# Patient Record
Sex: MALE | Race: WHITE | NOT HISPANIC OR LATINO | Employment: OTHER | URBAN - METROPOLITAN AREA
[De-identification: names, ages, dates, MRNs, and addresses within clinical notes are randomized per-mention and may not be internally consistent; named-entity substitution may affect disease eponyms.]

---

## 2017-01-18 ENCOUNTER — HOSPITAL ENCOUNTER (INPATIENT)
Facility: HOSPITAL | Age: 72
LOS: 3 days | Discharge: HOME | End: 2017-01-21
Attending: FAMILY MEDICINE | Admitting: INTERNAL MEDICINE

## 2017-01-18 ENCOUNTER — APPOINTMENT (OUTPATIENT)
Dept: GENERAL RADIOLOGY | Facility: HOSPITAL | Age: 72
End: 2017-01-18

## 2017-01-18 DIAGNOSIS — J18.9 PNEUMONIA OF LEFT LUNG DUE TO INFECTIOUS ORGANISM, UNSPECIFIED PART OF LUNG: Primary | ICD-10-CM

## 2017-01-18 PROBLEM — I10 BENIGN ESSENTIAL HTN: Status: ACTIVE | Noted: 2017-01-18

## 2017-01-18 PROBLEM — A41.9 SEPSIS: Status: ACTIVE | Noted: 2017-01-18

## 2017-01-18 PROBLEM — E11.65 TYPE 2 DIABETES MELLITUS WITH HYPERGLYCEMIA (HCC): Status: ACTIVE | Noted: 2017-01-18

## 2017-01-18 PROBLEM — R09.02 HYPOXIA: Status: ACTIVE | Noted: 2017-01-18

## 2017-01-18 LAB
ALBUMIN SERPL-MCNC: 3.7 G/DL (ref 3.5–5.2)
ALBUMIN/GLOB SERPL: 1.1 G/DL
ALP SERPL-CCNC: 56 U/L (ref 39–117)
ALT SERPL W P-5'-P-CCNC: 22 U/L (ref 1–41)
ANION GAP SERPL CALCULATED.3IONS-SCNC: 11.6 MMOL/L
AST SERPL-CCNC: 22 U/L (ref 1–40)
BASOPHILS # BLD AUTO: 0.01 10*3/MM3 (ref 0–0.2)
BASOPHILS NFR BLD AUTO: 0.1 % (ref 0–1.5)
BILIRUB SERPL-MCNC: 0.6 MG/DL (ref 0.1–1.2)
BUN BLD-MCNC: 29 MG/DL (ref 8–23)
BUN/CREAT SERPL: 21.5 (ref 7–25)
CALCIUM SPEC-SCNC: 9.3 MG/DL (ref 8.6–10.5)
CHLORIDE SERPL-SCNC: 95 MMOL/L (ref 98–107)
CO2 SERPL-SCNC: 28.4 MMOL/L (ref 22–29)
CREAT BLD-MCNC: 1.35 MG/DL (ref 0.76–1.27)
D-LACTATE SERPL-SCNC: 1.3 MMOL/L (ref 0.5–2)
DEPRECATED RDW RBC AUTO: 48.5 FL (ref 37–54)
EOSINOPHIL # BLD AUTO: 0 10*3/MM3 (ref 0–0.7)
EOSINOPHIL NFR BLD AUTO: 0 % (ref 0.3–6.2)
ERYTHROCYTE [DISTWIDTH] IN BLOOD BY AUTOMATED COUNT: 14.3 % (ref 11.5–14.5)
FLUAV AG NPH QL: NEGATIVE
FLUBV AG NPH QL IA: NEGATIVE
GFR SERPL CREATININE-BSD FRML MDRD: 52 ML/MIN/1.73
GLOBULIN UR ELPH-MCNC: 3.5 GM/DL
GLUCOSE BLD-MCNC: 210 MG/DL (ref 65–99)
GLUCOSE BLDC GLUCOMTR-MCNC: 132 MG/DL (ref 70–130)
GLUCOSE BLDC GLUCOMTR-MCNC: 193 MG/DL (ref 70–130)
HBA1C MFR BLD: 6.93 % (ref 4.8–5.6)
HCT VFR BLD AUTO: 41.4 % (ref 40.4–52.2)
HGB BLD-MCNC: 13.7 G/DL (ref 13.7–17.6)
HOLD SPECIMEN: NORMAL
HOLD SPECIMEN: NORMAL
IMM GRANULOCYTES # BLD: 0.07 10*3/MM3 (ref 0–0.03)
IMM GRANULOCYTES NFR BLD: 0.5 % (ref 0–0.5)
L PNEUMO1 AG UR QL IA: NEGATIVE
LYMPHOCYTES # BLD AUTO: 1.34 10*3/MM3 (ref 0.9–4.8)
LYMPHOCYTES NFR BLD AUTO: 9 % (ref 19.6–45.3)
MCH RBC QN AUTO: 30.5 PG (ref 27–32.7)
MCHC RBC AUTO-ENTMCNC: 33.1 G/DL (ref 32.6–36.4)
MCV RBC AUTO: 92.2 FL (ref 79.8–96.2)
MONOCYTES # BLD AUTO: 1.43 10*3/MM3 (ref 0.2–1.2)
MONOCYTES NFR BLD AUTO: 9.6 % (ref 5–12)
NEUTROPHILS # BLD AUTO: 12.06 10*3/MM3 (ref 1.9–8.1)
NEUTROPHILS NFR BLD AUTO: 80.8 % (ref 42.7–76)
NT-PROBNP SERPL-MCNC: 189.1 PG/ML (ref 5–900)
PLATELET # BLD AUTO: 238 10*3/MM3 (ref 140–500)
PMV BLD AUTO: 10.3 FL (ref 6–12)
POTASSIUM BLD-SCNC: 3.8 MMOL/L (ref 3.5–5.2)
PROT SERPL-MCNC: 7.2 G/DL (ref 6–8.5)
RBC # BLD AUTO: 4.49 10*6/MM3 (ref 4.6–6)
S PNEUM AG SPEC QL LA: NEGATIVE
SODIUM BLD-SCNC: 135 MMOL/L (ref 136–145)
TROPONIN T SERPL-MCNC: <0.01 NG/ML (ref 0–0.03)
WBC NRBC COR # BLD: 14.91 10*3/MM3 (ref 4.5–10.7)
WHOLE BLOOD HOLD SPECIMEN: NORMAL
WHOLE BLOOD HOLD SPECIMEN: NORMAL

## 2017-01-18 PROCEDURE — 84484 ASSAY OF TROPONIN QUANT: CPT | Performed by: FAMILY MEDICINE

## 2017-01-18 PROCEDURE — 25010000003 CEFTRIAXONE PER 250 MG: Performed by: FAMILY MEDICINE

## 2017-01-18 PROCEDURE — 85025 COMPLETE CBC W/AUTO DIFF WBC: CPT | Performed by: FAMILY MEDICINE

## 2017-01-18 PROCEDURE — 87040 BLOOD CULTURE FOR BACTERIA: CPT | Performed by: FAMILY MEDICINE

## 2017-01-18 PROCEDURE — 94640 AIRWAY INHALATION TREATMENT: CPT

## 2017-01-18 PROCEDURE — 87077 CULTURE AEROBIC IDENTIFY: CPT | Performed by: INTERNAL MEDICINE

## 2017-01-18 PROCEDURE — 83036 HEMOGLOBIN GLYCOSYLATED A1C: CPT | Performed by: INTERNAL MEDICINE

## 2017-01-18 PROCEDURE — 87798 DETECT AGENT NOS DNA AMP: CPT | Performed by: INTERNAL MEDICINE

## 2017-01-18 PROCEDURE — 82962 GLUCOSE BLOOD TEST: CPT

## 2017-01-18 PROCEDURE — 99285 EMERGENCY DEPT VISIT HI MDM: CPT

## 2017-01-18 PROCEDURE — 87185 SC STD ENZYME DETCJ PER NZM: CPT | Performed by: INTERNAL MEDICINE

## 2017-01-18 PROCEDURE — 25010000002 ENOXAPARIN PER 10 MG: Performed by: INTERNAL MEDICINE

## 2017-01-18 PROCEDURE — 87899 AGENT NOS ASSAY W/OPTIC: CPT | Performed by: INTERNAL MEDICINE

## 2017-01-18 PROCEDURE — 93005 ELECTROCARDIOGRAM TRACING: CPT | Performed by: FAMILY MEDICINE

## 2017-01-18 PROCEDURE — 83605 ASSAY OF LACTIC ACID: CPT | Performed by: FAMILY MEDICINE

## 2017-01-18 PROCEDURE — 83880 ASSAY OF NATRIURETIC PEPTIDE: CPT | Performed by: FAMILY MEDICINE

## 2017-01-18 PROCEDURE — 80053 COMPREHEN METABOLIC PANEL: CPT | Performed by: FAMILY MEDICINE

## 2017-01-18 PROCEDURE — 87070 CULTURE OTHR SPECIMN AEROBIC: CPT | Performed by: INTERNAL MEDICINE

## 2017-01-18 PROCEDURE — 71020 HC CHEST PA AND LATERAL: CPT

## 2017-01-18 PROCEDURE — 93010 ELECTROCARDIOGRAM REPORT: CPT | Performed by: INTERNAL MEDICINE

## 2017-01-18 PROCEDURE — 87449 NOS EACH ORGANISM AG IA: CPT | Performed by: INTERNAL MEDICINE

## 2017-01-18 PROCEDURE — 63710000001 INSULIN ASPART PER 5 UNITS: Performed by: INTERNAL MEDICINE

## 2017-01-18 PROCEDURE — 87486 CHLMYD PNEUM DNA AMP PROBE: CPT | Performed by: INTERNAL MEDICINE

## 2017-01-18 PROCEDURE — 87633 RESP VIRUS 12-25 TARGETS: CPT | Performed by: INTERNAL MEDICINE

## 2017-01-18 PROCEDURE — 87205 SMEAR GRAM STAIN: CPT | Performed by: INTERNAL MEDICINE

## 2017-01-18 PROCEDURE — 87581 M.PNEUMON DNA AMP PROBE: CPT | Performed by: INTERNAL MEDICINE

## 2017-01-18 PROCEDURE — 87804 INFLUENZA ASSAY W/OPTIC: CPT | Performed by: FAMILY MEDICINE

## 2017-01-18 PROCEDURE — 94799 UNLISTED PULMONARY SVC/PX: CPT

## 2017-01-18 RX ORDER — ACETAMINOPHEN 325 MG/1
650 TABLET ORAL EVERY 4 HOURS PRN
Status: DISCONTINUED | OUTPATIENT
Start: 2017-01-18 | End: 2017-01-21 | Stop reason: HOSPADM

## 2017-01-18 RX ORDER — ATORVASTATIN CALCIUM 20 MG/1
20 TABLET, FILM COATED ORAL DAILY
Status: DISCONTINUED | OUTPATIENT
Start: 2017-01-19 | End: 2017-01-21 | Stop reason: HOSPADM

## 2017-01-18 RX ORDER — HYDROCHLOROTHIAZIDE 50 MG/1
50 TABLET ORAL DAILY
COMMUNITY
End: 2017-01-21 | Stop reason: HOSPADM

## 2017-01-18 RX ORDER — ASPIRIN 81 MG/1
81 TABLET ORAL DAILY
COMMUNITY
End: 2019-11-08 | Stop reason: HOSPADM

## 2017-01-18 RX ORDER — SODIUM CHLORIDE 0.9 % (FLUSH) 0.9 %
1-10 SYRINGE (ML) INJECTION AS NEEDED
Status: DISCONTINUED | OUTPATIENT
Start: 2017-01-18 | End: 2017-01-21 | Stop reason: HOSPADM

## 2017-01-18 RX ORDER — IPRATROPIUM BROMIDE AND ALBUTEROL SULFATE 2.5; .5 MG/3ML; MG/3ML
3 SOLUTION RESPIRATORY (INHALATION)
Status: DISCONTINUED | OUTPATIENT
Start: 2017-01-18 | End: 2017-01-20

## 2017-01-18 RX ORDER — PIOGLITAZONEHYDROCHLORIDE 15 MG/1
15 TABLET ORAL DAILY
COMMUNITY
End: 2019-07-18 | Stop reason: SDUPTHER

## 2017-01-18 RX ORDER — ONDANSETRON 4 MG/1
4 TABLET, FILM COATED ORAL EVERY 6 HOURS PRN
Status: DISCONTINUED | OUTPATIENT
Start: 2017-01-18 | End: 2017-01-21 | Stop reason: HOSPADM

## 2017-01-18 RX ORDER — ONDANSETRON 4 MG/1
4 TABLET, ORALLY DISINTEGRATING ORAL EVERY 6 HOURS PRN
Status: DISCONTINUED | OUTPATIENT
Start: 2017-01-18 | End: 2017-01-21 | Stop reason: HOSPADM

## 2017-01-18 RX ORDER — ROSUVASTATIN CALCIUM 10 MG/1
10 TABLET, COATED ORAL DAILY
COMMUNITY
End: 2019-07-18 | Stop reason: SDUPTHER

## 2017-01-18 RX ORDER — DEXTROMETHORPHAN HYDROBROMIDE AND PROMETHAZINE HYDROCHLORIDE 15; 6.25 MG/5ML; MG/5ML
7.5 SYRUP ORAL EVERY 4 HOURS PRN
Status: DISCONTINUED | OUTPATIENT
Start: 2017-01-18 | End: 2017-01-21 | Stop reason: HOSPADM

## 2017-01-18 RX ORDER — OLMESARTAN MEDOXOMIL 20 MG/1
20 TABLET ORAL DAILY
COMMUNITY
End: 2019-05-07 | Stop reason: SDUPTHER

## 2017-01-18 RX ORDER — ASPIRIN 81 MG/1
81 TABLET ORAL DAILY
Status: DISCONTINUED | OUTPATIENT
Start: 2017-01-18 | End: 2017-01-21 | Stop reason: HOSPADM

## 2017-01-18 RX ORDER — CEFTRIAXONE SODIUM 1 G/50ML
1 INJECTION, SOLUTION INTRAVENOUS ONCE
Status: COMPLETED | OUTPATIENT
Start: 2017-01-18 | End: 2017-01-18

## 2017-01-18 RX ORDER — SODIUM CHLORIDE FOR INHALATION 7 %
4 VIAL, NEBULIZER (ML) INHALATION ONCE
Status: DISCONTINUED | OUTPATIENT
Start: 2017-01-18 | End: 2017-01-21 | Stop reason: HOSPADM

## 2017-01-18 RX ORDER — ONDANSETRON 2 MG/ML
4 INJECTION INTRAMUSCULAR; INTRAVENOUS EVERY 6 HOURS PRN
Status: DISCONTINUED | OUTPATIENT
Start: 2017-01-18 | End: 2017-01-21 | Stop reason: HOSPADM

## 2017-01-18 RX ORDER — AZITHROMYCIN 250 MG/1
250 TABLET, FILM COATED ORAL DAILY
COMMUNITY
End: 2017-01-21 | Stop reason: HOSPADM

## 2017-01-18 RX ORDER — SODIUM CHLORIDE 9 MG/ML
75 INJECTION, SOLUTION INTRAVENOUS CONTINUOUS
Status: DISCONTINUED | OUTPATIENT
Start: 2017-01-18 | End: 2017-01-20

## 2017-01-18 RX ORDER — DEXTROSE MONOHYDRATE 25 G/50ML
25 INJECTION, SOLUTION INTRAVENOUS
Status: DISCONTINUED | OUTPATIENT
Start: 2017-01-18 | End: 2017-01-21 | Stop reason: HOSPADM

## 2017-01-18 RX ORDER — METHYLPREDNISOLONE 8 MG/1
8 TABLET ORAL DAILY
COMMUNITY
End: 2017-01-21 | Stop reason: HOSPADM

## 2017-01-18 RX ORDER — NICOTINE POLACRILEX 4 MG
15 LOZENGE BUCCAL
Status: DISCONTINUED | OUTPATIENT
Start: 2017-01-18 | End: 2017-01-21 | Stop reason: HOSPADM

## 2017-01-18 RX ORDER — PIOGLITAZONEHYDROCHLORIDE 15 MG/1
15 TABLET ORAL DAILY
Status: DISCONTINUED | OUTPATIENT
Start: 2017-01-19 | End: 2017-01-21 | Stop reason: HOSPADM

## 2017-01-18 RX ORDER — OLMESARTAN MEDOXOMIL 20 MG/1
20 TABLET ORAL DAILY
Status: DISCONTINUED | OUTPATIENT
Start: 2017-01-19 | End: 2017-01-21 | Stop reason: HOSPADM

## 2017-01-18 RX ORDER — CEFTRIAXONE SODIUM 1 G/50ML
1 INJECTION, SOLUTION INTRAVENOUS EVERY 24 HOURS
Status: DISCONTINUED | OUTPATIENT
Start: 2017-01-19 | End: 2017-01-18

## 2017-01-18 RX ORDER — SODIUM CHLORIDE 0.9 % (FLUSH) 0.9 %
10 SYRINGE (ML) INJECTION AS NEEDED
Status: DISCONTINUED | OUTPATIENT
Start: 2017-01-18 | End: 2017-01-21 | Stop reason: HOSPADM

## 2017-01-18 RX ORDER — CEFTRIAXONE SODIUM 1 G/50ML
1 INJECTION, SOLUTION INTRAVENOUS EVERY 24 HOURS
Status: DISCONTINUED | OUTPATIENT
Start: 2017-01-19 | End: 2017-01-21 | Stop reason: HOSPADM

## 2017-01-18 RX ADMIN — SODIUM CHLORIDE 500 ML: 9 INJECTION, SOLUTION INTRAVENOUS at 13:21

## 2017-01-18 RX ADMIN — SODIUM CHLORIDE 125 ML/HR: 9 INJECTION, SOLUTION INTRAVENOUS at 15:27

## 2017-01-18 RX ADMIN — CEFTRIAXONE SODIUM 1 G: 1 INJECTION, SOLUTION INTRAVENOUS at 13:21

## 2017-01-18 RX ADMIN — IPRATROPIUM BROMIDE AND ALBUTEROL SULFATE 3 ML: .5; 3 SOLUTION RESPIRATORY (INHALATION) at 14:23

## 2017-01-18 RX ADMIN — ENOXAPARIN SODIUM 40 MG: 40 INJECTION SUBCUTANEOUS at 15:28

## 2017-01-18 RX ADMIN — SODIUM CHLORIDE 125 ML/HR: 9 INJECTION, SOLUTION INTRAVENOUS at 15:31

## 2017-01-18 RX ADMIN — IPRATROPIUM BROMIDE AND ALBUTEROL SULFATE 3 ML: .5; 3 SOLUTION RESPIRATORY (INHALATION) at 20:32

## 2017-01-18 RX ADMIN — DOXYCYCLINE 100 MG: 100 INJECTION, POWDER, LYOPHILIZED, FOR SOLUTION INTRAVENOUS at 16:52

## 2017-01-18 RX ADMIN — INSULIN ASPART 3 UNITS: 100 INJECTION, SOLUTION INTRAVENOUS; SUBCUTANEOUS at 17:22

## 2017-01-18 NOTE — IP AVS SNAPSHOT
AFTER VISIT SUMMARY             Ray Pratt           About your hospitalization     You were admitted on:  January 18, 2017 You last received care in the:  [address]       Procedures & Surgeries         Medications    If you or your caregiver advised us that you are currently taking a medication and that medication is marked below as “Resume”, this simply indicates that we have reviewed those medications to make sure our new therapy recommendations do not interfere.  If you have concerns about medications other than those new ones which we are prescribing today, please consult the physician who prescribed them (or your primary physician).  Our review of your home medications is not meant to indicate that we are directing their use.             Your Medications      START taking these medications     albuterol 108 (90 BASE) MCG/ACT inhaler   Inhale 2 puffs Every 4 (Four) Hours As Needed for wheezing for up to 1 day.   Commonly known as:  PROVENTIL HFA;VENTOLIN HFA           cefdinir 300 MG capsule   Take 1 capsule by mouth 2 (Two) Times a Day for 4 days.   Commonly known as:  OMNICEF   Next Dose Due:  today             CONTINUE taking these medications     aspirin 81 MG EC tablet   Take 81 mg by mouth Daily.   Last time this was given:  1/21/2017  8:52 AM   Next Dose Due:  1/22           olmesartan 20 MG tablet   Take 20 mg by mouth Daily.   Last time this was given:  1/21/2017  8:52 AM   Commonly known as:  BENICAR   Next Dose Due:  1/22           pioglitazone 15 MG tablet   Take 15 mg by mouth Daily.   Last time this was given:  1/21/2017  8:52 AM   Commonly known as:  ACTOS   Next Dose Due:  1/22           rosuvastatin 10 MG tablet   Take 10 mg by mouth Daily.   Commonly known as:  CRESTOR   Next Dose Due:  1/22             STOP taking these medications     azithromycin 250 MG tablet   Commonly known as:  ZITHROMAX           hydrochlorothiazide 50 MG tablet   Commonly known as:   HYDRODIURIL           HYDROcodone-homatropine 5-1.5 MG/5ML syrup   Commonly known as:  HYCODAN           methylPREDNISolone 8 MG tablet   Commonly known as:  MEDROL                Where to Get Your Medications      These medications were sent to [facility] [id number] [place] [address] - [address] AT [facility] Kamaljit Dunham(Keeley Dunham) &  - [phone]  - [phone]   [address] , [facility] [address]     Phone:  [phone]     albuterol 108 (90 BASE) MCG/ACT inhaler    cefdinir 300 MG capsule                  Your Medications      Your Medication List           Morning Noon Evening Bedtime As Needed    albuterol 108 (90 BASE) MCG/ACT inhaler   Inhale 2 puffs Every 4 (Four) Hours As Needed for wheezing for up to 1 day.   Commonly known as:  PROVENTIL HFA;VENTOLIN HFA                            Every 4 hours as needed for soa       aspirin 81 MG EC tablet   Take 81 mg by mouth Daily.                                   cefdinir 300 MG capsule   Take 1 capsule by mouth 2 (Two) Times a Day for 4 days.   Commonly known as:  OMNICEF                                      olmesartan 20 MG tablet   Take 20 mg by mouth Daily.   Commonly known as:  BENICAR                                   pioglitazone 15 MG tablet   Take 15 mg by mouth Daily.   Commonly known as:  ACTOS                                   rosuvastatin 10 MG tablet   Take 10 mg by mouth Daily.   Commonly known as:  CRESTOR                                            Instructions for After Discharge        Discharge References/Attachments     AZITHROMYCIN FOR INFUSION (ENGLISH)    CEFTRIAXONE INJECTION (ENGLISH)    ENOXAPARIN INJECTION (ENGLISH)    ALBUTEROL INHALATION AEROSOL (ENGLISH)    CEFDINIR CAPSULES (ENGLISH)    PNEUMONIA, ADULT (ENGLISH)       Follow-ups for After Discharge        Follow-up Information     Follow up with Benjamín Granda MD .    Specialty:  Family Medicine    Why:  Please call and Schedule a one week Hospital  FOllow Up as soon as Possible    Contact information:    [address] [facility] [address]  [phone]        Referrals and Follow-ups to Schedule     XR Sternum PA & Lateral    2017    Reason for Exam:  f/u pneumonia             Scheduled Appointments     [facility] Out- Patient Scheduling will call you with your  Appointment Time  for a PA +LAT CXR to be done near the end of the month [phone]                                                                       PLEASE CALL THEM IF THEY DO NOT CALL YOU. CHANGE XRAY FROM  TO THE END OF February.           [employer] Signup     [facility] [employer] allows you to send messages to your doctor, view your test results, renew your prescriptions, schedule appointments, and more. To sign up, go to [employer] and click on the Sign Up Now link in the New User? box. Enter your [employer] Activation Code exactly as it appears below along with the last four digits of your Social Security Number and your Date of Birth () to complete the sign-up process. If you do not sign up before the expiration date, you must request a new code.    [employer] Activation Code: [email or web address]  Expires: 2017  2:32 PM    If you have questions, you can email [email or web address] or call [phone] to talk to our [employer] staff. Remember, [employer] is NOT to be used for urgent needs. For medical emergencies, dial 911.           Summary of Your Hospitalization        Reason for Hospitalization     Your primary diagnosis was:  Pneumonia Of Left Lung Due To Haemophilus Influenzae    Your diagnoses also included:  Infection In Bloodstream, Decreased Oxygen Supply, Diabetes With Complication, Benign Essential Htn      Care Providers     Provider Service Role Specialty    Rigo Mendoza MD Internal Medicine Attending Provider Internal Medicine      Your Allergies  Date Reviewed: 2017    No active allergies      Pending Labs      Order Current Status    Blood Culture Preliminary result    Blood Culture Preliminary result      Patient Belongings Returned     Document Return of Belongings Flowsheet     Were the patient bedside belongings sent home?   Yes   Belongings Retrieved from Security & Sent Home   --    Belongings Sent to Safe   --   Medications Retrieved from Pharmacy & Sent Home   N/A              More Information      Azithromycin for infusion  What is this medicine?  AZITHROMYCIN (az ith tracee MYE sin) is a macrolide antibiotic. It is used to treat or prevent certain kinds of bacterial infections. It will not work for colds, flu, or other viral infections.  This medicine may be used for other purposes; ask your health care provider or pharmacist if you have questions.  What should I tell my health care provider before I take this medicine?  They need to know if you have any of these conditions:  -kidney disease  -liver disease  -irregular heartbeat or heart disease  -an unusual or allergic reaction to azithromycin, erythromycin, other macrolide antibiotics, foods, dyes, or preservatives  -pregnant or trying to get pregnant  -breast-feeding  How should I use this medicine?  This medicine is for infusion into a vein. It is given by a health care professional in a hospital or clinic or may be given during home health care. Take your medicine at regular intervals. Do not take your medicine more often than directed. Take all of your medicine as directed even if you think you are better. Do not skip doses or stop your medicine early.  Talk to your pediatrician regarding the use of this medicine in children. Special care may be needed.  Overdosage: If you think you have taken too much of this medicine contact a poison control center or emergency room at once.  NOTE: This medicine is only for you. Do not share this medicine with others.  What if I miss a dose?  This does not apply.  What may interact with this medicine?  Do not take this  medicine with any of the following medications:  -lincomycin  This medicine may also interact with the following medications:  -amiodarone  -birth control pills  -cyclosporine  -digoxin  -nelfinavir  -phenytoin  -warfarin  This list may not describe all possible interactions. Give your health care provider a list of all the medicines, herbs, non-prescription drugs, or dietary supplements you use. Also tell them if you smoke, drink alcohol, or use illegal drugs. Some items may interact with your medicine.  What should I watch for while using this medicine?  Your condition will be monitored closely. Tell your doctor or health care professional if your symptoms do not improve.  What side effects may I notice from receiving this medicine?  Side effects that you should report to your doctor or health care professional as soon as possible:  -allergic reactions like skin rash, itching or hives, swelling of the face, lips, or tongue  -anxious, hyperactive, nervous  -dark urine  -difficulty breathing  -hearing loss  -irregular heartbeat or chest pain  -pain or difficulty passing urine  -redness, blistering, peeling or loosening of the skin, including inside the mouth  -white patches or sores in the mouth  -yellowing of eyes, skin  Side effects that usually do not require medical attention (report to your doctor or health care professional if they continue or are bothersome):  -diarrhea  -dizziness, drowsiness  -headache  -loss of appetite, bad taste  -pain, swelling at the site of injection  -stomach upset, vomiting  -vaginal irritation  This list may not describe all possible side effects. Call your doctor for medical advice about side effects. You may report side effects to FDA at 7-548-FDA-6377.  Where should I keep my medicine?  Keep out of the reach of children.  This drug is usually given in a hospital or clinic and will usually not be stored at home. You will be instructed on how to store this medicine, if needed.  Throw away any unused medicine after the expiration date on the label.  NOTE: This sheet is a summary. It may not cover all possible information. If you have questions about this medicine, talk to your doctor, pharmacist, or health care provider.     © 2016, Elsevier/Gold Standard. (2014 15:34:55)          Ceftriaxone injection  What is this medicine?  CEFTRIAXONE (sef try AX one) is a cephalosporin antibiotic. It is used to treat certain kinds of bacterial infections. It will not work for colds, flu, or other viral infections.  This medicine may be used for other purposes; ask your health care provider or pharmacist if you have questions.  What should I tell my health care provider before I take this medicine?  They need to know if you have any of these conditions:  -any chronic illness  -bowel disease, like colitis  -both kidney and liver disease  -high bilirubin level in  patients  -an unusual or allergic reaction to ceftriaxone, other cephalosporin or penicillin antibiotics, foods, dyes, or preservatives  -pregnant or trying to get pregnant  -breast-feeding  How should I use this medicine?  This medicine is injected into a muscle or infused it into a vein. It is usually given in a medical office or clinic. If you are to give this medicine you will be taught how to inject it. Follow instructions carefully. Use your doses at regular intervals. Do not take your medicine more often than directed. Do not skip doses or stop your medicine early even if you feel better. Do not stop taking except on your doctor's advice.  Talk to your pediatrician regarding the use of this medicine in children. Special care may be needed.  Overdosage: If you think you have taken too much of this medicine contact a poison control center or emergency room at once.  NOTE: This medicine is only for you. Do not share this medicine with others.  What if I miss a dose?  If you miss a dose, take it as soon as you can. If it is  almost time for your next dose, take only that dose. Do not take double or extra doses.  What may interact with this medicine?  Do not take this medicine with any of the following medications:  -intravenous calcium  This medicine may also interact with the following medications:  -birth control pills  This list may not describe all possible interactions. Give your health care provider a list of all the medicines, herbs, non-prescription drugs, or dietary supplements you use. Also tell them if you smoke, drink alcohol, or use illegal drugs. Some items may interact with your medicine.  What should I watch for while using this medicine?  Tell your doctor or health care professional if your symptoms do not improve or if they get worse.  Do not treat diarrhea with over the counter products. Contact your doctor if you have diarrhea that lasts more than 2 days or if it is severe and watery.  If you are being treated for a sexually transmitted disease, avoid sexual contact until you have finished your treatment. Having sex can infect your sexual partner.  Calcium may bind to this medicine and cause lung or kidney problems. Avoid calcium products while taking this medicine and for 48 hours after taking the last dose of this medicine.  What side effects may I notice from receiving this medicine?  Side effects that you should report to your doctor or health care professional as soon as possible:  -allergic reactions like skin rash, itching or hives, swelling of the face, lips, or tongue  -breathing problems  -fever, chills  -irregular heartbeat  -pain when passing urine  -seizures  -stomach pain, cramps  -unusual bleeding, bruising  -unusually weak or tired  Side effects that usually do not require medical attention (report to your doctor or health care professional if they continue or are bothersome):  -diarrhea  -dizzy, drowsy  -headache  -nausea, vomiting  -pain, swelling, irritation where injected  -stomach  upset  -sweating  This list may not describe all possible side effects. Call your doctor for medical advice about side effects. You may report side effects to FDA at 6-878-TWO-1429.  Where should I keep my medicine?  Keep out of the reach of children.  Store at room temperature below 25 degrees C (77 degrees F). Protect from light. Throw away any unused vials after the expiration date.  NOTE: This sheet is a summary. It may not cover all possible information. If you have questions about this medicine, talk to your doctor, pharmacist, or health care provider.     © 2016, Elsevier/Gold Standard. (2015-07-06 09:14:54)          Enoxaparin injection  What is this medicine?  ENOXAPARIN (ee nox a PA rin) is used after knee, hip, or abdominal surgeries to prevent blood clotting. It is also used to treat existing blood clots in the lungs or in the veins.  This medicine may be used for other purposes; ask your health care provider or pharmacist if you have questions.  What should I tell my health care provider before I take this medicine?  They need to know if you have any of these conditions:  -bleeding disorders, hemorrhage, or hemophilia  -infection of the heart or heart valves  -kidney or liver disease  -previous stroke  -prosthetic heart valve  -recent surgery or delivery of a baby  -ulcer in the stomach or intestine, diverticulitis, or other bowel disease  -an unusual or allergic reaction to enoxaparin, heparin, pork or pork products, other medicines, foods, dyes, or preservatives  -pregnant or trying to get pregnant  -breast-feeding  How should I use this medicine?  This medicine is for injection under the skin. It is usually given by a health-care professional. You or a family member may be trained on how to give the injections. If you are to give yourself injections, make sure you understand how to use the syringe, measure the dose if necessary, and give the injection. To avoid bruising, do not rub the site where  this medicine has been injected. Do not take your medicine more often than directed. Do not stop taking except on the advice of your doctor or health care professional.  Make sure you receive a puncture-resistant container to dispose of the needles and syringes once you have finished with them. Do not reuse these items. Return the container to your doctor or health care professional for proper disposal.  Talk to your pediatrician regarding the use of this medicine in children. Special care may be needed.  Overdosage: If you think you have taken too much of this medicine contact a poison control center or emergency room at once.  NOTE: This medicine is only for you. Do not share this medicine with others.  What if I miss a dose?  If you miss a dose, take it as soon as you can. If it is almost time for your next dose, take only that dose. Do not take double or extra doses.  What may interact with this medicine?  -aspirin and aspirin-like medicines  -certain medicines that treat or prevent blood clots  -dipyridamole  -NSAIDs, medicines for pain and inflammation, like ibuprofen or naproxen  This list may not describe all possible interactions. Give your health care provider a list of all the medicines, herbs, non-prescription drugs, or dietary supplements you use. Also tell them if you smoke, drink alcohol, or use illegal drugs. Some items may interact with your medicine.  What should I watch for while using this medicine?  Visit your doctor or health care professional for regular checks on your progress. Your condition will be monitored carefully while you are receiving this medicine.  Notify your doctor or health care professional and seek emergency treatment if you develop breathing problems; changes in vision; chest pain; severe, sudden headache; pain, swelling, warmth in the leg; trouble speaking; sudden numbness or weakness of the face, arm, or leg. These can be signs that your condition has gotten worse.  If you  are going to have surgery, tell your doctor or health care professional that you are taking this medicine.  Do not stop taking this medicine without first talking to your doctor. Be sure to refill your prescription before you run out of medicine.  Avoid sports and activities that might cause injury while you are using this medicine. Severe falls or injuries can cause unseen bleeding. Be careful when using sharp tools or knives. Consider using an electric razor. Take special care brushing or flossing your teeth. Report any injuries, bruising, or red spots on the skin to your doctor or health care professional.  What side effects may I notice from receiving this medicine?  Side effects that you should report to your doctor or health care professional as soon as possible:  -allergic reactions like skin rash, itching or hives, swelling of the face, lips, or tongue  -feeling faint or lightheaded, falls  -signs and symptoms of bleeding such as bloody or black, tarry stools; red or dark-brown urine; spitting up blood or brown material that looks like coffee grounds; red spots on the skin; unusual bruising or bleeding from the eye, gums, or nose  Side effects that usually do not require medical attention (report to your doctor or health care professional if they continue or are bothersome):  -pain, redness, or irritation at site where injected  This list may not describe all possible side effects. Call your doctor for medical advice about side effects. You may report side effects to FDA at 4-985-FDA-3178.  Where should I keep my medicine?  Keep out of the reach of children.  Store at room temperature between 15 and 30 degrees C (59 and 86 degrees F). Do not freeze. If your injections have been specially prepared, you may need to store them in the refrigerator. Ask your pharmacist. Throw away any unused medicine after the expiration date.  NOTE: This sheet is a summary. It may not cover all possible information. If you have  questions about this medicine, talk to your doctor, pharmacist, or health care provider.     © 2016, [employer]vier/[employer] Standard. (2015-04-21 16:06:21)          Albuterol inhalation aerosol  What is this medicine?  ALBUTEROL (al BYOO ter ole) is a bronchodilator. It helps open up the airways in your lungs to make it easier to breathe. This medicine is used to treat and to prevent bronchospasm.  This medicine may be used for other purposes; ask your health care provider or pharmacist if you have questions.  What should I tell my health care provider before I take this medicine?  They need to know if you have any of the following conditions:  -diabetes  -heart disease or irregular heartbeat  -high blood pressure  -pheochromocytoma  -seizures  -thyroid disease  -an unusual or allergic reaction to albuterol, levalbuterol, sulfites, other medicines, foods, dyes, or preservatives  -pregnant or trying to get pregnant  -breast-feeding  How should I use this medicine?  This medicine is for inhalation through the mouth. Follow the directions on your prescription label. Take your medicine at regular intervals. Do not use more often than directed. Make sure that you are using your inhaler correctly. Ask you doctor or health care provider if you have any questions.  Talk to your pediatrician regarding the use of this medicine in children. Special care may be needed.  Overdosage: If you think you have taken too much of this medicine contact a poison control center or emergency room at once.  NOTE: This medicine is only for you. Do not share this medicine with others.  What if I miss a dose?  If you miss a dose, use it as soon as you can. If it is almost time for your next dose, use only that dose. Do not use double or extra doses.  What may interact with this medicine?  -anti-infectives like chloroquine and pentamidine  -caffeine  -cisapride  -diuretics  -medicines for colds  -medicines for depression or for emotional or psychotic  conditions  -medicines for weight loss including some herbal products  -methadone  -some antibiotics like clarithromycin, erythromycin, levofloxacin, and linezolid  -some heart medicines  -steroid hormones like dexamethasone, cortisone, hydrocortisone  -theophylline  -thyroid hormones  This list may not describe all possible interactions. Give your health care provider a list of all the medicines, herbs, non-prescription drugs, or dietary supplements you use. Also tell them if you smoke, drink alcohol, or use illegal drugs. Some items may interact with your medicine.  What should I watch for while using this medicine?  Tell your doctor or health care professional if your symptoms do not improve. Do not use extra albuterol. If your asthma or bronchitis gets worse while you are using this medicine, call your doctor right away.  If your mouth gets dry try chewing sugarless gum or sucking hard candy. Drink water as directed.  What side effects may I notice from receiving this medicine?  Side effects that you should report to your doctor or health care professional as soon as possible:  -allergic reactions like skin rash, itching or hives, swelling of the face, lips, or tongue  -breathing problems  -chest pain  -feeling faint or lightheaded, falls  -high blood pressure  -irregular heartbeat  -fever  -muscle cramps or weakness  -pain, tingling, numbness in the hands or feet  -vomiting  Side effects that usually do not require medical attention (report to your doctor or health care professional if they continue or are bothersome):  -cough  -difficulty sleeping  -headache  -nervousness or trembling  -stomach upset  -stuffy or runny nose  -throat irritation  -unusual taste  This list may not describe all possible side effects. Call your doctor for medical advice about side effects. You may report side effects to FDA at 2-801-FDA-1695.  Where should I keep my medicine?  Keep out of the reach of children.  Store at room  temperature between 15 and 30 degrees C (59 and 86 degrees F). The contents are under pressure and may burst when exposed to heat or flame. Do not freeze. This medicine does not work as well if it is too cold. Throw away any unused medicine after the expiration date. Inhalers need to be thrown away after the labeled number of puffs have been used or by the expiration date; whichever comes first. Ventolin HFA should be thrown away 12 months after removing from foil pouch. Check the instructions that come with your medicine.  NOTE: This sheet is a summary. It may not cover all possible information. If you have questions about this medicine, talk to your doctor, pharmacist, or health care provider.     © 2016, [employer]/Gold Standard. (2014-06-05 10:57:17)          Cefdinir capsules  What is this medicine?  CEFDINIR (SEF di ner) is a cephalosporin antibiotic. It is used to treat certain kinds of bacterial infections. It will not work for colds, flu, or other viral infections.  This medicine may be used for other purposes; ask your health care provider or pharmacist if you have questions.  What should I tell my health care provider before I take this medicine?  They need to know if you have any of these conditions:  -bleeding problems  -kidney disease  -stomach or intestine problems (especially colitis)  -an unusual or allergic reaction to cefdinir, other cephalosporin antibiotics, penicillin, penicillamine, other foods, dyes or preservatives  -pregnant or trying to get pregnant  -breast-feeding  How should I use this medicine?  Take this medicine by mouth. Swallow it with a drink of water. Follow the directions on the prescription label. You can take it with or without food. If it upsets your stomach it may help to take it with food. Take your doses at regular intervals. Do not take it more often than directed. Finish all the medicine you are prescribed even if you think your infection is better.  Talk to your  pediatrician regarding the use of this medicine in children. Special care may be needed.  Overdosage: If you think you have taken too much of this medicine contact a poison control center or emergency room at once.  NOTE: This medicine is only for you. Do not share this medicine with others.  What if I miss a dose?  If you miss a dose, take it as soon as you can. If it is almost time for your next dose, take only that dose. Do not take double or extra doses.  What may interact with this medicine?  -antacids that contain aluminum or magnesium  -iron supplements  -other antibiotics  -probenecid  This list may not describe all possible interactions. Give your health care provider a list of all the medicines, herbs, non-prescription drugs, or dietary supplements you use. Also tell them if you smoke, drink alcohol, or use illegal drugs. Some items may interact with your medicine.  What should I watch for while using this medicine?  Tell your doctor or health care professional if your symptoms do not get better in a few days.  If you are diabetic you may get a false-positive result for sugar in your urine. Check with your doctor or health care professional before you change your diet or the dose of your diabetes medicine.  What side effects may I notice from receiving this medicine?  Side effects that you should report to your doctor or health care professional as soon as possible:  -allergic reactions like skin rash, itching or hives, swelling of the face, lips, or tongue  -breathing problems  -fever or chills  -redness, blistering, peeling or loosening of the skin, including inside the mouth  -seizures  -severe or watery diarrhea  -sore throat  -swollen joints  -trouble passing urine or change in the amount of urine  -unusual bleeding or bruising  -unusually weak or tired  Side effects that usually do not require medical attention (report to your doctor or health care professional if they continue or are  bothersome):  -constipation  -dizziness  -gas or heartburn  -headache  -loss of appetite  -nausea or vomiting  -stomach pain  -stool discoloration  -vaginal itching  This list may not describe all possible side effects. Call your doctor for medical advice about side effects. You may report side effects to FDA at 7-978-BJU-7912.  Where should I keep my medicine?  Keep out of the reach of children.  Store at room temperature between 15 and 30 degrees C (59 and 86 degrees F). Throw the medicine away after the expiration date.  NOTE: This sheet is a summary. It may not cover all possible information. If you have questions about this medicine, talk to your doctor, pharmacist, or health care provider.     © 2016, [employer]/Gold Standard. (2009-02-27 16:02:53)          Community-Acquired Pneumonia, Adult  Pneumonia is an infection of the lungs. There are different types of pneumonia. One type can develop while a person is in a hospital. A different type, called community-acquired pneumonia, develops in people who are not, or have not recently been, in the hospital or other health care facility.   CAUSES  Pneumonia may be caused by bacteria, viruses, or funguses. Community-acquired pneumonia is often caused by Streptococcus pneumonia bacteria. These bacteria are often passed from one person to another by breathing in droplets from the cough or sneeze of an infected person.  RISK FACTORS  The condition is more likely to develop in:  · People who have chronic diseases, such as chronic obstructive pulmonary disease (COPD), asthma, congestive heart failure, cystic fibrosis, diabetes, or kidney disease.  · People who have early-stage or late-stage HIV.  · People who have sickle cell disease.  · People who have had their spleen removed (splenectomy).  · People who have poor dental hygiene.  · People who have medical conditions that increase the risk of breathing in (aspirating) secretions their own mouth and nose.    · People who  have a weakened immune system (immunocompromised).  · People who smoke.  · People who travel to areas where pneumonia-causing germs commonly exist.  · People who are around animal habitats or animals that have pneumonia-causing germs, including birds, bats, rabbits, cats, and farm animals.  SYMPTOMS  Symptoms of this condition include:  · A dry cough.  · A wet (productive) cough.  · Fever.  · Sweating.  · Chest pain, especially when breathing deeply or coughing.  · Rapid breathing or difficulty breathing.  · Shortness of breath.  · Shaking chills.  · Fatigue.  · Muscle aches.  DIAGNOSIS  Your health care provider will take a medical history and perform a physical exam. You may also have other tests, including:  · Imaging studies of your chest, including X-rays.  · Tests to check your blood oxygen level and other blood gases.  · Other tests on blood, mucus (sputum), fluid around your lungs (pleural fluid), and urine.  If your pneumonia is severe, other tests may be done to identify the specific cause of your illness.  TREATMENT  The type of treatment that you receive depends on many factors, such as the cause of your pneumonia, the medicines you take, and other medical conditions that you have. For most adults, treatment and recovery from pneumonia may occur at home. In some cases, treatment must happen in a hospital. Treatment may include:  · Antibiotic medicines, if the pneumonia was caused by bacteria.  · Antiviral medicines, if the pneumonia was caused by a virus.  · Medicines that are given by mouth or through an IV tube.  · Oxygen.  · Respiratory therapy.  Although rare, treating severe pneumonia may include:  · Mechanical ventilation. This is done if you are not breathing well on your own and you cannot maintain a safe blood oxygen level.  · Thoracentesis. This procedure removes fluid around one lung or both lungs to help you breathe better.  HOME CARE INSTRUCTIONS  · Take over-the-counter and prescription  medicines only as told by your health care provider.    Only take cough medicine if you are losing sleep. Understand that cough medicine can prevent your body's natural ability to remove mucus from your lungs.    If you were prescribed an antibiotic medicine, take it as told by your health care provider. Do not stop taking the antibiotic even if you start to feel better.  · Sleep in a semi-upright position at night. Try sleeping in a reclining chair, or place a few pillows under your head.  · Do not use tobacco products, including cigarettes, chewing tobacco, and e-cigarettes. If you need help quitting, ask your health care provider.  · Drink enough water to keep your urine clear or pale yellow. This will help to thin out mucus secretions in your lungs.  PREVENTION  There are ways that you can decrease your risk of developing community-acquired pneumonia. Consider getting a pneumococcal vaccine if:  · You are older than 65 years of age.  · You are older than 19 years of age and are undergoing cancer treatment, have chronic lung disease, or have other medical conditions that affect your immune system. Ask your health care provider if this applies to you.  There are different types and schedules of pneumococcal vaccines. Ask your health care provider which vaccination option is best for you.  You may also prevent community-acquired pneumonia if you take these actions:  · Get an influenza vaccine every year. Ask your health care provider which type of influenza vaccine is best for you.  · Go to the dentist on a regular basis.  · Wash your hands often. Use hand  if soap and water are not available.  SEEK MEDICAL CARE IF:  · You have a fever.  · You are losing sleep because you cannot control your cough with cough medicine.  SEEK IMMEDIATE MEDICAL CARE IF:  · You have worsening shortness of breath.  · You have increased chest pain.  · Your sickness becomes worse, especially if you are an older adult or have a  weakened immune system.  · You cough up blood.     This information is not intended to replace advice given to you by your health care provider. Make sure you discuss any questions you have with your health care provider.     Document Released: 12/18/2006 Document Revised: 09/07/2016 Document Reviewed: 04/13/2016  [employer] Patient Education ©2016 [employer]            SYMPTOMS OF A STROKE    Call 911 or have someone take you to the Emergency Department if you have any of the following:    · Sudden numbness or weakness of your face, arm or leg especially on one side of the body  · Sudden confusion, diffiiculty speaking or trouble understanding   · Changes in your vision or loss of sight in one eye  · Sudden severe headache with no known cause  · sudden dizziness, trouble walking, loss of balance or coordination    It is important to seek emergency care right away if you have further stroke symptoms. If you get emergency help quickly, the powerful clot-dissolving medicines can reduce the disabilities caused by a stroke.     For more information:    American Stroke Association  6-056-3-STROKE  www.strokeassociation.org           IF YOU SMOKE OR USE TOBACCO PLEASE READ THE FOLLOWING:    Why is smoking bad for me?  Smoking increases the risk of heart disease, lung disease, vascular disease, stroke, and cancer.     If you smoke, STOP!    If you would like more information on quitting smoking, please visit the [facility] website: [email or web address]   This link will provide additional resources including the QUIT line and the Beat the Pack support groups.     For more information:    American Cancer Society  ([phone]    American Heart Association  1-386.716.6550               YOU ARE THE MOST IMPORTANT FACTOR IN YOUR RECOVERY.     Follow all instructions carefully.     I have reviewed my discharge instructions with my nurse, including the following  information, if applicable:     Information about my illness and diagnosis   Follow up appointments (including lab draws)   Wound Care   Equipment Needs   Medications (new and continuing) along with side effects   Preventative information such as vaccines and smoking cessations   Diet   Pain   I know when to contact my Doctor's office or seek emergency care      I want my nurse to describe the side effects of my medications: YES NO   If the answer is no, I understand the side effects of my medications: YES NO   My nurse described the side effects of my medications in a way that I could understand: YES NO   I have taken my personal belongings and my own medications with me at discharge: YES NO            I have received this information and my questions have been answered. I have discussed any concerns I see with this plan with the nurse or physician. I understand these instructions.    Signature of Patient or Responsible Person: _____________________________________    Date: _________________  Time: __________________    Signature of Healthcare Provider: _______________________________________  Date: _________________  Time: __________________

## 2017-01-18 NOTE — ED PROVIDER NOTES
EMERGENCY DEPARTMENT ENCOUNTER    CHIEF COMPLAINT  Chief Complaint: SOB  History given by: patient  History limited by: nothing  Room Number: 18/18  PMD: Benjamín Granda MD      HPI:  Pt is a 71 y.o. male who presents complaining of worsening SOB that began 10 days ago. Pt also complains of a URI, productive cough with yellow sputum, CP secondary to cough, but denies fever, vomiting, diarrhea, urinary concerns, sore throat. Pt states that he is not on O2 at home.    Duration:  10 days  Onset: gradual  Timing: costant  Location: N/A  Radiation: N/A  Quality: N/A  Intensity/Severity: moderate  Progression: worsening  Associated Symptoms: URI, productive cough with yellow sputum  Aggravating Factors: none stated  Alleviating Factors: none stated  Previous Episodes: none stated  Treatment before arrival: none satted    PAST MEDICAL HISTORY  Active Ambulatory Problems     Diagnosis Date Noted   • No Active Ambulatory Problems     Resolved Ambulatory Problems     Diagnosis Date Noted   • No Resolved Ambulatory Problems     Past Medical History   Diagnosis Date   • Diabetes mellitus    • Hyperlipidemia    • Hypertension        PAST SURGICAL HISTORY  Past Surgical History   Procedure Laterality Date   • Mole removal     • Dental procedure         FAMILY HISTORY  History reviewed. No pertinent family history.    SOCIAL HISTORY  Social History     Social History   • Marital status:      Spouse name: N/A   • Number of children: N/A   • Years of education: N/A     Occupational History   • Not on file.     Social History Main Topics   • Smoking status: Never Smoker   • Smokeless tobacco: Not on file   • Alcohol use No   • Drug use: No   • Sexual activity: Not on file     Other Topics Concern   • Not on file     Social History Narrative   • No narrative on file       ALLERGIES  Review of patient's allergies indicates no known allergies.    REVIEW OF SYSTEMS  Review of Systems   Constitutional: Negative for chills, fever  and unexpected weight change.   HENT: Positive for congestion and rhinorrhea. Negative for sore throat.    Respiratory: Positive for cough (productive with yellow sputum) and shortness of breath.    Cardiovascular: Negative for chest pain and leg swelling.   Gastrointestinal: Negative for abdominal pain, blood in stool, diarrhea, nausea and vomiting.   Genitourinary: Negative for difficulty urinating, dysuria and frequency.   Musculoskeletal: Negative.    Skin: Negative.  Negative for rash.   Neurological: Negative for weakness and headaches.   All other systems reviewed and are negative.      PHYSICAL EXAM  ED Triage Vitals   Temp Heart Rate Resp BP SpO2   01/18/17 0813 01/18/17 0813 01/18/17 0851 01/18/17 0851 01/18/17 0813   99.6 °F (37.6 °C) 128 18 106/62 87 %      Temp src Heart Rate Source Patient Position BP Location FiO2 (%)   01/18/17 0851 01/18/17 0851 01/18/17 0851 01/18/17 0851 --   Oral Monitor Lying Left arm        Physical Exam   Constitutional: He is oriented to person, place, and time and well-developed, well-nourished, and in no distress.   HENT:   Head: Normocephalic and atraumatic.   Eyes: EOM are normal. Pupils are equal, round, and reactive to light.   Ptosis in the right eye.   Neck: Normal range of motion. Neck supple.   Cardiovascular: Normal rate, regular rhythm and normal heart sounds.    Pulmonary/Chest: Effort normal and breath sounds normal. No respiratory distress.   Abdominal: Soft. There is no tenderness. There is no rebound and no guarding.   Musculoskeletal: Normal range of motion. He exhibits no edema.   Neurological: He is alert and oriented to person, place, and time. He has normal sensation and normal strength.   Skin: Skin is warm and dry.   Psychiatric: Mood and affect normal.   Nursing note and vitals reviewed.      LAB RESULTS  Lab Results (last 24 hours)     Procedure Component Value Units Date/Time    CBC & Differential [[id number]] Collected:  01/18/17 0925    Specimen:   Blood Updated:  01/18/17 0944    Narrative:       The following orders were created for panel order CBC & Differential.  Procedure                               Abnormality         Status                     ---------                               -----------         ------                     CBC Auto Differential[[id number]]         Abnormal            Final result                 Please view results for these tests on the individual orders.    Comprehensive Metabolic Panel [[id number]]  (Abnormal) Collected:  01/18/17 0925    Specimen:  Blood Updated:  01/18/17 0958     Glucose 210 (H) mg/dL      BUN 29 (H) mg/dL      Creatinine 1.35 (H) mg/dL      Sodium 135 (L) mmol/L      Potassium 3.8 mmol/L      Chloride 95 (L) mmol/L      CO2 28.4 mmol/L      Calcium 9.3 mg/dL      Total Protein 7.2 g/dL      Albumin 3.70 g/dL      ALT (SGPT) 22 U/L      AST (SGOT) 22 U/L      Alkaline Phosphatase 56 U/L      Total Bilirubin 0.6 mg/dL      eGFR Non African Amer 52 (L) mL/min/1.73      Globulin 3.5 gm/dL      A/G Ratio 1.1 g/dL      BUN/Creatinine Ratio 21.5      Anion Gap 11.6 mmol/L     Narrative:       The MDRD GFR formula is only valid for adults with stable renal function between ages 18 and 70.    BNP [[id number]]  (Normal) Collected:  01/18/17 0925    Specimen:  Blood Updated:  01/18/17 0956     proBNP 189.1 pg/mL     Narrative:       Among patients with dyspnea, NT-proBNP is highly sensitive for the detection of acute congestive heart failure. In addition NT-proBNP of <300 pg/ml effectively rules out acute congestive heart failure with 99% negative predictive value.    Troponin [[id number]]  (Normal) Collected:  01/18/17 0925    Specimen:  Blood Updated:  01/18/17 0958     Troponin T <0.010 ng/mL     Narrative:       Troponin T Reference Ranges:  Less than 0.03 ng/mL:    Negative for AMI  0.03 to 0.09 ng/mL:      Indeterminant for AMI  Greater than 0.09 ng/mL: Positive for AMI    CBC Auto Differential [[id number]]  (Abnormal)  Collected:  01/18/17 0925    Specimen:  Blood Updated:  01/18/17 0944     WBC 14.91 (H) 10*3/mm3      RBC 4.49 (L) 10*6/mm3      Hemoglobin 13.7 g/dL      Hematocrit 41.4 %      MCV 92.2 fL      MCH 30.5 pg      MCHC 33.1 g/dL      RDW 14.3 %      RDW-SD 48.5 fl      MPV 10.3 fL      Platelets 238 10*3/mm3      Neutrophil % 80.8 (H) %      Lymphocyte % 9.0 (L) %      Monocyte % 9.6 %      Eosinophil % 0.0 (L) %      Basophil % 0.1 %      Immature Grans % 0.5 %      Neutrophils, Absolute 12.06 (H) 10*3/mm3      Lymphocytes, Absolute 1.34 10*3/mm3      Monocytes, Absolute 1.43 (H) 10*3/mm3      Eosinophils, Absolute 0.00 10*3/mm3      Basophils, Absolute 0.01 10*3/mm3      Immature Grans, Absolute 0.07 (H) 10*3/mm3     Lactic Acid, Plasma [[id number]]  (Normal) Collected:  01/18/17 0957    Specimen:  Blood Updated:  01/18/17 1019     Lactate 1.3 mmol/L     Influenza Antigen [[id number]]  (Normal) Collected:  01/18/17 1051    Specimen:  Swab from Nasopharynx Updated:  01/18/17 1110     Influenza A Ag, EIA Negative      Influenza B Ag, EIA Negative           I ordered the above labs and reviewed the results    RADIOLOGY  XR Chest 2 View   Final Result   Mild patchy infiltrate at the left lung base, follow-up   x-ray suggested.       This report was finalized on 1/18/2017 10:48 AM by Dr. Peter Daugherty MD.               I ordered the above noted radiological studies. Interpreted by radiologist. Reviewed by me in PACS.       PROCEDURES  Procedures    EKG           EKG time: 0837  Rhythm/Rate: NSR tachycardia 101  P waves and WI: normal, multiple PVCs  QRS, axis: normal   ST and T waves: nonspecific ST changes     Interpreted Contemporaneously by me, independently viewed  Changed compared to prior 2/16 PVCs new     PROGRESS AND CONSULTS  ED Course     0902- Ordered EKG, blood work, Troponin, BNP, and CXR for further evaluation.    1235- Call to A.    1252- Discussed pt's case with Dr. Spivey who agrees to admit the  pt.      MEDICAL DECISION MAKING  Results were reviewed/discussed with the patient and they were also made aware of online access. Pt also made aware that some labs, such as cultures, will not be resulted during ER visit and follow up with PMD is necessary.     MDM  Number of Diagnoses or Management Options  Pneumonia of left lung due to infectious organism, unspecified part of lung:      Amount and/or Complexity of Data Reviewed  Clinical lab tests: ordered and reviewed  Tests in the radiology section of CPT®: ordered and reviewed (CXR shows   Mild patchy infiltrate at the left lung base, follow-up  x-ray suggested.  )  Tests in the medicine section of CPT®: ordered and reviewed (See EKG procedure note.)  Decide to obtain previous medical records or to obtain history from someone other than the patient: yes  Independent visualization of images, tracings, or specimens: yes    Patient Progress  Patient progress: stable         DIAGNOSIS  Final diagnoses:   Pneumonia of left lung due to infectious organism, unspecified part of lung       DISPOSITION  ADMISSION    Discussed treatment plan and reason for admission with pt/family and admitting physician.  Pt/family voiced understanding of the plan for admission for further testing/treatment as needed.         Latest Documented Vital Signs:  As of 12:56 PM  BP- 111/72 HR- 82 Temp- 98.7 °F (37.1 °C) (Oral) O2 sat- 90%    --  Documentation assistance provided by key Adams for Dr. Hart.  Information recorded by the key was done at my direction and has been verified and validated by me.       Brittny Adams  01/18/17 1007       Brittny Adams  01/18/17 1350       Brittny Adams  01/18/17 1359       Patel Hart MD  01/18/17 1402

## 2017-01-18 NOTE — H&P
Patient Care Team:  Benjamín Granda MD as PCP - General  Benjamín Granda MD as PCP - Family Medicine    Chief complaint : cough    Subjective     Shortness of Breath   Pertinent negatives include no fever.   Pleasant 70 yo male with history of HTN and DM presents with cough. Initially dry cough x 1.5 weeks. Has had multiple sick contacts. Then yesterday began having productive cough, chills. Came to [facility] ER. Found to be tachycardic 128 on admission as well as hypoxic 88% on RA. Of note he was on po steroids, azithromycin, and cough medicine from PCP or urgent care last week. No improvement with those medications. Feels a little better with O2.    Review of Systems   Constitutional: Positive for chills. Negative for fever.   HENT: Negative.    Eyes: Negative.    Respiratory: Positive for cough and shortness of breath.    Cardiovascular: Negative.    Gastrointestinal: Negative.    Endocrine: Negative.    Genitourinary: Negative.    Musculoskeletal: Positive for myalgias. Negative for arthralgias.   Skin: Negative.    Allergic/Immunologic: Negative.    Neurological: Negative.    Hematological: Negative.    Psychiatric/Behavioral: Negative.     ext- no leg swelling, no history of blood clots    Past Medical History   Diagnosis Date   • Diabetes mellitus    • Hyperlipidemia    • Hypertension      Past Surgical History   Procedure Laterality Date   • Mole removal     • Dental procedure       Family History   Problem Relation Age of Onset   • Cancer Mother    • Stroke Father      Social History   Substance Use Topics   • Smoking status: Never Smoker   • Smokeless tobacco: None   • Alcohol use No     Home meds reviewed and documented in [facility]      (Not in a hospital admission)  Allergies:  Review of patient's allergies indicates no known allergies.    Objective      Vital Signs  Temp:  [98.7 °F (37.1 °C)-99.6 °F (37.6 °C)] 98.7 °F (37.1 °C)  Heart Rate:  [] 82  Resp:  [16-18] 16  BP: ()/(60-73)  111/72    Physical Exam   Constitutional: He is oriented to person, place, and time. He appears well-developed and well-nourished. He appears distressed (slight).   HENT:   Head: Normocephalic and atraumatic.   Mouth/Throat: Oropharynx is clear and moist.   Eyes: Conjunctivae and EOM are normal. No scleral icterus.   Neck: Normal range of motion. Neck supple. No JVD present.   Cardiovascular: Normal rate, regular rhythm and normal heart sounds.    No murmur heard.  Pulmonary/Chest: Breath sounds normal. He is in respiratory distress (slight).   Decreased bs at bases   Abdominal: Soft. Bowel sounds are normal. There is no tenderness.   Genitourinary:   Genitourinary Comments: Deferred    Musculoskeletal: Normal range of motion. He exhibits no edema.   Neurological: He is alert and oriented to person, place, and time. No cranial nerve deficit. He exhibits normal muscle tone.   Skin: Skin is warm and dry.   Psychiatric: He has a normal mood and affect. His behavior is normal. Thought content normal.   Nursing note and vitals reviewed.      Results Review:   I reviewed the patient's new clinical results.    XR Chest 2 View [[id number]] Not Reviewed       Order Status: Completed Collected: 01/18/17 1046      Updated: 01/18/17 1051     Narrative:       XR CHEST 2 VW-     HISTORY: Male who is 71 years-old,  cough, short of breath     TECHNIQUE: Frontal and lateral views of the chest     COMPARISON: 2/11/2016     FINDINGS: Heart, mediastinum and pulmonary vasculature are unremarkable.  Mild patchy infiltrate is seen at the left lung base. No pleural  effusion or pneumothorax. No acute osseous process.        Impression:       Mild patchy infiltrate at the left lung base, follow-up  x-ray suggested.         Blood Culture [[id number]]        Lab Status: No result Specimen: Blood        Blood Culture [[id number]]        Lab Status: No result Specimen: Blood        Influenza Antigen [[id number]] (Normal) Collected: 01/18/17 1051        Lab Status: Final result Specimen: Swab from Nasopharynx Updated: 01/18/17 1110        Influenza A Ag, EIA Negative         Influenza B Ag, EIA Negative        Lactic Acid, Plasma [[id number]] (Normal) Collected: 01/18/17 0957       Lab Status: Final result Specimen: Blood Updated: 01/18/17 1019        Lactate 1.3 mmol/L        [place] Draw [[id number]] Collected: 01/18/17 0925       Lab Status: In process Specimen: Blood Updated: 01/18/17 0931       Narrative:         The following orders were created for panel order [place] Draw.  Procedure                               Abnormality         Status                     ---------                               -----------         ------                     Light Blue Top[[id number]]                                    In process                 Green Top (Gel)[[id number]]                                   In process                 Lavender Top[[id number]]                                      In process                 Gold Top - SST[[id number]]                                    In process                   Please view results for these tests on the individual orders.       Comprehensive Metabolic Panel [[id number]] (Abnormal) Collected: 01/18/17 0925       Lab Status: Final result Specimen: Blood Updated: 01/18/17 0958        Glucose 210 (H) mg/dL         BUN 29 (H) mg/dL         Creatinine 1.35 (H) mg/dL         Sodium 135 (L) mmol/L         Potassium 3.8 mmol/L         Chloride 95 (L) mmol/L         CO2 28.4 mmol/L         Calcium 9.3 mg/dL         Total Protein 7.2 g/dL         Albumin 3.70 g/dL         ALT (SGPT) 22 U/L         AST (SGOT) 22 U/L         Alkaline Phosphatase 56 U/L         Total Bilirubin 0.6 mg/dL         eGFR Non African Amer 52 (L) mL/min/1.73         Globulin 3.5 gm/dL         A/G Ratio 1.1 g/dL         BUN/Creatinine Ratio 21.5         Anion Gap 11.6 mmol/L        Narrative:         The MDRD GFR formula is only valid for adults with stable renal function  between ages 18 and 70.       BNP [[id number]] (Normal) Collected: 01/18/17 0925       Lab Status: Final result Specimen: Blood Updated: 01/18/17 0956        proBNP 189.1 pg/mL        Narrative:         Among patients with dyspnea, NT-proBNP is highly sensitive for the detection of acute congestive heart failure. In addition NT-proBNP of <300 pg/ml effectively rules out acute congestive heart failure with 99% negative predictive value.       Troponin [[id number]] (Normal) Collected: 01/18/17 0925       Lab Status: Final result Specimen: Blood Updated: 01/18/17 0958        Troponin T <0.010 ng/mL        Narrative:         Troponin T Reference Ranges:  Less than 0.03 ng/mL:    Negative for AMI  0.03 to 0.09 ng/mL:      Indeterminant for AMI  Greater than 0.09 ng/mL: Positive for AMI       CBC Auto Differential [[id number]] (Abnormal) Collected: 01/18/17 0925       Lab Status: Final result Specimen: Blood Updated: 01/18/17 0944        WBC 14.91 (H) 10*3/mm3         RBC 4.49 (L) 10*6/mm3         Hemoglobin 13.7 g/dL         Hematocrit 41.4 %         MCV 92.2 fL         MCH 30.5 pg         MCHC 33.1 g/dL         RDW 14.3 %         RDW-SD 48.5 fl         MPV 10.3 fL         Platelets 238 10*3/mm3         Neutrophil % 80.8 (H) %         Lymphocyte % 9.0 (L) %         Monocyte % 9.6 %         Eosinophil % 0.0 (L) %         Basophil % 0.1 %         Immature Grans % 0.5 %         Neutrophils, Absolute 12.06 (H) 10*3/mm3             Assessment/Plan     Principal Problem:    Pneumonia of left lung due to infectious organism  Active Problems:    Sepsis    Hypoxia    Type 2 diabetes mellitus with hyperglycemia    Benign essential HTN    Started on IV ceftriaxone and azithromycin in ER  May change to doxy due to him just finishing zpack.  Check cultures  O2  Bronchodilators  SSI with hyperglycemia  Closely monitor clinical status, vitals, and labs    Assessment & Plan    I discussed the patients findings and my recommendations with  patient, family, nursing staff and consulting provider- ER physician    Quirino Spivey MD  01/18/17  1:14 PM

## 2017-01-18 NOTE — ED NOTES
Admitting dr turned o2 down and patient dropped to 88% on room air. Dr was in room and turned o2 to 2L and pt went to 92%     Shona Lopez  01/18/17 5313

## 2017-01-19 LAB
ANION GAP SERPL CALCULATED.3IONS-SCNC: 11.1 MMOL/L
B PERT DNA SPEC QL NAA+PROBE: NOT DETECTED
BASOPHILS # BLD AUTO: 0.02 10*3/MM3 (ref 0–0.2)
BASOPHILS NFR BLD AUTO: 0.1 % (ref 0–1.5)
BUN BLD-MCNC: 29 MG/DL (ref 8–23)
BUN/CREAT SERPL: 23.2 (ref 7–25)
C PNEUM DNA NPH QL NAA+NON-PROBE: NOT DETECTED
CALCIUM SPEC-SCNC: 8.7 MG/DL (ref 8.6–10.5)
CHLORIDE SERPL-SCNC: 99 MMOL/L (ref 98–107)
CO2 SERPL-SCNC: 28.9 MMOL/L (ref 22–29)
CREAT BLD-MCNC: 1.25 MG/DL (ref 0.76–1.27)
DEPRECATED RDW RBC AUTO: 50.7 FL (ref 37–54)
EOSINOPHIL # BLD AUTO: 0.02 10*3/MM3 (ref 0–0.7)
EOSINOPHIL NFR BLD AUTO: 0.1 % (ref 0.3–6.2)
ERYTHROCYTE [DISTWIDTH] IN BLOOD BY AUTOMATED COUNT: 14.7 % (ref 11.5–14.5)
FLUAV H1 2009 PAND RNA NPH QL NAA+PROBE: NOT DETECTED
FLUAV H1 HA GENE NPH QL NAA+PROBE: NOT DETECTED
FLUAV H3 RNA NPH QL NAA+PROBE: NOT DETECTED
FLUAV SUBTYP SPEC NAA+PROBE: NOT DETECTED
FLUBV RNA ISLT QL NAA+PROBE: NOT DETECTED
GFR SERPL CREATININE-BSD FRML MDRD: 57 ML/MIN/1.73
GLUCOSE BLD-MCNC: 155 MG/DL (ref 65–99)
GLUCOSE BLDC GLUCOMTR-MCNC: 138 MG/DL (ref 70–130)
GLUCOSE BLDC GLUCOMTR-MCNC: 141 MG/DL (ref 70–130)
GLUCOSE BLDC GLUCOMTR-MCNC: 155 MG/DL (ref 70–130)
GLUCOSE BLDC GLUCOMTR-MCNC: 229 MG/DL (ref 70–130)
HADV DNA SPEC NAA+PROBE: NOT DETECTED
HCOV 229E RNA SPEC QL NAA+PROBE: NOT DETECTED
HCOV HKU1 RNA SPEC QL NAA+PROBE: NOT DETECTED
HCOV NL63 RNA SPEC QL NAA+PROBE: NOT DETECTED
HCOV OC43 RNA SPEC QL NAA+PROBE: NOT DETECTED
HCT VFR BLD AUTO: 38.9 % (ref 40.4–52.2)
HGB BLD-MCNC: 12.6 G/DL (ref 13.7–17.6)
HMPV RNA NPH QL NAA+NON-PROBE: NOT DETECTED
HPIV1 RNA SPEC QL NAA+PROBE: NOT DETECTED
HPIV2 RNA SPEC QL NAA+PROBE: NOT DETECTED
HPIV3 RNA NPH QL NAA+PROBE: NOT DETECTED
HPIV4 P GENE NPH QL NAA+PROBE: NOT DETECTED
IMM GRANULOCYTES # BLD: 0.1 10*3/MM3 (ref 0–0.03)
IMM GRANULOCYTES NFR BLD: 0.7 % (ref 0–0.5)
LYMPHOCYTES # BLD AUTO: 2.19 10*3/MM3 (ref 0.9–4.8)
LYMPHOCYTES NFR BLD AUTO: 15.1 % (ref 19.6–45.3)
M PNEUMO IGG SER IA-ACNC: NOT DETECTED
MCH RBC QN AUTO: 30.4 PG (ref 27–32.7)
MCHC RBC AUTO-ENTMCNC: 32.4 G/DL (ref 32.6–36.4)
MCV RBC AUTO: 94 FL (ref 79.8–96.2)
MONOCYTES # BLD AUTO: 1.48 10*3/MM3 (ref 0.2–1.2)
MONOCYTES NFR BLD AUTO: 10.2 % (ref 5–12)
NEUTROPHILS # BLD AUTO: 10.74 10*3/MM3 (ref 1.9–8.1)
NEUTROPHILS NFR BLD AUTO: 73.8 % (ref 42.7–76)
PLATELET # BLD AUTO: 246 10*3/MM3 (ref 140–500)
PMV BLD AUTO: 10.7 FL (ref 6–12)
POTASSIUM BLD-SCNC: 4.4 MMOL/L (ref 3.5–5.2)
RBC # BLD AUTO: 4.14 10*6/MM3 (ref 4.6–6)
RHINOVIRUS RNA SPEC NAA+PROBE: NOT DETECTED
RSV RNA NPH QL NAA+NON-PROBE: NOT DETECTED
SODIUM BLD-SCNC: 139 MMOL/L (ref 136–145)
WBC NRBC COR # BLD: 14.55 10*3/MM3 (ref 4.5–10.7)

## 2017-01-19 PROCEDURE — 85025 COMPLETE CBC W/AUTO DIFF WBC: CPT | Performed by: INTERNAL MEDICINE

## 2017-01-19 PROCEDURE — 94640 AIRWAY INHALATION TREATMENT: CPT

## 2017-01-19 PROCEDURE — 94799 UNLISTED PULMONARY SVC/PX: CPT

## 2017-01-19 PROCEDURE — 63710000001 INSULIN ASPART PER 5 UNITS: Performed by: INTERNAL MEDICINE

## 2017-01-19 PROCEDURE — 25010000002 ENOXAPARIN PER 10 MG: Performed by: INTERNAL MEDICINE

## 2017-01-19 PROCEDURE — 82962 GLUCOSE BLOOD TEST: CPT

## 2017-01-19 PROCEDURE — 80048 BASIC METABOLIC PNL TOTAL CA: CPT | Performed by: INTERNAL MEDICINE

## 2017-01-19 PROCEDURE — 25010000003 CEFTRIAXONE PER 250 MG: Performed by: INTERNAL MEDICINE

## 2017-01-19 RX ADMIN — DEXTROMETHORPHAN HYDROBROMIDE AND PROMETHAZINE HYDROCHLORIDE 7.5 ML: 15; 6.25 SOLUTION ORAL at 05:29

## 2017-01-19 RX ADMIN — ASPIRIN 81 MG: 81 TABLET ORAL at 09:11

## 2017-01-19 RX ADMIN — IPRATROPIUM BROMIDE AND ALBUTEROL SULFATE 3 ML: .5; 3 SOLUTION RESPIRATORY (INHALATION) at 07:14

## 2017-01-19 RX ADMIN — DOXYCYCLINE 100 MG: 100 INJECTION, POWDER, LYOPHILIZED, FOR SOLUTION INTRAVENOUS at 06:27

## 2017-01-19 RX ADMIN — OLMESARTAN MEDOXOMIL 20 MG: 20 TABLET, FILM COATED ORAL at 09:11

## 2017-01-19 RX ADMIN — ENOXAPARIN SODIUM 40 MG: 40 INJECTION SUBCUTANEOUS at 09:11

## 2017-01-19 RX ADMIN — DEXTROMETHORPHAN HYDROBROMIDE AND PROMETHAZINE HYDROCHLORIDE 7.5 ML: 15; 6.25 SOLUTION ORAL at 20:50

## 2017-01-19 RX ADMIN — SODIUM CHLORIDE 125 ML/HR: 9 INJECTION, SOLUTION INTRAVENOUS at 00:32

## 2017-01-19 RX ADMIN — DOXYCYCLINE 100 MG: 100 INJECTION, POWDER, LYOPHILIZED, FOR SOLUTION INTRAVENOUS at 18:00

## 2017-01-19 RX ADMIN — PIOGLITAZONE 15 MG: 15 TABLET ORAL at 09:11

## 2017-01-19 RX ADMIN — IPRATROPIUM BROMIDE AND ALBUTEROL SULFATE 3 ML: .5; 3 SOLUTION RESPIRATORY (INHALATION) at 20:33

## 2017-01-19 RX ADMIN — INSULIN ASPART 2 UNITS: 100 INJECTION, SOLUTION INTRAVENOUS; SUBCUTANEOUS at 09:11

## 2017-01-19 RX ADMIN — ATORVASTATIN CALCIUM 20 MG: 20 TABLET, FILM COATED ORAL at 09:11

## 2017-01-19 RX ADMIN — INSULIN ASPART 3 UNITS: 100 INJECTION, SOLUTION INTRAVENOUS; SUBCUTANEOUS at 12:06

## 2017-01-19 RX ADMIN — IPRATROPIUM BROMIDE AND ALBUTEROL SULFATE 3 ML: .5; 3 SOLUTION RESPIRATORY (INHALATION) at 14:21

## 2017-01-19 RX ADMIN — CEFTRIAXONE SODIUM 1 G: 1 INJECTION, SOLUTION INTRAVENOUS at 17:58

## 2017-01-19 NOTE — PROGRESS NOTES
Discharge Planning Assessment  [facility]     Patient Name: Ray Pratt  MRN: [id number]  Today's Date: 1/19/2017    Admit Date: 1/18/2017          Discharge Needs Assessment       01/19/17 1016    Living Environment    Lives With spouse;other relative(s) (specify)    Living Arrangements house    Quality Of Family Relationships supportive    Able to Return to Prior Living Arrangements yes    Discharge Needs Assessment    Concerns To Be Addressed no discharge needs identified;denies needs/concerns at this time    Equipment Currently Used at Home none    Equipment Needed After Discharge none    Transportation Available car            Discharge Plan       01/19/17 1018    Case Management/Social Work Plan    Plan Home with wife    Patient/Family In Agreement With Plan yes    Additional Comments Pt's IMM letter signed 1/18/17 and pt's facesheer confirmed as correct.   Spoke with pt and his wife Asha [phone] to screen for DCP.  Pt reports that he was totally independent PTA and does plan to return home upon D/C. Pt voiced that he does not anticipate  any needs upon D/C.    Informed pt and his wife that CCP would continue to follow to assist if any needs do arise.          Discharge Placement     No information found                Demographic Summary       01/19/17 1016    Referral Information    Admission Type inpatient    Arrived From home or self-care    Referral Source admission list    Reason For Consult discharge planning            Functional Status       01/19/17 1016    Functional Status Current    Ambulation 0-->independent    Transferring 0-->independent    Toileting 0-->independent    Bathing 0-->independent    Dressing 0-->independent    Eating 0-->independent    Communication 0-->understands/communicates without difficulty    Swallowing (if score 2 or more for any item, consult Rehab Services) 0-->swallows foods/liquids without difficulty    Functional Status Prior    Ambulation  0-->independent    Transferring 0-->independent    Toileting 0-->independent    Bathing 0-->independent    Dressing 0-->independent    Eating 0-->independent    Communication 0-->understands/communicates without difficulty    Swallowing 0-->swallows foods/liquids without difficulty            Psychosocial     None            Abuse/Neglect     None            Legal     None            Substance Abuse     None            Patient Forms     None          Gela Hidalgo, FRENCH

## 2017-01-19 NOTE — PROGRESS NOTES
LOS: 1 day     Name: Ray Pratt  Age: 71 y.o.  Sex: male  :  1945  MRN: [id number]         Primary Care Physician: Benjamín Granda MD  Cc- cough  Subjective   Subjective    Cough fair  soa fair  On O2  On ABX    ROS  No f/c  No n/v    Objective   Vital Signs  Temp:  [97.4 °F (36.3 °C)-98.6 °F (37 °C)] 98 °F (36.7 °C)  Heart Rate:  [] 75  Resp:  [16-22] 18  BP: (106-115)/(69-93) 115/69  Body mass index is 41.56 kg/(m^2).    Alert, mild distress  Soft, nt  Decreased bs at bases, a few rhonchi  RRR no murmurs  Trace edema, no cyanosis    Objective    Results Review:       I reviewed the patient's new clinical results.      Results from last 7 days  Lab Units 17  0339 17  0925   WBC 10*3/mm3 14.55* 14.91*   HEMOGLOBIN g/dL 12.6* 13.7   PLATELETS 10*3/mm3 246 238       Results from last 7 days  Lab Units 17  0339 17  0925   SODIUM mmol/L 139 135*   POTASSIUM mmol/L 4.4 3.8   CHLORIDE mmol/L 99 95*   TOTAL CO2 mmol/L 28.9 28.4   BUN mg/dL 29* 29*   CREATININE mg/dL 1.25 1.35*   CALCIUM mg/dL 8.7 9.3   GLUCOSE mg/dL 155* 210*                 Scheduled Meds:     aspirin 81 mg Oral Daily   atorvastatin 20 mg Oral Daily   azithromycin 500 mg Intravenous Once   ceftriaxone 1 g Intravenous Q24H   And      doxycycline 100 mg Intravenous Q12H   enoxaparin 40 mg Subcutaneous Daily   insulin aspart 0-7 Units Subcutaneous 4x Daily AC & at Bedtime   ipratropium-albuterol 3 mL Nebulization TID - RT   olmesartan 20 mg Oral Daily   pioglitazone 15 mg Oral Daily   sodium chloride 4 mL Nebulization Once     PRN Meds:   •  acetaminophen  •  dextrose  •  dextrose  •  glucagon (human recombinant)  •  ondansetron **OR** ondansetron ODT **OR** ondansetron  •  promethazine-dextromethorphan  •  sodium chloride  •  sodium chloride  Continuous Infusions:    sodium chloride 125 mL/hr Last Rate: 125 mL/hr (17 0032)       Assessment/Plan   Principal Problem:    Pneumonia of left lung due to  infectious organism  Active Problems:    Sepsis    Hypoxia    Type 2 diabetes mellitus with hyperglycemia    Benign essential HTN    Started on IV ceftriaxone and doxy  Recently finished zpack.  Check cultures  O2  Bronchodilators  SSI with hyperglycemia  Closely monitor clinical status, vitals, and labs  DVT prophylaxis    D/W family and RT  Assessment & Plan      Quirino Spivey MD  [facility]  01/19/17  3:21 PM

## 2017-01-19 NOTE — PLAN OF CARE
Problem: Pneumonia (Adult)  Goal: Signs and Symptoms of Listed Potential Problems Will be Absent or Manageable (Pneumonia)  Outcome: Ongoing (interventions implemented as appropriate)    01/18/17 2001   Pneumonia   Problems Assessed (Pneumonia) all   Problems Present (Pneumonia) respiratory compromise

## 2017-01-19 NOTE — PLAN OF CARE
Problem: Patient Care Overview (Adult)  Goal: Plan of Care Review  Outcome: Ongoing (interventions implemented as appropriate)    Problem: Pneumonia (Adult)  Goal: Signs and Symptoms of Listed Potential Problems Will be Absent or Manageable (Pneumonia)  Outcome: Ongoing (interventions implemented as appropriate)

## 2017-01-20 PROBLEM — J14: Status: ACTIVE | Noted: 2017-01-18

## 2017-01-20 LAB
BACTERIA SPEC RESP CULT: ABNORMAL
BACTERIA SPEC RESP CULT: ABNORMAL
DEPRECATED RDW RBC AUTO: 51.6 FL (ref 37–54)
ERYTHROCYTE [DISTWIDTH] IN BLOOD BY AUTOMATED COUNT: 15 % (ref 11.5–14.5)
GLUCOSE BLDC GLUCOMTR-MCNC: 106 MG/DL (ref 70–130)
GLUCOSE BLDC GLUCOMTR-MCNC: 139 MG/DL (ref 70–130)
GLUCOSE BLDC GLUCOMTR-MCNC: 157 MG/DL (ref 70–130)
GLUCOSE BLDC GLUCOMTR-MCNC: 179 MG/DL (ref 70–130)
GRAM STN SPEC: ABNORMAL
HCT VFR BLD AUTO: 37.3 % (ref 40.4–52.2)
HGB BLD-MCNC: 12.1 G/DL (ref 13.7–17.6)
MCH RBC QN AUTO: 30.1 PG (ref 27–32.7)
MCHC RBC AUTO-ENTMCNC: 32.4 G/DL (ref 32.6–36.4)
MCV RBC AUTO: 92.8 FL (ref 79.8–96.2)
PLATELET # BLD AUTO: 244 10*3/MM3 (ref 140–500)
PMV BLD AUTO: 10.5 FL (ref 6–12)
RBC # BLD AUTO: 4.02 10*6/MM3 (ref 4.6–6)
WBC NRBC COR # BLD: 10.92 10*3/MM3 (ref 4.5–10.7)

## 2017-01-20 PROCEDURE — 25010000002 ENOXAPARIN PER 10 MG: Performed by: INTERNAL MEDICINE

## 2017-01-20 PROCEDURE — 25010000003 CEFTRIAXONE PER 250 MG: Performed by: INTERNAL MEDICINE

## 2017-01-20 PROCEDURE — 94640 AIRWAY INHALATION TREATMENT: CPT

## 2017-01-20 PROCEDURE — 63710000001 INSULIN ASPART PER 5 UNITS: Performed by: INTERNAL MEDICINE

## 2017-01-20 PROCEDURE — 82962 GLUCOSE BLOOD TEST: CPT

## 2017-01-20 PROCEDURE — 85027 COMPLETE CBC AUTOMATED: CPT | Performed by: INTERNAL MEDICINE

## 2017-01-20 PROCEDURE — 94799 UNLISTED PULMONARY SVC/PX: CPT

## 2017-01-20 RX ORDER — DOXYCYCLINE 100 MG/1
100 CAPSULE ORAL EVERY 12 HOURS SCHEDULED
Status: DISCONTINUED | OUTPATIENT
Start: 2017-01-20 | End: 2017-01-21 | Stop reason: HOSPADM

## 2017-01-20 RX ORDER — IPRATROPIUM BROMIDE AND ALBUTEROL SULFATE 2.5; .5 MG/3ML; MG/3ML
3 SOLUTION RESPIRATORY (INHALATION)
Status: DISCONTINUED | OUTPATIENT
Start: 2017-01-21 | End: 2017-01-21 | Stop reason: HOSPADM

## 2017-01-20 RX ADMIN — IPRATROPIUM BROMIDE AND ALBUTEROL SULFATE 3 ML: .5; 3 SOLUTION RESPIRATORY (INHALATION) at 06:40

## 2017-01-20 RX ADMIN — PIOGLITAZONE 15 MG: 15 TABLET ORAL at 08:56

## 2017-01-20 RX ADMIN — DOXYCYCLINE 100 MG: 100 INJECTION, POWDER, LYOPHILIZED, FOR SOLUTION INTRAVENOUS at 06:36

## 2017-01-20 RX ADMIN — SODIUM CHLORIDE 75 ML/HR: 9 INJECTION, SOLUTION INTRAVENOUS at 05:41

## 2017-01-20 RX ADMIN — ENOXAPARIN SODIUM 40 MG: 40 INJECTION SUBCUTANEOUS at 08:57

## 2017-01-20 RX ADMIN — INSULIN ASPART 2 UNITS: 100 INJECTION, SOLUTION INTRAVENOUS; SUBCUTANEOUS at 12:52

## 2017-01-20 RX ADMIN — INSULIN ASPART 2 UNITS: 100 INJECTION, SOLUTION INTRAVENOUS; SUBCUTANEOUS at 21:26

## 2017-01-20 RX ADMIN — DEXTROMETHORPHAN HYDROBROMIDE AND PROMETHAZINE HYDROCHLORIDE 7.5 ML: 15; 6.25 SOLUTION ORAL at 02:13

## 2017-01-20 RX ADMIN — IPRATROPIUM BROMIDE AND ALBUTEROL SULFATE 3 ML: .5; 3 SOLUTION RESPIRATORY (INHALATION) at 15:30

## 2017-01-20 RX ADMIN — ASPIRIN 81 MG: 81 TABLET ORAL at 08:56

## 2017-01-20 RX ADMIN — DOXYCYCLINE 100 MG: 100 CAPSULE ORAL at 20:10

## 2017-01-20 RX ADMIN — OLMESARTAN MEDOXOMIL 20 MG: 20 TABLET, FILM COATED ORAL at 08:56

## 2017-01-20 RX ADMIN — ATORVASTATIN CALCIUM 20 MG: 20 TABLET, FILM COATED ORAL at 08:56

## 2017-01-20 RX ADMIN — CEFTRIAXONE SODIUM 1 G: 1 INJECTION, SOLUTION INTRAVENOUS at 19:54

## 2017-01-20 NOTE — PLAN OF CARE
Problem: Patient Care Overview (Adult)  Goal: Plan of Care Review  Outcome: Ongoing (interventions implemented as appropriate)    01/19/17 1921   Coping/Psychosocial Response Interventions   Plan Of Care Reviewed With patient;spouse   Outcome Evaluation   Outcome Summary/Follow up Plan Pt continues having productive cough; yellow phlem; encouraged TCDB, IS, ambulating halls; cont IV abx; pt hopes to go home [place]   Patient Care Overview   Progress improving         Problem: Pneumonia (Adult)  Goal: Signs and Symptoms of Listed Potential Problems Will be Absent or Manageable (Pneumonia)  Outcome: Ongoing (interventions implemented as appropriate)

## 2017-01-20 NOTE — PROGRESS NOTES
" LOS: 2 days     Name: Ray Pratt  Age: 71 y.o.  Sex: male  :  1945  MRN: [id number]         Primary Care Physician: Benjamín Granda MD  Cc- cough  Subjective   Subjective    Cough fair but improving  soa fair  On/off O2  On ABX    ROS  No f/c  No n/v    Objective   Vital Signs  Temp:  [97 °F (36.1 °C)-99.1 °F (37.3 °C)] 99.1 °F (37.3 °C)  Heart Rate:  [] 83  Resp:  [16-18] 18  BP: (100-115)/(66-85) 103/66  Body mass index is 42.09 kg/(m^2).    Alert, obese  Soft, nt  Decreased bs at bases, a few rhonchi  RRR no murmurs  Trace edema, no cyanosis    Objective:  Vital signs: (most recent): Blood pressure 103/66, pulse 83, temperature 99.1 °F (37.3 °C), temperature source Oral, resp. rate 18, height 64\" (162.6 cm), weight 245 lb 3.2 oz (111 kg), SpO2 95 %.              Results Review:       I reviewed the patient's new clinical results.      Results from last 7 days  Lab Units 17  0507 17  0339 17  0925   WBC 10*3/mm3 10.92* 14.55* 14.91*   HEMOGLOBIN g/dL 12.1* 12.6* 13.7   PLATELETS 10*3/mm3 244 246 238       Results from last 7 days  Lab Units 17  0339 17  0925   SODIUM mmol/L 139 135*   POTASSIUM mmol/L 4.4 3.8   CHLORIDE mmol/L 99 95*   TOTAL CO2 mmol/L 28.9 28.4   BUN mg/dL 29* 29*   CREATININE mg/dL 1.25 1.35*   CALCIUM mg/dL 8.7 9.3   GLUCOSE mg/dL 155* 210*                 Scheduled Meds:     aspirin 81 mg Oral Daily   atorvastatin 20 mg Oral Daily   azithromycin 500 mg Intravenous Once   ceftriaxone 1 g Intravenous Q24H   And      doxycycline 100 mg Intravenous Q12H   enoxaparin 40 mg Subcutaneous Daily   insulin aspart 0-7 Units Subcutaneous 4x Daily AC & at Bedtime   ipratropium-albuterol 3 mL Nebulization TID - RT   olmesartan 20 mg Oral Daily   pioglitazone 15 mg Oral Daily   sodium chloride 4 mL Nebulization Once     PRN Meds:   •  acetaminophen  •  dextrose  •  dextrose  •  glucagon (human recombinant)  •  ondansetron **OR** ondansetron ODT **OR** " ondansetron  •  promethazine-dextromethorphan  •  sodium chloride  •  sodium chloride  Continuous Infusions:    sodium chloride 75 mL/hr Last Rate: 75 mL/hr (01/20/17 0541)       Assessment/Plan   Principal Problem:    Pneumonia of left lung due to Haemophilus influenzae  Active Problems:    Sepsis    Hypoxia    Type 2 diabetes mellitus with hyperglycemia    Benign essential HTN    on IV ceftriaxone and doxy  Recently finished zpack as outpatient  O2  Bronchodilators  SSI with hyperglycemia  Closely monitor clinical status, vitals, and labs  DVT prophylaxis    D/W family and RN  Possible discharge 1/21 if off O2 as he has still been on some today.   Assessment & Plan      Quirino Spivey MD  [facility]  01/20/17  5:31 PM

## 2017-01-20 NOTE — PLAN OF CARE
Problem: Patient Care Overview (Adult)  Goal: Plan of Care Review  Outcome: Ongoing (interventions implemented as appropriate)    01/20/17 8808   Coping/Psychosocial Response Interventions   Plan Of Care Reviewed With patient;spouse   Outcome Evaluation   Outcome Summary/Follow up Plan Doing better; weaned from oxygen; continues to have productive cough; encouraged ambulating/IS   Patient Care Overview   Progress improving         Problem: Pneumonia (Adult)  Goal: Signs and Symptoms of Listed Potential Problems Will be Absent or Manageable (Pneumonia)  Outcome: Ongoing (interventions implemented as appropriate)

## 2017-01-21 VITALS
HEART RATE: 111 BPM | BODY MASS INDEX: 41.86 KG/M2 | OXYGEN SATURATION: 90 % | WEIGHT: 245.2 LBS | RESPIRATION RATE: 18 BRPM | SYSTOLIC BLOOD PRESSURE: 106 MMHG | TEMPERATURE: 99.3 F | HEIGHT: 64 IN | DIASTOLIC BLOOD PRESSURE: 70 MMHG

## 2017-01-21 LAB
GLUCOSE BLDC GLUCOMTR-MCNC: 122 MG/DL (ref 70–130)
GLUCOSE BLDC GLUCOMTR-MCNC: 143 MG/DL (ref 70–130)

## 2017-01-21 PROCEDURE — 94640 AIRWAY INHALATION TREATMENT: CPT

## 2017-01-21 PROCEDURE — 25010000002 ENOXAPARIN PER 10 MG: Performed by: INTERNAL MEDICINE

## 2017-01-21 PROCEDURE — 82962 GLUCOSE BLOOD TEST: CPT

## 2017-01-21 RX ORDER — ALBUTEROL SULFATE 90 UG/1
2 AEROSOL, METERED RESPIRATORY (INHALATION) EVERY 4 HOURS PRN
Qty: 1 INHALER | Refills: 0 | Status: SHIPPED | OUTPATIENT
Start: 2017-01-21 | End: 2017-01-22

## 2017-01-21 RX ORDER — CEFDINIR 300 MG/1
300 CAPSULE ORAL 2 TIMES DAILY
Qty: 8 CAPSULE | Refills: 0 | Status: SHIPPED | OUTPATIENT
Start: 2017-01-21 | End: 2017-01-25

## 2017-01-21 RX ADMIN — ENOXAPARIN SODIUM 40 MG: 40 INJECTION SUBCUTANEOUS at 11:41

## 2017-01-21 RX ADMIN — ATORVASTATIN CALCIUM 20 MG: 20 TABLET, FILM COATED ORAL at 08:52

## 2017-01-21 RX ADMIN — PIOGLITAZONE 15 MG: 15 TABLET ORAL at 08:52

## 2017-01-21 RX ADMIN — DEXTROMETHORPHAN HYDROBROMIDE AND PROMETHAZINE HYDROCHLORIDE 7.5 ML: 15; 6.25 SOLUTION ORAL at 11:42

## 2017-01-21 RX ADMIN — DOXYCYCLINE 100 MG: 100 CAPSULE ORAL at 08:52

## 2017-01-21 RX ADMIN — ASPIRIN 81 MG: 81 TABLET ORAL at 08:52

## 2017-01-21 RX ADMIN — OLMESARTAN MEDOXOMIL 20 MG: 20 TABLET, FILM COATED ORAL at 08:52

## 2017-01-21 RX ADMIN — IPRATROPIUM BROMIDE AND ALBUTEROL SULFATE 3 ML: .5; 3 SOLUTION RESPIRATORY (INHALATION) at 08:40

## 2017-01-21 NOTE — PLAN OF CARE
Problem: Patient Care Overview (Adult)  Goal: Plan of Care Review  Outcome: Ongoing (interventions implemented as appropriate)    01/20/17 2011 01/21/17 0425   Coping/Psychosocial Response Interventions   Plan Of Care Reviewed With patient --    Outcome Evaluation   Outcome Summary/Follow up Plan --  Pt was weaned off O2 yesterday and has tolerated RA well overnight. Continues to have a productive cough, but pt did not c/o any SOA. Encourage IS use and ambulation. Possible D/C to home today.   Patient Care Overview   Progress --  improving       Goal: Adult Individualization and Mutuality  Outcome: Ongoing (interventions implemented as appropriate)    Problem: Pneumonia (Adult)  Goal: Signs and Symptoms of Listed Potential Problems Will be Absent or Manageable (Pneumonia)  Outcome: Ongoing (interventions implemented as appropriate)    01/21/17 0425   Pneumonia   Problems Assessed (Pneumonia) all   Problems Present (Pneumonia) respiratory compromise

## 2017-01-21 NOTE — DISCHARGE SUMMARY
DISCHARGE DIAGNOSES:   1.  Pneumonia due to Haemophilus influenza, which he failed Zithromax outpatient and given Rocephin, doxycycline here in the hospital, and we will continue with 4 more days of Omnicef on discharge.   2.  Sepsis.  3.  Hypoxemia, which is better. His oxygen saturations are currently 94% on room air.  4.  Type 2 diabetes. Blood sugars are reasonably well controlled. His hemoglobin A1c is 6.9.   5.  Hypertension.     HISTORY OF PRESENT ILLNESS: This is a 71-year-old gentleman who comes into the hospital because of worsening respiratory symptoms with cough, sputum production, feeling tired. The patient was admitted to the hospital, given Rocephin and doxycycline. With this, he has improved during the hospitalization.     His sputum has grown out Haemophilus influenza, which seems most certainly was resistant to Zithromax, as he grew it in his sputum despite the fact he received 5 days of the Zithromax, but the patients sputum production has improved and he is feeling better. Oxygen saturations have improved, so I think it is likely related to the Rocephin, which he has gotten 3 doses of, so we will give him another 4 days' worth of oral cephalosporin medication in the form of Omnicef 300 b.i.d.      I have also encouraged the patient to use a bronchodilator regularly at home for the next several days and also use incentive spirometry.     The patient had gotten steroids before this hospitalization so consideration using steroids, as the patient likely has a COPD exacerbation in addition to pneumonia, but the patient is doing well without steroids currently; just the sputum production but oxygen saturations are better so, at this point, no steroids will be given on discharge. And, also, he is diabetic so giving the patient more steroids would cause more problems as far as his blood sugars go.    DIAGNOSTIC DATA: Radiographic studies include a mild patchy infiltrate in the left lung base, for  which we are going to ask the patient to get a chest x-ray in 3-4 weeks. As sputum culture goes, Haemophilus influenza, as mentioned. White blood cell count is 10.9, hemoglobin is 12.1, and platelets are 244,000.    DISCHARGE MEDICATIONS:   1.  Tylenol.   2.  Aspirin 81.   3.  Albuterol HFA, 2 puffs q.4 h, but I would like him to use it regularly for the next several days.   4.  Actos 15.   5.  Crestor 10.   6.  Benicar 20.   7.  Omnicef 300 p.o. b.i.d. x4 more days.     DISCHARGE INSTRUCTIONS: A chest x-ray is scheduled for 02/28/2017 for followup on abnormal chest x-ray. The patient needs to followup with primary care doctor in 1-2 weeks. The patient needs to monitor blood pressures at home.      Greater than 30 minutes was spent.       Rigo Mendoza M.D.  MIKHAIL:patricia  D:   01/21/2017 14:10:03  T:   01/21/2017 14:35:26  Job ID:   [id number]  Document ID:   [id number]  cc:  [address] [place] [address]

## 2017-01-21 NOTE — DISCHARGE SUMMARY
"           Name: Ray Pratt ADMIT: 2017   : 1945  PCP: Benjamín Granda MD    MRN: [id number] LOS: 3 days   AGE/SEX: 71 y.o. male  ROOM: [facility]/   Date of Discharge:  2017    Brief Discharge Summary    Please see dictated discharge summary    DISCHARGE DIAGNOSIS  Active Hospital Problems (** Indicates Principal Problem)    Diagnosis Date Noted   • **Pneumonia of left lung due to Haemophilus influenzae [J14] 2017   • Sepsis [A41.9] 2017   • Hypoxia [R09.02] 2017   • Type 2 diabetes mellitus with hyperglycemia [E11.65] 2017   • Benign essential HTN [I10] 2017      Resolved Hospital Problems    Diagnosis Date Noted Date Resolved   No resolved problems to display.       SECONDARY DIAGNOSES  Past Medical History   Diagnosis Date   • Diabetes mellitus    • Hyperlipidemia    • Hypertension        CONSULTS   Consults     Date and Time Order Name Status Description    2017 1235 [facility] (on-call MD unless specified) Completed           PROCEDURES PERFORMED      VITAL SIGNS  Visit Vitals   • /70 (BP Location: Right arm, Patient Position: Lying)   • Pulse 111   • Temp 99.3 °F (37.4 °C) (Oral)   • Resp 18   • Ht 64\" (162.6 cm)   • Wt 245 lb 3.2 oz (111 kg)   • SpO2 90%   • BMI 42.09 kg/m2     ObjectiveCONDITION ON DISCHARGE  Stable.     DISCHARGE DISPOSITION   Home or Self Care      DISCHARGE MEDICATIONS  See Discharge summary     No future appointments.  Referrals and Follow-ups to Schedule                    Follow-up Information     Follow up with Benjamín Granda MD .    Specialty:  Family Medicine    Contact information:    [facility] [id number] [phone]            TEST  RESULTS PENDING AT DISCHARGE   Order Current Status    Blood Culture Preliminary result    Blood Culture Preliminary result             Rigo Mendoza MD  [facility]  17  2:10 PM      Time: greater than 30 minutes.      Dragon disclaimer:  Much " of this encounter note is an electronic transcription/translation of spoken language to printed text. The electronic translation of spoken language may permit erroneous, or at times, nonsensical words or phrases to be inadvertently transcribed; Although I have reviewed the note for such errors, some may still exist.

## 2017-01-21 NOTE — DISCHARGE INSTR - APPOINTMENTS
[facility] Out- Patient Scheduling will call you with your  Appointment Time  for a PA +LAT CXR to be done near the end of the month [phone]                                                                       PLEASE CALL THEM IF THEY DO NOT CALL YOU. CHANGE XRAY FROM January 26 TO THE END OF February.

## 2017-01-23 LAB
BACTERIA SPEC AEROBE CULT: NORMAL
BACTERIA SPEC AEROBE CULT: NORMAL

## 2017-02-23 ENCOUNTER — HOSPITAL ENCOUNTER (OUTPATIENT)
Dept: GENERAL RADIOLOGY | Facility: HOSPITAL | Age: 72
Discharge: HOME | End: 2017-02-23
Admitting: FAMILY MEDICINE

## 2017-02-23 DIAGNOSIS — J18.9 PNEUMONIA: ICD-10-CM

## 2017-02-23 PROCEDURE — 71020 HC CHEST PA AND LATERAL: CPT

## 2019-05-07 ENCOUNTER — OFFICE VISIT (OUTPATIENT)
Dept: FAMILY MEDICINE CLINIC | Facility: CLINIC | Age: 74
End: 2019-05-07

## 2019-05-07 VITALS
RESPIRATION RATE: 16 BRPM | DIASTOLIC BLOOD PRESSURE: 84 MMHG | SYSTOLIC BLOOD PRESSURE: 148 MMHG | WEIGHT: 252 LBS | HEIGHT: 64 IN | HEART RATE: 82 BPM | BODY MASS INDEX: 43.02 KG/M2 | TEMPERATURE: 97.4 F | OXYGEN SATURATION: 92 %

## 2019-05-07 DIAGNOSIS — E11.65 TYPE 2 DIABETES MELLITUS WITH HYPERGLYCEMIA, WITHOUT LONG-TERM CURRENT USE OF INSULIN (HCC): ICD-10-CM

## 2019-05-07 DIAGNOSIS — I10 BENIGN ESSENTIAL HTN: Primary | ICD-10-CM

## 2019-05-07 DIAGNOSIS — Z23 ENCOUNTER FOR ADMINISTRATION OF VACCINE: ICD-10-CM

## 2019-05-07 DIAGNOSIS — E66.01 OBESITY, MORBID (HCC): ICD-10-CM

## 2019-05-07 PROCEDURE — 90750 HZV VACC RECOMBINANT IM: CPT | Performed by: FAMILY MEDICINE

## 2019-05-07 PROCEDURE — 90471 IMMUNIZATION ADMIN: CPT | Performed by: FAMILY MEDICINE

## 2019-05-07 PROCEDURE — 99203 OFFICE O/P NEW LOW 30 MIN: CPT | Performed by: FAMILY MEDICINE

## 2019-05-07 RX ORDER — OLMESARTAN MEDOXOMIL 20 MG/1
20 TABLET ORAL DAILY
Qty: 90 TABLET | Refills: 1 | Status: SHIPPED | OUTPATIENT
Start: 2019-05-07 | End: 2019-06-11 | Stop reason: SDUPTHER

## 2019-05-07 NOTE — PROGRESS NOTES
Subjective   Ray Pratt is a 73 y.o. male.     73-year-old male presents today to establish care.  Past medical history of hypertension, type 2 diabetes mellitus, hyperlipidemia.    Patient reports that he is having issues with blood sugar control as noted below.  Fasting blood sugars are elevated from around 1 10-1 30.  Postprandial blood sugars can be in the low 200s.  He notes that his diet is unhealthy.  Last A1C in 3/2018 was 6.6%.  Patient is on Actos 15 mg a day.  Notes that he needs to schedule diabetic eye exam.    Hypertension: Patient is on Benicar 20 mg a day.  Blood pressure today is 148/84.  Patient notes that he is ran out of his Benicar needs a refill.  He denies chest pain shortness of breath or headache or vision changes.    Tdap: Patient unsure if up-to-date  Pneumonia vaccine: Got PCV 13 in February 2019; is not due for next one until February 2020  Colonoscopy: Next due January 2027  Shingles vaccine: Has received Zostavax in the past and is interested in getting Shingrix.       Diabetes   He has type 2 diabetes mellitus. No MedicAlert identification noted. The initial diagnosis of diabetes was made 9 years ago. Pertinent negatives for hypoglycemia include no nervousness/anxiousness. Associated symptoms include fatigue, polydipsia and polyuria. Pertinent negatives for diabetes include no chest pain. Symptoms are stable. Risk factors for coronary artery disease include hypertension and obesity. Current diabetic treatment includes oral agent (monotherapy). He is compliant with treatment all of the time. His weight is stable. He is following a generally unhealthy diet. Meal planning includes carbohydrate counting. He has not had a previous visit with a dietitian. He rarely participates in exercise. He monitors blood glucose at home 1-2 x per week. Blood glucose monitoring compliance is inadequate. His home blood glucose trend is fluctuating minimally. His breakfast blood glucose is taken  between 9-10 am. His breakfast blood glucose range is generally 110-130 mg/dl. His lunch blood glucose is taken between 12-1 pm. His lunch blood glucose range is generally 180-200 mg/dl. His highest blood glucose is 180-200 mg/dl. An ACE inhibitor/angiotensin II receptor blocker is being taken. He does not see a podiatrist.Eye exam is not current.        PHQ-2/PHQ-9 Depression Screening 5/7/2019   Little interest or pleasure in doing things 0   Feeling down, depressed, or hopeless 0   Total Score 0       The following portions of the patient's history were reviewed and updated as appropriate: allergies, current medications, past family history, past medical history, past social history, past surgical history and problem list.    Review of Systems   Constitutional: Positive for fatigue. Negative for chills and fever.   HENT: Negative for congestion.    Respiratory: Negative for cough and shortness of breath.    Cardiovascular: Negative for chest pain and palpitations.   Endocrine: Positive for polydipsia and polyuria.   Genitourinary: Negative for dysuria.   Psychiatric/Behavioral: Negative for depressed mood. The patient is not nervous/anxious.        Objective   Physical Exam   Constitutional: He is oriented to person, place, and time. He appears well-developed and well-nourished.   HENT:   Head: Normocephalic and atraumatic.   Eyes: Pupils are equal, round, and reactive to light.   Cardiovascular: Normal rate and regular rhythm.   Trace pitting edema ankles bilaterally   Pulmonary/Chest: Effort normal and breath sounds normal. No stridor. No respiratory distress. He has no decreased breath sounds. He has no wheezes. He has no rhonchi. He has no rales.   Musculoskeletal: Normal range of motion.   Lymphadenopathy:        Right cervical: No superficial cervical adenopathy present.       Left cervical: No superficial cervical adenopathy present.   Neurological: He is alert and oriented to person, place, and time.    Psychiatric: He has a normal mood and affect. His speech is normal.               Assessment/Plan     Ray was seen today for establish care, type diabetes mellitus with hyperglycemia and hypertension.    Diagnoses and all orders for this visit:    Benign essential HTN  -     Comprehensive Metabolic Panel  -     Lipid Panel  -     olmesartan (BENICAR) 20 MG tablet; Take 1 tablet by mouth Daily.    Type 2 diabetes mellitus with hyperglycemia, without long-term current use of insulin (CMS/[facility])  -     Hemoglobin A1c  -     Microalbumin / Creatinine Urine Ratio - Urine, Clean Catch    Obesity, morbid (CMS/[facility])    Encounter for administration of vaccine  -     Shingrix Vaccine      Return to clinic in 3 months to follow-up on elevated blood pressure as well as diabetes.  Blood pressure medication has been refilled.  Patient to have eye exam scheduled by then.  Patient to continue to work on diet to get better control of his blood sugars.  This will likely also help with fatigue.

## 2019-05-07 NOTE — PATIENT INSTRUCTIONS
Obesity, Adult  Obesity is the condition of having too much total body fat. Being overweight or obese means that your weight is greater than what is considered healthy for your body size. Obesity is determined by a measurement called BMI. BMI is an estimate of body fat and is calculated from height and weight. For adults, a BMI of 30 or higher is considered obese.  Obesity can eventually lead to other health concerns and major illnesses, including:  · Stroke.  · Coronary artery disease (CAD).  · Type 2 diabetes.  · Some types of cancer, including cancers of the colon, breast, uterus, and gallbladder.  · Osteoarthritis.  · High blood pressure (hypertension).  · High cholesterol.  · Sleep apnea.  · Gallbladder stones.  · Infertility problems.    What are the causes?  The main cause of obesity is taking in (consuming) more calories than your body uses for energy. Other factors that contribute to this condition may include:  · Being born with genes that make you more likely to become obese.  · Having a medical condition that causes obesity. These conditions include:  ? Hypothyroidism.  ? Polycystic ovarian syndrome (PCOS).  ? Binge-eating disorder.  ? Cushing syndrome.  · Taking certain medicines, such as steroids, antidepressants, and seizure medicines.  · Not being physically active (sedentary lifestyle).  · Living where there are limited places to exercise safely or buy healthy foods.  · Not getting enough sleep.    What increases the risk?  The following factors may increase your risk of this condition:  · Having a family history of obesity.  · Being a woman of -American descent.  · Being a man of  descent.    What are the signs or symptoms?  Having excessive body fat is the main symptom of this condition.  How is this diagnosed?  This condition may be diagnosed based on:  · Your symptoms.  · Your medical history.  · A physical exam. Your health care provider may measure:  ? Your BMI. If you are  an adult with a BMI between 25 and less than 30, you are considered overweight. If you are an adult with a BMI of 30 or higher, you are considered obese.  ? The distances around your hips and your waist (circumferences). These may be compared to each other to help diagnose your condition.  ? Your skinfold thickness. Your health care provider may gently pinch a fold of your skin and measure it.    How is this treated?  Treatment for this condition often includes changing your lifestyle. Treatment may include some or all of the following:  · Dietary changes. Work with your health care provider and a dietitian to set a weight-loss goal that is healthy and reasonable for you. Dietary changes may include eating:  ? Smaller portions. A portion size is the amount of a particular food that is healthy for you to eat at one time. This varies from person to person.  ? Low-calorie or low-fat options.  ? More whole grains, fruits, and vegetables.  · Regular physical activity. This may include aerobic activity (cardio) and strength training.  · Medicine to help you lose weight. Your health care provider may prescribe medicine if you are unable to lose 1 pound a week after 6 weeks of eating more healthily and doing more physical activity.  · Surgery. Surgical options may include gastric banding and gastric bypass. Surgery may be done if:  ? Other treatments have not helped to improve your condition.  ? You have a BMI of 40 or higher.  ? You have life-threatening health problems related to obesity.    Follow these instructions at home:    Eating and drinking    · Follow recommendations from your health care provider about what you eat and drink. Your health care provider may advise you to:  ? Limit fast foods, sweets, and processed snack foods.  ? Choose low-fat options, such as low-fat milk instead of whole milk.  ? Eat 5 or more servings of fruits or vegetables every day.  ? Eat at home more often. This gives you more control  over what you eat.  ? Choose healthy foods when you eat out.  ? Learn what a healthy portion size is.  ? Keep low-fat snacks on hand.  ? Avoid sugary drinks, such as soda, fruit juice, iced tea sweetened with sugar, and flavored milk.  ? Eat a healthy breakfast.  · Drink enough water to keep your urine clear or pale yellow.  · Do not go without eating for long periods of time (do not fast) or follow a fad diet. Fasting and fad diets can be unhealthy and even dangerous.  Physical Activity  · Exercise regularly, as told by your health care provider. Ask your health care provider what types of exercise are safe for you and how often you should exercise.  · Warm up and stretch before being active.  · Cool down and stretch after being active.  · Rest between periods of activity.  Lifestyle  · Limit the time that you spend in front of your TV, computer, or video game system.  · Find ways to reward yourself that do not involve food.  · Limit alcohol intake to no more than 1 drink a day for nonpregnant women and 2 drinks a day for men. One drink equals 12 oz of beer, 5 oz of wine, or 1½ oz of hard liquor.  General instructions  · Keep a weight loss journal to keep track of the food you eat and how much you exercise you get.  · Take over-the-counter and prescription medicines only as told by your health care provider.  · Take vitamins and supplements only as told by your health care provider.  · Consider joining a support group. Your health care provider may be able to recommend a support group.  · Keep all follow-up visits as told by your health care provider. This is important.  Contact a health care provider if:  · You are unable to meet your weight loss goal after 6 weeks of dietary and lifestyle changes.  This information is not intended to replace advice given to you by your health care provider. Make sure you discuss any questions you have with your health care provider.  Document Released: 01/25/2006 Document  Revised: 05/22/2017 Document Reviewed: 10/05/2016  [employer] Patient Education © 2019 [employer]      Exercising to Lose Weight  Exercising can help you to lose weight. In order to lose weight through exercise, you need to do vigorous-intensity exercise. You can tell that you are exercising with vigorous intensity if you are breathing very hard and fast and cannot hold a conversation while exercising.  Moderate-intensity exercise helps to maintain your current weight. You can tell that you are exercising at a moderate level if you have a higher heart rate and faster breathing, but you are still able to hold a conversation.  How often should I exercise?  Choose an activity that you enjoy and set realistic goals. Your health care provider can help you to make an activity plan that works for you. Exercise regularly as directed by your health care provider. This may include:  · Doing resistance training twice each week, such as:  ? Push-ups.  ? Sit-ups.  ? Lifting weights.  ? Using resistance bands.  · Doing a given intensity of exercise for a given amount of time. Choose from these options:  ? 150 minutes of moderate-intensity exercise every week.  ? 75 minutes of vigorous-intensity exercise every week.  ? A mix of moderate-intensity and vigorous-intensity exercise every week.    Children, pregnant women, people who are out of shape, people who are overweight, and older adults may need to consult a health care provider for individual recommendations. If you have any sort of medical condition, be sure to consult your health care provider before starting a new exercise program.  What are some activities that can help me to lose weight?  · Walking at a rate of at least 4.5 miles an hour.  · Jogging or running at a rate of 5 miles per hour.  · Biking at a rate of at least 10 miles per hour.  · Lap swimming.  · Roller-skating or in-line skating.  · Cross-country skiing.  · Vigorous competitive sports, such as  football, basketball, and soccer.  · Jumping rope.  · Aerobic dancing.  How can I be more active in my day-to-day activities?  · Use the stairs instead of the elevator.  · Take a walk during your lunch break.  · If you drive, park your car farther away from work or school.  · If you take public transportation, get off one stop early and walk the rest of the way.  · Make all of your phone calls while standing up and walking around.  · Get up, stretch, and walk around every 30 minutes throughout the day.  What guidelines should I follow while exercising?  · Do not exercise so much that you hurt yourself, feel dizzy, or get very short of breath.  · Consult your health care provider prior to starting a new exercise program.  · Wear comfortable clothes and shoes with good support.  · Drink plenty of water while you exercise to prevent dehydration or heat stroke. Body water is lost during exercise and must be replaced.  · Work out until you breathe faster and your heart beats faster.  This information is not intended to replace advice given to you by your health care provider. Make sure you discuss any questions you have with your health care provider.  Document Released: 01/20/2012 Document Revised: 05/25/2017 Document Reviewed: 05/21/2015  [employer] Interactive Patient Education © 2019 [employer]      Calorie Counting for Weight Loss  Calories are units of energy. Your body needs a certain amount of calories from food to keep you going throughout the day. When you eat more calories than your body needs, your body stores the extra calories as fat. When you eat fewer calories than your body needs, your body burns fat to get the energy it needs.  Calorie counting means keeping track of how many calories you eat and drink each day. Calorie counting can be helpful if you need to lose weight. If you make sure to eat fewer calories than your body needs, you should lose weight. Ask your health care provider what a healthy  weight is for you.  For calorie counting to work, you will need to eat the right number of calories in a day in order to lose a healthy amount of weight per week. A dietitian can help you determine how many calories you need in a day and will give you suggestions on how to reach your calorie goal.  · A healthy amount of weight to lose per week is usually 1-2 lb (0.5-0.9 kg). This usually means that your daily calorie intake should be reduced by 500-750 calories.  · Eating 1,200 - 1,500 calories per day can help most women lose weight.  · Eating 1,500 - 1,800 calories per day can help most men lose weight.    What is my plan?  My goal is to have __________ calories per day.  If I have this many calories per day, I should lose around __________ pounds per week.  What do I need to know about calorie counting?  In order to meet your daily calorie goal, you will need to:  · Find out how many calories are in each food you would like to eat. Try to do this before you eat.  · Decide how much of the food you plan to eat.  · Write down what you ate and how many calories it had. Doing this is called keeping a food log.    To successfully lose weight, it is important to balance calorie counting with a healthy lifestyle that includes regular activity. Aim for 150 minutes of moderate exercise (such as walking) or 75 minutes of vigorous exercise (such as running) each week.  Where do I find calorie information?    The number of calories in a food can be found on a Nutrition Facts label. If a food does not have a Nutrition Facts label, try to look up the calories online or ask your dietitian for help.  Remember that calories are listed per serving. If you choose to have more than one serving of a food, you will have to multiply the calories per serving by the amount of servings you plan to eat. For example, the label on a package of bread might say that a serving size is 1 slice and that there are 90 calories in a serving. If you  "eat 1 slice, you will have eaten 90 calories. If you eat 2 slices, you will have eaten 180 calories.  How do I keep a food log?  Immediately after each meal, record the following information in your food log:  · What you ate. Don't forget to include toppings, sauces, and other extras on the food.  · How much you ate. This can be measured in cups, ounces, or number of items.  · How many calories each food and drink had.  · The total number of calories in the meal.    Keep your food log near you, such as in a small notebook in your pocket, or use a mobile rhea or website. Some programs will calculate calories for you and show you how many calories you have left for the day to meet your goal.  What are some calorie counting tips?  · Use your calories on foods and drinks that will fill you up and not leave you hungry:  ? Some examples of foods that fill you up are nuts and nut butters, vegetables, lean proteins, and high-fiber foods like whole grains. High-fiber foods are foods with more than 5 g fiber per serving.  ? Drinks such as sodas, specialty coffee drinks, alcohol, and juices have a lot of calories, yet do not fill you up.  · Eat nutritious foods and avoid empty calories. Empty calories are calories you get from foods or beverages that do not have many vitamins or protein, such as candy, sweets, and soda. It is better to have a nutritious high-calorie food (such as an avocado) than a food with few nutrients (such as a bag of chips).  · Know how many calories are in the foods you eat most often. This will help you calculate calorie counts faster.  · Pay attention to calories in drinks. Low-calorie drinks include water and unsweetened drinks.  · Pay attention to nutrition labels for \"low fat\" or \"fat free\" foods. These foods sometimes have the same amount of calories or more calories than the full fat versions. They also often have added sugar, starch, or salt, to make up for flavor that was removed with the " fat.  · Find a way of tracking calories that works for you. Get creative. Try different apps or programs if writing down calories does not work for you.  What are some portion control tips?  · Know how many calories are in a serving. This will help you know how many servings of a certain food you can have.  · Use a measuring cup to measure serving sizes. You could also try weighing out portions on a kitchen scale. With time, you will be able to estimate serving sizes for some foods.  · Take some time to put servings of different foods on your favorite plates, bowls, and cups so you know what a serving looks like.  · Try not to eat straight from a bag or box. Doing this can lead to overeating. Put the amount you would like to eat in a cup or on a plate to make sure you are eating the right portion.  · Use smaller plates, glasses, and bowls to prevent overeating.  · Try not to multitask (for example, watch TV or use your computer) while eating. If it is time to eat, sit down at a table and enjoy your food. This will help you to know when you are full. It will also help you to be aware of what you are eating and how much you are eating.  What are tips for following this plan?  Reading food labels  · Check the calorie count compared to the serving size. The serving size may be smaller than what you are used to eating.  · Check the source of the calories. Make sure the food you are eating is high in vitamins and protein and low in saturated and trans fats.  Shopping  · Read nutrition labels while you shop. This will help you make healthy decisions before you decide to purchase your food.  · Make a grocery list and stick to it.  Cooking  · Try to cook your favorite foods in a healthier way. For example, try baking instead of frying.  · Use low-fat dairy products.  Meal planning  · Use more fruits and vegetables. Half of your plate should be fruits and vegetables.  · Include lean proteins like poultry and fish.  How do I  count calories when eating out?  · Ask for smaller portion sizes.  · Consider sharing an entree and sides instead of getting your own entree.  · If you get your own entree, eat only half. Ask for a box at the beginning of your meal and put the rest of your entree in it so you are not tempted to eat it.  · If calories are listed on the menu, choose the lower calorie options.  · Choose dishes that include vegetables, fruits, whole grains, low-fat dairy products, and lean protein.  · Choose items that are boiled, broiled, grilled, or steamed. Stay away from items that are buttered, battered, fried, or served with cream sauce. Items labeled “crispy” are usually fried, unless stated otherwise.  · Choose water, low-fat milk, unsweetened iced tea, or other drinks without added sugar. If you want an alcoholic beverage, choose a lower calorie option such as a glass of wine or light beer.  · Ask for dressings, sauces, and syrups on the side. These are usually high in calories, so you should limit the amount you eat.  · If you want a salad, choose a garden salad and ask for grilled meats. Avoid extra toppings like turner, cheese, or fried items. Ask for the dressing on the side, or ask for olive oil and vinegar or lemon to use as dressing.  · Estimate how many servings of a food you are given. For example, a serving of cooked rice is ½ cup or about the size of half a baseball. Knowing serving sizes will help you be aware of how much food you are eating at restaurants. The list below tells you how big or small some common portion sizes are based on everyday objects:  ? 1 oz--4 stacked dice.  ? 3 oz--1 deck of cards.  ? 1 tsp--1 die.  ? 1 Tbsp--½ a ping-pong ball.  ? 2 Tbsp--1 ping-pong ball.  ? ½ cup--½ baseball.  ? 1 cup--1 baseball.  Summary  · Calorie counting means keeping track of how many calories you eat and drink each day. If you eat fewer calories than your body needs, you should lose weight.  · A healthy amount of  weight to lose per week is usually 1-2 lb (0.5-0.9 kg). This usually means reducing your daily calorie intake by 500-750 calories.  · The number of calories in a food can be found on a Nutrition Facts label. If a food does not have a Nutrition Facts label, try to look up the calories online or ask your dietitian for help.  · Use your calories on foods and drinks that will fill you up, and not on foods and drinks that will leave you hungry.  · Use smaller plates, glasses, and bowls to prevent overeating.  This information is not intended to replace advice given to you by your health care provider. Make sure you discuss any questions you have with your health care provider.  Document Released: 12/18/2006 Document Revised: 11/17/2017 Document Reviewed: 11/17/2017  [employer] Interactive Patient Education © 2019 [employer]    Serving Sizes  A serving size is a measured amount of food or drink, such as one slice of bread, that has an associated nutrient content. Knowing the serving size of a food or drink can help you determine how much of that food you should consume.  What is the size of one serving?  The size of one healthy serving depends on the food or drink. To determine a serving size, read the food label. If the food or drink does not have a food label, try to find serving size information online. Or, use the following to estimate the size of one adult serving:  Grain  1 slice bread. ½ bagel. ½ cup pasta.  Vegetable  ½ cup cooked or canned vegetables. 1 cup raw, leafy greens.  Fruit  ½ cup canned fruit. 1 medium fruit. ¼ cup dried fruit.  Meat and Other Protein Sources  1 oz meat, poultry, or fish. ¼ cup cooked beans. 1 egg. ¼ cup nuts or seeds. 1 Tbsp nut butter. ¼ cup tofu or tempeh. 2 Tbsp hummus.  Dairy  An individual container of yogurt (6-8 oz). 1 piece of cheese the size of your thumb (1 oz). 1 cup (8 oz) milk or milk alternative.  Fat  A piece the size of one dice. 1 tsp soft margarine. 1 Tbsp  mayonnaise. 1 tsp vegetable oil. 1 Tbsp regular salad dressing. 2 Tbsp low-fat salad dressing.  How many servings should I eat from each food group each day?  The following are the suggested number of servings to try and have every day from each food group. You can also look at your eating throughout the week and aim for meeting these requirements on most days for overall healthy eating.  Grain  6-8 servings. Try to have half of your grains from whole grains, such as whole wheat bread, corn tortillas, oatmeal, brown rice, whole wheat pasta, and bulgur.  Vegetable  At least 2½-3 servings.  Fruit  2 servings.  Meat and Other Protein Foods  5-6 servings. Aim to have lean proteins, such as chicken, turkey, fish, beans, or tofu.  Dairy  3 servings. Choose low-fat or nonfat if you are trying to control your weight.  Fat  2-3 servings.  Is a serving the same thing as a portion?  No. A portion is the actual amount you eat, which may be more than one serving. Knowing the specific serving size of a food and the nutritional information that goes with it can help you make a healthy decision on what size portion to eat.  What are some tips to help me learn healthy serving sizes?  · Check food labels for serving sizes. Many foods that come as a single portion actually contain multiple servings.  · Determine the serving size of foods you commonly eat and figure out how large a portion you usually eat.  · Measure the number of servings that can be held by the bowls, glasses, cups, and plates you typically use. For example, pour your breakfast cereal into your regular bowl and then pour it into a measuring cup.  · For 1-2 days, measure the serving sizes of all the foods you eat.  · Practice estimating serving sizes and determining how big your portions should be.  This information is not intended to replace advice given to you by your health care provider. Make sure you discuss any questions you have with your health care  provider.  Document Released: 09/15/2004 Document Revised: 08/12/2017 Document Reviewed: 03/17/2015  [employer] Interactive Patient Education © 2018 [employer]

## 2019-05-08 LAB
ALBUMIN SERPL-MCNC: 4.2 G/DL (ref 3.5–5.2)
ALBUMIN/CREAT UR: 64.1 MG/G CREAT (ref 0–30)
ALBUMIN/GLOB SERPL: 1.2 G/DL
ALP SERPL-CCNC: 74 U/L (ref 39–117)
ALT SERPL-CCNC: 15 U/L (ref 1–41)
AST SERPL-CCNC: 19 U/L (ref 1–40)
BILIRUB SERPL-MCNC: 0.5 MG/DL (ref 0.2–1.2)
BUN SERPL-MCNC: 14 MG/DL (ref 8–23)
BUN/CREAT SERPL: 17.1 (ref 7–25)
CALCIUM SERPL-MCNC: 10.2 MG/DL (ref 8.6–10.5)
CHLORIDE SERPL-SCNC: 99 MMOL/L (ref 98–107)
CHOLEST SERPL-MCNC: 165 MG/DL (ref 0–200)
CO2 SERPL-SCNC: 30.3 MMOL/L (ref 22–29)
CREAT SERPL-MCNC: 0.82 MG/DL (ref 0.76–1.27)
CREAT UR-MCNC: 137.1 MG/DL
GLOBULIN SER CALC-MCNC: 3.4 GM/DL
GLUCOSE SERPL-MCNC: 94 MG/DL (ref 65–99)
HBA1C MFR BLD: 6.5 % (ref 4.8–5.6)
HDLC SERPL-MCNC: 61 MG/DL (ref 40–60)
LDLC SERPL CALC-MCNC: 89 MG/DL (ref 0–100)
MICROALBUMIN UR-MCNC: 87.9 UG/ML
POTASSIUM SERPL-SCNC: 4.9 MMOL/L (ref 3.5–5.2)
PROT SERPL-MCNC: 7.6 G/DL (ref 6–8.5)
SODIUM SERPL-SCNC: 141 MMOL/L (ref 136–145)
TRIGL SERPL-MCNC: 74 MG/DL (ref 0–150)
VLDLC SERPL CALC-MCNC: 14.8 MG/DL

## 2019-05-15 DIAGNOSIS — E11.65 TYPE 2 DIABETES MELLITUS WITH HYPERGLYCEMIA, WITHOUT LONG-TERM CURRENT USE OF INSULIN (HCC): Primary | ICD-10-CM

## 2019-05-15 NOTE — PROGRESS NOTES
Please inform patient CMP is essentially normal, A1c has decreased from 6.93% to 6.50%.  This is at goal for his diabetes.  Lipid panel is essentially normal.  Microalbumin creatinine ratio is slightly elevated at 64.1 we can recheck this in 3 months.

## 2019-06-11 DIAGNOSIS — I10 BENIGN ESSENTIAL HTN: ICD-10-CM

## 2019-06-11 NOTE — TELEPHONE ENCOUNTER
Please call this patient's pharmacy and see if they have irbesartan 150 mg tablets in stock.  Patient was originally on telmisartan 20 mg a day.  However we got a message today that this pharmacy is out of it.  Please call and see if they have irbesartan in stock and please make sure it is not from FDA recall lot.  If this is an issue can they recommend any other ARBs that can be substituted for the telmisartan that is also covered by his insurance?

## 2019-06-11 NOTE — TELEPHONE ENCOUNTER
Called pharmacy they do have his prescription ready for p/u so I called pt to let him know that they do have his Olmesartan ready.  Thanks

## 2019-06-12 RX ORDER — OLMESARTAN MEDOXOMIL 20 MG/1
20 TABLET ORAL DAILY
Qty: 90 TABLET | Refills: 1 | Status: SHIPPED | OUTPATIENT
Start: 2019-06-12 | End: 2019-07-25 | Stop reason: SDUPTHER

## 2019-07-17 ENCOUNTER — PATIENT MESSAGE (OUTPATIENT)
Dept: FAMILY MEDICINE CLINIC | Facility: CLINIC | Age: 74
End: 2019-07-17

## 2019-07-18 RX ORDER — ROSUVASTATIN CALCIUM 10 MG/1
10 TABLET, COATED ORAL DAILY
Qty: 30 TABLET | Refills: 1 | Status: SHIPPED | OUTPATIENT
Start: 2019-07-18 | End: 2019-08-13 | Stop reason: SDUPTHER

## 2019-07-18 RX ORDER — PIOGLITAZONEHYDROCHLORIDE 15 MG/1
15 TABLET ORAL DAILY
Qty: 30 TABLET | Refills: 1 | Status: SHIPPED | OUTPATIENT
Start: 2019-07-18 | End: 2019-07-25 | Stop reason: SDUPTHER

## 2019-07-24 ENCOUNTER — PATIENT MESSAGE (OUTPATIENT)
Dept: FAMILY MEDICINE CLINIC | Facility: CLINIC | Age: 74
End: 2019-07-24

## 2019-07-25 ENCOUNTER — PATIENT MESSAGE (OUTPATIENT)
Dept: FAMILY MEDICINE CLINIC | Facility: CLINIC | Age: 74
End: 2019-07-25

## 2019-07-25 DIAGNOSIS — I10 BENIGN ESSENTIAL HTN: ICD-10-CM

## 2019-07-25 RX ORDER — PIOGLITAZONEHYDROCHLORIDE 15 MG/1
15 TABLET ORAL DAILY
Qty: 90 TABLET | Refills: 0 | Status: SHIPPED | OUTPATIENT
Start: 2019-07-25 | End: 2019-10-16 | Stop reason: SDUPTHER

## 2019-07-25 RX ORDER — OLMESARTAN MEDOXOMIL 20 MG/1
20 TABLET ORAL DAILY
Qty: 90 TABLET | Refills: 1 | Status: SHIPPED | OUTPATIENT
Start: 2019-07-25 | End: 2019-08-16 | Stop reason: ALTCHOICE

## 2019-08-06 ENCOUNTER — OFFICE VISIT (OUTPATIENT)
Dept: FAMILY MEDICINE CLINIC | Facility: CLINIC | Age: 74
End: 2019-08-06

## 2019-08-06 VITALS
WEIGHT: 257 LBS | BODY MASS INDEX: 43.87 KG/M2 | OXYGEN SATURATION: 96 % | DIASTOLIC BLOOD PRESSURE: 81 MMHG | RESPIRATION RATE: 16 BRPM | SYSTOLIC BLOOD PRESSURE: 129 MMHG | HEIGHT: 64 IN | HEART RATE: 74 BPM

## 2019-08-06 DIAGNOSIS — I10 BENIGN ESSENTIAL HTN: Primary | ICD-10-CM

## 2019-08-06 DIAGNOSIS — E11.65 TYPE 2 DIABETES MELLITUS WITH HYPERGLYCEMIA, WITHOUT LONG-TERM CURRENT USE OF INSULIN (HCC): ICD-10-CM

## 2019-08-06 PROCEDURE — 99213 OFFICE O/P EST LOW 20 MIN: CPT | Performed by: FAMILY MEDICINE

## 2019-08-06 NOTE — PROGRESS NOTES
Subjective   Ray Pratt is a 73 y.o. male.     73-year-old male presents today for follow-up on hypertension and diabetes.    Hypertension: Patient is on Benicar 20 mg a day.  At his last visit his blood pressure in the office is 148/84.  He has been out of his medication at that time.  Blood pressure in the office today is 129/81.  Patient denies chest pain shortness of breath headache with vision changes.    Type 2 diabetes mellitus: At last visit discussed he is having issues with blood sugar control with fasting blood sugars elevated around 110-130.  Postprandial blood sugars can be in the low 200s.  At that time he did note that his diet was unhealthy.  Patient is on Actos 15 mg a day.  This was refilled at his last visit.  Patient was encouraged to work on dietary changes to help control his blood sugars.  He is also to schedule diabetic eye exam.  Labs at last visit did show that his A1c was at goal at 6.5%. Today patient reports blood sugar readings are much better. No new issues/complaints.             The following portions of the patient's history were reviewed and updated as appropriate: allergies, current medications, past family history, past medical history, past social history, past surgical history and problem list.    Review of Systems   Constitutional: Negative for chills, fatigue and fever.   HENT: Negative for congestion.    Respiratory: Negative for apnea and shortness of breath.    Cardiovascular: Negative for chest pain, palpitations and leg swelling.   Genitourinary: Negative for nocturia.   Psychiatric/Behavioral: Negative for sleep disturbance, depressed mood and stress. The patient is not nervous/anxious.        Objective   Physical Exam   Constitutional: He is oriented to person, place, and time. He appears well-developed and well-nourished.   HENT:   Head: Normocephalic and atraumatic.   Eyes: EOM are normal. Pupils are equal, round, and reactive to light.   Neck: Normal range  of motion. Neck supple.   Cardiovascular: Normal rate, regular rhythm and normal heart sounds.   No murmur heard.  Pulmonary/Chest: Effort normal and breath sounds normal. No stridor. No respiratory distress. He has no wheezes. He has no rales.   Neurological: He is alert and oriented to person, place, and time.   Skin: Skin is warm.   Psychiatric: He has a normal mood and affect. His behavior is normal.               Assessment/Plan     Ray was seen today for hypertension.    Diagnoses and all orders for this visit:    Benign essential HTN    Type 2 diabetes mellitus with hyperglycemia, without long-term current use of insulin (CMS/[facility])      Cont meds as Rx'd; RTC in 6 months for MWV or sooner if needed.

## 2019-08-13 ENCOUNTER — PATIENT MESSAGE (OUTPATIENT)
Dept: FAMILY MEDICINE CLINIC | Facility: CLINIC | Age: 74
End: 2019-08-13

## 2019-08-13 RX ORDER — ROSUVASTATIN CALCIUM 10 MG/1
10 TABLET, COATED ORAL DAILY
Qty: 90 TABLET | Refills: 1 | Status: SHIPPED | OUTPATIENT
Start: 2019-08-13 | End: 2020-01-30

## 2019-08-14 ENCOUNTER — PATIENT MESSAGE (OUTPATIENT)
Dept: FAMILY MEDICINE CLINIC | Facility: CLINIC | Age: 74
End: 2019-08-14

## 2019-08-16 RX ORDER — VALSARTAN 160 MG/1
160 TABLET ORAL DAILY
Qty: 30 TABLET | Refills: 0 | Status: SHIPPED | OUTPATIENT
Start: 2019-08-16 | End: 2019-09-08 | Stop reason: SDUPTHER

## 2019-09-09 RX ORDER — VALSARTAN 160 MG/1
TABLET ORAL
Qty: 30 TABLET | Refills: 5 | Status: SHIPPED | OUTPATIENT
Start: 2019-09-09 | End: 2019-11-08 | Stop reason: HOSPADM

## 2019-10-16 RX ORDER — PIOGLITAZONEHYDROCHLORIDE 15 MG/1
TABLET ORAL
Qty: 90 TABLET | Refills: 0 | Status: SHIPPED | OUTPATIENT
Start: 2019-10-16 | End: 2020-01-16

## 2019-11-04 ENCOUNTER — APPOINTMENT (OUTPATIENT)
Dept: CARDIOLOGY | Facility: HOSPITAL | Age: 74
End: 2019-11-04

## 2019-11-04 ENCOUNTER — APPOINTMENT (OUTPATIENT)
Dept: GENERAL RADIOLOGY | Facility: HOSPITAL | Age: 74
End: 2019-11-04

## 2019-11-04 ENCOUNTER — HOSPITAL ENCOUNTER (INPATIENT)
Facility: HOSPITAL | Age: 74
LOS: 1 days | Discharge: HOME | End: 2019-11-08
Attending: EMERGENCY MEDICINE | Admitting: INTERNAL MEDICINE

## 2019-11-04 DIAGNOSIS — I48.91 NEW ONSET ATRIAL FIBRILLATION (HCC): Primary | ICD-10-CM

## 2019-11-04 DIAGNOSIS — I48.92 ATRIAL FLUTTER WITH RAPID VENTRICULAR RESPONSE (HCC): ICD-10-CM

## 2019-11-04 DIAGNOSIS — I50.41 ACUTE COMBINED SYSTOLIC AND DIASTOLIC CONGESTIVE HEART FAILURE (HCC): ICD-10-CM

## 2019-11-04 DIAGNOSIS — I50.9 ACUTE CONGESTIVE HEART FAILURE, UNSPECIFIED HEART FAILURE TYPE (HCC): ICD-10-CM

## 2019-11-04 LAB
ALBUMIN SERPL-MCNC: 3.3 G/DL (ref 3.5–5.2)
ALBUMIN/GLOB SERPL: 0.9 G/DL
ALP SERPL-CCNC: 62 U/L (ref 39–117)
ALT SERPL W P-5'-P-CCNC: 18 U/L (ref 1–41)
ANION GAP SERPL CALCULATED.3IONS-SCNC: 12.5 MMOL/L (ref 5–15)
AORTIC DIMENSIONLESS INDEX: 0.6 (DI)
APTT PPP: 29 SECONDS (ref 22.7–35.4)
AST SERPL-CCNC: 18 U/L (ref 1–40)
BASOPHILS # BLD AUTO: 0.06 10*3/MM3 (ref 0–0.2)
BASOPHILS NFR BLD AUTO: 0.8 % (ref 0–1.5)
BH CV ECHO MEAS - AO MAX PG (FULL): 3.7 MMHG
BH CV ECHO MEAS - AO MAX PG: 6.3 MMHG
BH CV ECHO MEAS - AO MEAN PG (FULL): 2 MMHG
BH CV ECHO MEAS - AO MEAN PG: 4 MMHG
BH CV ECHO MEAS - AO ROOT AREA (BSA CORRECTED): 1.3
BH CV ECHO MEAS - AO ROOT AREA: 8 CM^2
BH CV ECHO MEAS - AO ROOT DIAM: 3.2 CM
BH CV ECHO MEAS - AO V2 MAX: 125 CM/SEC
BH CV ECHO MEAS - AO V2 MEAN: 88.4 CM/SEC
BH CV ECHO MEAS - AO V2 VTI: 19.3 CM
BH CV ECHO MEAS - ASC AORTA: 3.2 CM
BH CV ECHO MEAS - AVA(I,A): 2 CM^2
BH CV ECHO MEAS - AVA(I,D): 2 CM^2
BH CV ECHO MEAS - AVA(V,A): 2 CM^2
BH CV ECHO MEAS - AVA(V,D): 2 CM^2
BH CV ECHO MEAS - BSA(HAYCOCK): 2.5 M^2
BH CV ECHO MEAS - BSA: 2.4 M^2
BH CV ECHO MEAS - BZI_BMI: 39.9 KILOGRAMS/M^2
BH CV ECHO MEAS - BZI_METRIC_HEIGHT: 177 CM
BH CV ECHO MEAS - BZI_METRIC_WEIGHT: 125 KG
BH CV ECHO MEAS - EDV(CUBED): 140.6 ML
BH CV ECHO MEAS - EDV(MOD-SP2): 99 ML
BH CV ECHO MEAS - EDV(MOD-SP4): 100 ML
BH CV ECHO MEAS - EDV(TEICH): 129.5 ML
BH CV ECHO MEAS - EF(CUBED): 39.4 %
BH CV ECHO MEAS - EF(MOD-BP): 22 %
BH CV ECHO MEAS - EF(MOD-SP2): 23.2 %
BH CV ECHO MEAS - EF(MOD-SP4): 31 %
BH CV ECHO MEAS - EF(TEICH): 32.3 %
BH CV ECHO MEAS - ESV(CUBED): 85.2 ML
BH CV ECHO MEAS - ESV(MOD-SP2): 76 ML
BH CV ECHO MEAS - ESV(MOD-SP4): 69 ML
BH CV ECHO MEAS - ESV(TEICH): 87.7 ML
BH CV ECHO MEAS - FS: 15.4 %
BH CV ECHO MEAS - IVS/LVPW: 1.3
BH CV ECHO MEAS - IVSD: 1.1 CM
BH CV ECHO MEAS - LAT PEAK E' VEL: 9.3 CM/SEC
BH CV ECHO MEAS - LV DIASTOLIC VOL/BSA (35-75): 41.9 ML/M^2
BH CV ECHO MEAS - LV MASS(C)D: 187.7 GRAMS
BH CV ECHO MEAS - LV MASS(C)DI: 78.7 GRAMS/M^2
BH CV ECHO MEAS - LV MAX PG: 2.5 MMHG
BH CV ECHO MEAS - LV MEAN PG: 2 MMHG
BH CV ECHO MEAS - LV SYSTOLIC VOL/BSA (12-30): 28.9 ML/M^2
BH CV ECHO MEAS - LV V1 MAX: 79.5 CM/SEC
BH CV ECHO MEAS - LV V1 MEAN: 58.4 CM/SEC
BH CV ECHO MEAS - LV V1 VTI: 12 CM
BH CV ECHO MEAS - LVIDD: 5.2 CM
BH CV ECHO MEAS - LVIDS: 4.4 CM
BH CV ECHO MEAS - LVLD AP2: 7.7 CM
BH CV ECHO MEAS - LVLD AP4: 7.4 CM
BH CV ECHO MEAS - LVLS AP2: 8.4 CM
BH CV ECHO MEAS - LVLS AP4: 6.9 CM
BH CV ECHO MEAS - LVOT AREA (M): 3.1 CM^2
BH CV ECHO MEAS - LVOT AREA: 3.1 CM^2
BH CV ECHO MEAS - LVOT DIAM: 2 CM
BH CV ECHO MEAS - LVPWD: 0.85 CM
BH CV ECHO MEAS - MED PEAK E' VEL: 6.6 CM/SEC
BH CV ECHO MEAS - MV DEC SLOPE: 1105 CM/SEC^2
BH CV ECHO MEAS - MV MAX PG: 5.9 MMHG
BH CV ECHO MEAS - MV MEAN PG: 2 MMHG
BH CV ECHO MEAS - MV P1/2T MAX VEL: 133 CM/SEC
BH CV ECHO MEAS - MV P1/2T: 35.3 MSEC
BH CV ECHO MEAS - MV V2 MAX: 121 CM/SEC
BH CV ECHO MEAS - MV V2 MEAN: 55.7 CM/SEC
BH CV ECHO MEAS - MV V2 VTI: 24 CM
BH CV ECHO MEAS - MVA P1/2T LCG: 1.7 CM^2
BH CV ECHO MEAS - MVA(P1/2T): 6.2 CM^2
BH CV ECHO MEAS - MVA(VTI): 1.6 CM^2
BH CV ECHO MEAS - PA ACC TIME: 0.1 SEC
BH CV ECHO MEAS - PA MAX PG (FULL): 0.41 MMHG
BH CV ECHO MEAS - PA MAX PG: 1.7 MMHG
BH CV ECHO MEAS - PA PR(ACCEL): 34.9 MMHG
BH CV ECHO MEAS - PA V2 MAX: 64.6 CM/SEC
BH CV ECHO MEAS - PVA(V,A): 3.9 CM^2
BH CV ECHO MEAS - PVA(V,D): 3.9 CM^2
BH CV ECHO MEAS - QP/QS: 1.5
BH CV ECHO MEAS - RV MAX PG: 1.3 MMHG
BH CV ECHO MEAS - RV MEAN PG: 1 MMHG
BH CV ECHO MEAS - RV V1 MAX: 56.2 CM/SEC
BH CV ECHO MEAS - RV V1 MEAN: 38.9 CM/SEC
BH CV ECHO MEAS - RV V1 VTI: 12.5 CM
BH CV ECHO MEAS - RVOT AREA: 4.5 CM^2
BH CV ECHO MEAS - RVOT DIAM: 2.4 CM
BH CV ECHO MEAS - SI(AO): 65.1 ML/M^2
BH CV ECHO MEAS - SI(CUBED): 23.2 ML/M^2
BH CV ECHO MEAS - SI(LVOT): 15.8 ML/M^2
BH CV ECHO MEAS - SI(MOD-SP2): 9.6 ML/M^2
BH CV ECHO MEAS - SI(MOD-SP4): 13 ML/M^2
BH CV ECHO MEAS - SI(TEICH): 17.5 ML/M^2
BH CV ECHO MEAS - SV(AO): 155.2 ML
BH CV ECHO MEAS - SV(CUBED): 55.4 ML
BH CV ECHO MEAS - SV(LVOT): 37.7 ML
BH CV ECHO MEAS - SV(MOD-SP2): 23 ML
BH CV ECHO MEAS - SV(MOD-SP4): 31 ML
BH CV ECHO MEAS - SV(RVOT): 56.5 ML
BH CV ECHO MEAS - SV(TEICH): 41.8 ML
BH CV ECHO MEAS - TAPSE (>1.6): 1.4 CM
BH CV XLRA - RV BASE: 3.97 CM
BH CV XLRA - TDI S': 10.8 CM/SEC
BILIRUB SERPL-MCNC: 0.4 MG/DL (ref 0.2–1.2)
BUN BLD-MCNC: 11 MG/DL (ref 8–23)
BUN/CREAT SERPL: 11.3 (ref 7–25)
CALCIUM SPEC-SCNC: 8.8 MG/DL (ref 8.6–10.5)
CHLORIDE SERPL-SCNC: 108 MMOL/L (ref 98–107)
CO2 SERPL-SCNC: 20.5 MMOL/L (ref 22–29)
CREAT BLD-MCNC: 0.97 MG/DL (ref 0.76–1.27)
DEPRECATED RDW RBC AUTO: 49.6 FL (ref 37–54)
EOSINOPHIL # BLD AUTO: 0.06 10*3/MM3 (ref 0–0.4)
EOSINOPHIL NFR BLD AUTO: 0.8 % (ref 0.3–6.2)
ERYTHROCYTE [DISTWIDTH] IN BLOOD BY AUTOMATED COUNT: 14.5 % (ref 12.3–15.4)
GFR SERPL CREATININE-BSD FRML MDRD: 76 ML/MIN/1.73
GLOBULIN UR ELPH-MCNC: 3.5 GM/DL
GLUCOSE BLD-MCNC: 165 MG/DL (ref 65–99)
GLUCOSE BLDC GLUCOMTR-MCNC: 131 MG/DL (ref 70–130)
GLUCOSE BLDC GLUCOMTR-MCNC: 156 MG/DL (ref 70–130)
HCT VFR BLD AUTO: 41.4 % (ref 37.5–51)
HGB BLD-MCNC: 12.9 G/DL (ref 13–17.7)
IMM GRANULOCYTES # BLD AUTO: 0.03 10*3/MM3 (ref 0–0.05)
IMM GRANULOCYTES NFR BLD AUTO: 0.4 % (ref 0–0.5)
INR PPP: 1.06 (ref 0.9–1.1)
LEFT ATRIUM VOLUME INDEX: 36.8 ML/M2
LV EF 2D ECHO EST: 22 %
LYMPHOCYTES # BLD AUTO: 1.11 10*3/MM3 (ref 0.7–3.1)
LYMPHOCYTES NFR BLD AUTO: 15.6 % (ref 19.6–45.3)
MAGNESIUM SERPL-MCNC: 2.1 MG/DL (ref 1.6–2.4)
MCH RBC QN AUTO: 29.4 PG (ref 26.6–33)
MCHC RBC AUTO-ENTMCNC: 31.2 G/DL (ref 31.5–35.7)
MCV RBC AUTO: 94.3 FL (ref 79–97)
MONOCYTES # BLD AUTO: 0.51 10*3/MM3 (ref 0.1–0.9)
MONOCYTES NFR BLD AUTO: 7.2 % (ref 5–12)
NEUTROPHILS # BLD AUTO: 5.35 10*3/MM3 (ref 1.7–7)
NEUTROPHILS NFR BLD AUTO: 75.2 % (ref 42.7–76)
NRBC BLD AUTO-RTO: 0 /100 WBC (ref 0–0.2)
NT-PROBNP SERPL-MCNC: 1485 PG/ML (ref 5–900)
PLATELET # BLD AUTO: 231 10*3/MM3 (ref 140–450)
PMV BLD AUTO: 10.3 FL (ref 6–12)
POTASSIUM BLD-SCNC: 4.1 MMOL/L (ref 3.5–5.2)
PROT SERPL-MCNC: 6.8 G/DL (ref 6–8.5)
PROTHROMBIN TIME: 13.5 SECONDS (ref 11.7–14.2)
RBC # BLD AUTO: 4.39 10*6/MM3 (ref 4.14–5.8)
SODIUM BLD-SCNC: 141 MMOL/L (ref 136–145)
TROPONIN T SERPL-MCNC: <0.01 NG/ML (ref 0–0.03)
TSH SERPL DL<=0.05 MIU/L-ACNC: 0.83 UIU/ML (ref 0.27–4.2)
WBC NRBC COR # BLD: 7.12 10*3/MM3 (ref 3.4–10.8)

## 2019-11-04 PROCEDURE — 80053 COMPREHEN METABOLIC PANEL: CPT | Performed by: EMERGENCY MEDICINE

## 2019-11-04 PROCEDURE — G0378 HOSPITAL OBSERVATION PER HR: HCPCS

## 2019-11-04 PROCEDURE — 82962 GLUCOSE BLOOD TEST: CPT

## 2019-11-04 PROCEDURE — 93005 ELECTROCARDIOGRAM TRACING: CPT | Performed by: EMERGENCY MEDICINE

## 2019-11-04 PROCEDURE — 83880 ASSAY OF NATRIURETIC PEPTIDE: CPT | Performed by: EMERGENCY MEDICINE

## 2019-11-04 PROCEDURE — 25010000002 PERFLUTREN (DEFINITY) 8.476 MG IN SODIUM CHLORIDE 0.9 % 10 ML INJECTION: Performed by: NURSE PRACTITIONER

## 2019-11-04 PROCEDURE — 85025 COMPLETE CBC W/AUTO DIFF WBC: CPT | Performed by: EMERGENCY MEDICINE

## 2019-11-04 PROCEDURE — 83735 ASSAY OF MAGNESIUM: CPT | Performed by: INTERNAL MEDICINE

## 2019-11-04 PROCEDURE — 99204 OFFICE O/P NEW MOD 45 MIN: CPT | Performed by: NURSE PRACTITIONER

## 2019-11-04 PROCEDURE — 93306 TTE W/DOPPLER COMPLETE: CPT

## 2019-11-04 PROCEDURE — 85730 THROMBOPLASTIN TIME PARTIAL: CPT | Performed by: EMERGENCY MEDICINE

## 2019-11-04 PROCEDURE — 85610 PROTHROMBIN TIME: CPT | Performed by: EMERGENCY MEDICINE

## 2019-11-04 PROCEDURE — 93306 TTE W/DOPPLER COMPLETE: CPT | Performed by: INTERNAL MEDICINE

## 2019-11-04 PROCEDURE — 93010 ELECTROCARDIOGRAM REPORT: CPT | Performed by: INTERNAL MEDICINE

## 2019-11-04 PROCEDURE — 25010000002 FUROSEMIDE PER 20 MG: Performed by: EMERGENCY MEDICINE

## 2019-11-04 PROCEDURE — 99285 EMERGENCY DEPT VISIT HI MDM: CPT

## 2019-11-04 PROCEDURE — 84443 ASSAY THYROID STIM HORMONE: CPT | Performed by: INTERNAL MEDICINE

## 2019-11-04 PROCEDURE — 71045 X-RAY EXAM CHEST 1 VIEW: CPT

## 2019-11-04 PROCEDURE — 84484 ASSAY OF TROPONIN QUANT: CPT | Performed by: EMERGENCY MEDICINE

## 2019-11-04 RX ORDER — ASPIRIN 81 MG/1
81 TABLET ORAL DAILY
Status: DISCONTINUED | OUTPATIENT
Start: 2019-11-05 | End: 2019-11-06

## 2019-11-04 RX ORDER — ACETAMINOPHEN 160 MG/5ML
650 SOLUTION ORAL EVERY 4 HOURS PRN
Status: DISCONTINUED | OUTPATIENT
Start: 2019-11-04 | End: 2019-11-08 | Stop reason: HOSPADM

## 2019-11-04 RX ORDER — VALSARTAN 160 MG/1
160 TABLET ORAL DAILY
Status: DISCONTINUED | OUTPATIENT
Start: 2019-11-05 | End: 2019-11-06

## 2019-11-04 RX ORDER — PIOGLITAZONEHYDROCHLORIDE 15 MG/1
15 TABLET ORAL DAILY
Status: DISCONTINUED | OUTPATIENT
Start: 2019-11-05 | End: 2019-11-08 | Stop reason: HOSPADM

## 2019-11-04 RX ORDER — ACETAMINOPHEN 325 MG/1
650 TABLET ORAL EVERY 4 HOURS PRN
Status: DISCONTINUED | OUTPATIENT
Start: 2019-11-04 | End: 2019-11-08 | Stop reason: HOSPADM

## 2019-11-04 RX ORDER — FUROSEMIDE 10 MG/ML
40 INJECTION INTRAMUSCULAR; INTRAVENOUS ONCE
Status: COMPLETED | OUTPATIENT
Start: 2019-11-04 | End: 2019-11-04

## 2019-11-04 RX ORDER — SODIUM CHLORIDE 0.9 % (FLUSH) 0.9 %
10 SYRINGE (ML) INJECTION AS NEEDED
Status: DISCONTINUED | OUTPATIENT
Start: 2019-11-04 | End: 2019-11-08 | Stop reason: HOSPADM

## 2019-11-04 RX ORDER — SODIUM CHLORIDE 0.9 % (FLUSH) 0.9 %
10 SYRINGE (ML) INJECTION EVERY 12 HOURS SCHEDULED
Status: DISCONTINUED | OUTPATIENT
Start: 2019-11-04 | End: 2019-11-08 | Stop reason: HOSPADM

## 2019-11-04 RX ORDER — ROSUVASTATIN CALCIUM 10 MG/1
10 TABLET, COATED ORAL DAILY
Status: DISCONTINUED | OUTPATIENT
Start: 2019-11-05 | End: 2019-11-08 | Stop reason: HOSPADM

## 2019-11-04 RX ORDER — ACETAMINOPHEN 650 MG/1
650 SUPPOSITORY RECTAL EVERY 4 HOURS PRN
Status: DISCONTINUED | OUTPATIENT
Start: 2019-11-04 | End: 2019-11-08 | Stop reason: HOSPADM

## 2019-11-04 RX ADMIN — SODIUM CHLORIDE, PRESERVATIVE FREE 10 ML: 5 INJECTION INTRAVENOUS at 20:50

## 2019-11-04 RX ADMIN — PERFLUTREN 2 ML: 6.52 INJECTION, SUSPENSION INTRAVENOUS at 15:40

## 2019-11-04 RX ADMIN — METOPROLOL TARTRATE 25 MG: 25 TABLET ORAL at 16:54

## 2019-11-04 RX ADMIN — METOPROLOL TARTRATE 5 MG: 5 INJECTION INTRAVENOUS at 09:17

## 2019-11-04 RX ADMIN — METOPROLOL TARTRATE 5 MG: 5 INJECTION INTRAVENOUS at 10:45

## 2019-11-04 RX ADMIN — METOPROLOL TARTRATE 5 MG: 5 INJECTION, SOLUTION INTRAVENOUS at 11:17

## 2019-11-04 RX ADMIN — FUROSEMIDE 40 MG: 20 INJECTION, SOLUTION INTRAMUSCULAR; INTRAVENOUS at 11:19

## 2019-11-04 RX ADMIN — METOPROLOL TARTRATE 25 MG: 25 TABLET ORAL at 10:12

## 2019-11-04 RX ADMIN — METOPROLOL TARTRATE 5 MG: 5 INJECTION, SOLUTION INTRAVENOUS at 18:39

## 2019-11-04 RX ADMIN — APIXABAN 5 MG: 5 TABLET, FILM COATED ORAL at 20:49

## 2019-11-04 RX ADMIN — METOPROLOL TARTRATE 25 MG: 25 TABLET ORAL at 20:49

## 2019-11-04 RX ADMIN — APIXABAN 5 MG: 5 TABLET, FILM COATED ORAL at 09:17

## 2019-11-04 NOTE — ED NOTES
Pt reports feeling short of breath that causes him to feel dizzy. Pt reports shortness of breath increases with walking. Pt reports taking some cold medication. Pt also states swelling in legs for a month     Rebeca Harrington RN  11/04/19 7995

## 2019-11-04 NOTE — ED TRIAGE NOTES
Patient presents to er via ems from home.  Patient had two near syncopal episodes this morning while standing.  General weakness that started last Wednesday. Worsening of bilateral lower extremity edema that started approximately one month ago.

## 2019-11-04 NOTE — ED PROVIDER NOTES
" EMERGENCY DEPARTMENT ENCOUNTER    CHIEF COMPLAINT  Chief Complaint: lightheaded  History given by: patient and wife  History limited by: none  Room Number: 2201/1  PMD: Galen Meng MD      HPI:  Pt is a 74 y.o. male who presents complaining of lightheadedness with two near syncopal episodes this am occurring while standing. Pt also complains of palpitations, disorientation, feeling \"jittery\",  BLE edema (beginning one month ago), SOA and dry cough but denies congestion, fever, vomiting, diarrhea, LOC and chest pain. Pt states prior to episodes he felt lightheaded, disoriented and jittery but did not lose consciousness. Pt reports having a  \"cold\" since 10/30 and has been taking OTC cough/cold medication. Pt denies smoking. Past medical hx of DM.         Duration:  This morning  Onset: gradual  Timing: intermittent  Intensity/Severity: moderate  Progression: improved  Associated Symptoms: palpitations, disorientation, feeeling \"jittery\"    PAST MEDICAL HISTORY  Active Ambulatory Problems     Diagnosis Date Noted   • Pneumonia of left lung due to Haemophilus influenzae (CMS/[facility]) 01/18/2017   • Sepsis (CMS/[facility]) 01/18/2017   • Hypoxia 01/18/2017   • Type 2 diabetes mellitus with hyperglycemia (CMS/[facility]) 01/18/2017   • Benign essential HTN 01/18/2017     Resolved Ambulatory Problems     Diagnosis Date Noted   • No Resolved Ambulatory Problems     Past Medical History:   Diagnosis Date   • Diabetes mellitus (CMS/[facility])    • Hyperlipidemia    • Hypertension        PAST SURGICAL HISTORY  Past Surgical History:   Procedure Laterality Date   • DENTAL PROCEDURE     • MOLE REMOVAL         FAMILY HISTORY  Family History   Problem Relation Age of Onset   • Cancer Mother    • Stroke Father        SOCIAL HISTORY  Social History     Socioeconomic History   • Marital status:      Spouse name: Not on file   • Number of children: Not on file   • Years of education: Not on file   • Highest education level: Not on file   Tobacco " Use   • Smoking status: Never Smoker   • Smokeless tobacco: Never Used   Substance and Sexual Activity   • Alcohol use: No   • Drug use: No   • Sexual activity: Defer       ALLERGIES  Patient has no known allergies.    REVIEW OF SYSTEMS  Review of Systems   Constitutional: Negative for activity change, appetite change and fever.   HENT: Negative for congestion and sore throat.    Eyes: Negative.    Respiratory: Positive for cough (dry) and shortness of breath.    Cardiovascular: Positive for palpitations and leg swelling (BLE). Negative for chest pain.   Gastrointestinal: Negative for abdominal pain, diarrhea and vomiting.   Endocrine: Negative.    Genitourinary: Negative for decreased urine volume and dysuria.   Musculoskeletal: Negative for neck pain.   Skin: Negative for rash and wound.   Allergic/Immunologic: Negative.    Neurological: Positive for syncope (near) and light-headedness. Negative for weakness, numbness and headaches.   Hematological: Negative.    Psychiatric/Behavioral: Negative.         Disorientation   All other systems reviewed and are negative.        PHYSICAL EXAM  ED Triage Vitals [11/04/19 0846]   Temp Heart Rate Resp BP SpO2   -- (!) 140 16 140/100 96 %      Temp src Heart Rate Source Patient Position BP Location FiO2 (%)   -- -- -- -- --       Physical Exam   Constitutional: He is oriented to person, place, and time. No distress.   Morbidly obese     HENT:   Head: Normocephalic and atraumatic.   Eyes: EOM are normal. Pupils are equal, round, and reactive to light.   Neck: Normal range of motion. Neck supple. JVD (mild) present.   Cardiovascular: Normal heart sounds. An irregularly irregular rhythm present. Tachycardia present.   Pulmonary/Chest: Effort normal. No respiratory distress. He has rales (faint in bases).   Abdominal: Soft. There is no tenderness. There is no rebound and no guarding.   Musculoskeletal: Normal range of motion. He exhibits edema (mild/moderate BLE pedal  ).    Neurological: He is alert and oriented to person, place, and time. He has normal sensation and normal strength.   Skin: Skin is warm and dry.   Psychiatric: Mood and affect normal.   Nursing note and vitals reviewed.      LAB RESULTS  Lab Results (last 24 hours)     Procedure Component Value Units Date/Time    CBC & Differential [[id number]] Collected:  11/04/19 0908    Specimen:  Blood Updated:  11/04/19 0922    Narrative:       The following orders were created for panel order CBC & Differential.  Procedure                               Abnormality         Status                     ---------                               -----------         ------                     CBC Auto Differential[[id number]]        Abnormal            Final result                 Please view results for these tests on the individual orders.    Comprehensive Metabolic Panel [[id number]]  (Abnormal) Collected:  11/04/19 0908    Specimen:  Blood Updated:  11/04/19 1013     Glucose 165 mg/dL      BUN 11 mg/dL      Creatinine 0.97 mg/dL      Sodium 141 mmol/L      Potassium 4.1 mmol/L      Chloride 108 mmol/L      CO2 20.5 mmol/L      Calcium 8.8 mg/dL      Total Protein 6.8 g/dL      Albumin 3.30 g/dL      ALT (SGPT) 18 U/L      AST (SGOT) 18 U/L      Alkaline Phosphatase 62 U/L      Total Bilirubin 0.4 mg/dL      eGFR Non African Amer 76 mL/min/1.73      Globulin 3.5 gm/dL      A/G Ratio 0.9 g/dL      BUN/Creatinine Ratio 11.3     Anion Gap 12.5 mmol/L     Narrative:       GFR Normal >60  Chronic Kidney Disease <60  Kidney Failure <15    Protime-INR [[id number]]  (Normal) Collected:  11/04/19 0908    Specimen:  Blood Updated:  11/04/19 0936     Protime 13.5 Seconds      INR 1.06    aPTT [[id number]]  (Normal) Collected:  11/04/19 0908    Specimen:  Blood Updated:  11/04/19 0936     PTT 29.0 seconds     BNP [[id number]]  (Abnormal) Collected:  11/04/19 0908    Specimen:  Blood Updated:  11/04/19 0946     proBNP 1,485.0 pg/mL     Narrative:        Among patients with dyspnea, NT-proBNP is highly sensitive for the detection of acute congestive heart failure. In addition NT-proBNP of <300 pg/ml effectively rules out acute congestive heart failure with 99% negative predictive value.    Troponin [[id number]]  (Normal) Collected:  11/04/19 0908    Specimen:  Blood Updated:  11/04/19 1013     Troponin T <0.010 ng/mL     Narrative:       Troponin T Reference Range:  <= 0.03 ng/mL-   Negative for AMI  >0.03 ng/mL-     Abnormal for myocardial necrosis.  Clinicians would have to utilize clinical acumen, EKG, Troponin and serial changes to determine if it is an Acute Myocardial Infarction or myocardial injury due to an underlying chronic condition.     CBC Auto Differential [[id number]]  (Abnormal) Collected:  11/04/19 0908    Specimen:  Blood Updated:  11/04/19 0922     WBC 7.12 10*3/mm3      RBC 4.39 10*6/mm3      Hemoglobin 12.9 g/dL      Hematocrit 41.4 %      MCV 94.3 fL      MCH 29.4 pg      MCHC 31.2 g/dL      RDW 14.5 %      RDW-SD 49.6 fl      MPV 10.3 fL      Platelets 231 10*3/mm3      Neutrophil % 75.2 %      Lymphocyte % 15.6 %      Monocyte % 7.2 %      Eosinophil % 0.8 %      Basophil % 0.8 %      Immature Grans % 0.4 %      Neutrophils, Absolute 5.35 10*3/mm3      Lymphocytes, Absolute 1.11 10*3/mm3      Monocytes, Absolute 0.51 10*3/mm3      Eosinophils, Absolute 0.06 10*3/mm3      Basophils, Absolute 0.06 10*3/mm3      Immature Grans, Absolute 0.03 10*3/mm3      nRBC 0.0 /100 WBC     Magnesium [[id number]]  (Normal) Collected:  11/04/19 0908    Specimen:  Blood Updated:  11/04/19 1220     Magnesium 2.1 mg/dL     TSH [[id number]]  (Normal) Collected:  11/04/19 0908    Specimen:  Blood Updated:  11/04/19 1300     TSH 0.830 uIU/mL           I ordered the above labs and reviewed the results    RADIOLOGY  XR Chest 1 View   Final Result           I ordered the above noted radiological studies. Interpreted by radiologist. Reviewed by me in PACS.        PROCEDURES  Procedures        PROGRESS AND CONSULTS  ED Course as of Nov 04 1407   Mon Nov 04, 2019   0915 EKG performed at 09 11 and interpreted by me shows atrial fibrillation with a rate of approximately 150 bpm.  There is right bundle branch morphology and nonspecific ST-T changes.  The atrial fibrillation is new when compared to 1/18/2017.  [WC]   1026 HR ~ 130.  PO toprol is on board.  IV toprol 5mg IV given, will repeat.  [WC]      ED Course User Index  [WC] Roderick Baldwin MD       0910 I have ordered IV as well as p.o. Lopressor.  Also ordered Eliquis    1106 not much change in his heart rate despite 3 doses of IV Lopressor.  He does appear to have a bit of CHF.  Discussed pt case with Dr. Escalante, cardiology, who agrees to admit the pt     1112 pt rechecked and resting comfortably. Informed pt of need to admit for control of acute onset Afib and CHF. Pt understands and agrees with the plan. All questions have been answered.      MEDICAL DECISION MAKING  Results were reviewed/discussed with the patient and they were also made aware of online access. Pt also made aware that some labs, such as cultures, will not be resulted during ER visit and follow up with PMD is necessary.     MDM  Number of Diagnoses or Management Options  Acute congestive heart failure, unspecified heart failure type (CMS/HCC):   New onset atrial fibrillation (CMS/HCC):      Amount and/or Complexity of Data Reviewed  Clinical lab tests: reviewed and ordered (Creatinine 0.97  BNP 1485   Trop negative  INR 1.06)  Tests in the radiology section of CPT®: reviewed and ordered (Chest XR- There is prominent cardiomegaly with considerable vascular congestion and some vague interstitial haziness occurring since the study of 02/23/2017 and most consistent with changes of mild congestive heart failure.)  Tests in the medicine section of CPT®: reviewed and ordered (See ED course note for EKG reading)  Decide to obtain previous medical records  or to obtain history from someone other than the patient: yes  Review and summarize past medical records: yes  Discuss the patient with other providers: yes (Dr. Escalante, cardiology)  Independent visualization of images, tracings, or specimens: yes           DIAGNOSIS  Final diagnoses:   New onset atrial fibrillation (CMS/HCC)   Acute congestive heart failure, unspecified heart failure type (CMS/HCC)       DISPOSITION  ADMISSION    Discussed treatment plan and reason for admission with pt/family and admitting physician.  Pt/family voiced understanding of the plan for admission for further testing/treatment as needed.         Latest Documented Vital Signs:  As of 2:07 PM  BP- 124/80 HR- (!) 135 Temp- 97.4 °F (36.3 °C) (Oral) O2 sat- 96%    --  Documentation assistance provided by key Thomason for Dr. Baldwin.  Information recorded by the scribe was done at my direction and has been verified and validated by me.            Camelia Thomason  11/04/19 [id number]       Roderick Baldwin MD  11/04/19 9848

## 2019-11-04 NOTE — CONSULTS
"    Patient Name: Ray Pratt  :1945  74 y.o.    Date of Admission: 2019  Date of Consultation:  19  Encounter Provider:  ANICETO Perrin  Place of Service: [facility]  Referring Provider: No ref. provider found  Patient Care Team:  Galen Meng MD as PCP - General (Family Medicine)  Benjamín Granda MD as PCP - Claims Attributed      Chief complaint:New Onset Atrial fibrillation    History of Present Illness:  Mr Pratt is a 74 year old patient with a history of diabetes and hypertension who presented to the ED with near syncope and light headedness.  He also has associated palpitations, SOA, and LE edema.  He denies any chest pain.  He has been taking cold medications for chest congestion for the last several days and his lightheadedness started a couple days ago.  He states he feels \"jittery\" but does not really feel his heart racing.      On arrival to the ED his EKG showed -140's atrial fibrillation/ flutterwith B/P 140/100. Labs included K 4.1, BNP 1485, troponin 1485, Hgb 12.9.  Mg and TSH are pending      He was given IV lopressor x 3 and Eliquis and is being admitted for further treatment. He was also given lasix 40 mg IV.  He remains with 's and he states he feels uncomfortable lying down and prefers to sit on the side of the bed.  He has never seen a cardiologist in the past and has not had any prior arrhythmias that he is aware of.  He does not take any beta blockers at home     No Previous Cardiac testing found. He has never seen a cardiologist nor ever had any cardiac testing.      TSH was normal.  Magnesium was normal.     He has one sister without any cardiac issues.  His mother passed at 51 years of age from liver cancer.  His father passed with a stroke at 82.  Neither parent had any known heart disease.     He states about 3 days ago he developed a dry cough with mehdi SOA with activity.  Thought he had a cold.  His " symptoms worsened today and he presented to the ER.  He was found to be in Afib with RVR.  He denies chest pain.  He cannot feel his heart racing or skipping.      He denies any alcohol intake.  He dose admit to snoring.  His wife does not think she has witnessed any apneic episodes.  He has never had a sleep study.     He has never passed out but states about 3 years ago while working he had a presyncopal episode.  He cannot remember the circumstances surrounding that event.      Past Medical History:   Diagnosis Date   • Diabetes mellitus (CMS/HCC)    • Hyperlipidemia    • Hypertension        Past Surgical History:   Procedure Laterality Date   • DENTAL PROCEDURE     • MOLE REMOVAL           Prior to Admission medications    Medication Sig Start Date End Date Taking? Authorizing Provider   aspirin 81 MG EC tablet Take 81 mg by mouth Daily.   Yes Provider, MD Trip   pioglitazone (ACTOS) 15 MG tablet TAKE 1 TABLET BY MOUTH EVERY DAY 10/16/19  Yes Galen Meng MD   rosuvastatin (CRESTOR) 10 MG tablet Take 1 tablet by mouth Daily. 8/13/19  Yes Galen Meng MD   valsartan (DIOVAN) 160 MG tablet TAKE 1 TABLET BY MOUTH EVERY DAY 9/9/19  Yes Galen Meng MD       No Known Allergies    Social History     Socioeconomic History   • Marital status:      Spouse name: Not on file   • Number of children: Not on file   • Years of education: Not on file   • Highest education level: Not on file   Tobacco Use   • Smoking status: Never Smoker   • Smokeless tobacco: Never Used   Substance and Sexual Activity   • Alcohol use: No   • Drug use: No   • Sexual activity: Defer       Family History   Problem Relation Age of Onset   • Cancer Mother    • Stroke Father        REVIEW OF SYSTEMS:   All systems reviewed.  Pertinent positives identified in HPI.  All other systems are negative.      Objective:     Vitals:    11/04/19 1046 11/04/19 1102 11/04/19 1116 11/04/19 1156   BP:   112/87 124/80   BP Location:   Right arm  Left arm   Patient Position:   Sitting Sitting   Pulse: (!) 138 (!) 137 (!) 137 (!) 135   Resp:   18 18   Temp:    97.4 °F (36.3 °C)   TempSrc:    Oral   SpO2: 96% 96% 95% 96%   Weight:       Height:         Body mass index is 39.6 kg/m².    General Appearance:    Alert, cooperative, in no acute distress, obese   Head:    Normocephalic, without obvious abnormality, atraumatic   Eyes:            Lids and lashes normal on left, lid droop on right, conjunctivae and sclerae normal, no icterus, no pallor, corneas clear, PERRLA   Ears:    Ears appear intact with no abnormalities noted   Throat:   No oral lesions, no thrush, oral mucosa moist   Neck:   No adenopathy, supple, trachea midline, no thyromegaly, no carotid bruit, no JVD   Back:     No kyphosis present, no scoliosis present, no skin lesions, erythema or scars, no tenderness to percussion or palpation, range of motion normal   Lungs:     Clear to auscultation, respirations regular, even and unlabored    Heart:    Irregular rhythm and increased rate, normal S1 and S2, no murmur, no gallop, no rub, no click   Chest Wall:    No abnormalities observed   Abdomen:     Normal bowel sounds, no masses, no organomegaly, soft, nontender, nondistended, no guarding, no rebound  tenderness   Extremities:   Moves all extremities well, +1 albert edema, feet/ankles, no cyanosis, no redness   Pulses:   Pulses palpable and equal bilaterally. Normal radial, carotid, femoral, dorsalis pedis and posterior tibial pulses bilaterally. Normal abdominal aorta   Skin:  Psychiatric:   No bleeding, bruising or rash    Alert and oriented x 3, normal mood and affect   Lab Review:     Results from last 7 days   Lab Units 11/04/19  0908   SODIUM mmol/L 141   POTASSIUM mmol/L 4.1   CHLORIDE mmol/L 108*   CO2 mmol/L 20.5*   BUN mg/dL 11   CREATININE mg/dL 0.97   CALCIUM mg/dL 8.8   BILIRUBIN mg/dL 0.4   ALK PHOS U/L 62   ALT (SGPT) U/L 18   AST (SGOT) U/L 18   GLUCOSE mg/dL 165*     Results from last  7 days   Lab Units 11/04/19  0908   TROPONIN T ng/mL <0.010     Results from last 7 days   Lab Units 11/04/19  0908   WBC 10*3/mm3 7.12   HEMOGLOBIN g/dL 12.9*   HEMATOCRIT % 41.4   PLATELETS 10*3/mm3 231     Results from last 7 days   Lab Units 11/04/19  0908   INR  1.06   APTT seconds 29.0     Results from last 7 days   Lab Units 11/04/19  0908   MAGNESIUM mg/dL 2.1                   EKG baseline    I personally viewed and interpreted the patient's EKG/Telemetry data.    afib with RVR     Assessment and Plan:       1.  New onset afib - Echo ordered.  Normal TSH.  Probable sleep apnea.  Will start metoprolol 25 mg PO Q 8 hours with prn IV metoprolol for HR sustained over 130 bpm.  Continue apixaban.  First dose given in ER.       2.  HTN - stable, continue home valsartan 160 mg daily, aspirin 81 mg daily.     3.  DM Type II - continue home medications, actos 15 mg daily     4.  Obesity - He would benefit from a weight loss program.       5.  Probable sleep apnea - Would recommend sleep evaluation at discharge.    6.  Hypercholesterolemia - continue home rosuvastatin 10 mg daily    ANICETO Perrin  11/04/19  3:18 PM

## 2019-11-04 NOTE — ED NOTES
Nursing report ED to floor  Ray Pratt  74 y.o.  male    HPI (triage note):   Chief Complaint   Patient presents with   • Syncope       Admitting doctor:   Chelita Escalante MD    Admitting diagnosis:   The primary encounter diagnosis was New onset atrial fibrillation (CMS/HCC). A diagnosis of Acute congestive heart failure, unspecified heart failure type (CMS/HCC) was also pertinent to this visit.    Code status:   Current Code Status     Date Active Code Status Order ID Comments User Context       Prior          Allergies:   Patient has no known allergies.    Weight:       11/04/19  0907   Weight: 125 kg (276 lb)       Most recent vitals:   Vitals:    11/04/19 1044 11/04/19 1046 11/04/19 1102 11/04/19 1116   BP: (!) 130/112   112/87   BP Location:    Right arm   Patient Position:    Sitting   Pulse: (!) 138 (!) 138 (!) 137 (!) 137   Resp:    18   Temp:       TempSrc:       SpO2: 94% 96% 96% 95%   Weight:       Height:           Active LDAs/IV Access:   Lines, Drains & Airways    Active LDAs     Name:   Placement date:   Placement time:   Site:   Days:    Peripheral IV 11/04/19 0903 Right Antecubital   11/04/19 0903    Antecubital   less than 1                Labs (abnormal labs have a star):   Labs Reviewed   COMPREHENSIVE METABOLIC PANEL - Abnormal; Notable for the following components:       Result Value    Glucose 165 (*)     Chloride 108 (*)     CO2 20.5 (*)     Albumin 3.30 (*)     All other components within normal limits    Narrative:     GFR Normal >60  Chronic Kidney Disease <60  Kidney Failure <15   BNP (IN-HOUSE) - Abnormal; Notable for the following components:    proBNP 1,485.0 (*)     All other components within normal limits    Narrative:     Among patients with dyspnea, NT-proBNP is highly sensitive for the detection of acute congestive heart failure. In addition NT-proBNP of <300 pg/ml effectively rules out acute congestive heart failure with 99% negative predictive value.   CBC WITH  AUTO DIFFERENTIAL - Abnormal; Notable for the following components:    Hemoglobin 12.9 (*)     MCHC 31.2 (*)     Lymphocyte % 15.6 (*)     All other components within normal limits   PROTIME-INR - Normal   APTT - Normal   TROPONIN (IN-HOUSE) - Normal    Narrative:     Troponin T Reference Range:  <= 0.03 ng/mL-   Negative for AMI  >0.03 ng/mL-     Abnormal for myocardial necrosis.  Clinicians would have to utilize clinical acumen, EKG, Troponin and serial changes to determine if it is an Acute Myocardial Infarction or myocardial injury due to an underlying chronic condition.    CBC AND DIFFERENTIAL    Narrative:     The following orders were created for panel order CBC & Differential.  Procedure                               Abnormality         Status                     ---------                               -----------         ------                     CBC Auto Differential[[id number]]        Abnormal            Final result                 Please view results for these tests on the individual orders.       EKG:   ECG 12 Lead   Preliminary Result   HEART RATE= 145  bpm   RR Interval= 416  ms   NJ Interval=   ms   P Horizontal Axis=   deg   P Front Axis= 0  deg   QRSD Interval= 125  ms   QT Interval= 324  ms   QRS Axis= 74  deg   T Wave Axis= -9  deg   - ABNORMAL ECG -   Sinus tachycardia   Atrial premature complexes   Right bundle branch block   Electronically Signed By:    Date and Time of Study: 2019-11-04 09:11:11          Meds given in ED:   Medications   sodium chloride 0.9 % flush 10 mL (not administered)   metoprolol tartrate (LOPRESSOR) injection 5 mg (5 mg Intravenous Given 11/4/19 0917)   metoprolol tartrate (LOPRESSOR) tablet 25 mg (25 mg Oral Given 11/4/19 1012)   apixaban (ELIQUIS) tablet 5 mg (5 mg Oral Given 11/4/19 0917)   metoprolol tartrate (LOPRESSOR) injection 5 mg (5 mg Intravenous Given 11/4/19 1045)   metoprolol tartrate (LOPRESSOR) injection 5 mg (5 mg Intravenous Given 11/4/19 1117)    furosemide (LASIX) injection 40 mg (40 mg Intravenous Given 11/4/19 1119)       Imaging results:  No radiology results for the last day    Ambulatory status:   - bedrest    Social issues:   Social History     Socioeconomic History   • Marital status:      Spouse name: Not on file   • Number of children: Not on file   • Years of education: Not on file   • Highest education level: Not on file   Tobacco Use   • Smoking status: Never Smoker   • Smokeless tobacco: Never Used   Substance and Sexual Activity   • Alcohol use: No   • Drug use: No   • Sexual activity: Rebeca Kaiser, RN  11/04/19 1124

## 2019-11-05 LAB
ANION GAP SERPL CALCULATED.3IONS-SCNC: 12.4 MMOL/L (ref 5–15)
BUN BLD-MCNC: 15 MG/DL (ref 8–23)
BUN/CREAT SERPL: 17.2 (ref 7–25)
CALCIUM SPEC-SCNC: 8.9 MG/DL (ref 8.6–10.5)
CHLORIDE SERPL-SCNC: 104 MMOL/L (ref 98–107)
CO2 SERPL-SCNC: 24.6 MMOL/L (ref 22–29)
CREAT BLD-MCNC: 0.87 MG/DL (ref 0.76–1.27)
GFR SERPL CREATININE-BSD FRML MDRD: 86 ML/MIN/1.73
GLUCOSE BLD-MCNC: 115 MG/DL (ref 65–99)
POTASSIUM BLD-SCNC: 3.9 MMOL/L (ref 3.5–5.2)
SODIUM BLD-SCNC: 141 MMOL/L (ref 136–145)

## 2019-11-05 PROCEDURE — 99152 MOD SED SAME PHYS/QHP 5/>YRS: CPT | Performed by: INTERNAL MEDICINE

## 2019-11-05 PROCEDURE — 80048 BASIC METABOLIC PNL TOTAL CA: CPT | Performed by: INTERNAL MEDICINE

## 2019-11-05 PROCEDURE — 0 IOPAMIDOL PER 1 ML: Performed by: INTERNAL MEDICINE

## 2019-11-05 PROCEDURE — B2111ZZ FLUOROSCOPY OF MULTIPLE CORONARY ARTERIES USING LOW OSMOLAR CONTRAST: ICD-10-PCS | Performed by: INTERNAL MEDICINE

## 2019-11-05 PROCEDURE — C1769 GUIDE WIRE: HCPCS | Performed by: INTERNAL MEDICINE

## 2019-11-05 PROCEDURE — 4A023N7 MEASUREMENT OF CARDIAC SAMPLING AND PRESSURE, LEFT HEART, PERCUTANEOUS APPROACH: ICD-10-PCS | Performed by: INTERNAL MEDICINE

## 2019-11-05 PROCEDURE — G0378 HOSPITAL OBSERVATION PER HR: HCPCS

## 2019-11-05 PROCEDURE — C1894 INTRO/SHEATH, NON-LASER: HCPCS | Performed by: INTERNAL MEDICINE

## 2019-11-05 PROCEDURE — 25010000002 FENTANYL CITRATE (PF) 100 MCG/2ML SOLUTION: Performed by: INTERNAL MEDICINE

## 2019-11-05 PROCEDURE — 25010000002 HEPARIN (PORCINE) PER 1000 UNITS: Performed by: INTERNAL MEDICINE

## 2019-11-05 PROCEDURE — 93458 L HRT ARTERY/VENTRICLE ANGIO: CPT | Performed by: INTERNAL MEDICINE

## 2019-11-05 PROCEDURE — 99232 SBSQ HOSP IP/OBS MODERATE 35: CPT | Performed by: INTERNAL MEDICINE

## 2019-11-05 PROCEDURE — 25010000002 MIDAZOLAM PER 1 MG: Performed by: INTERNAL MEDICINE

## 2019-11-05 RX ORDER — METOPROLOL SUCCINATE 100 MG/1
100 TABLET, EXTENDED RELEASE ORAL
Status: DISCONTINUED | OUTPATIENT
Start: 2019-11-05 | End: 2019-11-06

## 2019-11-05 RX ORDER — MIDAZOLAM HYDROCHLORIDE 1 MG/ML
INJECTION INTRAMUSCULAR; INTRAVENOUS AS NEEDED
Status: DISCONTINUED | OUTPATIENT
Start: 2019-11-05 | End: 2019-11-05 | Stop reason: HOSPADM

## 2019-11-05 RX ORDER — FENTANYL CITRATE 50 UG/ML
INJECTION, SOLUTION INTRAMUSCULAR; INTRAVENOUS AS NEEDED
Status: DISCONTINUED | OUTPATIENT
Start: 2019-11-05 | End: 2019-11-05 | Stop reason: HOSPADM

## 2019-11-05 RX ORDER — SODIUM CHLORIDE 9 MG/ML
INJECTION, SOLUTION INTRAVENOUS CONTINUOUS PRN
Status: COMPLETED | OUTPATIENT
Start: 2019-11-05 | End: 2019-11-05

## 2019-11-05 RX ORDER — LIDOCAINE HYDROCHLORIDE 20 MG/ML
INJECTION, SOLUTION INFILTRATION; PERINEURAL AS NEEDED
Status: DISCONTINUED | OUTPATIENT
Start: 2019-11-05 | End: 2019-11-05 | Stop reason: HOSPADM

## 2019-11-05 RX ADMIN — SODIUM CHLORIDE, PRESERVATIVE FREE 10 ML: 5 INJECTION INTRAVENOUS at 08:14

## 2019-11-05 RX ADMIN — METOPROLOL TARTRATE 5 MG: 5 INJECTION, SOLUTION INTRAVENOUS at 08:16

## 2019-11-05 RX ADMIN — METOPROLOL SUCCINATE 100 MG: 100 TABLET, FILM COATED, EXTENDED RELEASE ORAL at 12:21

## 2019-11-05 RX ADMIN — VALSARTAN 160 MG: 160 TABLET, FILM COATED ORAL at 08:13

## 2019-11-05 RX ADMIN — APIXABAN 5 MG: 5 TABLET, FILM COATED ORAL at 20:15

## 2019-11-05 RX ADMIN — ASPIRIN 81 MG: 81 TABLET, COATED ORAL at 08:13

## 2019-11-05 RX ADMIN — SODIUM CHLORIDE, PRESERVATIVE FREE 10 ML: 5 INJECTION INTRAVENOUS at 20:15

## 2019-11-05 RX ADMIN — ROSUVASTATIN CALCIUM 10 MG: 10 TABLET, FILM COATED ORAL at 08:13

## 2019-11-05 RX ADMIN — METOPROLOL TARTRATE 25 MG: 25 TABLET ORAL at 06:02

## 2019-11-05 NOTE — PROGRESS NOTES
"Patient Name: Ray Pratt  :1945  74 y.o.      Patient Care Team:  Galen Meng MD as PCP - General (Family Medicine)  Benjamín Granda MD as PCP - Claims Attributed    Interval History:   Rate controlling medications were started.  Heart rate is better controlled.  Echocardiogram showed severe cardiomyopathy.    Subjective:  Following for new onset atrial fibrillation and acute systolic congestive heart failure    Objective   Vital Signs  Temp:  [97.4 °F (36.3 °C)-98.2 °F (36.8 °C)] 97.5 °F (36.4 °C)  Heart Rate:  [] 111  Resp:  [16-18] 18  BP: (112-130)/() 119/88    Intake/Output Summary (Last 24 hours) at 2019 0931  Last data filed at 2019 0602  Gross per 24 hour   Intake --   Output 1775 ml   Net -1775 ml     Flowsheet Rows      First Filed Value   Admission Height  177.8 cm (70\") Documented at 2019 0846   Admission Weight  125 kg (276 lb) Documented at 2019 0907          Physical Exam:   General Appearance:    Alert, cooperative, in no acute distress   Lungs:     Clear to auscultation.  Normal respiratory effort and rate.      Heart:   Irregularly irregular.  No murmurs.  Rate controlled.   Chest Wall:    No abnormalities observed   Abdomen:     Soft, nontender, positive bowel sounds.     Extremities:   no cyanosis, clubbing or edema.  No marked joint deformities.  Adequate musculoskeletal strength.       Results Review:    Results from last 7 days   Lab Units 19  0516   SODIUM mmol/L 141   POTASSIUM mmol/L 3.9   CHLORIDE mmol/L 104   CO2 mmol/L 24.6   BUN mg/dL 15   CREATININE mg/dL 0.87   GLUCOSE mg/dL 115*   CALCIUM mg/dL 8.9     Results from last 7 days   Lab Units 19  0908   TROPONIN T ng/mL <0.010     Results from last 7 days   Lab Units 19  0908   WBC 10*3/mm3 7.12   HEMOGLOBIN g/dL 12.9*   HEMATOCRIT % 41.4   PLATELETS 10*3/mm3 231     Results from last 7 days   Lab Units 19  0908   INR  1.06   APTT seconds 29.0         Results " from last 7 days   Lab Units 11/04/19  0908   MAGNESIUM mg/dL 2.1             Medication Review:     apixaban 5 mg Oral Q12H   aspirin 81 mg Oral Daily   metoprolol tartrate 25 mg Oral Q8H   pioglitazone 15 mg Oral Daily   rosuvastatin 10 mg Oral Daily   sodium chloride 10 mL Intravenous Q12H   valsartan 160 mg Oral Daily             Assessment/Plan     1.  New onset atrial fibrillation.  Normal TSH.  Rate is better controlled with metoprolol tartrate.  I am going to transition that to metoprolol succinate today.  Hold Eliquis until after the heart catheterization.  2.  Hypertension  3.  Type 2 diabetes  4.  Obesity  5.  Probable sleep apnea.  Recommend an outpatient sleep study  6.  Hyperlipidemia.  7.  Acute systolic congestive heart failure.  Cardiomyopathy with severe LV dysfunction and ejection fraction of 22%.  BNP was mildly elevated.  He was given a dose of IV Lasix yesterday.  He feels better after single dose of Lasix yesterday.  We will check his left ventricular end-diastolic pressure with his heart catheterization.  Continue valsartan.  Switch to metoprolol succinate.    Chelita Escalante MD, [facility]  11/05/19  9:31 AM

## 2019-11-05 NOTE — PROGRESS NOTES
Clinical Pharmacy Services: Medication History    Ray Pratt is a 74 y.o. male presenting to [facility] for New onset atrial fibrillation (CMS/HCC) [I48.91]  Acute congestive heart failure, unspecified heart failure type (CMS/HCC) [I50.9]    He  has a past medical history of Diabetes mellitus (CMS/HCC), Hyperlipidemia, and Hypertension.    Allergies as of 11/04/2019   • (No Known Allergies)     Medication information was obtained from: Patient/Pharmacy  Pharmacy and Phone Number: [facility] ([phone])    Prior to Admission Medications     Prescriptions Last Dose Informant Patient Reported? Taking?    aspirin 81 MG EC tablet 11/4/2019  Yes Yes    Take 81 mg by mouth Daily.    pioglitazone (ACTOS) 15 MG tablet 11/4/2019  No Yes    TAKE 1 TABLET BY MOUTH EVERY DAY    rosuvastatin (CRESTOR) 10 MG tablet 11/4/2019  No Yes    Take 1 tablet by mouth Daily.    valsartan (DIOVAN) 160 MG tablet 11/4/2019  No Yes    TAKE 1 TABLET BY MOUTH EVERY DAY     This medication list is complete to the best of my knowledge as of 11/5/2019    Please call if questions.    Rita Lopes, Pharm.D., [facility]  Clinical Pharmacist  Phone Extension #[phone]  11/5/2019 9:56 AM

## 2019-11-05 NOTE — PROGRESS NOTES
Discharge Planning Assessment  [facility]     Patient Name: Ray Pratt  MRN: [id number]  Today's Date: 11/5/2019    Admit Date: 11/4/2019    Discharge Needs Assessment     Row Name 11/05/19 1416       Living Environment    Lives With  spouse    Name(s) of Who Lives With Patient  Asha Pratt, spouse    Current Living Arrangements  home/apartment/condo    Primary Care Provided by  self    Provides Primary Care For  no one    Family Caregiver if Needed  spouse    Quality of Family Relationships  involved;helpful;supportive       Resource/Environmental Concerns    Resource/Environmental Concerns  none       Transition Planning    Patient/Family Anticipates Transition to  home with family    Patient/Family Anticipated Services at Transition  none    Transportation Anticipated  family or friend will provide       Discharge Needs Assessment    Concerns to be Addressed  no discharge needs identified;denies needs/concerns at this time    Equipment Currently Used at Home  glucometer;grab bar    Anticipated Changes Related to Illness  none    Equipment Needed After Discharge  none    Offered/Gave Vendor List  yes Pt declined.        Discharge Plan     Row Name 11/05/19 1417       Plan    Plan  Home; Denies needs.    Plan Comments  Spoke with Pt and spouse Asha Pratt ([phone]) at bedside.  CCP introduced self and role.  Pt confirmed information on face sheet.  Pt stated he is IADL'S, retired & drives.  Pt lives in a house with three entrance steps.  Pt reports PCP is Galen Meng MD.  Pt confirms pharmacy as [facility].  Pt denies issues with affording his medications.  Pt denies past home health, sub-acute rehab & DME.  Pt plans to return home at discharge.  Pt denies d/c needs.  CCP will continue to follow…LAZARA RUVALCABA/CCP        Destination      No service coordination in this encounter.      Durable Medical Equipment      No service coordination in this encounter.       Dialysis/Infusion      No service coordination in this encounter.      Home Medical Care      No service coordination in this encounter.      Therapy      No service coordination in this encounter.      Community Resources      No service coordination in this encounter.          Demographic Summary     Row Name 11/05/19 1416       General Information    Admission Type  observation    Arrived From  emergency department    Required Notices Provided  Observation Status Notice    Referral Source  admission list    Reason for Consult  discharge planning    Preferred Language  English     Used During This Interaction  no        Functional Status     Row Name 11/05/19 1416       Functional Status    Usual Activity Tolerance  moderate    Current Activity Tolerance  moderate       Functional Status, IADL    Medications  independent    Meal Preparation  assistive person    Housekeeping  assistive person    Laundry  assistive person    Shopping  independent       Mental Status    General Appearance WDL  WDL       Mental Status Summary    Recent Changes in Mental Status/Cognitive Functioning  no changes       Employment/    Employment Status  retired        Psychosocial    No documentation.       Abuse/Neglect    No documentation.       Legal    No documentation.       Substance Abuse    No documentation.       Patient Forms    No documentation.           Thu Medina RN

## 2019-11-06 PROBLEM — I50.41 ACUTE COMBINED SYSTOLIC AND DIASTOLIC CONGESTIVE HEART FAILURE: Status: ACTIVE | Noted: 2019-11-06

## 2019-11-06 PROBLEM — I48.92 ATRIAL FLUTTER WITH RAPID VENTRICULAR RESPONSE: Status: ACTIVE | Noted: 2019-11-06

## 2019-11-06 PROCEDURE — 94762 N-INVAS EAR/PLS OXIMTRY CONT: CPT

## 2019-11-06 PROCEDURE — 94799 UNLISTED PULMONARY SVC/PX: CPT

## 2019-11-06 PROCEDURE — 94640 AIRWAY INHALATION TREATMENT: CPT

## 2019-11-06 PROCEDURE — G0378 HOSPITAL OBSERVATION PER HR: HCPCS

## 2019-11-06 PROCEDURE — 25010000002 FUROSEMIDE PER 20 MG: Performed by: NURSE PRACTITIONER

## 2019-11-06 PROCEDURE — 93010 ELECTROCARDIOGRAM REPORT: CPT | Performed by: INTERNAL MEDICINE

## 2019-11-06 PROCEDURE — 25010000002 DIGOXIN PER 500 MCG: Performed by: NURSE PRACTITIONER

## 2019-11-06 PROCEDURE — 99233 SBSQ HOSP IP/OBS HIGH 50: CPT | Performed by: NURSE PRACTITIONER

## 2019-11-06 PROCEDURE — 99222 1ST HOSP IP/OBS MODERATE 55: CPT | Performed by: INTERNAL MEDICINE

## 2019-11-06 PROCEDURE — 93005 ELECTROCARDIOGRAM TRACING: CPT | Performed by: NURSE PRACTITIONER

## 2019-11-06 RX ORDER — POTASSIUM CHLORIDE 750 MG/1
20 CAPSULE, EXTENDED RELEASE ORAL DAILY
Status: DISCONTINUED | OUTPATIENT
Start: 2019-11-06 | End: 2019-11-08 | Stop reason: HOSPADM

## 2019-11-06 RX ORDER — DIGOXIN 0.25 MG/ML
250 INJECTION INTRAMUSCULAR; INTRAVENOUS EVERY 6 HOURS
Status: COMPLETED | OUTPATIENT
Start: 2019-11-06 | End: 2019-11-06

## 2019-11-06 RX ORDER — DIGOXIN 0.25 MG/ML
500 INJECTION INTRAMUSCULAR; INTRAVENOUS ONCE
Status: COMPLETED | OUTPATIENT
Start: 2019-11-06 | End: 2019-11-06

## 2019-11-06 RX ORDER — FUROSEMIDE 10 MG/ML
40 INJECTION INTRAMUSCULAR; INTRAVENOUS EVERY 12 HOURS
Status: DISCONTINUED | OUTPATIENT
Start: 2019-11-06 | End: 2019-11-06

## 2019-11-06 RX ORDER — FUROSEMIDE 10 MG/ML
40 INJECTION INTRAMUSCULAR; INTRAVENOUS EVERY 12 HOURS
Status: COMPLETED | OUTPATIENT
Start: 2019-11-06 | End: 2019-11-06

## 2019-11-06 RX ORDER — IPRATROPIUM BROMIDE AND ALBUTEROL SULFATE 2.5; .5 MG/3ML; MG/3ML
3 SOLUTION RESPIRATORY (INHALATION) EVERY 6 HOURS PRN
Status: DISCONTINUED | OUTPATIENT
Start: 2019-11-06 | End: 2019-11-08 | Stop reason: HOSPADM

## 2019-11-06 RX ORDER — ATENOLOL 50 MG/1
50 TABLET ORAL EVERY 12 HOURS SCHEDULED
Status: DISCONTINUED | OUTPATIENT
Start: 2019-11-06 | End: 2019-11-08 | Stop reason: HOSPADM

## 2019-11-06 RX ORDER — METOPROLOL SUCCINATE 50 MG/1
50 TABLET, EXTENDED RELEASE ORAL NIGHTLY
Status: DISCONTINUED | OUTPATIENT
Start: 2019-11-06 | End: 2019-11-06

## 2019-11-06 RX ADMIN — DIGOXIN 250 MCG: 0.25 INJECTION INTRAMUSCULAR; INTRAVENOUS at 19:40

## 2019-11-06 RX ADMIN — IPRATROPIUM BROMIDE AND ALBUTEROL SULFATE 3 ML: 2.5; .5 SOLUTION RESPIRATORY (INHALATION) at 22:07

## 2019-11-06 RX ADMIN — IPRATROPIUM BROMIDE AND ALBUTEROL SULFATE 3 ML: 2.5; .5 SOLUTION RESPIRATORY (INHALATION) at 15:11

## 2019-11-06 RX ADMIN — ATENOLOL 50 MG: 50 TABLET ORAL at 21:30

## 2019-11-06 RX ADMIN — ROSUVASTATIN CALCIUM 10 MG: 10 TABLET, FILM COATED ORAL at 09:50

## 2019-11-06 RX ADMIN — SODIUM CHLORIDE, PRESERVATIVE FREE 10 ML: 5 INJECTION INTRAVENOUS at 21:31

## 2019-11-06 RX ADMIN — APIXABAN 5 MG: 5 TABLET, FILM COATED ORAL at 21:30

## 2019-11-06 RX ADMIN — APIXABAN 5 MG: 5 TABLET, FILM COATED ORAL at 09:50

## 2019-11-06 RX ADMIN — FUROSEMIDE 40 MG: 40 INJECTION, SOLUTION INTRAMUSCULAR; INTRAVENOUS at 09:50

## 2019-11-06 RX ADMIN — SODIUM CHLORIDE, PRESERVATIVE FREE 10 ML: 5 INJECTION INTRAVENOUS at 13:45

## 2019-11-06 RX ADMIN — POTASSIUM CHLORIDE 20 MEQ: 750 CAPSULE, EXTENDED RELEASE ORAL at 13:45

## 2019-11-06 RX ADMIN — DIGOXIN 500 MCG: 0.25 INJECTION INTRAMUSCULAR; INTRAVENOUS at 09:50

## 2019-11-06 RX ADMIN — ATENOLOL 50 MG: 50 TABLET ORAL at 09:50

## 2019-11-06 RX ADMIN — DIGOXIN 250 MCG: 0.25 INJECTION INTRAMUSCULAR; INTRAVENOUS at 13:45

## 2019-11-06 RX ADMIN — FUROSEMIDE 40 MG: 40 INJECTION, SOLUTION INTRAMUSCULAR; INTRAVENOUS at 21:30

## 2019-11-06 RX ADMIN — PIOGLITAZONE 15 MG: 15 TABLET ORAL at 09:50

## 2019-11-06 NOTE — PROGRESS NOTES
"CC: A fib/ CM    Interval History: heart rate still fast. Needing oxygen at night. Shortness of breath and cough unchanged since admit      Vital Signs  Temp:  [97.7 °F (36.5 °C)-98.2 °F (36.8 °C)] 98.2 °F (36.8 °C)  Heart Rate:  [] 141  Resp:  [16-18] 18  BP: (109-127)/(69-90) 122/90    Intake/Output Summary (Last 24 hours) at 11/6/2019 0838  Last data filed at 11/6/2019 0755  Gross per 24 hour   Intake 240 ml   Output 225 ml   Net 15 ml     Flowsheet Rows      First Filed Value   Admission Height  177.8 cm (70\") Documented at 11/04/2019 0846   Admission Weight  125 kg (276 lb) Documented at 11/04/2019 0907          PHYSICAL EXAM:  General: No acute distress  Resp:NL Rate, unlabored, diminished bases, wheezing  CV:tachycardic rate and irregular rhythm, NL PMI, Nl S1 and S2, no Murmur, no gallop, no rub, No JVD. Normal pedal pulses  ABD:Nl sounds, no masses or tenderness, nondistended, no guarding or rebound  Neuro: alert,cooperative and oriented  Extr: No edema or cyanosis, moves all extremities      Results Review:    Results from last 7 days   Lab Units 11/05/19  0516   SODIUM mmol/L 141   POTASSIUM mmol/L 3.9   CHLORIDE mmol/L 104   CO2 mmol/L 24.6   BUN mg/dL 15   CREATININE mg/dL 0.87   GLUCOSE mg/dL 115*   CALCIUM mg/dL 8.9     Results from last 7 days   Lab Units 11/04/19  0908   TROPONIN T ng/mL <0.010     Results from last 7 days   Lab Units 11/04/19  0908   WBC 10*3/mm3 7.12   HEMOGLOBIN g/dL 12.9*   HEMATOCRIT % 41.4   PLATELETS 10*3/mm3 231     Results from last 7 days   Lab Units 11/04/19  0908   INR  1.06   APTT seconds 29.0         Results from last 7 days   Lab Units 11/04/19  0908   MAGNESIUM mg/dL 2.1         I reviewed the patient's new clinical results.  I personally viewed and interpreted the patient's EKG/Telemetry data- a flutter PVCs        Medication Review:   Meds reviewed       Assessment/Plan  1. Nonischemic cardiomyopathy echocardiogram 11/4/2019 EF 22%.  Cardiac catheterization " 1/5/2019 insignificant disease. This may be tachycardic induced   2.  New onset atrial fibrillation/ flutter rate not yet controlled 140s this am on Eliquis. Increase beta blockade, obtain ECG now, Discussed with Dr. Vazquez. Have EP evaluate for possible rhythm control strategy   3. Diabetes mellitus A1c 6.5 in 05/2019 on Actos  4. HLD on rosuvastatin 10 mg LDL 89 continue this  5. Volume overload- give IV lasix today x 2 BMP in am, re-evalutate need for PO   6. Suspected sleep apnea- overnight oximetry- to have outpatient sleep study  7. PVCs - asymptomatic with these    ANICETO Gomez  11/06/19  8:38 AM

## 2019-11-06 NOTE — CONSULTS
Electrophysiology Hospital Consult            Patient Name: Ray Pratt  Age/Sex: 74 y.o. male  : 1945  MRN: [id number]    Date of Admission: 2019  Date of Encounter Visit: 19  Encounter Provider: Reginald Vazquez MD  Referring Provider: Chelita Escalante MD  Place of Service: [facility] CARDIOLOGY  Patient Care Team:  Galen Meng MD as PCP - General (Family Medicine)  Benjamín Granda MD as PCP - Claims Attributed      Subjective:   EP Consultation for: Atrial flutter    Chief Complaint: Shortness of breath    History of Present Illness:  Ray Pratt is a 74 y.o. male obesity hypertension and a 2-week history of shortness of breath, dizziness, fatigue and on the day of admission to the emergency room he had such bad dizziness he could not stand up.  He also had anxiety and at funny feeling in his chest.  He was seen to be in atrial flutter.  An echocardiogram during rapid atrial flutter showed a calculated ejection fraction of 22% and he underwent cath which was noted not significant for coronary disease.  This is his first episode of heart disease he is never had a cardiologist before.  He has not had angina.  He is extremely sedentary but does have a part-time job that does not include much physical exertion.        Past Medical History:  Past Medical History:   Diagnosis Date   • Diabetes mellitus (CMS/HCC)    • Hyperlipidemia    • Hypertension        Past Surgical History:   Procedure Laterality Date   • CARDIAC CATHETERIZATION N/A 2019    Procedure: Left Heart Cath and coronaries;  Surgeon: Sundeep Tsang MD;  Location: [facility] INVASIVE LOCATION;  Service: Cardiovascular   • DENTAL PROCEDURE     • MOLE REMOVAL         Home Medications:   Medications Prior to Admission   Medication Sig Dispense Refill Last Dose   • aspirin 81 MG EC tablet Take 81 mg by mouth Daily.   2019 at 0800   • pioglitazone (ACTOS) 15 MG tablet TAKE 1  TABLET BY MOUTH EVERY DAY 90 tablet 0 11/4/2019 at 0800   • rosuvastatin (CRESTOR) 10 MG tablet Take 1 tablet by mouth Daily. 90 tablet 1 11/4/2019 at 0800   • valsartan (DIOVAN) 160 MG tablet TAKE 1 TABLET BY MOUTH EVERY DAY 30 tablet 5 11/4/2019 at 0800       Allergies:  No Known Allergies    Past Social History:  Social History     Socioeconomic History   • Marital status:      Spouse name: Not on file   • Number of children: Not on file   • Years of education: Not on file   • Highest education level: Not on file   Tobacco Use   • Smoking status: Never Smoker   • Smokeless tobacco: Never Used   Substance and Sexual Activity   • Alcohol use: No   • Drug use: No   • Sexual activity: Defer       Past Family History:  Family History   Problem Relation Age of Onset   • Cancer Mother    • Stroke Father        Review of Systems: All systems reviewed. Pertinent positives identified in HPI. All other systems are negative.     14 point ROS was performed and is negative except as outlined in HPI.     Objective:     Objective:  Vital Signs (last 24 hours)       11/05 0700  -  11/06 0659 11/06 0700  -  11/06 0938   Most Recent    Temp (°F) 97.5 -  97.8      98.2     98.2 (36.8)    Heart Rate 86 -  (!)123      (!)141     (!) 141    Resp 16 -  18      18     18    /73 -  127/89      122/90     122/90    SpO2 (%) 92 -  94      94     94        Temp:  [97.7 °F (36.5 °C)-98.2 °F (36.8 °C)] 98.2 °F (36.8 °C)  Heart Rate:  [] 141  Resp:  [16-18] 18  BP: (109-127)/(69-90) 122/90  Body mass index is 39.89 kg/m².        Physical Exam:   Physical Exam   Constitutional: He is oriented to person, place, and time.   HENT:   Head: Normocephalic and atraumatic.   Eyes: Right eye exhibits no discharge. Left eye exhibits no discharge.   Neck: No JVD present. No thyromegaly present.   Cardiovascular: Regular rhythm. Tachycardia present. Exam reveals no gallop and no friction rub.   No murmur heard.  Pulmonary/Chest: Effort  normal. He has decreased breath sounds. He has wheezes. He has no rales.   Abdominal: Soft. Bowel sounds are normal. There is no tenderness.   Musculoskeletal: Normal range of motion. He exhibits no edema or deformity.   Neurological: He is alert and oriented to person, place, and time. He exhibits normal muscle tone.   Skin: Skin is warm and dry. No erythema.   Psychiatric: He has a normal mood and affect. His behavior is normal. Thought content normal.        Labs:   Lab Review:     Results from last 7 days   Lab Units 11/05/19  0516 11/04/19  0908   SODIUM mmol/L 141 141   POTASSIUM mmol/L 3.9 4.1   CHLORIDE mmol/L 104 108*   CO2 mmol/L 24.6 20.5*   BUN mg/dL 15 11   CREATININE mg/dL 0.87 0.97   GLUCOSE mg/dL 115* 165*   CALCIUM mg/dL 8.9 8.8   AST (SGOT) U/L  --  18   ALT (SGPT) U/L  --  18     Results from last 7 days   Lab Units 11/04/19  0908   TROPONIN T ng/mL <0.010     Results from last 7 days   Lab Units 11/04/19  0908   WBC 10*3/mm3 7.12   HEMOGLOBIN g/dL 12.9*   HEMATOCRIT % 41.4   PLATELETS 10*3/mm3 231     Results from last 7 days   Lab Units 11/04/19  0908   INR  1.06   APTT seconds 29.0         Results from last 7 days   Lab Units 11/04/19  0908   MAGNESIUM mg/dL 2.1         Results from last 7 days   Lab Units 11/04/19  0908   PROBNP pg/mL 1,485.0*         Results from last 7 days   Lab Units 11/04/19  0908   TSH uIU/mL 0.830           Imaging:     Imaging Results (Most Recent)     Procedure Component Value Units Date/Time    XR Chest 1 View [[id number]] Collected:  11/04/19 1021     Updated:  11/04/19 1059    Narrative:       1-VIEW PORTABLE CHEST     HISTORY: Shortness of breath.     FINDINGS: There is prominent cardiomegaly with considerable vascular  congestion and some vague interstitial haziness occurring since the  study of 02/23/2017 and most consistent with changes of mild congestive  heart failure.     This report was finalized on 11/4/2019 10:56 AM by Dr. Romel Moreno M.D.                EKG:   Atrial flutter      I personally viewed and interpreted the patient's EKG/Telemetry data.    Assessment:       Atrial flutter with rapid ventricular response (CMS/HCC)    Acute combined systolic and diastolic congestive heart failure (CMS/HCC)        Plan:      This patient has symptomatic atrial flutter.  He went into atrial flutter probably about 2 weeks ago and then gradually declined and I am sure that I will get the coding wrong but hears what he has medically: He has pulmonary and venous congestion due to both tachycardia mediated cardiomyopathy and systolic LV dysfunction (it is going to be totally reversible) and diastolic congestion due to high rate.  I suspect that both of these are acute problems but may be has some degree of chronicity given his sleep apnea and obesity.    The treatment will be to control his ventricular rate with digoxin and beta-blockade.  He does not meet the criteria for pure heart failure with reduced ejection fraction so we are not compelled to use just carvedilol or metoprolol XL.  I will choose atenolol to get his rate better controlled.  We will also give dig to control his rate again both these are likely to be temporary problems.    I know if he has heart failure of course were supposed to put him on ARB or ACE inhibitor but his blood pressure is low and we will use all of his blood pressure to treat his heart rate and if he has LV dysfunction at 3 months which he likely will not then we will put him on ACE and arm.  If he stabilizes with rate modulation and diuretic therapy will defer his rhythm control strategy which will probably be CTI ablation for 3 to 4 weeks.  If on the other hand he does not stabilize medically because of his high rates then we can pursue a EVGENY guided approach to rhythm control.  Right now he would not tolerate general anesthesia very well as he is short of breath at rest and has bilateral wheezing likely consistent with his pulmonary  venous congestion.    Also he has severe sleep apnea as documented by hypoxemia at night and we have asked the sleep people to intervene sooner than later.    Thank you for allowing me to participate in the care of Ray Pratt. Feel free to contact me directly with any further questions or concerns.    Reginald Vazquez MD  [facility]  11/06/19  9:41 AM

## 2019-11-06 NOTE — PLAN OF CARE
Problem: Patient Care Overview  Goal: Plan of Care Review  Outcome: Ongoing (interventions implemented as appropriate)   11/06/19 8368   Coping/Psychosocial   Plan of Care Reviewed With patient   Plan of Care Review   Progress no change   OTHER   Outcome Summary Pt AFib RVR, EP consult, meds adjusted. no c/o. Pt w/wheezes. PRN meds admin. Room Air, SBP WNL. Will continue to monitor.

## 2019-11-07 LAB
ANION GAP SERPL CALCULATED.3IONS-SCNC: 15 MMOL/L (ref 5–15)
BUN BLD-MCNC: 17 MG/DL (ref 8–23)
BUN/CREAT SERPL: 16.7 (ref 7–25)
CALCIUM SPEC-SCNC: 9 MG/DL (ref 8.6–10.5)
CHLORIDE SERPL-SCNC: 100 MMOL/L (ref 98–107)
CO2 SERPL-SCNC: 28 MMOL/L (ref 22–29)
CREAT BLD-MCNC: 1.02 MG/DL (ref 0.76–1.27)
DIGOXIN SERPL-MCNC: 1.8 NG/ML (ref 0.6–1.2)
GFR SERPL CREATININE-BSD FRML MDRD: 71 ML/MIN/1.73
GLUCOSE BLD-MCNC: 121 MG/DL (ref 65–99)
GLUCOSE BLDC GLUCOMTR-MCNC: 156 MG/DL (ref 70–130)
MAGNESIUM SERPL-MCNC: 1.9 MG/DL (ref 1.6–2.4)
POTASSIUM BLD-SCNC: 3.7 MMOL/L (ref 3.5–5.2)
SODIUM BLD-SCNC: 143 MMOL/L (ref 136–145)

## 2019-11-07 PROCEDURE — 80162 ASSAY OF DIGOXIN TOTAL: CPT | Performed by: INTERNAL MEDICINE

## 2019-11-07 PROCEDURE — 93010 ELECTROCARDIOGRAM REPORT: CPT | Performed by: INTERNAL MEDICINE

## 2019-11-07 PROCEDURE — 83735 ASSAY OF MAGNESIUM: CPT | Performed by: INTERNAL MEDICINE

## 2019-11-07 PROCEDURE — 82962 GLUCOSE BLOOD TEST: CPT

## 2019-11-07 PROCEDURE — 93005 ELECTROCARDIOGRAM TRACING: CPT | Performed by: INTERNAL MEDICINE

## 2019-11-07 PROCEDURE — 99232 SBSQ HOSP IP/OBS MODERATE 35: CPT | Performed by: NURSE PRACTITIONER

## 2019-11-07 PROCEDURE — 80048 BASIC METABOLIC PNL TOTAL CA: CPT | Performed by: NURSE PRACTITIONER

## 2019-11-07 PROCEDURE — 25010000002 FUROSEMIDE PER 20 MG: Performed by: INTERNAL MEDICINE

## 2019-11-07 PROCEDURE — 99232 SBSQ HOSP IP/OBS MODERATE 35: CPT | Performed by: INTERNAL MEDICINE

## 2019-11-07 RX ORDER — FUROSEMIDE 10 MG/ML
40 INJECTION INTRAMUSCULAR; INTRAVENOUS EVERY 6 HOURS
Status: COMPLETED | OUTPATIENT
Start: 2019-11-07 | End: 2019-11-07

## 2019-11-07 RX ADMIN — ATENOLOL 50 MG: 50 TABLET ORAL at 20:15

## 2019-11-07 RX ADMIN — ATENOLOL 50 MG: 50 TABLET ORAL at 08:21

## 2019-11-07 RX ADMIN — APIXABAN 5 MG: 5 TABLET, FILM COATED ORAL at 08:21

## 2019-11-07 RX ADMIN — PIOGLITAZONE 15 MG: 15 TABLET ORAL at 08:21

## 2019-11-07 RX ADMIN — SODIUM CHLORIDE, PRESERVATIVE FREE 10 ML: 5 INJECTION INTRAVENOUS at 08:21

## 2019-11-07 RX ADMIN — SODIUM CHLORIDE, PRESERVATIVE FREE 10 ML: 5 INJECTION INTRAVENOUS at 20:15

## 2019-11-07 RX ADMIN — POTASSIUM CHLORIDE 20 MEQ: 750 CAPSULE, EXTENDED RELEASE ORAL at 08:21

## 2019-11-07 RX ADMIN — ROSUVASTATIN CALCIUM 10 MG: 10 TABLET, FILM COATED ORAL at 08:21

## 2019-11-07 RX ADMIN — APIXABAN 5 MG: 5 TABLET, FILM COATED ORAL at 20:15

## 2019-11-07 RX ADMIN — FUROSEMIDE 40 MG: 40 INJECTION, SOLUTION INTRAMUSCULAR; INTRAVENOUS at 17:06

## 2019-11-07 RX ADMIN — FUROSEMIDE 40 MG: 40 INJECTION, SOLUTION INTRAMUSCULAR; INTRAVENOUS at 11:59

## 2019-11-07 NOTE — PROGRESS NOTES
"Patient Name: Ray Pratt  :1945  74 y.o.      Patient Care Team:  Galen Meng MD as PCP - General (Family Medicine)  Benjamín Granda MD as PCP - Claims Attributed    Interval History:   Seen by Dr Vazquez, I have reviewed his plans     Subjective:  Following for a fib with HFrEF     Objective   Vital Signs  Temp:  [97.8 °F (36.6 °C)-98.4 °F (36.9 °C)] 98.1 °F (36.7 °C)  Heart Rate:  [] 106  Resp:  [16-18] 17  BP: ()/(70-95) 103/70    Intake/Output Summary (Last 24 hours) at 2019 0834  Last data filed at 2019 0730  Gross per 24 hour   Intake 240 ml   Output 6375 ml   Net -6135 ml     Flowsheet Rows      First Filed Value   Admission Height  177.8 cm (70\") Documented at 2019 0846   Admission Weight  125 kg (276 lb) Documented at 2019 0907          Physical Exam:   General Appearance:    Alert, cooperative, in no acute distress   Lungs:    No wheezing.  Some crackles.    Heart:      irreg irreg.  2+ pitting edema bilateral lower extremities   Chest Wall:    No abnormalities observed   Abdomen:     Soft, nontender, positive bowel sounds.     Extremities:  Adequate musculoskeletal strength.       Results Review:    Results from last 7 days   Lab Units 19  0505   SODIUM mmol/L 143   POTASSIUM mmol/L 3.7   CHLORIDE mmol/L 100   CO2 mmol/L 28.0   BUN mg/dL 17   CREATININE mg/dL 1.02   GLUCOSE mg/dL 121*   CALCIUM mg/dL 9.0     Results from last 7 days   Lab Units 19  0908   TROPONIN T ng/mL <0.010     Results from last 7 days   Lab Units 19  0908   WBC 10*3/mm3 7.12   HEMOGLOBIN g/dL 12.9*   HEMATOCRIT % 41.4   PLATELETS 10*3/mm3 231     Results from last 7 days   Lab Units 19  0908   INR  1.06   APTT seconds 29.0         Results from last 7 days   Lab Units 19  0505   MAGNESIUM mg/dL 1.9             Medication Review:     apixaban 5 mg Oral Q12H   atenolol 50 mg Oral Q12H   pioglitazone 15 mg Oral Daily   potassium chloride 20 mEq Oral " Daily   rosuvastatin 10 mg Oral Daily   sodium chloride 10 mL Intravenous Q12H             Assessment/Plan     1.  New onset atrial flutter.  Normal TSH.  Plan is to try to rate control with dig and atenolol. AC with Eliquis. A flutter ablation when more stable and after 4 weeks of AC unless he needs EVGENY guided therapy  2. HFrEF secondary to arrythmia. Repeat echo in 3 months. Cath with no significant CAD. No weight in 2 days.  3. DM  4. HTN  5. Obesity  6. Probable sleep apnea.  Recommend an outpatient sleep study    -Heart rate looks better.  -Diurese more today.  -Recheck a BMP in the morning.    Chelita Escalante MD, [facility]  11/07/19  8:34 AM

## 2019-11-07 NOTE — PROGRESS NOTES
Electrophysiology Follow-Up Note      Patient Name: Ray Pratt  Age/Sex: 74 y.o. male  : 1945  MRN: [id number]      Day of Service: 19       Chief Complaint/Follow-up: AFlutter w/RVR, CM, suspect tachy induced, cath >no obstructive disease      S: Feels better      Temp:  [97.8 °F (36.6 °C)-98.1 °F (36.7 °C)] 98 °F (36.7 °C)  Heart Rate:  [] 76  Resp:  [16-18] 17  BP: (103-152)/(70-99) 123/99     PHYSICAL EXAM:    General Appearance: No acute distress, well developed and well nourished.   Eyes: Conjunctiva and lids: No erythema, swelling, or discharge. Sclera non-icteric.   HENT: Atraumatic, normocephalic. External eyes, ears, and nose normal.   Respiratory: No signs of respiratory distress. Respiration rhythm and depth normal.   Cardiovascular:  Heart Rate and Rhythm: Irregularly, irregular. Heart Sounds: Normal S1 and S2.  Murmurs: No murmurs noted. No rubs, thrills, or gallops.   Arterial Pulses: Posterior tibialis and dorsalis pedis pulses normal.   Lower Extremities: No edema noted.  Gastrointestinal:  Abdomen soft, non-distended, non-tender.  Musculoskeletal: Normal movement of extremities  Skin: Warm and dry.   Psychiatric: Patient alert and oriented to person, place, and time. Speech and behavior appropriate. Normal mood and affect.       ECG/TELE:           Results from last 7 days   Lab Units 19  0505 19  0516 19  0908   SODIUM mmol/L 143 141 141   POTASSIUM mmol/L 3.7 3.9 4.1   CHLORIDE mmol/L 100 104 108*   CO2 mmol/L 28.0 24.6 20.5*   BUN mg/dL 17 15 11   CREATININE mg/dL 1.02 0.87 0.97   GLUCOSE mg/dL 121* 115* 165*   CALCIUM mg/dL 9.0 8.9 8.8     Results from last 7 days   Lab Units 19  0908   WBC 10*3/mm3 7.12   HEMOGLOBIN g/dL 12.9*   HEMATOCRIT % 41.4   PLATELETS 10*3/mm3 231     Results from last 7 days   Lab Units 19  0908   INR  1.06     Results from last 7 days   Lab Units 19  0908   TROPONIN T ng/mL <0.010     Results  from last 7 days   Lab Units 11/04/19  0908   TSH uIU/mL 0.830           Current Medications:   Scheduled Meds:  apixaban 5 mg Oral Q12H   atenolol 50 mg Oral Q12H   furosemide 40 mg Intravenous Q6H   pioglitazone 15 mg Oral Daily   potassium chloride 20 mEq Oral Daily   rosuvastatin 10 mg Oral Daily   sodium chloride 10 mL Intravenous Q12H           Atrial flutter with rapid ventricular response (CMS/HCC)    Acute combined systolic and diastolic congestive heart failure (CMS/HCC)       Plan:     Dr. Vazquez and I saw him. Volume status improving, breathing better and HR currently controlled. Dr. Escalante is giving him a little more diuretic today, hopefully home tomorrow. Plan will be for him to come back in about 3 weeks for atrial flutter ablation after appropriately anticoagulated. I will order that and have the schedulers get it scheduled and call him as outpatient with instructions. Stressed importance of remaining of apixaban without missing any doses.     Kandice Wylie, APRN  11/07/19  12:08 PM

## 2019-11-08 VITALS
BODY MASS INDEX: 36.33 KG/M2 | HEIGHT: 70 IN | SYSTOLIC BLOOD PRESSURE: 117 MMHG | OXYGEN SATURATION: 96 % | HEART RATE: 91 BPM | TEMPERATURE: 98.2 F | WEIGHT: 253.8 LBS | DIASTOLIC BLOOD PRESSURE: 99 MMHG | RESPIRATION RATE: 20 BRPM

## 2019-11-08 LAB
ANION GAP SERPL CALCULATED.3IONS-SCNC: 10 MMOL/L (ref 5–15)
BUN BLD-MCNC: 21 MG/DL (ref 8–23)
BUN/CREAT SERPL: 21.9 (ref 7–25)
CALCIUM SPEC-SCNC: 8.8 MG/DL (ref 8.6–10.5)
CHLORIDE SERPL-SCNC: 98 MMOL/L (ref 98–107)
CO2 SERPL-SCNC: 36 MMOL/L (ref 22–29)
CREAT BLD-MCNC: 0.96 MG/DL (ref 0.76–1.27)
GFR SERPL CREATININE-BSD FRML MDRD: 77 ML/MIN/1.73
GLUCOSE BLD-MCNC: 102 MG/DL (ref 65–99)
POTASSIUM BLD-SCNC: 3.5 MMOL/L (ref 3.5–5.2)
SODIUM BLD-SCNC: 144 MMOL/L (ref 136–145)

## 2019-11-08 PROCEDURE — 80048 BASIC METABOLIC PNL TOTAL CA: CPT | Performed by: INTERNAL MEDICINE

## 2019-11-08 PROCEDURE — 99239 HOSP IP/OBS DSCHRG MGMT >30: CPT | Performed by: INTERNAL MEDICINE

## 2019-11-08 RX ORDER — ATENOLOL 50 MG/1
50 TABLET ORAL EVERY 12 HOURS SCHEDULED
Qty: 60 TABLET | Refills: 11 | Status: ON HOLD | OUTPATIENT
Start: 2019-11-08 | End: 2020-02-12 | Stop reason: SDUPTHER

## 2019-11-08 RX ORDER — DIGOXIN 125 MCG
125 TABLET ORAL DAILY
Qty: 30 TABLET | Refills: 11 | Status: SHIPPED | OUTPATIENT
Start: 2019-11-08 | End: 2019-12-02 | Stop reason: SDUPTHER

## 2019-11-08 RX ORDER — POTASSIUM CHLORIDE 750 MG/1
20 CAPSULE, EXTENDED RELEASE ORAL DAILY
Qty: 30 CAPSULE | Refills: 11 | Status: SHIPPED | OUTPATIENT
Start: 2019-11-09 | End: 2020-03-17

## 2019-11-08 RX ORDER — FUROSEMIDE 40 MG/1
40 TABLET ORAL DAILY
Qty: 30 TABLET | Refills: 11 | Status: SHIPPED | OUTPATIENT
Start: 2019-11-08 | End: 2020-10-21

## 2019-11-08 RX ADMIN — POTASSIUM CHLORIDE 20 MEQ: 750 CAPSULE, EXTENDED RELEASE ORAL at 09:10

## 2019-11-08 RX ADMIN — ATENOLOL 50 MG: 50 TABLET ORAL at 09:10

## 2019-11-08 RX ADMIN — APIXABAN 5 MG: 5 TABLET, FILM COATED ORAL at 09:10

## 2019-11-08 RX ADMIN — PIOGLITAZONE 15 MG: 15 TABLET ORAL at 09:10

## 2019-11-08 RX ADMIN — SODIUM CHLORIDE, PRESERVATIVE FREE 10 ML: 5 INJECTION INTRAVENOUS at 09:10

## 2019-11-08 RX ADMIN — ROSUVASTATIN CALCIUM 10 MG: 10 TABLET, FILM COATED ORAL at 09:10

## 2019-11-08 NOTE — DISCHARGE SUMMARY
Patient Name: Ray Pratt  :1945  74 y.o.    Date of Admit: 2019  Date of Discharge:  2019    Discharge Diagnosis:  Problem List Items Addressed This Visit        Cardiovascular and Mediastinum    New onset atrial fibrillation (CMS/HCC) - Primary    Relevant Medications    atenolol (TENORMIN) 50 MG tablet    digoxin (LANOXIN) 125 MCG tablet    Other Relevant Orders    Basic Metabolic Panel    Digoxin Level    Atrial flutter with rapid ventricular response (CMS/HCC)    Relevant Medications    atenolol (TENORMIN) 50 MG tablet    digoxin (LANOXIN) 125 MCG tablet    Other Relevant Orders    Case Request EP Lab: Ablation atrial flutter---to be scheduled as outpatient in 3-4 weeks    Acute combined systolic and diastolic congestive heart failure (CMS/HCC)    Relevant Medications    atenolol (TENORMIN) 50 MG tablet    digoxin (LANOXIN) 125 MCG tablet    Other Relevant Orders    Basic Metabolic Panel    Digoxin Level      Other Visit Diagnoses     Acute congestive heart failure, unspecified heart failure type (CMS/HCC)        Relevant Medications    atenolol (TENORMIN) 50 MG tablet    digoxin (LANOXIN) 125 MCG tablet          Hospital Course:     1.  New onset atrial flutter.  Normal TSH.   Rate control with digoxin and atenolol.  Anticoagulated with Eliquis.  Plan is for an atrial flutter ablation in 3 to 4 weeks.  Sarah was setting this up.  Check dig level in 1 week.  2. HFrEF secondary to arrythmia. Repeat echo in 3 months. Cath with no significant CAD.  Discharged on Lasix and potassium.  Basic metabolic panel in 1 week.  3. DM  4. HTN  5. Obesity  6. Probable sleep apnea.  Recommend an outpatient sleep study.  We will arrange at follow-up appointment.      Procedures Performed  Procedure(s):  Left Heart Cath and coronaries       Consults     Date and Time Order Name Status Description    2019 0830 Cardiac Electrophysiologist Inpatient Consult      2019 1022 [facility] (on-call MD unless  specified) Completed           Pertinent Test Results:   Results from last 7 days   Lab Units 11/08/19  0443  11/04/19  0908   SODIUM mmol/L 144   < > 141   POTASSIUM mmol/L 3.5   < > 4.1   CHLORIDE mmol/L 98   < > 108*   CO2 mmol/L 36.0*   < > 20.5*   BUN mg/dL 21   < > 11   CREATININE mg/dL 0.96   < > 0.97   CALCIUM mg/dL 8.8   < > 8.8   BILIRUBIN mg/dL  --   --  0.4   ALK PHOS U/L  --   --  62   ALT (SGPT) U/L  --   --  18   AST (SGOT) U/L  --   --  18   GLUCOSE mg/dL 102*   < > 165*    < > = values in this interval not displayed.     Results from last 7 days   Lab Units 11/04/19  0908   TROPONIN T ng/mL <0.010       Results from last 7 days   Lab Units 11/04/19  0908   WBC 10*3/mm3 7.12   HEMOGLOBIN g/dL 12.9*   HEMATOCRIT % 41.4   PLATELETS 10*3/mm3 231     Results from last 7 days   Lab Units 11/04/19  0908   INR  1.06   APTT seconds 29.0     Results from last 7 days   Lab Units 11/07/19  0505   MAGNESIUM mg/dL 1.9           Condition on Discharge: stable    Discharge Medications     Discharge Medications      New Medications      Instructions Start Date   apixaban 5 MG tablet tablet  Commonly known as:  ELIQUIS   5 mg, Oral, Every 12 Hours Scheduled      atenolol 50 MG tablet  Commonly known as:  TENORMIN   50 mg, Oral, Every 12 Hours Scheduled      digoxin 125 MCG tablet  Commonly known as:  LANOXIN   125 mcg, Oral, Daily      furosemide 40 MG tablet  Commonly known as:  LASIX   40 mg, Oral, Daily      potassium chloride 10 MEQ CR capsule  Commonly known as:  MICRO-K   20 mEq, Oral, Daily   Start Date:  11/9/2019        Continue These Medications      Instructions Start Date   pioglitazone 15 MG tablet  Commonly known as:  ACTOS   TAKE 1 TABLET BY MOUTH EVERY DAY      rosuvastatin 10 MG tablet  Commonly known as:  CRESTOR   10 mg, Oral, Daily         Stop These Medications    aspirin 81 MG EC tablet     valsartan 160 MG tablet  Commonly known as:  DIOVAN            Discharge Diet:   Diet Instructions      Diet: Cardiac      Discharge Diet:  Cardiac          Activity at Discharge:   Activity Instructions     Activity as Tolerated            Discharge disposition: home    Follow-up Appointments  Future Appointments   Date Time Provider Department Center   2/7/2020 10:30 AM Galen Meng MD [id number] None     Additional Instructions for the Follow-ups that You Need to Schedule     Basic Metabolic Panel    Nov 15, 2019 (Approximate)      Digoxin Level    Nov 15, 2019 (Approximate)               Chelita Escalante MD, [facility]  11/08/19  9:23 AM    Time: Discharge 40 min

## 2019-11-09 ENCOUNTER — READMISSION MANAGEMENT (OUTPATIENT)
Dept: CALL CENTER | Facility: HOSPITAL | Age: 74
End: 2019-11-09

## 2019-11-10 NOTE — OUTREACH NOTE
Prep Survey      Responses   Facility patient discharged from?  [place]   Is patient eligible?  Yes   Discharge diagnosis  Acute congestive heart failure   Does the patient have one of the following disease processes/diagnoses(primary or secondary)?  CHF   Does the patient have Home health ordered?  No   Is there a DME ordered?  No   Prep survey completed?  Yes          Loree Hernandez RN

## 2019-11-11 ENCOUNTER — TRANSITIONAL CARE MANAGEMENT TELEPHONE ENCOUNTER (OUTPATIENT)
Dept: FAMILY MEDICINE CLINIC | Facility: CLINIC | Age: 74
End: 2019-11-11

## 2019-11-11 NOTE — OUTREACH NOTE
Spoke with pt, doing fairly well. Feels ok, appetite is good, no SOB, flutters, chest pain, nausea. Confirms receipt and understanding of d/c orders and all medication changes. Declines TCM hosp fwp with Dr Meng at this time due to other upcoming appts.

## 2019-11-12 ENCOUNTER — READMISSION MANAGEMENT (OUTPATIENT)
Dept: CALL CENTER | Facility: HOSPITAL | Age: 74
End: 2019-11-12

## 2019-11-12 ENCOUNTER — TRANSCRIBE ORDERS (OUTPATIENT)
Dept: CARDIOLOGY | Facility: CLINIC | Age: 74
End: 2019-11-12

## 2019-11-12 DIAGNOSIS — I48.92 ATRIAL FLUTTER WITH RAPID VENTRICULAR RESPONSE (HCC): ICD-10-CM

## 2019-11-12 DIAGNOSIS — Z13.6 SCREENING FOR CARDIOVASCULAR CONDITION: ICD-10-CM

## 2019-11-12 DIAGNOSIS — Z01.810 PREOP CARDIOVASCULAR EXAM: Primary | ICD-10-CM

## 2019-11-12 NOTE — OUTREACH NOTE
CHF Week 1 Survey      Responses   Facility patient discharged from?  [place]   Does the patient have one of the following disease processes/diagnoses(primary or secondary)?  CHF   Is there a successful TCM telephone encounter documented?  Yes [TCM call completed 11/11/19, ]          Yumi Swanson RN

## 2019-11-15 ENCOUNTER — OFFICE VISIT (OUTPATIENT)
Dept: CARDIOLOGY | Facility: CLINIC | Age: 74
End: 2019-11-15

## 2019-11-15 ENCOUNTER — READMISSION MANAGEMENT (OUTPATIENT)
Dept: CALL CENTER | Facility: HOSPITAL | Age: 74
End: 2019-11-15

## 2019-11-15 ENCOUNTER — LAB (OUTPATIENT)
Dept: LAB | Facility: HOSPITAL | Age: 74
End: 2019-11-15

## 2019-11-15 VITALS
OXYGEN SATURATION: 93 % | DIASTOLIC BLOOD PRESSURE: 80 MMHG | SYSTOLIC BLOOD PRESSURE: 140 MMHG | HEIGHT: 64 IN | WEIGHT: 248.4 LBS | BODY MASS INDEX: 42.41 KG/M2 | HEART RATE: 86 BPM

## 2019-11-15 DIAGNOSIS — I48.91 NEW ONSET ATRIAL FIBRILLATION (HCC): Primary | ICD-10-CM

## 2019-11-15 DIAGNOSIS — I50.41 ACUTE COMBINED SYSTOLIC AND DIASTOLIC CONGESTIVE HEART FAILURE (HCC): ICD-10-CM

## 2019-11-15 DIAGNOSIS — I10 BENIGN ESSENTIAL HTN: ICD-10-CM

## 2019-11-15 DIAGNOSIS — I48.92 ATRIAL FLUTTER WITH RAPID VENTRICULAR RESPONSE (HCC): ICD-10-CM

## 2019-11-15 DIAGNOSIS — E11.65 TYPE 2 DIABETES MELLITUS WITH HYPERGLYCEMIA, WITHOUT LONG-TERM CURRENT USE OF INSULIN (HCC): ICD-10-CM

## 2019-11-15 DIAGNOSIS — Z01.810 PREOP CARDIOVASCULAR EXAM: ICD-10-CM

## 2019-11-15 DIAGNOSIS — I48.91 NEW ONSET ATRIAL FIBRILLATION (HCC): ICD-10-CM

## 2019-11-15 DIAGNOSIS — Z13.6 SCREENING FOR CARDIOVASCULAR CONDITION: ICD-10-CM

## 2019-11-15 LAB
ANION GAP SERPL CALCULATED.3IONS-SCNC: 11.7 MMOL/L (ref 5–15)
BASOPHILS # BLD AUTO: 0.05 10*3/MM3 (ref 0–0.2)
BASOPHILS NFR BLD AUTO: 0.8 % (ref 0–1.5)
BUN BLD-MCNC: 25 MG/DL (ref 8–23)
BUN/CREAT SERPL: 21 (ref 7–25)
CALCIUM SPEC-SCNC: 9.5 MG/DL (ref 8.6–10.5)
CHLORIDE SERPL-SCNC: 104 MMOL/L (ref 98–107)
CO2 SERPL-SCNC: 28.3 MMOL/L (ref 22–29)
CREAT BLD-MCNC: 1.19 MG/DL (ref 0.76–1.27)
DEPRECATED RDW RBC AUTO: 47.1 FL (ref 37–54)
DIGOXIN SERPL-MCNC: 1 NG/ML (ref 0.6–1.2)
EOSINOPHIL # BLD AUTO: 0.05 10*3/MM3 (ref 0–0.4)
EOSINOPHIL NFR BLD AUTO: 0.8 % (ref 0.3–6.2)
ERYTHROCYTE [DISTWIDTH] IN BLOOD BY AUTOMATED COUNT: 13.9 % (ref 12.3–15.4)
GFR SERPL CREATININE-BSD FRML MDRD: 60 ML/MIN/1.73
GLUCOSE BLD-MCNC: 173 MG/DL (ref 65–99)
HCT VFR BLD AUTO: 47 % (ref 37.5–51)
HGB BLD-MCNC: 15.6 G/DL (ref 13–17.7)
IMM GRANULOCYTES # BLD AUTO: 0.03 10*3/MM3 (ref 0–0.05)
IMM GRANULOCYTES NFR BLD AUTO: 0.5 % (ref 0–0.5)
LYMPHOCYTES # BLD AUTO: 1.65 10*3/MM3 (ref 0.7–3.1)
LYMPHOCYTES NFR BLD AUTO: 25.7 % (ref 19.6–45.3)
MCH RBC QN AUTO: 30.9 PG (ref 26.6–33)
MCHC RBC AUTO-ENTMCNC: 33.2 G/DL (ref 31.5–35.7)
MCV RBC AUTO: 93.1 FL (ref 79–97)
MONOCYTES # BLD AUTO: 0.56 10*3/MM3 (ref 0.1–0.9)
MONOCYTES NFR BLD AUTO: 8.7 % (ref 5–12)
NEUTROPHILS # BLD AUTO: 4.07 10*3/MM3 (ref 1.7–7)
NEUTROPHILS NFR BLD AUTO: 63.5 % (ref 42.7–76)
NRBC BLD AUTO-RTO: 0 /100 WBC (ref 0–0.2)
PLATELET # BLD AUTO: 258 10*3/MM3 (ref 140–450)
PMV BLD AUTO: 10.2 FL (ref 6–12)
POTASSIUM BLD-SCNC: 5 MMOL/L (ref 3.5–5.2)
RBC # BLD AUTO: 5.05 10*6/MM3 (ref 4.14–5.8)
SODIUM BLD-SCNC: 144 MMOL/L (ref 136–145)
WBC NRBC COR # BLD: 6.41 10*3/MM3 (ref 3.4–10.8)

## 2019-11-15 PROCEDURE — 80162 ASSAY OF DIGOXIN TOTAL: CPT

## 2019-11-15 PROCEDURE — 99214 OFFICE O/P EST MOD 30 MIN: CPT | Performed by: NURSE PRACTITIONER

## 2019-11-15 PROCEDURE — 36415 COLL VENOUS BLD VENIPUNCTURE: CPT

## 2019-11-15 PROCEDURE — 93000 ELECTROCARDIOGRAM COMPLETE: CPT | Performed by: NURSE PRACTITIONER

## 2019-11-15 PROCEDURE — 80048 BASIC METABOLIC PNL TOTAL CA: CPT

## 2019-11-15 PROCEDURE — 85025 COMPLETE CBC W/AUTO DIFF WBC: CPT

## 2019-11-15 NOTE — PROGRESS NOTES
Date of Office Visit: 11/15/19  Encounter Provider: ANICETO Michael  Primary Cardiologist: Dr. Escalante  Place of Service: [facility] CARDIOLOGY  Patient Name: Ray Pratt  :1945      Subjective:     Chief Complaint:  Hospital follow up for new onset atrial fibrillation/flutter    History of Present Illness:  Ray Pratt is a pleasant 74 y.o. male who is new to me.  Outside records have been obtained and reviewed by me.     This is a patient with a history of diabetes, hypertension and newly diagnosed atrial fibrillation/flutter.    2019 presented to the emergency department with near syncope and lightheadedness.  He had associated palpitations, shortness of breath and lower extremity edema.  He denied chest pain.  He has been taking cold medications for chest congestion for the last several days and had some jittery feeling but not necessarily feeling his heart racing.  Work-up showed blood pressure is elevated 140/100 and he was in atrial fibrillation/flutter with heart rate in the 130s 140s.  CBC showed mild anemia with hemoglobin of 12.9, troponin normal, TSH normal, magnesium normal, CMP showed normal creatinine, sodium, potassium, BNP was elevated at 1485,    He had an echocardiogram performed that showed EF was severely decreased at 22% with global hypokinesis, borderline dilated RV cavity size and mildly reduced RV systolic function, dilated left atrial cavity size. He was given IV Lopressor and IV Lasix.  He was started on Eliquis.2019 he underwent cardiac catheterization for newly diagnosed cardiomyopathy with no angiographic evidence of significant coronary artery disease and moderate to severely elevated left ventricular filling pressures with elevated LVEDP.  It was felt that his tachyarrhythmia was likely playing a role with his cardiomyopathy.He had an overnight oximetry that revealed severe sleep apnea and sleep medicine was consulted.   Dr. Vazquez was also consulted to evaluate him for symptomatic atrial flutter.  He was started on digoxin and beta-blockade was switched to atenolol and his ARB therapy was stopped to try to use as much beta-blocker for rate control.  The plan was to have him come back for an atrial flutter ablation when more stable and after 4 weeks of anticoagulation unless he needed EVGENY guided therapy prior to then.  11/8/2019 patient was discharged and has been scheduled for atrial flutter ablation on 12/19/2019 with Dr. Vazquez.    He is presenting today for a hospital follow-up visit.  He brings in a blood pressure log and it is range from 118-148/79-88.  Most of his readings are in the 120/80 range.  His weight has gone from 250 at home to 243.4 yesterday.  He is present today with his wife.  He has been feeling better.  He still has some weakness and shortness of breath with exertion but is overall better than it had been prior to coming to the hospital.  His lower extremity edema is improving.  He denies any chest pain, palpitations, racing sensation of his heart rate, dizziness, lightheadedness, syncope, near syncope.  At home he states his heart rate has been ranging 70s to 80s he had one at 94 but none recorded above 100.  He denies any issues with his right radial catheterization site.  He is still experiencing a cough that is associated with meals but denies dysphasia.  He is not on any GERD medicine.  He denies fevers or chills.  He denies any issues on his new medication regimen.      Past Medical History:   Diagnosis Date   • Diabetes mellitus (CMS/HCC)    • Hyperlipidemia    • Hypertension      Past Surgical History:   Procedure Laterality Date   • CARDIAC CATHETERIZATION N/A 11/5/2019    Procedure: Left Heart Cath;  Surgeon: Sundeep Tsang MD;  Location: [facility] INVASIVE LOCATION;  Service: Cardiovascular   • CARDIAC CATHETERIZATION N/A 11/5/2019    Procedure: Coronary angiography;  Surgeon: Trinh  Sundeep PERKINS MD;  Location: [facility] LOCATION;  Service: Cardiovascular   • DENTAL PROCEDURE     • MOLE REMOVAL       Outpatient Medications Prior to Visit   Medication Sig Dispense Refill   • apixaban (ELIQUIS) 5 MG tablet tablet Take 1 tablet by mouth Every 12 (Twelve) Hours. 60 tablet 11   • atenolol (TENORMIN) 50 MG tablet Take 1 tablet by mouth Every 12 (Twelve) Hours. 60 tablet 11   • digoxin (LANOXIN) 125 MCG tablet Take 1 tablet by mouth Daily. 30 tablet 11   • furosemide (LASIX) 40 MG tablet Take 1 tablet by mouth Daily. 30 tablet 11   • pioglitazone (ACTOS) 15 MG tablet TAKE 1 TABLET BY MOUTH EVERY DAY 90 tablet 0   • potassium chloride (MICRO-K) 10 MEQ CR capsule Take 2 capsules by mouth Daily. 30 capsule 11   • rosuvastatin (CRESTOR) 10 MG tablet Take 1 tablet by mouth Daily. 90 tablet 1     No facility-administered medications prior to visit.        Allergies as of 11/15/2019   • (No Known Allergies)     Social History     Socioeconomic History   • Marital status:      Spouse name: Not on file   • Number of children: Not on file   • Years of education: Not on file   • Highest education level: Not on file   Tobacco Use   • Smoking status: Never Smoker   • Smokeless tobacco: Never Used   • Tobacco comment: caffeine use   Substance and Sexual Activity   • Alcohol use: No   • Drug use: No   • Sexual activity: Defer     Family History   Problem Relation Age of Onset   • Cancer Mother    • Stroke Father      Review of Systems   Constitution: Positive for malaise/fatigue. Negative for chills and fever.   HENT: Negative for ear pain, hearing loss, nosebleeds and sore throat.    Eyes: Negative for double vision, pain, vision loss in left eye and vision loss in right eye.   Cardiovascular: Positive for leg swelling. Negative for chest pain, claudication, dyspnea on exertion, irregular heartbeat, near-syncope, orthopnea, palpitations, paroxysmal nocturnal dyspnea and syncope.   Respiratory: Positive  "for shortness of breath. Negative for cough, snoring and wheezing.    Endocrine: Negative for cold intolerance and heat intolerance.   Hematologic/Lymphatic: Negative for bleeding problem.   Skin: Negative for color change, itching, rash and unusual hair distribution.   Musculoskeletal: Negative for joint pain and joint swelling.   Gastrointestinal: Negative for abdominal pain, diarrhea, hematochezia, melena, nausea and vomiting.   Genitourinary: Negative for decreased libido, frequency, hematuria, hesitancy and incomplete emptying.   Neurological: Negative for excessive daytime sleepiness, dizziness, headaches, light-headedness, loss of balance, numbness, paresthesias and seizures.   Psychiatric/Behavioral: Negative for depression.          Objective:     Vitals:    11/15/19 1326 11/15/19 1357   BP: 140/80    BP Location: Left arm    Patient Position: Sitting    Cuff Size: Adult    Pulse: 90 86   SpO2:  93%   Weight: 113 kg (248 lb 6.4 oz)    Height: 162.6 cm (64\")      Body mass index is 42.64 kg/m².    PHYSICAL EXAM:  Physical Exam   Constitutional: He is oriented to person, place, and time. He appears well-developed and well-nourished. No distress.   Morbidly obese   HENT:   Head: Normocephalic and atraumatic.   Eyes:   Wearing glasses   Neck: Carotid bruit is not present.   Difficult to assess JVD d/t body habitus   Cardiovascular: Normal rate, normal heart sounds and intact distal pulses. An irregularly irregular rhythm present.   No murmur heard.  Pulses:       Radial pulses are 2+ on the right side, and 2+ on the left side.        Posterior tibial pulses are 2+ on the right side, and 2+ on the left side.   Bilateral lower extremity edema present nonpitting   Pulmonary/Chest: Effort normal and breath sounds normal. No accessory muscle usage. No tachypnea. No respiratory distress. He has no decreased breath sounds. He has no wheezes. He has no rhonchi. He has no rales. He exhibits no tenderness.   Abdominal: " Soft. Bowel sounds are normal. He exhibits no distension. There is no tenderness. There is no rebound and no guarding.   Obese abdomen   Musculoskeletal: Normal range of motion. He exhibits no edema.   Neurological: He is alert and oriented to person, place, and time.   Skin: Skin is warm, dry and intact. He is not diaphoretic. No erythema.   Psychiatric: He has a normal mood and affect. His speech is normal and behavior is normal. Judgment and thought content normal. Cognition and memory are normal.   Nursing note and vitals reviewed.        ECG 12 Lead  Date/Time: 11/15/2019 1:33 PM  Performed by: Macarena Velez APRN  Authorized by: Macarena Velez APRN   Comparison: compared with previous ECG   Rhythm: atrial fibrillation  Rate: normal  BPM: 90  Conduction: conduction normal  Conduction: incomplete right bundle branch block  Other findings: non-specific ST-T wave changes    Clinical impression: abnormal EKG  Comments: Indication: Atrial fibrillation/flutter, cardiomyopathy            Assessment:       Diagnosis Plan   1. New onset atrial fibrillation (CMS/HCC)  ECG 12 Lead   2. Atrial flutter with rapid ventricular response (CMS/HCC)  ECG 12 Lead   3. Acute combined systolic and diastolic congestive heart failure (CMS/HCC)  ECG 12 Lead   4. Benign essential HTN  ECG 12 Lead   5. Type 2 diabetes mellitus with hyperglycemia, without long-term current use of insulin (CMS/HCC)  ECG 12 Lead       Plan:     1.  New onset atrial fibrillation/flutter:  Normal TSH and electrolytes including normal magnesium. He was seen by EP and plan was for outpatient ablation after at least 4 weeks of anticoagulation-He is already scheduled for ablation on 12/19/19 with Dr. Vazquez.  He was educated not to miss any doses of his apixaban prior to this ablation.  He was educated on stroke prevention.  He was also started on digoxin and atenolol.  He denies any bleeding issues or falls on the apixaban that was started.    2.   Hypertension : Pressure has been stable at home.  Mostly ranging in the 120/80 range.  3.  Type 2 diabetes: On oral therapy.  I would also recommend that he discuss with his PCP transitioning from pioglitazone therapy to a additional therapy for his diabetes secondary to newly diagnosed heart failure.  4.  Obesity: He is losing some weight on diuretic therapy.  I advised he could do some light walking but would recommend not doing aggressive activity until we get his rhythm under control.  5.  Probable sleep apnea: Has abnormal overnight oximetry.  Recommend an outpatient sleep study. Will have his discuss with his PCP who also specializes in sleep medicine.  6.  Hyperlipidemia :On statin managed by outside provider.  7.  Acute systolic congestive heart failure:  Cardiomyopathy with severe LV dysfunction and ejection fraction of 22%.  BNP was mildly elevated.  Cardiac cath showed no significant coronary disease and elevated end-diastolic and LV filling pressures.  Treated with IV diuretics and transition to oral.  It was felt that his cardiomyopathy was secondary to tachyarrhythmia therefore EP wanted to use atenolol versus carvedilol/Toprol-XL and wanted to stop his ARB therapy to focus more on use of beta-blocker for rate control.  It is anticipated that his cardiomyopathy will improve once we treat his arrhythmia.  If down the road his EF is still down will consider changing to appropriate GDMT.  His renal function is remained stable during admission.    8.  Elevated digoxin level in the hospital: Digoxin level today was in normal range at 1.  9.  Cough associated with meals: I have encouraged him to discuss this with PCP.  He may consider trying over-the-counter GERD medication.  He does deny dysphasia 12 fever or chills.    CBC was within normal limits today.  BMP showed normal creatinine, sodium and his potassium was normal at 5.0.  I recommended that he follow-up with his PCP to discuss sleep study as she  specializes in sleep medicine.  I have also recommended he discuss his Actos therapy.  I do recommend at his follow-up with PCP a repeat BMP is checked to ensure his potassium is not higher.  From my standpoint we will continue him on his current regimen.    Keep scheduled ablation procedure with Dr. Vazquez 12/19/2019 and follow up with Dr. Escalante on 1/17/2020, unless otherwise needed sooner.  I advised the patient to contact our office with any questions or concerns.      It has been a pleasure to participate in this patient's care. Please feel free to contact me with any questions or concerns.     Macarena Velez, APRN  11/15/19             Your medication list           Accurate as of 11/15/19  2:06 PM. If you have any questions, ask your nurse or doctor.               CONTINUE taking these medications      Instructions Last Dose Given Next Dose Due   apixaban 5 MG tablet tablet  Commonly known as:  ELIQUIS      Take 1 tablet by mouth Every 12 (Twelve) Hours.       atenolol 50 MG tablet  Commonly known as:  TENORMIN      Take 1 tablet by mouth Every 12 (Twelve) Hours.       digoxin 125 MCG tablet  Commonly known as:  LANOXIN      Take 1 tablet by mouth Daily.       furosemide 40 MG tablet  Commonly known as:  LASIX      Take 1 tablet by mouth Daily.       pioglitazone 15 MG tablet  Commonly known as:  ACTOS      TAKE 1 TABLET BY MOUTH EVERY DAY       potassium chloride 10 MEQ CR capsule  Commonly known as:  MICRO-K      Take 2 capsules by mouth Daily.       rosuvastatin 10 MG tablet  Commonly known as:  CRESTOR      Take 1 tablet by mouth Daily.              The above medication changes may not have been made by this provider.  Medication list was updated to reflect medications patient is currently taking including medication changes and discontinuations made by other healthcare providers.     Dictated utilizing Dragon Dictation System.

## 2019-11-15 NOTE — OUTREACH NOTE
CHF Week 2 Survey      Responses   Facility patient discharged from?  [place]   Does the patient have one of the following disease processes/diagnoses(primary or secondary)?  CHF   Week 2 attempt successful?  No   Unsuccessful attempts  Attempt 1          Heaven Ragland RN

## 2019-11-18 ENCOUNTER — READMISSION MANAGEMENT (OUTPATIENT)
Dept: CALL CENTER | Facility: HOSPITAL | Age: 74
End: 2019-11-18

## 2019-11-18 NOTE — OUTREACH NOTE
CHF Week 2 Survey      Responses   Facility patient discharged from?  [place]   Does the patient have one of the following disease processes/diagnoses(primary or secondary)?  CHF   Week 2 attempt successful?  Yes   Call start time  1605   Call end time  1607   Discharge diagnosis  Acute congestive heart failure   Meds reviewed with patient/caregiver?  Yes   Is the patient having any side effects they believe may be caused by any medication additions or changes?  No   Does the patient have all medications ordered at discharge?  Yes   Is the patient taking all medications as directed (includes completed medication regime)?  Yes   Does the patient have a primary care provider?   Yes   Does the patient have an appointment with their PCP within 7 days of discharge?  Yes   Has the patient kept scheduled appointments due by today?  N/A   Has home health visited the patient within 72 hours of discharge?  N/A   Psychosocial issues?  No   Did the patient receive a copy of their discharge instructions?  Yes   Nursing interventions  Reviewed instructions with patient   What is the patient's perception of their health status since discharge?  Improving   Nursing interventions  Nurse provided patient education   Is the patient weighing daily?  Yes   Does the patient have scales?  Yes   Is the patient able to teach back Heart Failure diet management?  Yes   Is the patient able to teach back Heart Failure Zones?  Yes   Is the patient able to teach back signs and symptoms of worsening condition? (i.e. weight gain, shortness of air, etc.)  Yes   Is the patient/caregiver able to teach back the hierarchy of who to call/visit for symptoms/problems? PCP, Specialist, Home health nurse, Urgent Care, ED, 911  Yes   CHF Week 2 call completed?  Yes          Olga Sumner RN

## 2019-11-25 ENCOUNTER — TELEPHONE (OUTPATIENT)
Dept: CARDIOLOGY | Facility: CLINIC | Age: 74
End: 2019-11-25

## 2019-11-26 ENCOUNTER — READMISSION MANAGEMENT (OUTPATIENT)
Dept: CALL CENTER | Facility: HOSPITAL | Age: 74
End: 2019-11-26

## 2019-11-26 NOTE — OUTREACH NOTE
CHF Week 3 Survey      Responses   Facility patient discharged from?  [place]   Does the patient have one of the following disease processes/diagnoses(primary or secondary)?  CHF   Week 3 attempt successful?  No   Unsuccessful attempts  Attempt 1          Kellie Quinn RN

## 2019-11-27 ENCOUNTER — READMISSION MANAGEMENT (OUTPATIENT)
Dept: CALL CENTER | Facility: HOSPITAL | Age: 74
End: 2019-11-27

## 2019-11-27 NOTE — OUTREACH NOTE
CHF Week 3 Survey      Responses   Facility patient discharged from?  [place]   Does the patient have one of the following disease processes/diagnoses(primary or secondary)?  CHF   Week 3 attempt successful?  Yes   Call start time  1656   Rescheduled  Rescheduled-pt requested   Call end time  1657   Discharge diagnosis  Acute congestive heart failure   Is patient permission given to speak with other caregiver?  Yes   List who call center can speak with  Wife, Asha          Ayesha Mai, RN

## 2019-11-29 ENCOUNTER — READMISSION MANAGEMENT (OUTPATIENT)
Dept: CALL CENTER | Facility: HOSPITAL | Age: 74
End: 2019-11-29

## 2019-11-29 NOTE — OUTREACH NOTE
CHF Week 3 Survey      Responses   Facility patient discharged from?  [place]   Does the patient have one of the following disease processes/diagnoses(primary or secondary)?  CHF   Week 3 attempt successful?  Yes   Call start time  1531   Call end time  1536   Discharge diagnosis  Acute congestive heart failure   Is patient permission given to speak with other caregiver?  Yes   List who call center can speak with  Wife, Asha Maharaj reviewed with patient/caregiver?  Yes   Is the patient having any side effects they believe may be caused by any medication additions or changes?  No   Does the patient have all medications ordered at discharge?  Yes   Is the patient taking all medications as directed (includes completed medication regime)?  Yes   Does the patient have a primary care provider?   Yes   Has the patient kept scheduled appointments due by today?  Yes   Has home health visited the patient within 72 hours of discharge?  N/A   Psychosocial issues?  No   Did the patient receive a copy of their discharge instructions?  Yes   Nursing interventions  Reviewed instructions with patient   What is the patient's perception of their health status since discharge?  Improving   Nursing interventions  Nurse provided patient education   Is the patient weighing daily?  Yes   Does the patient have scales?  Yes   Daily weight interventions  Education provided on importance of daily weight   Is the patient able to teach back Heart Failure diet management?  Yes   Is the patient able to teach back signs and symptoms of worsening condition? (i.e. weight gain, shortness of air, etc.)  Yes   Is the patient/caregiver able to teach back the hierarchy of who to call/visit for symptoms/problems? PCP, Specialist, Home health nurse, Urgent Care, ED, 911  Yes   CHF Week 3 call completed?  Yes   Wrap up additional comments  Patient states that he is scheduled for an ablation next month.           Yumi Swanson RN

## 2019-12-02 RX ORDER — DIGOXIN 125 MCG
125 TABLET ORAL DAILY
Qty: 30 TABLET | Refills: 11 | Status: SHIPPED | OUTPATIENT
Start: 2019-12-02 | End: 2020-02-12 | Stop reason: HOSPADM

## 2019-12-03 ENCOUNTER — OFFICE VISIT (OUTPATIENT)
Dept: FAMILY MEDICINE CLINIC | Facility: CLINIC | Age: 74
End: 2019-12-03

## 2019-12-03 VITALS
DIASTOLIC BLOOD PRESSURE: 72 MMHG | HEIGHT: 64 IN | SYSTOLIC BLOOD PRESSURE: 122 MMHG | WEIGHT: 247 LBS | HEART RATE: 92 BPM | TEMPERATURE: 97.9 F | BODY MASS INDEX: 42.17 KG/M2 | OXYGEN SATURATION: 97 % | RESPIRATION RATE: 16 BRPM

## 2019-12-03 DIAGNOSIS — I48.91 NEW ONSET ATRIAL FIBRILLATION (HCC): ICD-10-CM

## 2019-12-03 DIAGNOSIS — Z09 HOSPITAL DISCHARGE FOLLOW-UP: Primary | ICD-10-CM

## 2019-12-03 DIAGNOSIS — G47.10 HYPERSOMNIA: ICD-10-CM

## 2019-12-03 DIAGNOSIS — G47.8 NON-RESTORATIVE SLEEP: ICD-10-CM

## 2019-12-03 DIAGNOSIS — J01.00 ACUTE NON-RECURRENT MAXILLARY SINUSITIS: ICD-10-CM

## 2019-12-03 DIAGNOSIS — E11.65 TYPE 2 DIABETES MELLITUS WITH HYPERGLYCEMIA, WITHOUT LONG-TERM CURRENT USE OF INSULIN (HCC): ICD-10-CM

## 2019-12-03 PROBLEM — A41.9 SEPSIS: Status: RESOLVED | Noted: 2017-01-18 | Resolved: 2019-12-03

## 2019-12-03 PROBLEM — J14: Status: RESOLVED | Noted: 2017-01-18 | Resolved: 2019-12-03

## 2019-12-03 PROCEDURE — 99214 OFFICE O/P EST MOD 30 MIN: CPT | Performed by: FAMILY MEDICINE

## 2019-12-03 RX ORDER — AMOXICILLIN AND CLAVULANATE POTASSIUM 875; 125 MG/1; MG/1
1 TABLET, FILM COATED ORAL 2 TIMES DAILY
Qty: 14 TABLET | Refills: 0 | Status: SHIPPED | OUTPATIENT
Start: 2019-12-03 | End: 2019-12-10

## 2019-12-03 RX ORDER — ZOLPIDEM TARTRATE 5 MG/1
TABLET ORAL
Qty: 2 TABLET | Refills: 0 | Status: SHIPPED | OUTPATIENT
Start: 2019-12-03 | End: 2020-01-17

## 2019-12-03 NOTE — PROGRESS NOTES
Subjective   Ray Pratt is a 74 y.o. male.     74-year-old male presents today for hospital follow-up.    Patient Name: Ray Pratt  :1945  74 y.o.     Date of Admit: 2019  Date of Discharge:  2019     Discharge Diagnosis:  Problem List Items Addressed This Visit          Cardiovascular and Mediastinum     New onset atrial fibrillation (CMS/HCC) - Primary     Relevant Medications     atenolol (TENORMIN) 50 MG tablet     digoxin (LANOXIN) 125 MCG tablet     Other Relevant Orders     Basic Metabolic Panel     Digoxin Level     Atrial flutter with rapid ventricular response (CMS/HCC)     Relevant Medications     atenolol (TENORMIN) 50 MG tablet     digoxin (LANOXIN) 125 MCG tablet     Other Relevant Orders     Case Request EP Lab: Ablation atrial flutter---to be scheduled as outpatient in 3-4 weeks     Acute combined systolic and diastolic congestive heart failure (CMS/HCC)     Relevant Medications     atenolol (TENORMIN) 50 MG tablet     digoxin (LANOXIN) 125 MCG tablet     Other Relevant Orders     Basic Metabolic Panel     Digoxin Level      Other Visit Diagnoses     Acute congestive heart failure, unspecified heart failure type (CMS/HCC)        Relevant Medications    atenolol (TENORMIN) 50 MG tablet    digoxin (LANOXIN) 125 MCG tablet           Hospital Course:      1.  New onset atrial flutter.  Normal TSH.   Rate control with digoxin and atenolol.  Anticoagulated with Eliquis.  Plan is for an atrial flutter ablation in 3 to 4 weeks.  Sarah was setting this up.  Check dig level in 1 week.  2. HFrEF secondary to arrythmia. Repeat echo in 3 months. Cath with no significant CAD.  Discharged on Lasix and potassium.  Basic metabolic panel in 1 week.  3. DM  4. HTN  5. Obesity  6. Probable sleep apnea.  Recommend an outpatient sleep study.  We will arrange at follow-up appointment.    Discharge Medications     New Medications     Instructions Start Date  apixaban 5 MG tablet  tablet  Commonly known as:  ELIQUIS    5 mg, Oral, Every 12 Hours Scheduled       atenolol 50 MG tablet  Commonly known as:  TENORMIN    50 mg, Oral, Every 12 Hours Scheduled       digoxin 125 MCG tablet  Commonly known as:  LANOXIN    125 mcg, Oral, Daily       furosemide 40 MG tablet  Commonly known as:  LASIX    40 mg, Oral, Daily       potassium chloride 10 MEQ CR capsule  Commonly known as:  MICRO-K    20 mEq, Oral, Daily    Start Date:  11/9/2019        Continue These Medications     Instructions Start Date  pioglitazone 15 MG tablet  Commonly known as:  ACTOS    TAKE 1 TABLET BY MOUTH EVERY DAY       rosuvastatin 10 MG tablet  Commonly known as:  CRESTOR    10 mg, Oral, Daily          Stop These Medications   aspirin 81 MG EC tablet     valsartan 160 MG tablet  Commonly known as:  DIOVAN           -----------------------  Followed up with cardiology November 15, 2019.  Plan for outpatient ablation in the next few weeks.  Continues to take apixaban per cardiology recommendations.  Continuing digoxin and atenolol as well.  Blood pressure in the office today 122/72.    Type 2 diabetes mellitus: Due for A1c recheck; most recent was in May 7, 2019 was 6.50% which is at goal for his diabetes.    Concern for MARCOS: Wife reports snoring; unsure about witnessed apneas; patient notes he jerks himself awake sometimes; reports he sleeps on average 7 hours a night.    Acute systolic congestive heart failure: Ejection fraction of 22%.  Per note it is anticipated that his cardiomyopathy will improve once arrhythmias treated.    Cardiology nurse practitioner recommends repeat BMP to ensure potassium is not higher.    Cough: Cough has been going on for about a month. In the last week has become productive of sputum. Prior to this it was very dry. Sputum is clear. No fevers/chills/night sweats. Also reports some wheezing. Reports a little bit of stuffy nose. Nasal drainage is clear. Ears feel full b/l. No sinus pressure.  Currently denies sore throat. Tried Tussin DM which helps a bit when he takes it.                   The following portions of the patient's history were reviewed and updated as appropriate: allergies, current medications, past family history, past medical history, past social history, past surgical history and problem list.    Review of Systems   Constitutional: Positive for fatigue. Negative for chills and fever.   HENT: Positive for congestion, rhinorrhea and sinus pressure. Negative for ear discharge, ear pain and trouble swallowing.    Respiratory: Positive for cough. Negative for shortness of breath.    Cardiovascular: Negative for chest pain, palpitations and leg swelling.   Gastrointestinal: Negative for abdominal pain, nausea and vomiting.       Objective   Physical Exam   Constitutional: He is oriented to person, place, and time. He appears well-developed and well-nourished.   HENT:   Head: Normocephalic and atraumatic.   Right Ear: Hearing, external ear and ear canal normal.   Left Ear: Hearing, external ear and ear canal normal.   Nose: Mucosal edema present. Right sinus exhibits maxillary sinus tenderness. Right sinus exhibits no frontal sinus tenderness. Left sinus exhibits maxillary sinus tenderness. Left sinus exhibits no frontal sinus tenderness.   Mouth/Throat: Uvula is midline and mucous membranes are normal. Posterior oropharyngeal erythema present. No oropharyngeal exudate or posterior oropharyngeal edema.   Cloudy TMs bilaterally   Eyes: EOM are normal. Pupils are equal, round, and reactive to light.   Neck: Normal range of motion. Neck supple.   Cardiovascular: Normal rate, regular rhythm and normal heart sounds.   No murmur heard.  Pulmonary/Chest: Effort normal and breath sounds normal. No stridor. No respiratory distress. He has no decreased breath sounds. He has no wheezes. He has no rhonchi. He has no rales.   Lymphadenopathy:        Right cervical: No superficial cervical adenopathy  present.       Left cervical: No superficial cervical adenopathy present.   Neurological: He is alert and oriented to person, place, and time.   Skin: Skin is warm.   Psychiatric: He has a normal mood and affect. His behavior is normal.               Assessment/Plan     Ray was seen today for hospital follow up.    Diagnoses and all orders for this visit:    Hospital discharge follow-up    New onset atrial fibrillation (CMS/HCC)  -     Polysomnography 4 or More Parameters; Future  -     Basic Metabolic Panel  -     zolpidem (AMBIEN) 5 MG tablet; Take 1 tablet as needed for sleep on night of sleep study only. Do not take at home.    Non-restorative sleep  -     Polysomnography 4 or More Parameters; Future  -     zolpidem (AMBIEN) 5 MG tablet; Take 1 tablet as needed for sleep on night of sleep study only. Do not take at home.    Hypersomnia  -     Polysomnography 4 or More Parameters; Future  -     zolpidem (AMBIEN) 5 MG tablet; Take 1 tablet as needed for sleep on night of sleep study only. Do not take at home.    Type 2 diabetes mellitus with hyperglycemia, without long-term current use of insulin (CMS/HCC)  -     Hemoglobin A1c    Acute non-recurrent maxillary sinusitis  -     amoxicillin-clavulanate (AUGMENTIN) 875-125 MG per tablet; Take 1 tablet by mouth 2 (Two) Times a Day for 7 days.    Follow-up BMP to check on potassium, follow-up A1c.  Follow-up sleep study.  Prescription of Ambien given to only use at night of sleep study.  Augmentin prescribed for concern for sinus infection.  Did consider that this could be related to reflux partly as well due to cough increasing sometimes when he has a very large meal.  This is not a consistent complaint for the patient however.  Will consider Pepcid if no improvement.    Continue plan of care per cardiology.  Return to clinic in 6 weeks for follow-up or sooner if needed.    To ensure he sets up appropriate follow-up with his dermatologist as he is supposed to have  this once a year to continue skin cancer screening.  Lesion on right chin which patient says is old and unchanged and has been checked out by dermatology in the past.

## 2019-12-06 LAB
BUN SERPL-MCNC: 19 MG/DL (ref 8–27)
BUN/CREAT SERPL: 17 (ref 10–24)
CALCIUM SERPL-MCNC: 9.5 MG/DL (ref 8.6–10.2)
CHLORIDE SERPL-SCNC: 101 MMOL/L (ref 96–106)
CO2 SERPL-SCNC: 27 MMOL/L (ref 20–29)
CREAT SERPL-MCNC: 1.1 MG/DL (ref 0.76–1.27)
GLUCOSE SERPL-MCNC: 136 MG/DL (ref 65–99)
HBA1C MFR BLD: 6.7 % (ref 4.8–5.6)
POTASSIUM SERPL-SCNC: 5.1 MMOL/L (ref 3.5–5.2)
SODIUM SERPL-SCNC: 142 MMOL/L (ref 134–144)

## 2019-12-09 ENCOUNTER — READMISSION MANAGEMENT (OUTPATIENT)
Dept: CALL CENTER | Facility: HOSPITAL | Age: 74
End: 2019-12-09

## 2019-12-09 NOTE — OUTREACH NOTE
CHF Week 4 Survey      Responses   Facility patient discharged from?  [facility]   Does the patient have one of the following disease processes/diagnoses(primary or secondary)?  CHF   Week 4 attempt successful?  Yes   Call start time  1508   Call end time  1515   Meds reviewed with patient/caregiver?  Yes   Is the patient having any side effects they believe may be caused by any medication additions or changes?  No   Is the patient taking all medications as directed (includes completed medication regime)?  Yes   Has the patient kept scheduled appointments due by today?  Yes   Is the patient still receiving Home Health Services?  N/A   Psychosocial issues?  No   What is the patient's perception of their health status since discharge?  Improving   Is the patient weighing daily?  Yes   Is the patient able to teach back Heart Failure diet management?  Yes   Is the patient able to teach back Heart Failure Zones?  No   Is the patient able to teach back signs and symptoms of worsening condition? (i.e. weight gain, shortness of air, etc.)  Yes   Is the patient/caregiver able to teach back the hierarchy of who to call/visit for symptoms/problems? PCP, Specialist, Home health nurse, Urgent Care, ED, 911  Yes   Week 4 Call Completed?  Yes   Would the patient like one additional call?  Yes   Wrap up additional comments  Patient states is feeling better-states still some SOA on exertion. Denies any edema. States scheduled for ablation 12/19/19. No complaints today.          Margarita Rosales RN

## 2019-12-16 ENCOUNTER — LAB (OUTPATIENT)
Dept: LAB | Facility: HOSPITAL | Age: 74
End: 2019-12-16

## 2019-12-16 ENCOUNTER — TRANSCRIBE ORDERS (OUTPATIENT)
Dept: ADMINISTRATIVE | Facility: HOSPITAL | Age: 74
End: 2019-12-16

## 2019-12-16 DIAGNOSIS — Z13.6 SCREENING FOR ISCHEMIC HEART DISEASE: ICD-10-CM

## 2019-12-16 DIAGNOSIS — I48.92 ATRIAL FLUTTER, UNSPECIFIED TYPE (HCC): ICD-10-CM

## 2019-12-16 DIAGNOSIS — Z01.810 PRE-OPERATIVE CARDIOVASCULAR EXAMINATION: ICD-10-CM

## 2019-12-16 DIAGNOSIS — Z01.810 PRE-OPERATIVE CARDIOVASCULAR EXAMINATION: Primary | ICD-10-CM

## 2019-12-16 LAB
ANION GAP SERPL CALCULATED.3IONS-SCNC: 11 MMOL/L (ref 5–15)
BASOPHILS # BLD AUTO: 0.07 10*3/MM3 (ref 0–0.2)
BASOPHILS NFR BLD AUTO: 1 % (ref 0–1.5)
BUN BLD-MCNC: 19 MG/DL (ref 8–23)
BUN/CREAT SERPL: 18.6 (ref 7–25)
CALCIUM SPEC-SCNC: 9.1 MG/DL (ref 8.6–10.5)
CHLORIDE SERPL-SCNC: 98 MMOL/L (ref 98–107)
CO2 SERPL-SCNC: 29 MMOL/L (ref 22–29)
CREAT BLD-MCNC: 1.02 MG/DL (ref 0.76–1.27)
DEPRECATED RDW RBC AUTO: 46.9 FL (ref 37–54)
EOSINOPHIL # BLD AUTO: 0.12 10*3/MM3 (ref 0–0.4)
EOSINOPHIL NFR BLD AUTO: 1.7 % (ref 0.3–6.2)
ERYTHROCYTE [DISTWIDTH] IN BLOOD BY AUTOMATED COUNT: 13.7 % (ref 12.3–15.4)
GFR SERPL CREATININE-BSD FRML MDRD: 71 ML/MIN/1.73
GLUCOSE BLD-MCNC: 158 MG/DL (ref 65–99)
HCT VFR BLD AUTO: 45.4 % (ref 37.5–51)
HGB BLD-MCNC: 14.8 G/DL (ref 13–17.7)
IMM GRANULOCYTES # BLD AUTO: 0.03 10*3/MM3 (ref 0–0.05)
IMM GRANULOCYTES NFR BLD AUTO: 0.4 % (ref 0–0.5)
LYMPHOCYTES # BLD AUTO: 2.1 10*3/MM3 (ref 0.7–3.1)
LYMPHOCYTES NFR BLD AUTO: 30.3 % (ref 19.6–45.3)
MCH RBC QN AUTO: 30.2 PG (ref 26.6–33)
MCHC RBC AUTO-ENTMCNC: 32.6 G/DL (ref 31.5–35.7)
MCV RBC AUTO: 92.7 FL (ref 79–97)
MONOCYTES # BLD AUTO: 0.67 10*3/MM3 (ref 0.1–0.9)
MONOCYTES NFR BLD AUTO: 9.7 % (ref 5–12)
NEUTROPHILS # BLD AUTO: 3.94 10*3/MM3 (ref 1.7–7)
NEUTROPHILS NFR BLD AUTO: 56.9 % (ref 42.7–76)
NRBC BLD AUTO-RTO: 0 /100 WBC (ref 0–0.2)
PLATELET # BLD AUTO: 220 10*3/MM3 (ref 140–450)
PMV BLD AUTO: 10.5 FL (ref 6–12)
POTASSIUM BLD-SCNC: 4.4 MMOL/L (ref 3.5–5.2)
RBC # BLD AUTO: 4.9 10*6/MM3 (ref 4.14–5.8)
SODIUM BLD-SCNC: 138 MMOL/L (ref 136–145)
WBC NRBC COR # BLD: 6.93 10*3/MM3 (ref 3.4–10.8)

## 2019-12-16 PROCEDURE — 36415 COLL VENOUS BLD VENIPUNCTURE: CPT

## 2019-12-16 PROCEDURE — 85025 COMPLETE CBC W/AUTO DIFF WBC: CPT

## 2019-12-16 PROCEDURE — 80048 BASIC METABOLIC PNL TOTAL CA: CPT

## 2019-12-19 ENCOUNTER — ANESTHESIA (OUTPATIENT)
Dept: CARDIOLOGY | Facility: HOSPITAL | Age: 74
End: 2019-12-19

## 2019-12-19 ENCOUNTER — HOSPITAL ENCOUNTER (OUTPATIENT)
Facility: HOSPITAL | Age: 74
Setting detail: HOSPITAL OUTPATIENT SURGERY
Discharge: HOME | End: 2019-12-19
Attending: INTERNAL MEDICINE | Admitting: INTERNAL MEDICINE

## 2019-12-19 ENCOUNTER — HOSPITAL ENCOUNTER (OUTPATIENT)
Dept: SLEEP MEDICINE | Facility: HOSPITAL | Age: 74
Discharge: HOME | End: 2019-12-19

## 2019-12-19 ENCOUNTER — ANESTHESIA EVENT (OUTPATIENT)
Dept: CARDIOLOGY | Facility: HOSPITAL | Age: 74
End: 2019-12-19

## 2019-12-19 VITALS
TEMPERATURE: 98.1 F | WEIGHT: 239 LBS | BODY MASS INDEX: 40.8 KG/M2 | DIASTOLIC BLOOD PRESSURE: 84 MMHG | SYSTOLIC BLOOD PRESSURE: 119 MMHG | HEIGHT: 64 IN | OXYGEN SATURATION: 97 % | HEART RATE: 79 BPM | RESPIRATION RATE: 20 BRPM

## 2019-12-19 DIAGNOSIS — G47.10 HYPERSOMNIA: ICD-10-CM

## 2019-12-19 DIAGNOSIS — I48.91 NEW ONSET ATRIAL FIBRILLATION (HCC): ICD-10-CM

## 2019-12-19 DIAGNOSIS — G47.8 NON-RESTORATIVE SLEEP: ICD-10-CM

## 2019-12-19 DIAGNOSIS — I48.92 ATRIAL FLUTTER WITH RAPID VENTRICULAR RESPONSE (HCC): ICD-10-CM

## 2019-12-19 LAB
GLUCOSE BLDC GLUCOMTR-MCNC: 125 MG/DL (ref 70–130)
GLUCOSE BLDC GLUCOMTR-MCNC: 125 MG/DL (ref 70–130)

## 2019-12-19 PROCEDURE — 93005 ELECTROCARDIOGRAM TRACING: CPT | Performed by: INTERNAL MEDICINE

## 2019-12-19 PROCEDURE — 82962 GLUCOSE BLOOD TEST: CPT

## 2019-12-19 PROCEDURE — 25010000002 MIDAZOLAM PER 1 MG: Performed by: ANESTHESIOLOGY

## 2019-12-19 PROCEDURE — 95811 POLYSOM 6/>YRS CPAP 4/> PARM: CPT | Performed by: INTERNAL MEDICINE

## 2019-12-19 PROCEDURE — 93010 ELECTROCARDIOGRAM REPORT: CPT | Performed by: INTERNAL MEDICINE

## 2019-12-19 PROCEDURE — 95811 POLYSOM 6/>YRS CPAP 4/> PARM: CPT

## 2019-12-19 RX ORDER — FENTANYL CITRATE 50 UG/ML
50 INJECTION, SOLUTION INTRAMUSCULAR; INTRAVENOUS
Status: CANCELLED | OUTPATIENT
Start: 2019-12-19

## 2019-12-19 RX ORDER — GABAPENTIN 300 MG/1
300 CAPSULE ORAL ONCE
Status: CANCELLED | OUTPATIENT
Start: 2019-12-19 | End: 2019-12-19

## 2019-12-19 RX ORDER — MIDAZOLAM HYDROCHLORIDE 1 MG/ML
1 INJECTION INTRAMUSCULAR; INTRAVENOUS
Status: DISCONTINUED | OUTPATIENT
Start: 2019-12-19 | End: 2019-12-19 | Stop reason: HOSPADM

## 2019-12-19 RX ORDER — SODIUM CHLORIDE 0.9 % (FLUSH) 0.9 %
3 SYRINGE (ML) INJECTION EVERY 12 HOURS SCHEDULED
Status: CANCELLED | OUTPATIENT
Start: 2019-12-19

## 2019-12-19 RX ORDER — SODIUM CHLORIDE 0.9 % (FLUSH) 0.9 %
3-10 SYRINGE (ML) INJECTION AS NEEDED
Status: CANCELLED | OUTPATIENT
Start: 2019-12-19

## 2019-12-19 RX ORDER — SODIUM CHLORIDE 9 MG/ML
75 INJECTION, SOLUTION INTRAVENOUS CONTINUOUS
Status: DISCONTINUED | OUTPATIENT
Start: 2019-12-19 | End: 2019-12-19 | Stop reason: HOSPADM

## 2019-12-19 RX ORDER — SODIUM CHLORIDE 0.9 % (FLUSH) 0.9 %
3 SYRINGE (ML) INJECTION EVERY 12 HOURS SCHEDULED
Status: DISCONTINUED | OUTPATIENT
Start: 2019-12-19 | End: 2019-12-19 | Stop reason: HOSPADM

## 2019-12-19 RX ORDER — FAMOTIDINE 10 MG/ML
20 INJECTION, SOLUTION INTRAVENOUS ONCE
Status: DISCONTINUED | OUTPATIENT
Start: 2019-12-19 | End: 2019-12-19 | Stop reason: HOSPADM

## 2019-12-19 RX ORDER — MIDAZOLAM HYDROCHLORIDE 1 MG/ML
2 INJECTION INTRAMUSCULAR; INTRAVENOUS
Status: DISCONTINUED | OUTPATIENT
Start: 2019-12-19 | End: 2019-12-19 | Stop reason: HOSPADM

## 2019-12-19 RX ORDER — LIDOCAINE HYDROCHLORIDE 10 MG/ML
0.1 INJECTION, SOLUTION EPIDURAL; INFILTRATION; INTRACAUDAL; PERINEURAL ONCE AS NEEDED
Status: DISCONTINUED | OUTPATIENT
Start: 2019-12-19 | End: 2019-12-19 | Stop reason: HOSPADM

## 2019-12-19 RX ORDER — SODIUM CHLORIDE 0.9 % (FLUSH) 0.9 %
10 SYRINGE (ML) INJECTION AS NEEDED
Status: DISCONTINUED | OUTPATIENT
Start: 2019-12-19 | End: 2019-12-19 | Stop reason: HOSPADM

## 2019-12-19 RX ORDER — ACETAMINOPHEN 500 MG
1000 TABLET ORAL ONCE
Status: CANCELLED | OUTPATIENT
Start: 2019-12-19 | End: 2019-12-19

## 2019-12-19 RX ORDER — LIDOCAINE HYDROCHLORIDE 10 MG/ML
0.5 INJECTION, SOLUTION EPIDURAL; INFILTRATION; INTRACAUDAL; PERINEURAL ONCE AS NEEDED
Status: CANCELLED | OUTPATIENT
Start: 2019-12-19

## 2019-12-19 RX ORDER — SODIUM CHLORIDE, SODIUM LACTATE, POTASSIUM CHLORIDE, CALCIUM CHLORIDE 600; 310; 30; 20 MG/100ML; MG/100ML; MG/100ML; MG/100ML
9 INJECTION, SOLUTION INTRAVENOUS CONTINUOUS
Status: CANCELLED | OUTPATIENT
Start: 2019-12-19

## 2019-12-19 RX ADMIN — SODIUM CHLORIDE 75 ML/HR: 9 INJECTION, SOLUTION INTRAVENOUS at 07:35

## 2019-12-19 NOTE — ANESTHESIA PREPROCEDURE EVALUATION
Anesthesia Evaluation     Patient summary reviewed and Nursing notes reviewed   no history of anesthetic complications:  NPO Solid Status: > 8 hours  NPO Liquid Status: > 8 hours           Airway   Mallampati: II  TM distance: >3 FB  Neck ROM: full  No difficulty expected  Dental - normal exam     Pulmonary    (-) rhonchi, decreased breath sounds, wheezes, not a smoker  Cardiovascular   Exercise tolerance: good (4-7 METS)    Rhythm: regular  Rate: normal    (+) hypertension well controlled, dysrhythmias Atrial Flutter, Atrial Fib, CHF Systolic <55%, hyperlipidemia,   (-) valvular problems/murmurs, past MI, CAD, angina, VILLA, murmur, cardiac stents    ROS comment: LVEF was 22% in November but rate was 130-140 at time of study rate 70s today controlled now     Neuro/Psych  (-) seizures, CVA  GI/Hepatic/Renal/Endo    (+) morbid obesity,  diabetes mellitus type 2 poorly controlled,   (-) liver disease, no renal disease, no thyroid disorder    Musculoskeletal     Abdominal     Abdomen: soft.   Substance History      OB/GYN          Other                        Anesthesia Plan    ASA 3     general   (CMAc available for intubation   Case cancelled)  intravenous induction     Anesthetic plan, all risks, benefits, and alternatives have been provided, discussed and informed consent has been obtained with: patient.

## 2019-12-19 NOTE — H&P
Pt is here for aflutter ablation but is in AF    Has not had sleep therapy    Has not trial of AAD    Is way overweight     Plan -- cancel procedure -- he's been having normal heart rates each day     Get sleep eval asap and treatment asap     Trial of dofeltilide in [place]

## 2019-12-20 ENCOUNTER — TELEPHONE (OUTPATIENT)
Dept: SLEEP MEDICINE | Facility: HOSPITAL | Age: 74
End: 2019-12-20

## 2020-01-06 ENCOUNTER — APPOINTMENT (OUTPATIENT)
Dept: CT IMAGING | Facility: HOSPITAL | Age: 75
End: 2020-01-06

## 2020-01-06 ENCOUNTER — HOSPITAL ENCOUNTER (EMERGENCY)
Facility: HOSPITAL | Age: 75
Discharge: HOME | End: 2020-01-06
Attending: EMERGENCY MEDICINE | Admitting: EMERGENCY MEDICINE

## 2020-01-06 VITALS
HEIGHT: 64 IN | TEMPERATURE: 97.8 F | WEIGHT: 239 LBS | DIASTOLIC BLOOD PRESSURE: 83 MMHG | SYSTOLIC BLOOD PRESSURE: 118 MMHG | HEART RATE: 93 BPM | RESPIRATION RATE: 18 BRPM | OXYGEN SATURATION: 97 % | BODY MASS INDEX: 40.8 KG/M2

## 2020-01-06 DIAGNOSIS — S40.022A CONTUSION OF LEFT UPPER EXTREMITY, INITIAL ENCOUNTER: ICD-10-CM

## 2020-01-06 DIAGNOSIS — S00.03XA CONTUSION OF SCALP, INITIAL ENCOUNTER: Primary | ICD-10-CM

## 2020-01-06 DIAGNOSIS — D68.9 COAGULOPATHY (HCC): ICD-10-CM

## 2020-01-06 PROCEDURE — 70450 CT HEAD/BRAIN W/O DYE: CPT

## 2020-01-06 PROCEDURE — 99282 EMERGENCY DEPT VISIT SF MDM: CPT

## 2020-01-06 NOTE — ED PROVIDER NOTES
EMERGENCY DEPARTMENT ENCOUNTER    Room Number:  05/05  Date of encounter:  1/6/2020  PCP: Galen eMng MD  Historian: Pt      HPI:  Chief Complaint: head injury  A complete HPI/ROS/PMH/PSH/SH/FH are unobtainable due to: None  Context: Ray Pratt is a 74 y.o. male who presents to the ED c/o sudden onset, head injury s/p mechanical fall that occurred about 45 minutes ago. He denies headache at this time or prior to arrival here.  He came here because he is on Eliquis and he fell and hit his head.. He also denies any other injuries at this time. Pt is currently on Eliquis. Pt contacted PCP regarding fall and was recommended to ED for further evaluation.    PAST MEDICAL HISTORY  Active Ambulatory Problems     Diagnosis Date Noted   • Hypoxia 01/18/2017   • Type 2 diabetes mellitus with hyperglycemia (CMS/HCC) 01/18/2017   • Benign essential HTN 01/18/2017   • New onset atrial fibrillation (CMS/HCC) 11/04/2019   • Atrial flutter with rapid ventricular response (CMS/HCC) 11/06/2019   • Acute combined systolic and diastolic congestive heart failure (CMS/HCC) 11/06/2019     Resolved Ambulatory Problems     Diagnosis Date Noted   • Pneumonia of left lung due to Haemophilus influenzae (CMS/[facility]) 01/18/2017   • Sepsis (CMS/HCC) 01/18/2017     Past Medical History:   Diagnosis Date   • Atrial fibrillation (CMS/HCC)    • Diabetes mellitus (CMS/[facility])    • Hyperlipidemia    • Hypertension          PAST SURGICAL HISTORY  Past Surgical History:   Procedure Laterality Date   • CARDIAC CATHETERIZATION N/A 11/5/2019    Procedure: Left Heart Cath;  Surgeon: Sundeep Tsang MD;  [facility];  Service: Cardiovascular   • CARDIAC CATHETERIZATION N/A 11/5/2019    Procedure: Coronary angiography;  Surgeon: Sundeep Tsang MD;  Location:  [facility] CATH INVASIVE LOCATION;  Service: Cardiovascular   • CARDIAC CATHETERIZATION     • DENTAL PROCEDURE     • MOLE REMOVAL           FAMILY HISTORY  Family  History   Problem Relation Age of Onset   • Cancer Mother    • Stroke Father          SOCIAL HISTORY  Social History     Socioeconomic History   • Marital status:      Spouse name: Not on file   • Number of children: Not on file   • Years of education: Not on file   • Highest education level: Not on file   Tobacco Use   • Smoking status: Never Smoker   • Smokeless tobacco: Never Used   • Tobacco comment: caffeine use   Substance and Sexual Activity   • Alcohol use: No   • Drug use: No   • Sexual activity: Defer         ALLERGIES  Patient has no known allergies.        REVIEW OF SYSTEMS  Review of Systems   Constitutional: Negative for activity change, appetite change and fever.   HENT: Negative for congestion and sore throat.    Eyes: Negative.    Respiratory: Negative for cough and shortness of breath.    Cardiovascular: Negative for chest pain and leg swelling.   Gastrointestinal: Negative for abdominal pain, diarrhea and vomiting.   Endocrine: Negative.    Genitourinary: Negative for decreased urine volume and dysuria.   Musculoskeletal: Negative for neck pain.   Skin: Positive for wound (abrasion to scalp). Negative for rash.   Allergic/Immunologic: Negative.    Neurological: Negative for weakness, numbness and headaches.   Hematological: Negative.    Psychiatric/Behavioral: Negative.    All other systems reviewed and are negative.       All systems reviewed and negative except for those discussed in HPI.       PHYSICAL EXAM    I have reviewed the triage vital signs and nursing notes.    ED Triage Vitals   Temp Heart Rate Resp BP SpO2   01/06/20 0848 01/06/20 0848 01/06/20 0848 01/06/20 0904 01/06/20 0848   97.8 °F (36.6 °C) 93 18 118/83 97 %      Temp src Heart Rate Source Patient Position BP Location FiO2 (%)   01/06/20 0848 01/06/20 0848 -- -- --   Tympanic Monitor          GENERAL: awake and alert, not distressed  HENT: nares patent  Head: small red jazlyn at vertex of scalp consistent with a very small  contusion,  EYES: no scleral icterus  CV: regular rhythm, regular rate, intact distal pulses  RESPIRATORY: normal effort  ABDOMEN: soft  MUSCULOSKELETAL: no deformity, no neck or back tenderness, mild erythema to L elbow, no pain with pronation or supination or movement at the elbow.  No pain with palpation as well.  NEURO: alert, moves all extremities, follows commands  SKIN: warm, dry, small abrasion/contusion to L elbow,    RADIOLOGY  Ct Head Without Contrast    Result Date: 1/6/2020  CT HEAD WITHOUT CONTRAST  CLINICAL HISTORY: Fall hitting the top of the head with headache.  TECHNIQUE: CT scan of the head was obtained with 2 mm axial bone algorithm and 3 mm axial soft tissue algorithm images. No intravenous contrast was administered.  FINDINGS: There is no evidence for a calvarial fracture. There is no evidence for an acute extra-axial hemorrhage. The gray-white matter differentiation is within normal limits. The ventricles, sulci, and cisterns are age appropriate. The basal ganglia and thalami are unremarkable. The posterior fossa structures are within normal limits.  Mucosal thickening is identified within the ethmoid air cells and the maxillary sinuses.      No evidence for acute traumatic intracranial pathology.  Radiation dose reduction techniques were utilized, including automated exposure control and exposure modulation based on body size.  This report was finalized on 1/6/2020 10:21 AM by Dr. Slick Chan M.D.        I ordered the above noted radiological studies. Reviewed by me and discussed with radiologist.  See dictation for official radiology interpretation.      PROCEDURES    Procedures      MEDICATIONS GIVEN IN ER    Medications - No data to display      PROGRESS, DATA ANALYSIS, CONSULTS, AND MEDICAL DECISION MAKING    All radiology studies have been reviewed by me and discussed with radiologist dictating the report. Discussion below represents my analysis of pertinent findings related to  patient's condition, differential diagnosis, treatment plan and final disposition.    0916: Ordered CT head for further evaluation.     1037: Rechecked pt who is resting comfortably. Informed pt/family on imaging results. Discussed plan to discharge pt home and return to ED if HA persists. Pt understands and agrees with the plan, all questions answered.         AS OF 3:59 PM VITALS:    BP - 118/83  HR - 93  TEMP - 97.8 °F (36.6 °C) (Tympanic)  O2 SATS - 97%        DIAGNOSIS  Final diagnoses:   Contusion of scalp, initial encounter   Coagulopathy from eliquis (CMS/[facility])   Contusion of left upper extremity, initial encounter         DISPOSITION  DISCHARGE    Patient discharged in stable condition.    Reviewed implications of results, diagnosis, meds, responsibility to follow up, warning signs and symptoms of possible worsening, potential complications and reasons to return to ER.    Patient/Family voiced understanding of above instructions.    Discussed plan for discharge, as there is no emergent indication for admission.  Pt/family is agreeable and understands need for follow up and repeat testing.  Pt is aware that discharge does not mean that nothing is wrong but it indicates no emergency is present that requires admission and they must continue care with follow-up as given below or physician of their choice.     FOLLOW-UP  Galen Meng MD  [address] [address]  [phone]    In 1 week  Return if pain worsens, If symptoms worsen, confusion, speech change, visual change, any weakness to extremity, shortness of breath, fever, any concerns         Medication List      Changed    zolpidem 5 MG tablet  Commonly known as:  AMBIEN  Take 1 tablet as needed for sleep on night of sleep study only. Do not   take at home.  What changed:    how much to take  how to take this  when to take this  reasons to take this                  Documentation assistance provided by key Ceja,  MD for Dr. Ceja.  Information recorded by the scribe was done at my direction and has been verified and validated by me.       Anna Garcia  01/06/20 [id number]       Kendell Ceja MD  01/06/20 [id number]

## 2020-01-06 NOTE — ED TRIAGE NOTES
Pt states that he tripped and fell this morning hitting his head. Pt is on eloquis. Denies any LOC or dizziness. Denies any other injuries.

## 2020-01-07 ENCOUNTER — TELEPHONE (OUTPATIENT)
Dept: CARDIOLOGY | Facility: CLINIC | Age: 75
End: 2020-01-07

## 2020-01-07 NOTE — TELEPHONE ENCOUNTER
Pt called today to get a letter typed for his employer stating he is currently under your can and ablated. Is it ok to have Chasidy RUCKER Type this for him...Diane Umaña this needs to be faxed to [phone]   Attn Jaimie Capellan

## 2020-01-08 NOTE — TELEPHONE ENCOUNTER
Please advise how long the patient should be off work. Once this information is verified, will prepare letter and authorization for the patient to allow release.   Thanks denzel

## 2020-01-13 ENCOUNTER — HOSPITAL ENCOUNTER (OUTPATIENT)
Facility: HOSPITAL | Age: 75
Discharge: HOME | End: 2020-01-13
Attending: INTERNAL MEDICINE | Admitting: INTERNAL MEDICINE

## 2020-01-13 VITALS
DIASTOLIC BLOOD PRESSURE: 73 MMHG | BODY MASS INDEX: 41.37 KG/M2 | WEIGHT: 242.3 LBS | OXYGEN SATURATION: 97 % | SYSTOLIC BLOOD PRESSURE: 107 MMHG | TEMPERATURE: 98.6 F | RESPIRATION RATE: 17 BRPM | HEART RATE: 69 BPM | HEIGHT: 64 IN

## 2020-01-13 PROBLEM — I48.91 A-FIB: Status: ACTIVE | Noted: 2020-01-13

## 2020-01-13 LAB
ALBUMIN SERPL-MCNC: 3.8 G/DL (ref 3.5–5.2)
ALBUMIN/GLOB SERPL: 1 G/DL
ALP SERPL-CCNC: 69 U/L (ref 39–117)
ALT SERPL W P-5'-P-CCNC: 12 U/L (ref 1–41)
ANION GAP SERPL CALCULATED.3IONS-SCNC: 10.9 MMOL/L (ref 5–15)
AST SERPL-CCNC: 15 U/L (ref 1–40)
BASOPHILS # BLD AUTO: 0.06 10*3/MM3 (ref 0–0.2)
BASOPHILS NFR BLD AUTO: 0.7 % (ref 0–1.5)
BILIRUB SERPL-MCNC: 0.4 MG/DL (ref 0.2–1.2)
BUN BLD-MCNC: 12 MG/DL (ref 8–23)
BUN/CREAT SERPL: 11.1 (ref 7–25)
CALCIUM SPEC-SCNC: 9.4 MG/DL (ref 8.6–10.5)
CHLORIDE SERPL-SCNC: 99 MMOL/L (ref 98–107)
CO2 SERPL-SCNC: 29.1 MMOL/L (ref 22–29)
CREAT BLD-MCNC: 1.08 MG/DL (ref 0.76–1.27)
DEPRECATED RDW RBC AUTO: 44.5 FL (ref 37–54)
EOSINOPHIL # BLD AUTO: 0.06 10*3/MM3 (ref 0–0.4)
EOSINOPHIL NFR BLD AUTO: 0.7 % (ref 0.3–6.2)
ERYTHROCYTE [DISTWIDTH] IN BLOOD BY AUTOMATED COUNT: 13.7 % (ref 12.3–15.4)
GFR SERPL CREATININE-BSD FRML MDRD: 67 ML/MIN/1.73
GLOBULIN UR ELPH-MCNC: 3.9 GM/DL
GLUCOSE BLD-MCNC: 157 MG/DL (ref 65–99)
HCT VFR BLD AUTO: 44.1 % (ref 37.5–51)
HGB BLD-MCNC: 14.5 G/DL (ref 13–17.7)
IMM GRANULOCYTES # BLD AUTO: 0.08 10*3/MM3 (ref 0–0.05)
IMM GRANULOCYTES NFR BLD AUTO: 0.9 % (ref 0–0.5)
LYMPHOCYTES # BLD AUTO: 1.66 10*3/MM3 (ref 0.7–3.1)
LYMPHOCYTES NFR BLD AUTO: 19.3 % (ref 19.6–45.3)
MAGNESIUM SERPL-MCNC: 2 MG/DL (ref 1.6–2.4)
MCH RBC QN AUTO: 29.8 PG (ref 26.6–33)
MCHC RBC AUTO-ENTMCNC: 32.9 G/DL (ref 31.5–35.7)
MCV RBC AUTO: 90.7 FL (ref 79–97)
MONOCYTES # BLD AUTO: 0.84 10*3/MM3 (ref 0.1–0.9)
MONOCYTES NFR BLD AUTO: 9.8 % (ref 5–12)
NEUTROPHILS # BLD AUTO: 5.89 10*3/MM3 (ref 1.7–7)
NEUTROPHILS NFR BLD AUTO: 68.6 % (ref 42.7–76)
NRBC BLD AUTO-RTO: 0 /100 WBC (ref 0–0.2)
PLATELET # BLD AUTO: 259 10*3/MM3 (ref 140–450)
PMV BLD AUTO: 9.9 FL (ref 6–12)
POTASSIUM BLD-SCNC: 4.3 MMOL/L (ref 3.5–5.2)
PROT SERPL-MCNC: 7.7 G/DL (ref 6–8.5)
RBC # BLD AUTO: 4.86 10*6/MM3 (ref 4.14–5.8)
SODIUM BLD-SCNC: 139 MMOL/L (ref 136–145)
WBC NRBC COR # BLD: 8.59 10*3/MM3 (ref 3.4–10.8)

## 2020-01-13 PROCEDURE — 83735 ASSAY OF MAGNESIUM: CPT | Performed by: NURSE PRACTITIONER

## 2020-01-13 PROCEDURE — 93005 ELECTROCARDIOGRAM TRACING: CPT | Performed by: NURSE PRACTITIONER

## 2020-01-13 PROCEDURE — 85025 COMPLETE CBC W/AUTO DIFF WBC: CPT | Performed by: NURSE PRACTITIONER

## 2020-01-13 PROCEDURE — 93010 ELECTROCARDIOGRAM REPORT: CPT | Performed by: INTERNAL MEDICINE

## 2020-01-13 PROCEDURE — 99236 HOSP IP/OBS SAME DATE HI 85: CPT | Performed by: NURSE PRACTITIONER

## 2020-01-13 PROCEDURE — 80053 COMPREHEN METABOLIC PANEL: CPT | Performed by: NURSE PRACTITIONER

## 2020-01-13 RX ORDER — PIOGLITAZONEHYDROCHLORIDE 15 MG/1
15 TABLET ORAL DAILY
Status: DISCONTINUED | OUTPATIENT
Start: 2020-01-14 | End: 2020-01-13 | Stop reason: HOSPADM

## 2020-01-13 RX ORDER — DOFETILIDE 0.5 MG/1
500 CAPSULE ORAL EVERY 12 HOURS
Status: DISCONTINUED | OUTPATIENT
Start: 2020-01-13 | End: 2020-01-13

## 2020-01-13 RX ORDER — POTASSIUM CHLORIDE 750 MG/1
20 CAPSULE, EXTENDED RELEASE ORAL DAILY
Status: DISCONTINUED | OUTPATIENT
Start: 2020-01-14 | End: 2020-01-13 | Stop reason: HOSPADM

## 2020-01-13 RX ORDER — NITROGLYCERIN 0.4 MG/1
0.4 TABLET SUBLINGUAL
Status: DISCONTINUED | OUTPATIENT
Start: 2020-01-13 | End: 2020-01-13 | Stop reason: HOSPADM

## 2020-01-13 RX ORDER — ROSUVASTATIN CALCIUM 10 MG/1
10 TABLET, COATED ORAL DAILY
Status: DISCONTINUED | OUTPATIENT
Start: 2020-01-14 | End: 2020-01-13 | Stop reason: HOSPADM

## 2020-01-13 RX ORDER — FUROSEMIDE 40 MG/1
40 TABLET ORAL DAILY
Status: DISCONTINUED | OUTPATIENT
Start: 2020-01-14 | End: 2020-01-13 | Stop reason: HOSPADM

## 2020-01-13 RX ORDER — ZOLPIDEM TARTRATE 5 MG/1
5 TABLET ORAL NIGHTLY PRN
Status: DISCONTINUED | OUTPATIENT
Start: 2020-01-13 | End: 2020-01-13 | Stop reason: HOSPADM

## 2020-01-13 RX ORDER — ATENOLOL 50 MG/1
50 TABLET ORAL EVERY 12 HOURS SCHEDULED
Status: DISCONTINUED | OUTPATIENT
Start: 2020-01-13 | End: 2020-01-13 | Stop reason: HOSPADM

## 2020-01-13 RX ORDER — ACETAMINOPHEN 325 MG/1
650 TABLET ORAL EVERY 4 HOURS PRN
Status: DISCONTINUED | OUTPATIENT
Start: 2020-01-13 | End: 2020-01-13 | Stop reason: HOSPADM

## 2020-01-13 NOTE — PROGRESS NOTES
"Pharmacy Services - Tikosyn Review    Pharmacy has looked over Ray Pratt's profile to review renal function and drug interactions before starting tikosyn inpatient.    Relevant clinical data and objective history reviewed:  74 y.o. male 162.6 cm (64\") 110 kg (242 lb 4.8 oz)    Past Medical History:   Diagnosis Date   • Atrial fibrillation (CMS/[facility])    • CHF (congestive heart failure) (CMS/[facility])    • Diabetes mellitus (CMS/[facility])    • Hyperlipidemia    • Hypertension    • Pneumonia of left lung due to Haemophilus influenzae (CMS/[facility]) 1/18/2017   • Sepsis (CMS/[facility]) 1/18/2017     Creatinine   Date Value Ref Range Status   01/13/2020 1.08 0.76 - 1.27 mg/dL Final     BUN   Date Value Ref Range Status   01/13/2020 12 8 - 23 mg/dL Final     Estimated Creatinine Clearance: 67.5 mL/min (by C-G formula based on SCr of 1.08 mg/dL).    Assessment/Plan    1. Based on patient's renal function, dosing guidelines recommends a starting dose of tikosyn 500 mcg PO q12h. This dose may change based on the initial EKG, or the EKG 2-3 hrs after initial dose per physician's judgement.    2. Looking over the patient's home medication list, the following medications interact with tikosyn:    · Furosemide may result in an increased risk of QT prolongation due to potassium and magnesium loss secondary to diuretic use (continue daily replacement)    3. I have advised the patient to separate anything with magnesium, phosphate, or calcium from their tikosyn dose by at least an hour.      4. I have also advised the patient on contraindications and potential drug interactions associated with tikosyn, including antibiotics and antiemetics.     Thank you for allowing me to participate in your patient's care,  Rita Lopes, Pharm.D., BCPS  "

## 2020-01-13 NOTE — H&P
Electrophysiology History & Physical/Discharge Summary    Date of Hospital Visit: 2020  9:56 AM  Encounter Provider: ANICETO Stone  Place of Service: [facility] CARDIOLOGY  Patient Name: Ray Pratt  :1945  Referral Provider: Reginald Vazquez MD      Chief complaint: Persistent afib, admission for dofetilde    History of Present Illness     74 yr old male patient of Dr. 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 He also has HTN, DM and obesity. He presented on 19 for an atrial flutter ablation but was in afib and HR was controlled. They discussed treatment options and since he had not been tried on an antiarrhythmic drug, had not been evaluated for sleep apnea. It was decided to not do ablation and bring back this month for admission for dofetilide.     He had a cath in November when he was first diagnosed with atrial flutter, he had a cath which showed normal cors and echo at that time showed severely decreased LV systolic function.     He was evaluated for MARCOS and started on Bipap a couple of weeks ago.     He currently denies chest pain, shortness of breath at rest, PND, orthopnea or edema. He does complain of some mild dyspnea with exertion and fatigue when he tries to do anything.     He is on apixaban and now says he has missed a couple of doses, missed dose for sure last night. d      Past Medical History:   Diagnosis Date   • Atrial fibrillation (CMS/[facility])    • CHF (congestive heart failure) (CMS/[facility])    • Diabetes mellitus (CMS/[facility])    • Hyperlipidemia    • Hypertension    • Pneumonia of left lung due to Haemophilus influenzae (CMS/HCC) 2017   • Sepsis (CMS/HCC) 2017         Past Surgical History:   Procedure Laterality Date   • CARDIAC CATHETERIZATION N/A 2019    Procedure: Left Heart Cath;  Surgeon: Sundeep Tsang MD;  Location: [facility] CATH INVASIVE LOCATION;  Service:  Cardiovascular   • CARDIAC CATHETERIZATION N/A 11/5/2019    Procedure: Coronary angiography;  Surgeon: Sundeep Tsang MD;  Location: [facility] INVASIVE LOCATION;  Service: Cardiovascular   • CARDIAC CATHETERIZATION     • DENTAL PROCEDURE     • MOLE REMOVAL         Family History   Problem Relation Age of Onset   • Cancer Mother    • Stroke Father        Medications Prior to Admission   Medication Sig Dispense Refill Last Dose   • apixaban (ELIQUIS) 5 MG tablet tablet Take 1 tablet by mouth Every 12 (Twelve) Hours. 60 tablet 11 1/13/2020 at 0900   • atenolol (TENORMIN) 50 MG tablet Take 1 tablet by mouth Every 12 (Twelve) Hours. 60 tablet 11 1/13/2020 at 0900   • digoxin (LANOXIN) 125 MCG tablet Take 1 tablet by mouth Daily. 30 tablet 11 1/13/2020 at 0900   • furosemide (LASIX) 40 MG tablet Take 1 tablet by mouth Daily. 30 tablet 11 1/13/2020 at 0900   • pioglitazone (ACTOS) 15 MG tablet TAKE 1 TABLET BY MOUTH EVERY DAY (Patient taking differently: Take 15 mg by mouth Daily.) 90 tablet 0 1/13/2020 at 0900   • potassium chloride (MICRO-K) 10 MEQ CR capsule Take 2 capsules by mouth Daily. 30 capsule 11 1/13/2020 at 0900   • rosuvastatin (CRESTOR) 10 MG tablet Take 1 tablet by mouth Daily. 90 tablet 1 1/13/2020 at 0900   • zolpidem (AMBIEN) 5 MG tablet Take 1 tablet as needed for sleep on night of sleep study only. Do not take at home. (Patient taking differently: Take 5 mg by mouth At Night As Needed. Take 1 tablet as needed for sleep on night of sleep study only. Do not take at home.) 2 tablet 0 12/18/2019 at Unknown time       Current Meds  No current facility-administered medications on file prior to encounter.      Current Outpatient Medications on File Prior to Encounter   Medication Sig Dispense Refill   • apixaban (ELIQUIS) 5 MG tablet tablet Take 1 tablet by mouth Every 12 (Twelve) Hours. 60 tablet 11   • atenolol (TENORMIN) 50 MG tablet Take 1 tablet by mouth Every 12 (Twelve) Hours. 60 tablet 11   •  "digoxin (LANOXIN) 125 MCG tablet Take 1 tablet by mouth Daily. 30 tablet 11   • furosemide (LASIX) 40 MG tablet Take 1 tablet by mouth Daily. 30 tablet 11   • pioglitazone (ACTOS) 15 MG tablet TAKE 1 TABLET BY MOUTH EVERY DAY (Patient taking differently: Take 15 mg by mouth Daily.) 90 tablet 0   • potassium chloride (MICRO-K) 10 MEQ CR capsule Take 2 capsules by mouth Daily. 30 capsule 11   • rosuvastatin (CRESTOR) 10 MG tablet Take 1 tablet by mouth Daily. 90 tablet 1   • zolpidem (AMBIEN) 5 MG tablet Take 1 tablet as needed for sleep on night of sleep study only. Do not take at home. (Patient taking differently: Take 5 mg by mouth At Night As Needed. Take 1 tablet as needed for sleep on night of sleep study only. Do not take at home.) 2 tablet 0         Social History     Socioeconomic History   • Marital status:      Spouse name: Not on file   • Number of children: Not on file   • Years of education: Not on file   • Highest education level: Not on file   Tobacco Use   • Smoking status: Never Smoker   • Smokeless tobacco: Never Used   • Tobacco comment: caffeine use   Substance and Sexual Activity   • Alcohol use: No   • Drug use: No   • Sexual activity: Defer         REVIEW OF SYSTEMS: All systems reviewed. Pertinent positives identified in HPI. All other systems are negative.     12 point ROS was performed and is negative except as outlined in HPI            Objective:     Vitals:    01/13/20 1014   BP: 122/83   BP Location: Left arm   Patient Position: Sitting   Pulse: 91   Resp: 16   Temp: 97.7 °F (36.5 °C)   TempSrc: Oral   SpO2: 96%   Weight: 110 kg (242 lb 4.8 oz)   Height: 162.6 cm (64\")     Body mass index is 41.59 kg/m².  Flowsheet Rows      First Filed Value   Admission Height  162.6 cm (64\") Documented at 01/13/2020 1014   Admission Weight  110 kg (242 lb 4.8 oz) Documented at 01/13/2020 1014          PHYSICAL EXAM:    Vitals Reviewed.   General Appearance: No acute distress, obese, male.   "   Eyes: Conjunctiva and lids: No erythema, swelling, or discharge. Sclera non-icteric.   HENT: Atraumatic, normocephalic. External eyes, ears, and nose normal.   Respiratory: No signs of respiratory distress. Respiration rhythm and depth normal.   Clear to auscultation. No rales, crackles, rhonchi, or wheezing auscultated.   Cardiovascular:  Jugular Venous Pressure: Normal  Heart Rate and Rhythm: Irregularly, irregular.  Heart Sounds: Normal S1 and S2.   Murmurs: No murmurs noted. No rubs, thrills, or gallops.   Arterial Pulses: Posterior tibialis and dorsalis pedis pulses normal.   Lower Extremities: No edema noted.  Gastrointestinal:  Abdomen soft, obese, non-tender.   Musculoskeletal: Normal movement of extremities  Skin: Warm and dry.   Psychiatric: Patient alert and oriented to person, place, and time. Speech and behavior appropriate. Normal mood and affect.                                                                       Lab Review:  Results from last 7 days   Lab Units 20  1133   SODIUM mmol/L 139   POTASSIUM mmol/L 4.3   CHLORIDE mmol/L 99   CO2 mmol/L 29.1*   BUN mg/dL 12   CREATININE mg/dL 1.08   GLUCOSE mg/dL 157*   CALCIUM mg/dL 9.4   AST (SGOT) U/L 15   ALT (SGPT) U/L 12         Results from last 7 days   Lab Units 20  1133   WBC 10*3/mm3 8.59   HEMOGLOBIN g/dL 14.5   HEMATOCRIT % 44.1   PLATELETS 10*3/mm3 259             Results from last 7 days   Lab Units 20  1133   MAGNESIUM mg/dL 2.0       Imagin/2019 Echo:    Interpretation Summary     · Estimated EF = 22%.  · Left ventricular systolic function is severely decreased.  · Right ventricular cavity is borderline dilated.  · Mildly reduced right ventricular systolic function noted.  · Left atrial cavity size is dilated.  · The following left ventricular wall segments are hypokinetic: mid anterior, apical anterior, basal anterolateral, mid anterolateral, apical lateral, basal inferolateral, mid inferolateral, apical  inferior, mid inferior, apical septal, basal inferoseptal, mid inferoseptal, apex hypokinetic, mid anteroseptal, basal anterior, basal inferior and basal inferoseptal.  · HR is 110-130 during the study        11/2019 Cath:    Conclusions:  1. No angiographic evidence of significant coronary artery disease left dominant coronary circulation.  2. Moderate to severely elevated left ventricular filling pressures of 30 mmHg.  Left ventriculogram was deferred due to significantly elevated LVEDP.     Recommendations:   1. Cardiac cath findings consistent with a nonischemic cardiomyopathy and likely related to the patient's underlying atrial fibrillation with rapid ventricular response with further management per primary cardiologist, Dr. Escalante.    EKG:         I personally viewed and interpreted the patient's EKG/Telemetry tracings    Assessment:      A-fib (CMS/[facility])       Plan:       Dr Vazquez and I saw patient, he has missed doses of apixaban, missed last nights dose. He is not in heart failure, so we are going to discharge home and bring back in 3 weeks once he has taken his apixaban without missing doses.       ANICETO Stone  [facility]  01/13/20  1701

## 2020-01-14 ENCOUNTER — READMISSION MANAGEMENT (OUTPATIENT)
Dept: CALL CENTER | Facility: HOSPITAL | Age: 75
End: 2020-01-14

## 2020-01-14 DIAGNOSIS — I48.91 NEW ONSET ATRIAL FIBRILLATION (HCC): Primary | ICD-10-CM

## 2020-01-14 NOTE — OUTREACH NOTE
Prep Survey      Responses   Facility patient discharged from?  [facility]   Is patient eligible?  Yes   Discharge diagnosis  A-fib    Does the patient have one of the following disease processes/diagnoses(primary or secondary)?  Other   Does the patient have Home health ordered?  No   Is there a DME ordered?  No   Prep survey completed?  Yes          Sulema Ewing RN

## 2020-01-16 ENCOUNTER — READMISSION MANAGEMENT (OUTPATIENT)
Dept: CALL CENTER | Facility: HOSPITAL | Age: 75
End: 2020-01-16

## 2020-01-16 RX ORDER — PIOGLITAZONEHYDROCHLORIDE 15 MG/1
15 TABLET ORAL DAILY
Qty: 30 TABLET | Refills: 0 | Status: SHIPPED | OUTPATIENT
Start: 2020-01-16 | End: 2020-01-17

## 2020-01-16 NOTE — OUTREACH NOTE
Medical Week 1 Survey      Responses   Facility patient discharged from?  [facility]   Does the patient have one of the following disease processes/diagnoses(primary or secondary)?  Other   Is there a successful TCM telephone encounter documented?  No   Week 1 attempt successful?  Yes   Call start time  0911   Call end time  0913   Discharge diagnosis  A-fib    Meds reviewed with patient/caregiver?  Yes   Is the patient having any side effects they believe may be caused by any medication additions or changes?  No   Does the patient have all medications ordered at discharge?  Yes   Is the patient taking all medications as directed (includes completed medication regime)?  Yes   Does the patient have a primary care provider?   Yes   Does the patient have an appointment with their PCP within 7 days of discharge?  Yes   Has the patient kept scheduled appointments due by today?  N/A   Has home health visited the patient within 72 hours of discharge?  N/A   Psychosocial issues?  No   Did the patient receive a copy of their discharge instructions?  Yes   What is the patient's perception of their health status since discharge?  Improving   Is the patient/caregiver able to teach back signs and symptoms related to disease process for when to call PCP?  Yes   Is the patient/caregiver able to teach back signs and symptoms related to disease process for when to call 911?  Yes   Is the patient/caregiver able to teach back the hierarchy of who to call/visit for symptoms/problems? PCP, Specialist, Home health nurse, Urgent Care, ED, 911  Yes   Week 1 call completed?  Yes   Graduated  Yes   Did the patient feel the follow up calls were helpful during their recovery period?  Yes   Was the number of calls appropriate?  Yes   Graduated/Revoked comments  Doing well.  All goals met.          Heaven Ragland RN

## 2020-01-17 ENCOUNTER — OFFICE VISIT (OUTPATIENT)
Dept: CARDIOLOGY | Facility: CLINIC | Age: 75
End: 2020-01-17

## 2020-01-17 VITALS
WEIGHT: 241 LBS | BODY MASS INDEX: 41.15 KG/M2 | HEART RATE: 84 BPM | SYSTOLIC BLOOD PRESSURE: 130 MMHG | OXYGEN SATURATION: 98 % | DIASTOLIC BLOOD PRESSURE: 82 MMHG | HEIGHT: 64 IN

## 2020-01-17 DIAGNOSIS — G47.33 OBSTRUCTIVE SLEEP APNEA: ICD-10-CM

## 2020-01-17 DIAGNOSIS — I50.41 ACUTE COMBINED SYSTOLIC AND DIASTOLIC CONGESTIVE HEART FAILURE (HCC): ICD-10-CM

## 2020-01-17 DIAGNOSIS — E66.01 CLASS 3 SEVERE OBESITY DUE TO EXCESS CALORIES WITHOUT SERIOUS COMORBIDITY WITH BODY MASS INDEX (BMI) OF 40.0 TO 44.9 IN ADULT (HCC): ICD-10-CM

## 2020-01-17 DIAGNOSIS — I10 BENIGN ESSENTIAL HTN: ICD-10-CM

## 2020-01-17 DIAGNOSIS — I48.91 NEW ONSET ATRIAL FIBRILLATION (HCC): Primary | ICD-10-CM

## 2020-01-17 DIAGNOSIS — R06.09 DYSPNEA ON EXERTION: ICD-10-CM

## 2020-01-17 PROBLEM — E66.813 CLASS 3 SEVERE OBESITY DUE TO EXCESS CALORIES WITHOUT SERIOUS COMORBIDITY WITH BODY MASS INDEX (BMI) OF 40.0 TO 44.9 IN ADULT: Status: ACTIVE | Noted: 2020-01-17

## 2020-01-17 PROCEDURE — 99214 OFFICE O/P EST MOD 30 MIN: CPT | Performed by: INTERNAL MEDICINE

## 2020-01-17 PROCEDURE — 93000 ELECTROCARDIOGRAM COMPLETE: CPT | Performed by: INTERNAL MEDICINE

## 2020-01-17 NOTE — PROGRESS NOTES
PATIENTINFORMATION    Date of Office Visit: 20  Encounter Provider: Chelita Escalante MD  Place of Service: [facility] CARDIOLOGY  Patient Name: Ray Pratt  : 1945    Subjective:         Patient ID: Ray Pratt is a 74 y.o. male.      History of Present Illness    This is a gentleman who was hospitalized in 2019.  He has diabetes and hypertension.  He presented to the emergency room with syncope and lightheadedness.  He was found to be in atrial flutter he was rate controlled with digoxin and atenolol and anticoagulated with Eliquis.  With rapid ventricular response.  TSH was normal.  Echocardiogram showed severe LV dysfunction with ejection fraction 22%.  Heart catheterization showed no evidence of coronary disease with significantly elevated left ventricular end-diastolic pressure.  He was diagnosed with obstructive sleep apnea and started on BiPAP.  He was admitted to the hospital and 2020 for a cardioversion.  He had missed abciximab.  He was discharged home with the plan of bringing him back in 3 weeks once he had not missed any blood thinner.    Overall he has been feeling well.  He denies any palpitations, edema, lightheadedness, chest pain or fatigue.  He notices some shortness of breath with exertion.  He has no paroxysmal nocturnal dyspnea, orthopnea or edema.    Review of Systems   Constitution: Negative for fever, malaise/fatigue, weight gain and weight loss.   HENT: Negative for ear pain, hearing loss, nosebleeds and sore throat.    Eyes: Negative for double vision, pain, vision loss in left eye and vision loss in right eye.   Cardiovascular:        See history of present illness.   Respiratory: Negative for cough, shortness of breath, sleep disturbances due to breathing, snoring and wheezing.    Endocrine: Negative for cold intolerance, heat intolerance and polyuria.   Skin: Negative for itching, poor wound healing and rash.  "  Musculoskeletal: Negative for joint pain, joint swelling and myalgias.   Gastrointestinal: Negative for abdominal pain, diarrhea, hematochezia, nausea and vomiting.   Genitourinary: Negative for hematuria and hesitancy.   Neurological: Negative for numbness, paresthesias and seizures.   Psychiatric/Behavioral: Negative for depression. The patient is not nervous/anxious.            ECG 12 Lead  Date/Time: 1/17/2020 12:28 PM  Performed by: Chelita Escalante MD  Authorized by: Chelita Escalante MD   Comparison: compared with previous ECG from 1/13/2020  Similar to previous ECG  Rhythm: atrial fibrillation  BPM: 78  Conduction: incomplete right bundle branch block    Clinical impression: abnormal EKG               Objective:     /82 (BP Location: Left arm)   Pulse 84   Ht 162.6 cm (64\")   Wt 109 kg (241 lb)   SpO2 98%   BMI 41.37 kg/m²  Body mass index is 41.37 kg/m².     Physical Exam   Constitutional: He appears well-developed.   HENT:   Head: Normocephalic and atraumatic.   Eyes: Pupils are equal, round, and reactive to light. Conjunctivae and lids are normal. Lids are everted and swept, no foreign bodies found.   Neck: Normal range of motion. No JVD present. Carotid bruit is not present. No tracheal deviation present. No thyroid mass present.   Cardiovascular: Normal rate and normal heart sounds. An irregularly irregular rhythm present.   Pulses:       Dorsalis pedis pulses are 2+ on the right side, and 2+ on the left side.   Pulmonary/Chest: Effort normal and breath sounds normal.   Abdominal: Normal appearance and bowel sounds are normal.   Musculoskeletal: Normal range of motion.   Neurological: He is alert. He has normal strength.   Skin: Skin is warm, dry and intact.   Psychiatric: He has a normal mood and affect. His behavior is normal.   Vitals reviewed.            Assessment/Plan:       1.  Persistent atrial fibrillation.  He is now rate controlled.  He is anticoagulated with Eliquis.  Dr." George is planning a cardioversion on February 10.  2.  Nonischemic cardiomyopathy.  Ejection fraction in November 2019 was 22%.  He is now rate controlled.  I am going to repeat an echocardiogram in mid February which will be 3 months after his original diagnosis and a week after his cardioversion.   3.  Diabetes.  I agree with stopping Actos.  Follow-up with primary care provider.  4.  Hypertension.  Controlled  5.  Obesity   6.  Obstructive sleep apnea.  Compliant with BiPAP.      He will follow-up with [facility] in 6 months.  Myself or 1 of my nurses will be in touch with the results of his echo.    Orders Placed This Encounter   Procedures   • ECG 12 Lead     This order was created via procedure documentation   • Adult Transthoracic Echo Complete W/ Cont if Necessary Per Protocol     Standing Status:   Future     Standing Expiration Date:   1/16/2021     Order Specific Question:   Reason for exam?     Answer:   Dyspnea     Order Specific Question:   Reason for exam?     Answer:   Arrhythmia        Discharge Medications           Accurate as of January 17, 2020 12:30 PM. If you have any questions, ask your nurse or doctor.               Continue These Medications      Instructions Start Date   apixaban 5 MG tablet tablet  Commonly known as:  ELIQUIS   5 mg, Oral, Every 12 Hours Scheduled      atenolol 50 MG tablet  Commonly known as:  TENORMIN   50 mg, Oral, Every 12 Hours Scheduled      digoxin 125 MCG tablet  Commonly known as:  LANOXIN   125 mcg, Oral, Daily      furosemide 40 MG tablet  Commonly known as:  LASIX   40 mg, Oral, Daily      potassium chloride 10 MEQ CR capsule  Commonly known as:  MICRO-K   20 mEq, Oral, Daily      rosuvastatin 10 MG tablet  Commonly known as:  CRESTOR   10 mg, Oral, Daily         Stop These Medications    pioglitazone 15 MG tablet  Commonly known as:  ACTOS  Stopped by:  Chelita Escalante MD     zolpidem 5 MG tablet  Commonly known as:  AMBIEN  Stopped by:  Chelita Escalante  MD Chelita Escalante MD  01/17/20  12:30 PM

## 2020-01-21 ENCOUNTER — TELEPHONE (OUTPATIENT)
Dept: FAMILY MEDICINE CLINIC | Facility: CLINIC | Age: 75
End: 2020-01-21

## 2020-01-21 NOTE — TELEPHONE ENCOUNTER
Pt called wanting to know if he should stop taking the Actos because pt stated the pharm and cardio stated he shouldn't take it because of his heart failure. Can you clarify this for me please. Thank you

## 2020-01-23 NOTE — TELEPHONE ENCOUNTER
Discontinue Actos.  Currently his A1c is at goal as it is less than 7%.  Watch diet and exercise, maybe we can avoid medication if he works on diet and exercise.  Plan to recheck A1c in 3 months.  Please place this order and advised patient to get this done at that time.  If he notices consistently high blood sugars at home before 3 months, let me know.

## 2020-01-30 RX ORDER — ROSUVASTATIN CALCIUM 10 MG/1
TABLET, COATED ORAL
Qty: 30 TABLET | Refills: 0 | Status: SHIPPED | OUTPATIENT
Start: 2020-01-30 | End: 2020-02-24

## 2020-02-05 ENCOUNTER — TELEPHONE (OUTPATIENT)
Dept: SLEEP MEDICINE | Facility: HOSPITAL | Age: 75
End: 2020-02-05

## 2020-02-05 DIAGNOSIS — G47.33 SEVERE OBSTRUCTIVE SLEEP APNEA: Primary | ICD-10-CM

## 2020-02-05 DIAGNOSIS — I48.91 NEW ONSET ATRIAL FIBRILLATION (HCC): ICD-10-CM

## 2020-02-05 DIAGNOSIS — G47.39 MIXED SLEEP APNEA: ICD-10-CM

## 2020-02-06 ENCOUNTER — HOSPITAL ENCOUNTER (OUTPATIENT)
Dept: SLEEP MEDICINE | Facility: HOSPITAL | Age: 75
Discharge: HOME | End: 2020-02-06
Admitting: FAMILY MEDICINE

## 2020-02-06 DIAGNOSIS — I48.91 NEW ONSET ATRIAL FIBRILLATION (HCC): ICD-10-CM

## 2020-02-06 DIAGNOSIS — G47.39 MIXED SLEEP APNEA: ICD-10-CM

## 2020-02-06 DIAGNOSIS — G47.33 SEVERE OBSTRUCTIVE SLEEP APNEA: ICD-10-CM

## 2020-02-06 PROCEDURE — 95811 POLYSOM 6/>YRS CPAP 4/> PARM: CPT | Performed by: FAMILY MEDICINE

## 2020-02-06 PROCEDURE — 95811 POLYSOM 6/>YRS CPAP 4/> PARM: CPT

## 2020-02-10 ENCOUNTER — HOSPITAL ENCOUNTER (INPATIENT)
Facility: HOSPITAL | Age: 75
LOS: 2 days | Discharge: HOME | End: 2020-02-12
Attending: INTERNAL MEDICINE | Admitting: INTERNAL MEDICINE

## 2020-02-10 DIAGNOSIS — I48.19 ATRIAL FIBRILLATION, PERSISTENT (HCC): Primary | ICD-10-CM

## 2020-02-10 PROBLEM — I48.91 A-FIB: Status: ACTIVE | Noted: 2020-02-10

## 2020-02-10 LAB
ALBUMIN SERPL-MCNC: 3.5 G/DL (ref 3.5–5.2)
ALBUMIN/GLOB SERPL: 1.1 G/DL
ALP SERPL-CCNC: 65 U/L (ref 39–117)
ALT SERPL W P-5'-P-CCNC: 18 U/L (ref 1–41)
ANION GAP SERPL CALCULATED.3IONS-SCNC: 9.9 MMOL/L (ref 5–15)
AST SERPL-CCNC: 19 U/L (ref 1–40)
BASOPHILS # BLD AUTO: 0.05 10*3/MM3 (ref 0–0.2)
BASOPHILS NFR BLD AUTO: 0.7 % (ref 0–1.5)
BILIRUB SERPL-MCNC: 0.3 MG/DL (ref 0.2–1.2)
BUN BLD-MCNC: 16 MG/DL (ref 8–23)
BUN/CREAT SERPL: 16 (ref 7–25)
CALCIUM SPEC-SCNC: 8.9 MG/DL (ref 8.6–10.5)
CHLORIDE SERPL-SCNC: 98 MMOL/L (ref 98–107)
CO2 SERPL-SCNC: 28.1 MMOL/L (ref 22–29)
CREAT BLD-MCNC: 1 MG/DL (ref 0.76–1.27)
DEPRECATED RDW RBC AUTO: 46.7 FL (ref 37–54)
DIGOXIN SERPL-MCNC: 0.8 NG/ML (ref 0.6–1.2)
EOSINOPHIL # BLD AUTO: 0.05 10*3/MM3 (ref 0–0.4)
EOSINOPHIL NFR BLD AUTO: 0.7 % (ref 0.3–6.2)
ERYTHROCYTE [DISTWIDTH] IN BLOOD BY AUTOMATED COUNT: 13.8 % (ref 12.3–15.4)
GFR SERPL CREATININE-BSD FRML MDRD: 73 ML/MIN/1.73
GLOBULIN UR ELPH-MCNC: 3.1 GM/DL
GLUCOSE BLD-MCNC: 305 MG/DL (ref 65–99)
GLUCOSE BLDC GLUCOMTR-MCNC: 206 MG/DL (ref 70–130)
GLUCOSE BLDC GLUCOMTR-MCNC: 228 MG/DL (ref 70–130)
HBA1C MFR BLD: 7.9 % (ref 4.8–5.6)
HCT VFR BLD AUTO: 42.1 % (ref 37.5–51)
HGB BLD-MCNC: 13.7 G/DL (ref 13–17.7)
IMM GRANULOCYTES # BLD AUTO: 0.06 10*3/MM3 (ref 0–0.05)
IMM GRANULOCYTES NFR BLD AUTO: 0.8 % (ref 0–0.5)
LYMPHOCYTES # BLD AUTO: 1.34 10*3/MM3 (ref 0.7–3.1)
LYMPHOCYTES NFR BLD AUTO: 18.8 % (ref 19.6–45.3)
MAGNESIUM SERPL-MCNC: 2 MG/DL (ref 1.6–2.4)
MCH RBC QN AUTO: 30.2 PG (ref 26.6–33)
MCHC RBC AUTO-ENTMCNC: 32.5 G/DL (ref 31.5–35.7)
MCV RBC AUTO: 92.9 FL (ref 79–97)
MONOCYTES # BLD AUTO: 0.61 10*3/MM3 (ref 0.1–0.9)
MONOCYTES NFR BLD AUTO: 8.6 % (ref 5–12)
NEUTROPHILS # BLD AUTO: 5 10*3/MM3 (ref 1.7–7)
NEUTROPHILS NFR BLD AUTO: 70.4 % (ref 42.7–76)
NRBC BLD AUTO-RTO: 0 /100 WBC (ref 0–0.2)
PLATELET # BLD AUTO: 242 10*3/MM3 (ref 140–450)
PMV BLD AUTO: 10.3 FL (ref 6–12)
POTASSIUM BLD-SCNC: 4.4 MMOL/L (ref 3.5–5.2)
PROT SERPL-MCNC: 6.6 G/DL (ref 6–8.5)
RBC # BLD AUTO: 4.53 10*6/MM3 (ref 4.14–5.8)
SODIUM BLD-SCNC: 136 MMOL/L (ref 136–145)
WBC NRBC COR # BLD: 7.11 10*3/MM3 (ref 3.4–10.8)

## 2020-02-10 PROCEDURE — 63710000001 INSULIN LISPRO (HUMAN) PER 5 UNITS: Performed by: NURSE PRACTITIONER

## 2020-02-10 PROCEDURE — 93005 ELECTROCARDIOGRAM TRACING: CPT | Performed by: INTERNAL MEDICINE

## 2020-02-10 PROCEDURE — 93010 ELECTROCARDIOGRAM REPORT: CPT | Performed by: INTERNAL MEDICINE

## 2020-02-10 PROCEDURE — 80053 COMPREHEN METABOLIC PANEL: CPT | Performed by: NURSE PRACTITIONER

## 2020-02-10 PROCEDURE — 82962 GLUCOSE BLOOD TEST: CPT

## 2020-02-10 PROCEDURE — 83735 ASSAY OF MAGNESIUM: CPT | Performed by: NURSE PRACTITIONER

## 2020-02-10 PROCEDURE — 80162 ASSAY OF DIGOXIN TOTAL: CPT | Performed by: NURSE PRACTITIONER

## 2020-02-10 PROCEDURE — 85025 COMPLETE CBC W/AUTO DIFF WBC: CPT | Performed by: NURSE PRACTITIONER

## 2020-02-10 PROCEDURE — 83036 HEMOGLOBIN GLYCOSYLATED A1C: CPT | Performed by: NURSE PRACTITIONER

## 2020-02-10 PROCEDURE — 93005 ELECTROCARDIOGRAM TRACING: CPT | Performed by: NURSE PRACTITIONER

## 2020-02-10 RX ORDER — ZOLPIDEM TARTRATE 5 MG/1
5 TABLET ORAL NIGHTLY PRN
Status: DISCONTINUED | OUTPATIENT
Start: 2020-02-10 | End: 2020-02-12 | Stop reason: HOSPADM

## 2020-02-10 RX ORDER — ATENOLOL 50 MG/1
50 TABLET ORAL EVERY 12 HOURS SCHEDULED
Status: DISCONTINUED | OUTPATIENT
Start: 2020-02-10 | End: 2020-02-10

## 2020-02-10 RX ORDER — DOFETILIDE 0.25 MG/1
250 CAPSULE ORAL EVERY 12 HOURS
Status: DISCONTINUED | OUTPATIENT
Start: 2020-02-11 | End: 2020-02-11

## 2020-02-10 RX ORDER — ACETAMINOPHEN 650 MG/1
650 SUPPOSITORY RECTAL EVERY 4 HOURS PRN
Status: DISCONTINUED | OUTPATIENT
Start: 2020-02-10 | End: 2020-02-12 | Stop reason: HOSPADM

## 2020-02-10 RX ORDER — SODIUM CHLORIDE 0.9 % (FLUSH) 0.9 %
10 SYRINGE (ML) INJECTION EVERY 12 HOURS SCHEDULED
Status: DISCONTINUED | OUTPATIENT
Start: 2020-02-10 | End: 2020-02-12 | Stop reason: HOSPADM

## 2020-02-10 RX ORDER — SODIUM CHLORIDE 0.9 % (FLUSH) 0.9 %
10 SYRINGE (ML) INJECTION AS NEEDED
Status: DISCONTINUED | OUTPATIENT
Start: 2020-02-10 | End: 2020-02-12 | Stop reason: HOSPADM

## 2020-02-10 RX ORDER — ACETAMINOPHEN 325 MG/1
650 TABLET ORAL EVERY 4 HOURS PRN
Status: DISCONTINUED | OUTPATIENT
Start: 2020-02-10 | End: 2020-02-12 | Stop reason: HOSPADM

## 2020-02-10 RX ORDER — DIGOXIN 125 MCG
125 TABLET ORAL
Status: DISCONTINUED | OUTPATIENT
Start: 2020-02-10 | End: 2020-02-10

## 2020-02-10 RX ORDER — POTASSIUM CHLORIDE 1.5 G/1.77G
40 POWDER, FOR SOLUTION ORAL AS NEEDED
Status: DISCONTINUED | OUTPATIENT
Start: 2020-02-10 | End: 2020-02-12 | Stop reason: HOSPADM

## 2020-02-10 RX ORDER — POTASSIUM CHLORIDE 750 MG/1
40 CAPSULE, EXTENDED RELEASE ORAL AS NEEDED
Status: DISCONTINUED | OUTPATIENT
Start: 2020-02-10 | End: 2020-02-12 | Stop reason: HOSPADM

## 2020-02-10 RX ORDER — ROSUVASTATIN CALCIUM 10 MG/1
10 TABLET, COATED ORAL DAILY
Status: DISCONTINUED | OUTPATIENT
Start: 2020-02-10 | End: 2020-02-12 | Stop reason: HOSPADM

## 2020-02-10 RX ORDER — DEXTROSE MONOHYDRATE 25 G/50ML
25 INJECTION, SOLUTION INTRAVENOUS
Status: DISCONTINUED | OUTPATIENT
Start: 2020-02-10 | End: 2020-02-12 | Stop reason: HOSPADM

## 2020-02-10 RX ORDER — NICOTINE POLACRILEX 4 MG
15 LOZENGE BUCCAL
Status: DISCONTINUED | OUTPATIENT
Start: 2020-02-10 | End: 2020-02-12 | Stop reason: HOSPADM

## 2020-02-10 RX ORDER — ATENOLOL 25 MG/1
25 TABLET ORAL EVERY 12 HOURS SCHEDULED
Status: DISCONTINUED | OUTPATIENT
Start: 2020-02-10 | End: 2020-02-12 | Stop reason: HOSPADM

## 2020-02-10 RX ORDER — FUROSEMIDE 40 MG/1
40 TABLET ORAL DAILY
Status: DISCONTINUED | OUTPATIENT
Start: 2020-02-10 | End: 2020-02-12 | Stop reason: HOSPADM

## 2020-02-10 RX ORDER — POTASSIUM CHLORIDE 750 MG/1
20 CAPSULE, EXTENDED RELEASE ORAL DAILY
Status: DISCONTINUED | OUTPATIENT
Start: 2020-02-10 | End: 2020-02-12 | Stop reason: HOSPADM

## 2020-02-10 RX ORDER — NITROGLYCERIN 0.4 MG/1
0.4 TABLET SUBLINGUAL
Status: DISCONTINUED | OUTPATIENT
Start: 2020-02-10 | End: 2020-02-12 | Stop reason: HOSPADM

## 2020-02-10 RX ORDER — DOFETILIDE 0.5 MG/1
500 CAPSULE ORAL EVERY 12 HOURS
Status: DISCONTINUED | OUTPATIENT
Start: 2020-02-10 | End: 2020-02-10

## 2020-02-10 RX ORDER — ACETAMINOPHEN 160 MG/5ML
650 SOLUTION ORAL EVERY 4 HOURS PRN
Status: DISCONTINUED | OUTPATIENT
Start: 2020-02-10 | End: 2020-02-12 | Stop reason: HOSPADM

## 2020-02-10 RX ORDER — NITROGLYCERIN 0.4 MG/1
0.4 TABLET SUBLINGUAL
Status: DISCONTINUED | OUTPATIENT
Start: 2020-02-10 | End: 2020-02-10

## 2020-02-10 RX ADMIN — ATENOLOL 25 MG: 25 TABLET ORAL at 21:12

## 2020-02-10 RX ADMIN — INSULIN LISPRO 3 UNITS: 100 INJECTION, SOLUTION INTRAVENOUS; SUBCUTANEOUS at 17:02

## 2020-02-10 RX ADMIN — DOFETILIDE 500 MCG: 0.5 CAPSULE ORAL at 14:28

## 2020-02-10 RX ADMIN — INSULIN LISPRO 3 UNITS: 100 INJECTION, SOLUTION INTRAVENOUS; SUBCUTANEOUS at 21:12

## 2020-02-10 RX ADMIN — APIXABAN 5 MG: 5 TABLET, FILM COATED ORAL at 21:12

## 2020-02-10 RX ADMIN — SODIUM CHLORIDE, PRESERVATIVE FREE 10 ML: 5 INJECTION INTRAVENOUS at 21:12

## 2020-02-11 ENCOUNTER — APPOINTMENT (OUTPATIENT)
Dept: CARDIOLOGY | Facility: HOSPITAL | Age: 75
End: 2020-02-11

## 2020-02-11 LAB
ANION GAP SERPL CALCULATED.3IONS-SCNC: 9.4 MMOL/L (ref 5–15)
BUN BLD-MCNC: 16 MG/DL (ref 8–23)
BUN/CREAT SERPL: 18 (ref 7–25)
CALCIUM SPEC-SCNC: 8.9 MG/DL (ref 8.6–10.5)
CHLORIDE SERPL-SCNC: 99 MMOL/L (ref 98–107)
CO2 SERPL-SCNC: 28.6 MMOL/L (ref 22–29)
CREAT BLD-MCNC: 0.89 MG/DL (ref 0.76–1.27)
GFR SERPL CREATININE-BSD FRML MDRD: 84 ML/MIN/1.73
GLUCOSE BLD-MCNC: 155 MG/DL (ref 65–99)
GLUCOSE BLDC GLUCOMTR-MCNC: 158 MG/DL (ref 70–130)
GLUCOSE BLDC GLUCOMTR-MCNC: 164 MG/DL (ref 70–130)
GLUCOSE BLDC GLUCOMTR-MCNC: 210 MG/DL (ref 70–130)
GLUCOSE BLDC GLUCOMTR-MCNC: 215 MG/DL (ref 70–130)
MAGNESIUM SERPL-MCNC: 1.9 MG/DL (ref 1.6–2.4)
POTASSIUM BLD-SCNC: 4 MMOL/L (ref 3.5–5.2)
SODIUM BLD-SCNC: 137 MMOL/L (ref 136–145)

## 2020-02-11 PROCEDURE — 63710000001 INSULIN LISPRO (HUMAN) PER 5 UNITS: Performed by: NURSE PRACTITIONER

## 2020-02-11 PROCEDURE — 93010 ELECTROCARDIOGRAM REPORT: CPT | Performed by: INTERNAL MEDICINE

## 2020-02-11 PROCEDURE — 99232 SBSQ HOSP IP/OBS MODERATE 35: CPT | Performed by: NURSE PRACTITIONER

## 2020-02-11 PROCEDURE — 93005 ELECTROCARDIOGRAM TRACING: CPT | Performed by: INTERNAL MEDICINE

## 2020-02-11 PROCEDURE — 83735 ASSAY OF MAGNESIUM: CPT | Performed by: NURSE PRACTITIONER

## 2020-02-11 PROCEDURE — 99223 1ST HOSP IP/OBS HIGH 75: CPT | Performed by: INTERNAL MEDICINE

## 2020-02-11 PROCEDURE — 80048 BASIC METABOLIC PNL TOTAL CA: CPT | Performed by: NURSE PRACTITIONER

## 2020-02-11 PROCEDURE — 82962 GLUCOSE BLOOD TEST: CPT

## 2020-02-11 RX ADMIN — LINAGLIPTIN 5 MG: 5 TABLET, FILM COATED ORAL at 23:25

## 2020-02-11 RX ADMIN — DOFETILIDE 250 MCG: 0.25 CAPSULE ORAL at 01:32

## 2020-02-11 RX ADMIN — APIXABAN 5 MG: 5 TABLET, FILM COATED ORAL at 08:43

## 2020-02-11 RX ADMIN — INSULIN LISPRO 2 UNITS: 100 INJECTION, SOLUTION INTRAVENOUS; SUBCUTANEOUS at 07:12

## 2020-02-11 RX ADMIN — DOFETILIDE 375 MCG: 0.25 CAPSULE ORAL at 23:25

## 2020-02-11 RX ADMIN — DOFETILIDE 250 MCG: 0.25 CAPSULE ORAL at 12:35

## 2020-02-11 RX ADMIN — SODIUM CHLORIDE, PRESERVATIVE FREE 10 ML: 5 INJECTION INTRAVENOUS at 20:59

## 2020-02-11 RX ADMIN — POTASSIUM CHLORIDE 20 MEQ: 750 CAPSULE, EXTENDED RELEASE ORAL at 08:44

## 2020-02-11 RX ADMIN — ATENOLOL 25 MG: 25 TABLET ORAL at 20:59

## 2020-02-11 RX ADMIN — ROSUVASTATIN CALCIUM 10 MG: 10 TABLET, FILM COATED ORAL at 08:43

## 2020-02-11 RX ADMIN — INSULIN LISPRO 2 UNITS: 100 INJECTION, SOLUTION INTRAVENOUS; SUBCUTANEOUS at 16:02

## 2020-02-11 RX ADMIN — INSULIN LISPRO 3 UNITS: 100 INJECTION, SOLUTION INTRAVENOUS; SUBCUTANEOUS at 20:59

## 2020-02-11 RX ADMIN — SODIUM CHLORIDE, PRESERVATIVE FREE 10 ML: 5 INJECTION INTRAVENOUS at 09:04

## 2020-02-11 RX ADMIN — ATENOLOL 25 MG: 25 TABLET ORAL at 08:43

## 2020-02-11 RX ADMIN — FUROSEMIDE 40 MG: 40 TABLET ORAL at 08:43

## 2020-02-11 RX ADMIN — INSULIN LISPRO 3 UNITS: 100 INJECTION, SOLUTION INTRAVENOUS; SUBCUTANEOUS at 12:35

## 2020-02-11 RX ADMIN — APIXABAN 5 MG: 5 TABLET, FILM COATED ORAL at 20:59

## 2020-02-12 VITALS
HEIGHT: 64 IN | TEMPERATURE: 97.3 F | SYSTOLIC BLOOD PRESSURE: 110 MMHG | BODY MASS INDEX: 40.77 KG/M2 | WEIGHT: 238.8 LBS | OXYGEN SATURATION: 96 % | DIASTOLIC BLOOD PRESSURE: 68 MMHG | HEART RATE: 63 BPM | RESPIRATION RATE: 16 BRPM

## 2020-02-12 LAB
ANION GAP SERPL CALCULATED.3IONS-SCNC: 12.1 MMOL/L (ref 5–15)
BUN BLD-MCNC: 15 MG/DL (ref 8–23)
BUN/CREAT SERPL: 16.5 (ref 7–25)
CALCIUM SPEC-SCNC: 9.2 MG/DL (ref 8.6–10.5)
CHLORIDE SERPL-SCNC: 98 MMOL/L (ref 98–107)
CO2 SERPL-SCNC: 26.9 MMOL/L (ref 22–29)
CREAT BLD-MCNC: 0.91 MG/DL (ref 0.76–1.27)
GFR SERPL CREATININE-BSD FRML MDRD: 81 ML/MIN/1.73
GLUCOSE BLD-MCNC: 157 MG/DL (ref 65–99)
GLUCOSE BLDC GLUCOMTR-MCNC: 155 MG/DL (ref 70–130)
GLUCOSE BLDC GLUCOMTR-MCNC: 196 MG/DL (ref 70–130)
MAGNESIUM SERPL-MCNC: 2 MG/DL (ref 1.6–2.4)
POTASSIUM BLD-SCNC: 3.9 MMOL/L (ref 3.5–5.2)
SODIUM BLD-SCNC: 137 MMOL/L (ref 136–145)

## 2020-02-12 PROCEDURE — 82962 GLUCOSE BLOOD TEST: CPT

## 2020-02-12 PROCEDURE — 99232 SBSQ HOSP IP/OBS MODERATE 35: CPT | Performed by: INTERNAL MEDICINE

## 2020-02-12 PROCEDURE — 93005 ELECTROCARDIOGRAM TRACING: CPT | Performed by: INTERNAL MEDICINE

## 2020-02-12 PROCEDURE — 63710000001 INSULIN LISPRO (HUMAN) PER 5 UNITS: Performed by: NURSE PRACTITIONER

## 2020-02-12 PROCEDURE — 93010 ELECTROCARDIOGRAM REPORT: CPT | Performed by: INTERNAL MEDICINE

## 2020-02-12 PROCEDURE — 80048 BASIC METABOLIC PNL TOTAL CA: CPT | Performed by: NURSE PRACTITIONER

## 2020-02-12 PROCEDURE — 93005 ELECTROCARDIOGRAM TRACING: CPT | Performed by: NURSE PRACTITIONER

## 2020-02-12 PROCEDURE — 83735 ASSAY OF MAGNESIUM: CPT | Performed by: NURSE PRACTITIONER

## 2020-02-12 PROCEDURE — 99238 HOSP IP/OBS DSCHRG MGMT 30/<: CPT | Performed by: NURSE PRACTITIONER

## 2020-02-12 RX ORDER — DOFETILIDE 0.25 MG/1
250 CAPSULE ORAL EVERY 12 HOURS
Status: DISCONTINUED | OUTPATIENT
Start: 2020-02-13 | End: 2020-02-12 | Stop reason: HOSPADM

## 2020-02-12 RX ORDER — ATENOLOL 50 MG/1
25 TABLET ORAL EVERY 12 HOURS SCHEDULED
Qty: 60 TABLET | Refills: 11
Start: 2020-02-12 | End: 2021-01-13

## 2020-02-12 RX ORDER — DOFETILIDE 0.25 MG/1
250 CAPSULE ORAL EVERY 12 HOURS
Qty: 14 CAPSULE | Refills: 0 | Status: SHIPPED | OUTPATIENT
Start: 2020-02-12 | End: 2020-02-14

## 2020-02-12 RX ORDER — DOFETILIDE 0.25 MG/1
250 CAPSULE ORAL EVERY 12 HOURS
Qty: 60 CAPSULE | Refills: 5 | Status: SHIPPED | OUTPATIENT
Start: 2020-02-13 | End: 2020-02-14

## 2020-02-12 RX ADMIN — INSULIN LISPRO 2 UNITS: 100 INJECTION, SOLUTION INTRAVENOUS; SUBCUTANEOUS at 07:48

## 2020-02-12 RX ADMIN — ROSUVASTATIN CALCIUM 10 MG: 10 TABLET, FILM COATED ORAL at 08:38

## 2020-02-12 RX ADMIN — APIXABAN 5 MG: 5 TABLET, FILM COATED ORAL at 08:38

## 2020-02-12 RX ADMIN — DOFETILIDE 375 MCG: 0.25 CAPSULE ORAL at 10:32

## 2020-02-12 RX ADMIN — SODIUM CHLORIDE, PRESERVATIVE FREE 10 ML: 5 INJECTION INTRAVENOUS at 08:38

## 2020-02-12 RX ADMIN — ATENOLOL 25 MG: 25 TABLET ORAL at 08:38

## 2020-02-12 RX ADMIN — INSULIN LISPRO 2 UNITS: 100 INJECTION, SOLUTION INTRAVENOUS; SUBCUTANEOUS at 11:43

## 2020-02-12 RX ADMIN — POTASSIUM CHLORIDE 20 MEQ: 750 CAPSULE, EXTENDED RELEASE ORAL at 08:38

## 2020-02-12 RX ADMIN — FUROSEMIDE 40 MG: 40 TABLET ORAL at 08:38

## 2020-02-13 ENCOUNTER — READMISSION MANAGEMENT (OUTPATIENT)
Dept: CALL CENTER | Facility: HOSPITAL | Age: 75
End: 2020-02-13

## 2020-02-13 ENCOUNTER — TRANSITIONAL CARE MANAGEMENT TELEPHONE ENCOUNTER (OUTPATIENT)
Dept: FAMILY MEDICINE CLINIC | Facility: CLINIC | Age: 75
End: 2020-02-13

## 2020-02-13 NOTE — OUTREACH NOTE
Prep Survey      Responses   Facility patient discharged from?  [facility]   Is patient eligible?  Yes   Discharge diagnosis  Atrial fibrillation, persistent   Does the patient have one of the following disease processes/diagnoses(primary or secondary)?  Other   Does the patient have Home health ordered?  No   Is there a DME ordered?  No   Medication alerts for this patient  [email or web address]   Prep survey completed?  Yes          Katie Lee RN

## 2020-02-13 NOTE — OUTREACH NOTE
Spoke with pt, doing well s/p hospital stay for persistent A-fib. This is resolved and pt feeling well. Appetite is good, no chest pain, SOB, nausea. No extremity pain. Confirmed receipt and understanding of d/c orders and medications. Confirmed TCM [facility] with Dr Meng on 02/17/20.

## 2020-02-14 ENCOUNTER — TELEPHONE (OUTPATIENT)
Dept: CARDIOLOGY | Facility: CLINIC | Age: 75
End: 2020-02-14

## 2020-02-14 ENCOUNTER — CLINICAL SUPPORT (OUTPATIENT)
Dept: CARDIOLOGY | Facility: CLINIC | Age: 75
End: 2020-02-14

## 2020-02-14 VITALS — HEART RATE: 47 BPM | SYSTOLIC BLOOD PRESSURE: 100 MMHG | DIASTOLIC BLOOD PRESSURE: 60 MMHG

## 2020-02-14 DIAGNOSIS — I48.19 ATRIAL FIBRILLATION, PERSISTENT (HCC): Primary | ICD-10-CM

## 2020-02-14 PROCEDURE — 93000 ELECTROCARDIOGRAM COMPLETE: CPT | Performed by: NURSE PRACTITIONER

## 2020-02-14 RX ORDER — DOFETILIDE 0.12 MG/1
125 CAPSULE ORAL EVERY 12 HOURS
Qty: 14 CAPSULE | Refills: 0 | Status: SHIPPED | OUTPATIENT
Start: 2020-02-14 | End: 2020-02-17

## 2020-02-14 RX ORDER — DOFETILIDE 0.12 MG/1
125 CAPSULE ORAL EVERY 12 HOURS
Qty: 60 CAPSULE | Refills: 5 | Status: SHIPPED | OUTPATIENT
Start: 2020-02-14 | End: 2020-08-17 | Stop reason: SDUPTHER

## 2020-02-14 NOTE — PROGRESS NOTES
Pt present to the office for EKG.  Verified medications.  Pt was started on Dofetilide 250 mcg (pt took his first dose at 0900 this morning) bid, pt's Atenolol was changed to 50 mg (take one-half tablet bid), and Eliquis 5 mg bid.      Presented EKG to ANICETO Newell  for review.   Kandice recommended that pt decrease his Dofetilide 125 mcg bid.  Pt will call the office back to verify the dose of his Atenolol.  Pt will need a repeat EKG on Monday.   Pt verbalized understanding and released from the office.

## 2020-02-14 NOTE — TELEPHONE ENCOUNTER
Patient was seen in the office today for B/P  ekg check. He stated that he had the Atenolol 50 mg. He understands that he is suppose to take 25 mg. He just wanted to give Kandice a heads up.

## 2020-02-14 NOTE — PROGRESS NOTES
Procedure     ECG 12 Lead  Date/Time: 2/14/2020 9:54 AM  Performed by: Kandice Wylie APRN  Authorized by: Kandice Wylie APRN   Comparison: compared with previous ECG   Similar to previous ECG  Rhythm: sinus bradycardia  BPM: 47  Comments: QTc---520--prolonged---reviewed with Dr. Vazquez, decreasing to 125 mcg q12hrs    Discussed with patient, he is going to [facility] to  new dose---I spoke to them     He will return on Monday morning for EKG only    He also is not sure whether he is taking 50 mg of atenolol BID or 25 mg BID---he is going to check his tablet mg and call me---it needs to be decreased from what he is currently taking.

## 2020-02-15 ENCOUNTER — READMISSION MANAGEMENT (OUTPATIENT)
Dept: CALL CENTER | Facility: HOSPITAL | Age: 75
End: 2020-02-15

## 2020-02-15 NOTE — OUTREACH NOTE
Medical Week 1 Survey      Responses   Facility patient discharged from?  [facility]   Does the patient have one of the following disease processes/diagnoses(primary or secondary)?  Other   Is there a successful TCM telephone encounter documented?  Yes          Loree Hernandez RN

## 2020-02-17 ENCOUNTER — OFFICE VISIT (OUTPATIENT)
Dept: FAMILY MEDICINE CLINIC | Facility: CLINIC | Age: 75
End: 2020-02-17

## 2020-02-17 VITALS
HEART RATE: 64 BPM | HEIGHT: 64 IN | OXYGEN SATURATION: 95 % | WEIGHT: 233 LBS | SYSTOLIC BLOOD PRESSURE: 110 MMHG | BODY MASS INDEX: 39.78 KG/M2 | DIASTOLIC BLOOD PRESSURE: 74 MMHG | TEMPERATURE: 97.4 F | RESPIRATION RATE: 16 BRPM

## 2020-02-17 DIAGNOSIS — I50.41 ACUTE COMBINED SYSTOLIC AND DIASTOLIC CONGESTIVE HEART FAILURE (HCC): ICD-10-CM

## 2020-02-17 DIAGNOSIS — E11.65 TYPE 2 DIABETES MELLITUS WITH HYPERGLYCEMIA, WITHOUT LONG-TERM CURRENT USE OF INSULIN (HCC): ICD-10-CM

## 2020-02-17 DIAGNOSIS — I10 BENIGN ESSENTIAL HTN: ICD-10-CM

## 2020-02-17 DIAGNOSIS — G47.33 OBSTRUCTIVE SLEEP APNEA: ICD-10-CM

## 2020-02-17 DIAGNOSIS — I48.19 ATRIAL FIBRILLATION, PERSISTENT (HCC): ICD-10-CM

## 2020-02-17 DIAGNOSIS — Z09 HOSPITAL DISCHARGE FOLLOW-UP: Primary | ICD-10-CM

## 2020-02-17 PROCEDURE — 99495 TRANSJ CARE MGMT MOD F2F 14D: CPT | Performed by: FAMILY MEDICINE

## 2020-02-17 NOTE — PROGRESS NOTES
Subjective   Ray Pratt is a 74 y.o. male.     74-year-old male comes today for hospital discharge follow-up.    Patient Name: Ray Pratt  Age/Sex: 74 y.o. male  : 1945  MRN: [id number]     Date of Discharge:  2020   Date of Admit: 2/10/2020  Encounter Provider: ANICETO Stone  Place of Service: [facility] CARDIOLOGY  Patient Care Team:  Galen Meng MD as PCP - General (Family Medicine)  Galen Meng MD as PCP - Claims Attributed  Reginald Vazquez MD as Referring Physician (Cardiology)     Subjective:     Discharge Diagnosis:    Atrial fibrillation, persistent    A-fib (CMS/[facility])        Hospital Course:      74-year-old patient of Dr. Escalante and Dr. Vazquez with symptomatic persistent A. fib as well as cardiomyopathy.  He was admitted on 2/10/2020 for initiation of dofetilide.  Spontaneously converted after the first dose of 500 mcg.  His QTC was a little long on 500 & 375 so we are discharging him on 250 mcg twice daily.  We will have him come to the office on Friday around 11 AM a couple hours after his morning dose for an EKG.  He thinks he felt a little better when he ambulated around the nurses station this morning.  We did have endocrinology see him while he was here for his elevated blood glucose and the fact that his Actos had recently been stopped due to his heart failure, he has been started on 2 new medications and can follow-up with them with the PCP as directed.  He also was scheduled to have an upcoming echo in the office later this week get a go ahead and move that out and have them do that when he sees me in the office in 4 weeks that way he will have been in sinus rhythm and hopefully get a better cure on how his heart is doing.       Discharge Medications     New Medications     Instructions Start Date  dofetilide 250 MCG capsule  Commonly known as:  TIKOSYN    250 mcg, Oral, Every 12 Hours    Start Date:    2020     linagliptin 5 MG tablet tablet  Commonly known as:  TRADJENTA    5 mg, Oral, Daily    Start Date:  February 13, 2020        Changes to Medications     Instructions Start Date  atenolol 50 MG tablet  Commonly known as:  TENORMIN  What changed:  how much to take    25 mg, Oral, Every 12 Hours Scheduled          Continue These Medications     Instructions Start Date  apixaban 5 MG tablet tablet  Commonly known as:  ELIQUIS    5 mg, Oral, Every 12 Hours Scheduled       furosemide 40 MG tablet  Commonly known as:  LASIX    40 mg, Oral, Daily       potassium chloride 10 MEQ CR capsule  Commonly known as:  MICRO-K    20 mEq, Oral, Daily       rosuvastatin 10 MG tablet  Commonly known as:  CRESTOR    TAKE 1 TABLET BY MOUTH EVERY DAY          Stop These Medications   digoxin 125 MCG tablet  Commonly known as:  LANOXIN       ----------------------------    Was seen by Leigha in hospital and started on Januvia and Jardiance. Recent A1C 7.9%. BG fasting is going down; this morning 135. Has not been checking in the evening.  Monitoring blood pressure regularly.  Ranging in the 120s/70s.  Heart rate in the mid to high 50s.    Needs to reschedule sleep clinic appointment for about 1 month from now.  We will get a call about the results of his titration study this week.  Discussed that we cannot do BiPAP ST due to his history of CHF.       PHQ-2/PHQ-9 Depression Screening 5/7/2019   Little interest or pleasure in doing things 0   Feeling down, depressed, or hopeless 0   Total Score 0       The following portions of the patient's history were reviewed and updated as appropriate: allergies, current medications, past family history, past medical history, past social history, past surgical history and problem list.    Review of Systems   Constitutional: Negative for chills and fever.   Respiratory: Negative for cough and shortness of breath.    Cardiovascular: Negative for chest pain, palpitations and leg swelling.    Gastrointestinal: Negative for abdominal pain, nausea and vomiting.       Objective   Physical Exam   Constitutional: He is oriented to person, place, and time. He appears well-developed and well-nourished.   HENT:   Head: Normocephalic and atraumatic.   Eyes: Pupils are equal, round, and reactive to light. EOM are normal.   Neck: Normal range of motion. Neck supple.   Cardiovascular: Normal rate, regular rhythm and normal heart sounds.   No murmur heard.  Pulmonary/Chest: Effort normal and breath sounds normal. No stridor. No respiratory distress. He has no wheezes. He has no rales.   Neurological: He is alert and oriented to person, place, and time.   Skin: Skin is warm.   Psychiatric: He has a normal mood and affect. His behavior is normal.               Assessment/Plan     Ray was seen today for transitional care management.    Diagnoses and all orders for this visit:    Hospital discharge follow-up    Benign essential HTN    Acute combined systolic and diastolic congestive heart failure (CMS/HCC)    Atrial fibrillation, persistent    Obstructive sleep apnea    Type 2 diabetes mellitus with hyperglycemia, without long-term current use of insulin (CMS/HCC)      Continue to work on weight loss.  Continue follow-up per cardiology.  Continue new medications for diabetes and recheck A1c in 3 months.  Follow-up with me in the sleep clinic in 1 month.  We will get a call about his titration study report this week.  Cannot do BiPAP ST or ASV due to history of CHF.  Must continue with medical management of CHF.  Go to ER for new or worsening symptoms.

## 2020-02-19 ENCOUNTER — APPOINTMENT (OUTPATIENT)
Dept: SLEEP MEDICINE | Facility: HOSPITAL | Age: 75
End: 2020-02-19

## 2020-02-21 ENCOUNTER — READMISSION MANAGEMENT (OUTPATIENT)
Dept: CALL CENTER | Facility: HOSPITAL | Age: 75
End: 2020-02-21

## 2020-02-21 NOTE — OUTREACH NOTE
Medical Week 2 Survey      Responses   Facility patient discharged from?  [facility]   Does the patient have one of the following disease processes/diagnoses(primary or secondary)?  Other   Week 2 attempt successful?  Yes   Call start time  1355   Call end time  1354   List who call center can speak with  Wife   Person spoke with today (if not patient) and relationship  Wife   Meds reviewed with patient/caregiver?  Yes   Is the patient taking all medications as directed (includes completed medication regime)?  Yes   Has the patient kept scheduled appointments due by today?  Yes   Comments  More energy, trying to eat better   What is the patient's perception of their health status since discharge?  Improving   Week 2 Call Completed?  Yes   Wrap up additional comments  Wife states, he is doing fine, no new issues.           Laurita Christianson RN

## 2020-02-24 RX ORDER — ROSUVASTATIN CALCIUM 10 MG/1
TABLET, COATED ORAL
Qty: 30 TABLET | Refills: 0 | Status: SHIPPED | OUTPATIENT
Start: 2020-02-24 | End: 2020-03-25

## 2020-02-27 ENCOUNTER — TELEPHONE (OUTPATIENT)
Dept: SLEEP MEDICINE | Facility: HOSPITAL | Age: 75
End: 2020-02-27

## 2020-02-27 NOTE — TELEPHONE ENCOUNTER
Spoke with patient advised him that there were not pressure changes at this time, and that he is to F/u w/cardiologist-AK

## 2020-02-28 ENCOUNTER — READMISSION MANAGEMENT (OUTPATIENT)
Dept: CALL CENTER | Facility: HOSPITAL | Age: 75
End: 2020-02-28

## 2020-02-28 NOTE — OUTREACH NOTE
Medical Week 3 Survey      Responses   Facility patient discharged from?  [facility]   Does the patient have one of the following disease processes/diagnoses(primary or secondary)?  Other   Week 3 attempt successful?  Yes   Call start time  1259   Call end time  1301   Discharge diagnosis  Atrial fibrillation, persistent   Meds reviewed with patient/caregiver?  Yes   Is the patient taking all medications as directed (includes completed medication regime)?  Yes   Has the patient kept scheduled appointments due by today?  Yes   What is the patient's perception of their health status since discharge?  Improving   Week 3 Call Completed?  Yes   Graduated  Yes   Did the patient feel the follow up calls were helpful during their recovery period?  Yes   Was the number of calls appropriate?  Yes   Graduated/Revoked comments  Goals met   Wrap up additional comments  No complaints. NSR. No side effects from medications.  Appt kept.           Ayesha Mai RN

## 2020-03-09 RX ORDER — SITAGLIPTIN 100 MG/1
TABLET, FILM COATED ORAL
Qty: 30 TABLET | Refills: 1 | Status: SHIPPED | OUTPATIENT
Start: 2020-03-09 | End: 2020-04-03 | Stop reason: SDUPTHER

## 2020-03-17 RX ORDER — POTASSIUM CHLORIDE 750 MG/1
CAPSULE, EXTENDED RELEASE ORAL
Qty: 90 CAPSULE | Refills: 0 | Status: SHIPPED | OUTPATIENT
Start: 2020-03-17 | End: 2020-04-10

## 2020-03-25 RX ORDER — ROSUVASTATIN CALCIUM 10 MG/1
TABLET, COATED ORAL
Qty: 30 TABLET | Refills: 0 | Status: SHIPPED | OUTPATIENT
Start: 2020-03-25 | End: 2020-04-20

## 2020-04-03 NOTE — TELEPHONE ENCOUNTER
Pt called stating that he has appt scheduled 05/18/20, but will be out of meds before then.  Please send to [facility].  You have not yet prescribed these meds.

## 2020-04-10 RX ORDER — POTASSIUM CHLORIDE 750 MG/1
CAPSULE, EXTENDED RELEASE ORAL
Qty: 60 CAPSULE | Refills: 1 | Status: SHIPPED | OUTPATIENT
Start: 2020-04-10 | End: 2020-05-05

## 2020-04-20 RX ORDER — ROSUVASTATIN CALCIUM 10 MG/1
TABLET, COATED ORAL
Qty: 30 TABLET | Refills: 0 | Status: SHIPPED | OUTPATIENT
Start: 2020-04-20 | End: 2020-05-11 | Stop reason: SDUPTHER

## 2020-05-05 RX ORDER — POTASSIUM CHLORIDE 750 MG/1
CAPSULE, EXTENDED RELEASE ORAL
Qty: 60 CAPSULE | Refills: 1 | Status: SHIPPED | OUTPATIENT
Start: 2020-05-05 | End: 2020-06-03

## 2020-05-11 RX ORDER — ROSUVASTATIN CALCIUM 10 MG/1
10 TABLET, COATED ORAL DAILY
Qty: 30 TABLET | Refills: 0 | Status: SHIPPED | OUTPATIENT
Start: 2020-05-11 | End: 2020-05-29 | Stop reason: SDUPTHER

## 2020-05-29 ENCOUNTER — OFFICE VISIT (OUTPATIENT)
Dept: FAMILY MEDICINE CLINIC | Facility: CLINIC | Age: 75
End: 2020-05-29

## 2020-05-29 VITALS
WEIGHT: 222 LBS | BODY MASS INDEX: 37.9 KG/M2 | TEMPERATURE: 97.7 F | HEART RATE: 59 BPM | HEIGHT: 64 IN | SYSTOLIC BLOOD PRESSURE: 116 MMHG | RESPIRATION RATE: 16 BRPM | DIASTOLIC BLOOD PRESSURE: 71 MMHG | OXYGEN SATURATION: 99 %

## 2020-05-29 DIAGNOSIS — E11.65 TYPE 2 DIABETES MELLITUS WITH HYPERGLYCEMIA, WITHOUT LONG-TERM CURRENT USE OF INSULIN (HCC): Primary | ICD-10-CM

## 2020-05-29 LAB — HBA1C MFR BLD: 6.6 % (ref 4.8–5.6)

## 2020-05-29 PROCEDURE — 99213 OFFICE O/P EST LOW 20 MIN: CPT | Performed by: FAMILY MEDICINE

## 2020-05-29 RX ORDER — DIGOXIN 125 MCG
125 TABLET ORAL DAILY
COMMUNITY
Start: 2020-03-01 | End: 2020-06-18

## 2020-05-29 RX ORDER — ROSUVASTATIN CALCIUM 10 MG/1
10 TABLET, COATED ORAL DAILY
Qty: 90 TABLET | Refills: 1 | Status: SHIPPED | OUTPATIENT
Start: 2020-05-29 | End: 2020-11-16

## 2020-05-29 NOTE — PROGRESS NOTES
Subjective   Ray Pratt is a 74 y.o. male.        Answers for HPI/ROS submitted by the patient on 5/23/2020   Diabetes problem  What is the primary reason for your visit?: Diabetes    Diabetes   He has type 2 diabetes mellitus. No MedicAlert identification noted. The initial diagnosis of diabetes was made 6 years ago. Pertinent negatives for hypoglycemia include no confusion, dizziness, headaches, hunger, mood changes, nervousness/anxiousness, pallor, seizures, sleepiness, speech difficulty, sweats or tremors. Associated symptoms include fatigue, polydipsia and polyuria. Pertinent negatives for diabetes include no blurred vision, no chest pain, no foot paresthesias, no foot ulcerations, no polyphagia, no visual change, no weakness and no weight loss. Pertinent negatives for hypoglycemia complications include no blackouts, no hospitalization, no nocturnal hypoglycemia, no required assistance and no required glucagon injection. Symptoms are stable. Diabetic complications include heart disease. Pertinent negatives for diabetic complications include no CVA, impotence, nephropathy, peripheral neuropathy, PVD or retinopathy. Risk factors for coronary artery disease include hypertension and obesity. Current diabetic treatment includes diet and oral agent (dual therapy). He is compliant with treatment all of the time. His weight is decreasing steadily. He is following a generally healthy diet. Meal planning includes calorie counting. He has not had a previous visit with a dietitian. He participates in exercise intermittently. He monitors blood glucose at home 1-2 x per week. Blood glucose monitoring compliance is good. His home blood glucose trend is fluctuating minimally. His breakfast blood glucose is taken between 8-9 am. His breakfast blood glucose range is generally  mg/dl. His highest blood glucose is 130-140 mg/dl. His overall blood glucose range is 130-140 mg/dl. He does not see a podiatrist.Eye exam  is current.      Januvia 100 mg once a day, Jardiance 25 mg once a day.    Needs set up appointment with me in sleep lab to look over his data and get new order for mask.  Appointment with cardiology was pushed out of June 18 due to pandemic.  Patient aware that need to work on medical management for CHF to help control central events as noted in my last note from her last visit.  I will review his data and next sleep lab appointment in a few weeks to see how obstructive events are being controlled.  Again cannot use BiPAP ST machine due to ejection fraction of 22%.      The following portions of the patient's history were reviewed and updated as appropriate: allergies, current medications, past family history, past medical history, past social history, past surgical history and problem list.    Review of Systems   Constitutional: Positive for fatigue. Negative for chills, fever and unexpected weight loss.   Eyes: Negative for blurred vision.   Respiratory: Negative for cough and shortness of breath.    Cardiovascular: Negative for chest pain, palpitations and leg swelling.   Gastrointestinal: Negative for abdominal pain, nausea and vomiting.   Endocrine: Positive for polydipsia and polyuria. Negative for polyphagia.   Genitourinary: Negative for impotence.   Skin: Negative for pallor.   Neurological: Negative for dizziness, tremors, seizures, speech difficulty, weakness and confusion.   Psychiatric/Behavioral: The patient is not nervous/anxious.        Objective   Physical Exam   Constitutional: He is oriented to person, place, and time. He appears well-developed and well-nourished.   HENT:   Head: Normocephalic and atraumatic.   Eyes: Pupils are equal, round, and reactive to light. EOM are normal.   Neck: Normal range of motion. Neck supple.   Cardiovascular: Normal rate, regular rhythm and normal heart sounds.   No murmur heard.  Pulmonary/Chest: Effort normal and breath sounds normal. No stridor. No respiratory  distress. He has no wheezes. He has no rales.   Neurological: He is alert and oriented to person, place, and time.   Skin: Skin is warm.   Psychiatric: He has a normal mood and affect. His behavior is normal.               Assessment/Plan     Ray was seen today for diabetes.    Diagnoses and all orders for this visit:    Type 2 diabetes mellitus with hyperglycemia, without long-term current use of insulin (CMS/[facility])  -     Hemoglobin A1c    Follow-up A1c refilled meds.  Continue care per cardiology.  Follow-up with me in a few weeks and sleep lab for further assessment of MARCOS.  Return to clinic in 6 months for Medicare wellness visit or sooner if needed.

## 2020-06-03 RX ORDER — POTASSIUM CHLORIDE 750 MG/1
CAPSULE, EXTENDED RELEASE ORAL
Qty: 60 CAPSULE | Refills: 1 | Status: SHIPPED | OUTPATIENT
Start: 2020-06-03 | End: 2020-07-24

## 2020-06-18 ENCOUNTER — OFFICE VISIT (OUTPATIENT)
Dept: CARDIOLOGY | Facility: CLINIC | Age: 75
End: 2020-06-18

## 2020-06-18 ENCOUNTER — HOSPITAL ENCOUNTER (OUTPATIENT)
Dept: CARDIOLOGY | Facility: HOSPITAL | Age: 75
Discharge: HOME | End: 2020-06-18
Admitting: INTERNAL MEDICINE

## 2020-06-18 VITALS
SYSTOLIC BLOOD PRESSURE: 121 MMHG | WEIGHT: 222 LBS | DIASTOLIC BLOOD PRESSURE: 68 MMHG | BODY MASS INDEX: 37.9 KG/M2 | HEART RATE: 54 BPM | HEIGHT: 64 IN | OXYGEN SATURATION: 97 %

## 2020-06-18 VITALS
DIASTOLIC BLOOD PRESSURE: 90 MMHG | WEIGHT: 220.8 LBS | HEIGHT: 64 IN | BODY MASS INDEX: 37.69 KG/M2 | HEART RATE: 53 BPM | SYSTOLIC BLOOD PRESSURE: 110 MMHG

## 2020-06-18 DIAGNOSIS — Z79.899 ON DOFETILIDE THERAPY: ICD-10-CM

## 2020-06-18 DIAGNOSIS — R06.09 DYSPNEA ON EXERTION: ICD-10-CM

## 2020-06-18 DIAGNOSIS — G47.33 OBSTRUCTIVE SLEEP APNEA: ICD-10-CM

## 2020-06-18 DIAGNOSIS — I48.19 ATRIAL FIBRILLATION, PERSISTENT (HCC): Primary | ICD-10-CM

## 2020-06-18 DIAGNOSIS — I42.0 CARDIOMYOPATHY, DILATED (HCC): ICD-10-CM

## 2020-06-18 DIAGNOSIS — I10 BENIGN ESSENTIAL HTN: ICD-10-CM

## 2020-06-18 DIAGNOSIS — E66.01 CLASS 2 SEVERE OBESITY DUE TO EXCESS CALORIES WITH SERIOUS COMORBIDITY AND BODY MASS INDEX (BMI) OF 37.0 TO 37.9 IN ADULT (HCC): ICD-10-CM

## 2020-06-18 DIAGNOSIS — I48.91 NEW ONSET ATRIAL FIBRILLATION (HCC): ICD-10-CM

## 2020-06-18 PROBLEM — E66.812 CLASS 2 SEVERE OBESITY DUE TO EXCESS CALORIES WITH SERIOUS COMORBIDITY AND BODY MASS INDEX (BMI) OF 37.0 TO 37.9 IN ADULT: Status: ACTIVE | Noted: 2020-06-18

## 2020-06-18 LAB
ASCENDING AORTA: 3.5 CM
BH CV ECHO MEAS - ACS: 1.9 CM
BH CV ECHO MEAS - AO MAX PG (FULL): 3.6 MMHG
BH CV ECHO MEAS - AO MAX PG: 7.6 MMHG
BH CV ECHO MEAS - AO MEAN PG (FULL): 2 MMHG
BH CV ECHO MEAS - AO MEAN PG: 4 MMHG
BH CV ECHO MEAS - AO ROOT AREA (BSA CORRECTED): 1.5
BH CV ECHO MEAS - AO ROOT AREA: 7.5 CM^2
BH CV ECHO MEAS - AO ROOT DIAM: 3.1 CM
BH CV ECHO MEAS - AO V2 MAX: 138 CM/SEC
BH CV ECHO MEAS - AO V2 MEAN: 93.3 CM/SEC
BH CV ECHO MEAS - AO V2 VTI: 34.5 CM
BH CV ECHO MEAS - ASC AORTA: 3.5 CM
BH CV ECHO MEAS - AVA(I,A): 2.1 CM^2
BH CV ECHO MEAS - AVA(I,D): 2.1 CM^2
BH CV ECHO MEAS - AVA(V,A): 2.1 CM^2
BH CV ECHO MEAS - AVA(V,D): 2.1 CM^2
BH CV ECHO MEAS - BSA(HAYCOCK): 2.2 M^2
BH CV ECHO MEAS - BSA: 2 M^2
BH CV ECHO MEAS - BZI_BMI: 38.1 KILOGRAMS/M^2
BH CV ECHO MEAS - BZI_METRIC_HEIGHT: 162.6 CM
BH CV ECHO MEAS - BZI_METRIC_WEIGHT: 100.7 KG
BH CV ECHO MEAS - EDV(MOD-SP2): 91 ML
BH CV ECHO MEAS - EDV(MOD-SP4): 104 ML
BH CV ECHO MEAS - EDV(TEICH): 118.2 ML
BH CV ECHO MEAS - EF(CUBED): 76.2 %
BH CV ECHO MEAS - EF(MOD-BP): 64.9 %
BH CV ECHO MEAS - EF(MOD-SP2): 61.5 %
BH CV ECHO MEAS - EF(MOD-SP4): 66.3 %
BH CV ECHO MEAS - EF(TEICH): 67.9 %
BH CV ECHO MEAS - ESV(MOD-SP2): 35 ML
BH CV ECHO MEAS - ESV(MOD-SP4): 35 ML
BH CV ECHO MEAS - ESV(TEICH): 37.9 ML
BH CV ECHO MEAS - FS: 38 %
BH CV ECHO MEAS - IVS/LVPW: 0.89
BH CV ECHO MEAS - IVSD: 0.8 CM
BH CV ECHO MEAS - LAT PEAK E' VEL: 8.3 CM/SEC
BH CV ECHO MEAS - LV DIASTOLIC VOL/BSA (35-75): 50.9 ML/M^2
BH CV ECHO MEAS - LV MASS(C)D: 146.8 GRAMS
BH CV ECHO MEAS - LV MASS(C)DI: 71.8 GRAMS/M^2
BH CV ECHO MEAS - LV MAX PG: 4 MMHG
BH CV ECHO MEAS - LV MEAN PG: 2 MMHG
BH CV ECHO MEAS - LV SYSTOLIC VOL/BSA (12-30): 17.1 ML/M^2
BH CV ECHO MEAS - LV V1 MAX: 99.9 CM/SEC
BH CV ECHO MEAS - LV V1 MEAN: 69.8 CM/SEC
BH CV ECHO MEAS - LV V1 VTI: 25.5 CM
BH CV ECHO MEAS - LVIDD: 5 CM
BH CV ECHO MEAS - LVIDS: 3.1 CM
BH CV ECHO MEAS - LVLD AP2: 6.8 CM
BH CV ECHO MEAS - LVLD AP4: 7.2 CM
BH CV ECHO MEAS - LVLS AP2: 6 CM
BH CV ECHO MEAS - LVLS AP4: 6.1 CM
BH CV ECHO MEAS - LVOT AREA (M): 2.8 CM^2
BH CV ECHO MEAS - LVOT AREA: 2.8 CM^2
BH CV ECHO MEAS - LVOT DIAM: 1.9 CM
BH CV ECHO MEAS - LVPWD: 0.9 CM
BH CV ECHO MEAS - MED PEAK E' VEL: 6.5 CM/SEC
BH CV ECHO MEAS - MR MAX PG: 72.6 MMHG
BH CV ECHO MEAS - MR MAX VEL: 426 CM/SEC
BH CV ECHO MEAS - MV A DUR: 0.13 SEC
BH CV ECHO MEAS - MV A MAX VEL: 103 CM/SEC
BH CV ECHO MEAS - MV DEC SLOPE: 229 CM/SEC^2
BH CV ECHO MEAS - MV DEC TIME: 0.27 SEC
BH CV ECHO MEAS - MV E MAX VEL: 108 CM/SEC
BH CV ECHO MEAS - MV E/A: 1
BH CV ECHO MEAS - MV MAX PG: 4.7 MMHG
BH CV ECHO MEAS - MV MEAN PG: 2 MMHG
BH CV ECHO MEAS - MV P1/2T MAX VEL: 103 CM/SEC
BH CV ECHO MEAS - MV P1/2T: 131.7 MSEC
BH CV ECHO MEAS - MV V2 MAX: 108 CM/SEC
BH CV ECHO MEAS - MV V2 MEAN: 58.2 CM/SEC
BH CV ECHO MEAS - MV V2 VTI: 39.3 CM
BH CV ECHO MEAS - MVA P1/2T LCG: 2.1 CM^2
BH CV ECHO MEAS - MVA(P1/2T): 1.7 CM^2
BH CV ECHO MEAS - MVA(VTI): 1.8 CM^2
BH CV ECHO MEAS - PA ACC TIME: 0.17 SEC
BH CV ECHO MEAS - PA MAX PG (FULL): 1.2 MMHG
BH CV ECHO MEAS - PA MAX PG: 2.8 MMHG
BH CV ECHO MEAS - PA PR(ACCEL): 3 MMHG
BH CV ECHO MEAS - PA V2 MAX: 84.2 CM/SEC
BH CV ECHO MEAS - PULM A REVS DUR: 0.1 SEC
BH CV ECHO MEAS - PULM A REVS VEL: 25.3 CM/SEC
BH CV ECHO MEAS - PULM DIAS VEL: 51 CM/SEC
BH CV ECHO MEAS - PULM S/D: 0.99
BH CV ECHO MEAS - PULM SYS VEL: 50.6 CM/SEC
BH CV ECHO MEAS - PVA(V,A): 3.4 CM^2
BH CV ECHO MEAS - PVA(V,D): 3.4 CM^2
BH CV ECHO MEAS - QP/QS: 1
BH CV ECHO MEAS - RAP SYSTOLE: 3 MMHG
BH CV ECHO MEAS - RV BASE (<4.1) - OBSOLETE: 3.1 CM
BH CV ECHO MEAS - RV LENGTH (<8.5) - OBSOLETE: 6.5 CM
BH CV ECHO MEAS - RV MAX PG: 1.6 MMHG
BH CV ECHO MEAS - RV MEAN PG: 1 MMHG
BH CV ECHO MEAS - RV V1 MAX: 64 CM/SEC
BH CV ECHO MEAS - RV V1 MEAN: 45.2 CM/SEC
BH CV ECHO MEAS - RV V1 VTI: 16 CM
BH CV ECHO MEAS - RVOT AREA: 4.5 CM^2
BH CV ECHO MEAS - RVOT DIAM: 2.4 CM
BH CV ECHO MEAS - RVSP: 19 MMHG
BH CV ECHO MEAS - SI(AO): 127.3 ML/M^2
BH CV ECHO MEAS - SI(CUBED): 46.6 ML/M^2
BH CV ECHO MEAS - SI(LVOT): 35.4 ML/M^2
BH CV ECHO MEAS - SI(MOD-SP2): 27.4 ML/M^2
BH CV ECHO MEAS - SI(MOD-SP4): 33.7 ML/M^2
BH CV ECHO MEAS - SI(TEICH): 39.3 ML/M^2
BH CV ECHO MEAS - SUP REN AO DIAM: 1.6 CM
BH CV ECHO MEAS - SV(AO): 260.4 ML
BH CV ECHO MEAS - SV(CUBED): 95.2 ML
BH CV ECHO MEAS - SV(LVOT): 72.3 ML
BH CV ECHO MEAS - SV(MOD-SP2): 56 ML
BH CV ECHO MEAS - SV(MOD-SP4): 69 ML
BH CV ECHO MEAS - SV(RVOT): 72.4 ML
BH CV ECHO MEAS - SV(TEICH): 80.3 ML
BH CV ECHO MEAS - TAPSE (>1.6): 3.5 CM2
BH CV ECHO MEAS - TR MAX VEL: 200 CM/SEC
BH CV ECHO MEASUREMENTS AVERAGE E/E' RATIO: 14.59
BH CV XLRA - RV BASE: 3.1 CM
BH CV XLRA - RV LENGTH: 6.5 CM
BH CV XLRA - RV MID: 2.4 CM
BH CV XLRA - TDI S': 12 CM/SEC
LEFT ATRIUM VOLUME INDEX: 24 ML/M2
MAXIMAL PREDICTED HEART RATE: 146 BPM
SINUS: 2.7 CM
STJ: 3 CM
STRESS TARGET HR: 124 BPM

## 2020-06-18 PROCEDURE — 93306 TTE W/DOPPLER COMPLETE: CPT

## 2020-06-18 PROCEDURE — 93000 ELECTROCARDIOGRAM COMPLETE: CPT | Performed by: NURSE PRACTITIONER

## 2020-06-18 PROCEDURE — 99214 OFFICE O/P EST MOD 30 MIN: CPT | Performed by: NURSE PRACTITIONER

## 2020-06-18 PROCEDURE — 93356 MYOCRD STRAIN IMG SPCKL TRCK: CPT

## 2020-06-18 PROCEDURE — 93356 MYOCRD STRAIN IMG SPCKL TRCK: CPT | Performed by: INTERNAL MEDICINE

## 2020-06-18 PROCEDURE — 93306 TTE W/DOPPLER COMPLETE: CPT | Performed by: INTERNAL MEDICINE

## 2020-06-18 NOTE — PROGRESS NOTES
Date of Office Visit: 2020  Encounter Provider: ANICETO Stone  Place of Service: [facility] CARDIOLOGY  Patient Name: Ray Pratt  :1945    Chief Complaint   Patient presents with   • Follow-up     1 month hospital    • Atrial Fibrillation   :     HPI: Ray Pratt is a 74 y.o. male who is a patient of Dr. Escalante and Dr. Vazquez with symptomatic persistent A. fib as well as nonischemic cardiomyopathy.  He was admitted in February for initiation of dofetilide, he did spontaneously convert after his first dose, his QTC was too long on all of the doses except for 125 mcg twice daily and he has done well with this dose so far.  He also has a history of hypertension, obesity, central sleep apnea and diabetes.    He presents today for routine follow-up.    He reports that he is doing fairly well.  He denies any chest pain, PND, orthopnea, edema or palpitations.    He does still have problems with fatigue and some dyspnea on exertion, he has been trying to increase his activity and he has been working on weight loss and he has lost about 20 pounds but says he is kind of hit a plateau.    He is on apixaban for anticoagulation with no bleeding issues.    He has readings of his heart rate and blood pressure for the last several months, he checks it daily, his blood pressure is very well controlled his heart rate looks to typically run in the 50s and 60s occasionally in the 70s.  He does say that whenever he tries to be active or walk distances he does feel short of breath.    He has some occasional brief dizziness---mostly with position changes such as getting out of the bed at night to urinate or when he gets up early in the morning---it is brief, only last a few seconds. He also mentions that there have been a few occasions when he is walking on uneven surfaces---like wood chips---that he feels off balance, has not happened on flat surfaces or grass.     He has  central sleep apnea for which he sees his primary care Dr. Meng --- he does have an upcoming appointment for follow-up for his sleep apnea, per her note and less his EF improves she cannot use BiPAP with him.    He has not had any problems with volume overload recently.    He has not had any episodes of PAF or any palpitations.    Past Medical History:   Diagnosis Date   • Acute combined systolic and diastolic congestive heart failure (CMS/[facility])    • Atrial fibrillation (CMS/[facility])    • Atrial flutter with rapid ventricular response (CMS/[facility])    • CHF (congestive heart failure) (CMS/[facility])    • Diabetes mellitus (CMS/[facility])    • Hypercholesterolemia    • Hyperlipidemia    • Hypertension    • Nonischemic cardiomyopathy (CMS/[facility])    • Obesity    • MARCOS (obstructive sleep apnea)     compliant with BiPAP   • Persistent atrial fibrillation (CMS/[facility])    • Pneumonia of left lung due to Haemophilus influenzae (CMS/[facility]) 1/18/2017   • Sepsis (CMS/[facility]) 1/18/2017       Past Surgical History:   Procedure Laterality Date   • CARDIAC CATHETERIZATION N/A 11/5/2019    Procedure: Left Heart Cath;  Surgeon: Sundeep Tsang MD;  Location: [facility] INVAS[facility] LOC[facility];  Service: Cardiovascular   • CARDIAC CATHETERIZATION N/A 11/5/2019    Procedure: Coronary angiography;  Surgeon: Sundeep Tsang MD;  Location: [facility] CATH INVASIVE LOCATION;  Service: Cardiovascular   • CARDIAC CATHETERIZATION     • DENTAL PROCEDURE     • MOLE REMOVAL         Social History     Socioeconomic History   • Marital status:      Spouse name: Not on file   • Number of children: Not on file   • Years of education: Not on file   • Highest education level: Not on file   Tobacco Use   • Smoking status: Never Smoker   • Smokeless tobacco: Never Used   • Tobacco comment: caffeine use   Substance and Sexual Activity   • Alcohol use: No   • Drug use: No   • Sexual activity: Defer       Family History   Problem Relation Age of Onset   • Cancer Mother    • Stroke  "Father        Review of Systems   Constitution: Positive for malaise/fatigue. Negative for chills and fever.   Cardiovascular: Positive for dyspnea on exertion. Negative for chest pain, leg swelling, near-syncope, orthopnea, palpitations, paroxysmal nocturnal dyspnea and syncope.   Respiratory: Negative for cough and shortness of breath.    Hematologic/Lymphatic: Negative.    Musculoskeletal: Negative for joint pain, joint swelling and myalgias.   Gastrointestinal: Negative for abdominal pain, diarrhea, melena, nausea and vomiting.   Genitourinary: Negative for frequency and hematuria.   Neurological: Negative for light-headedness, numbness, paresthesias and seizures.   Allergic/Immunologic: Negative.    All other systems reviewed and are negative.      No Known Allergies      Current Outpatient Medications:   •  apixaban (ELIQUIS) 5 MG tablet tablet, Take 1 tablet by mouth Every 12 (Twelve) Hours., Disp: 60 tablet, Rfl: 11  •  atenolol (TENORMIN) 50 MG tablet, Take 0.5 tablets by mouth Every 12 (Twelve) Hours., Disp: 60 tablet, Rfl: 11  •  dofetilide (TIKOSYN) 125 MCG capsule, Take 1 capsule by mouth Every 12 (Twelve) Hours., Disp: 60 capsule, Rfl: 5  •  Empagliflozin (Jardiance) 25 MG tablet, Take 25 mg by mouth Daily With Breakfast., Disp: 90 tablet, Rfl: 1  •  furosemide (LASIX) 40 MG tablet, Take 1 tablet by mouth Daily., Disp: 30 tablet, Rfl: 11  •  potassium chloride (MICRO-K) 10 MEQ CR capsule, TAKE 2 CAPSULES BY MOUTH EVERY DAY, Disp: 60 capsule, Rfl: 1  •  rosuvastatin (CRESTOR) 10 MG tablet, Take 1 tablet by mouth Daily., Disp: 90 tablet, Rfl: 1  •  SITagliptin (Januvia) 100 MG tablet, Take 1 tablet by mouth Daily., Disp: 90 tablet, Rfl: 1      Objective:     Vitals:    06/18/20 1004   BP: 110/90   BP Location: Left arm   Patient Position: Sitting   Cuff Size: Adult   Pulse: 53   Weight: 100 kg (220 lb 12.8 oz)   Height: 162.6 cm (64\")     Body mass index is 37.9 kg/m².    PHYSICAL EXAM:    Vitals " Reviewed.   General Appearance: No acute distress, obese male..   Eyes: Conjunctiva and lids: No erythema, swelling, or discharge. Sclera non-icteric.   HENT: Atraumatic, normocephalic. External eyes, ears, and nose normal.   Respiratory: No signs of respiratory distress. Respiration rhythm and depth normal.   Clear to auscultation. No rales, crackles, rhonchi, or wheezing auscultated.   Cardiovascular:  Heart Rate and Rhythm: Normal, Heart Sounds: Normal S1 and S2. No S3 or S4 noted.  Murmurs: No murmurs noted. No rubs, thrills, or gallops.   Arterial Pulses:  Posterior tibialis and dorsalis pedis pulses normal.   Lower Extremities: No edema noted.  Gastrointestinal:  Abdomen obese, nontender.  Musculoskeletal: Normal movement of extremities  Skin and Nails: General appearance normal. No pallor, cyanosis, diaphoresis. Skin temperature normal. No clubbing of fingernails.   Psychiatric: Patient alert and oriented to person, place, and time. Speech and behavior appropriate. Normal mood and affect.       ECG 12 Lead  Date/Time: 6/18/2020 10:50 AM  Performed by: Kandice Wylie APRN  Authorized by: Kandice Wylie APRN   Comparison: compared with previous ECG   Similar to previous ECG  Rhythm: sinus bradycardia  BPM: 53  Conduction: right bundle branch block  Comments: QTC okay              Assessment:       Diagnosis Plan   1. Atrial fibrillation, persistent (CMS/HCC)     2. On dofetilide therapy     3. Benign essential HTN     4. Obstructive sleep apnea     5. Cardiomyopathy, dilated (CMS/HCC)     6. Class 2 severe obesity due to excess calories with serious comorbidity and body mass index (BMI) of 37.0 to 37.9 in adult (CMS/HCC)            Plan:       1.-2.  Persistent AF, symptomatic, admitted in February and initiated on dofetilide.  He is doing well on 125 mcg [place] daily, his QTC is fine.  He has not had any recurrence of AF since initiating the drug.  He is on apixaban for anticoagulation.    3.  Hypertension,  controlled.    4.  MARCOS, treated by his primary care physician.    5.  Nonischemic cardiomyopathy, no heart failure by exam.  He is having a repeat echo today, those results are pending.    6. For the problem of overweight/obesity, we discussed the importance of lifestyle measures and strategies for weight loss, such as improved nutrition, regular exercise and sleep hygiene.      I do wonder if his HR running on the lower side persistently may have be part of him having some shortness of breath with exercise---I am going to have him stop the digoxin for now and call me with an update in 2 weeks. He has appt with ANICETO Guy 7/17 and will have him see Dr. Vazquez in 6 months. Will call with echo results when available.     As always, it has been a pleasure to participate in your patient's care.      Sincerely,         ANICETO Newell

## 2020-07-01 ENCOUNTER — OFFICE VISIT (OUTPATIENT)
Dept: SLEEP MEDICINE | Facility: HOSPITAL | Age: 75
End: 2020-07-01

## 2020-07-01 VITALS
OXYGEN SATURATION: 96 % | HEIGHT: 64 IN | DIASTOLIC BLOOD PRESSURE: 73 MMHG | BODY MASS INDEX: 37.25 KG/M2 | SYSTOLIC BLOOD PRESSURE: 122 MMHG | HEART RATE: 59 BPM | WEIGHT: 218.2 LBS

## 2020-07-01 DIAGNOSIS — G47.33 SEVERE OBSTRUCTIVE SLEEP APNEA: ICD-10-CM

## 2020-07-01 DIAGNOSIS — G47.39 MIXED SLEEP APNEA: Primary | ICD-10-CM

## 2020-07-01 PROCEDURE — G0463 HOSPITAL OUTPT CLINIC VISIT: HCPCS

## 2020-07-01 PROCEDURE — 99213 OFFICE O/P EST LOW 20 MIN: CPT | Performed by: FAMILY MEDICINE

## 2020-07-01 NOTE — PROGRESS NOTES
Follow Up [facility] Note     Chief Complaint:  MARCOS     Primary Care Physician: Galen Meng MD    Ray Pratt is a 74 y.o.male  was last seen at [facility] sleep lab: Few months ago.  Presents today to look over his data and get a new order for a new mask.  Does follow-up with cardiology.  We discussed at last primary care visit that the need to continue to work on medical management for CHF to help control central events.  We already decided he cannot use BiPAP-ST machine due to ejection fraction at that time of 22%.    Per records patient did see cardiology nurse practitioner on June 18, 2020.  He was noted to have lower heart rate with some shortness of breath with exercise; they stop his digoxin with plans to follow-up in 2 weeks.  Echo was also done.      Overall AHI 28 events per hour.  Majority of events were central events.  Good usage.  No significant leak.    Echo from 6/18/2020 shows ejection fraction of 61-65%.    Current Medications:    Current Outpatient Medications:   •  apixaban (ELIQUIS) 5 MG tablet tablet, Take 1 tablet by mouth Every 12 (Twelve) Hours., Disp: 60 tablet, Rfl: 11  •  atenolol (TENORMIN) 50 MG tablet, Take 0.5 tablets by mouth Every 12 (Twelve) Hours., Disp: 60 tablet, Rfl: 11  •  dofetilide (TIKOSYN) 125 MCG capsule, Take 1 capsule by mouth Every 12 (Twelve) Hours., Disp: 60 capsule, Rfl: 5  •  Empagliflozin (Jardiance) 25 MG tablet, Take 25 mg by mouth Daily With Breakfast., Disp: 90 tablet, Rfl: 1  •  furosemide (LASIX) 40 MG tablet, Take 1 tablet by mouth Daily., Disp: 30 tablet, Rfl: 11  •  potassium chloride (MICRO-K) 10 MEQ CR capsule, TAKE 2 CAPSULES BY MOUTH EVERY DAY, Disp: 60 capsule, Rfl: 1  •  rosuvastatin (CRESTOR) 10 MG tablet, Take 1 tablet by mouth Daily., Disp: 90 tablet, Rfl: 1  •  SITagliptin (Januvia) 100 MG tablet, Take 1 tablet by mouth Daily., Disp: 90 tablet, Rfl: 1   also entered in Sleep Questionnaire    Patient  has a past medical history of  "Acute combined systolic and diastolic congestive heart failure (CMS/[facility]), Atrial fibrillation (CMS/[facility]), Atrial flutter with rapid ventricular response (CMS/[facility]), CHF (congestive heart failure) (CMS/[facility]), Diabetes mellitus (CMS/[facility]), Hypercholesterolemia, Hyperlipidemia, Hypertension, Nonischemic cardiomyopathy (CMS/[facility]), Obesity, MARCOS (obstructive sleep apnea), Persistent atrial fibrillation (CMS/[facility]), Pneumonia of left lung due to Haemophilus influenzae (CMS/[facility]) (1/18/2017), and Sepsis (CMS/[facility]) (1/18/2017).    Social History:    Social History     Socioeconomic History   • Marital status:      Spouse name: Not on file   • Number of children: Not on file   • Years of education: Not on file   • Highest education level: Not on file   Tobacco Use   • Smoking status: Never Smoker   • Smokeless tobacco: Never Used   • Tobacco comment: caffeine use   Substance and Sexual Activity   • Alcohol use: No   • Drug use: No   • Sexual activity: Defer       Allergies:  Patient has no known allergies.    Review of Systems:    A complete review of systems was done and all were negative with the exception of all negative    Vital Signs:    Vitals:    07/01/20 1500   BP: 122/73   BP Location: Left arm   Patient Position: Sitting   Pulse: 59   SpO2: 96%   Weight: 99 kg (218 lb 3.2 oz)   Height: 162.6 cm (64\")     Body mass index is 37.45 kg/m².    Vital Signs /73 (BP Location: Left arm, Patient Position: Sitting)   Pulse 59   Ht 162.6 cm (64\")   Wt 99 kg (218 lb 3.2 oz)   SpO2 96%   BMI 37.45 kg/m²  Body mass index is 37.45 kg/m².    General Alert and oriented. No acute distress noted   Pharynx/Throat Class III Mallampati airway, large tongue, no evidence of redundant lateral pharyngeal tissue. No oral lesions. No thrush. Moist mucous membranes.   Head Normocephalic. Symmetrical. Atraumatic.    Nose No septal deviation. No drainage   Chest Wall Normal shape. Symmetric expansion with respiration. No tenderness. "   Neck Trachea midline, no thyromegaly or adenopathy    Lungs Clear to auscultation bilaterally. No wheezes. No rhonchi. No rales. Respirations regular, even and unlabored.   Heart Regular rhythm and normal rate. Normal S1 and S2. No murmur   Abdomen Soft, non-tender and non-distended. Normal bowel sounds. No masses.   Extremities Moves all extremities well. No edema   Psychiatric Normal mood and affect.     Impression:  1. Mixed sleep apnea    2. Severe obstructive sleep apnea        Echocardiogram done June 18, 2020 shows ejection fraction of 61 to 65%.  This means it is safe for patient to use BiPAP ST.  I will change him to BiPAP ST 26/24 cm H2O with breathing rate of 8.  Return to clinic in 2 months for follow-up.  Continue care per cardiology.  Will need to repeat echocardiogram in 6 months to ensure ejection fraction stays above 45%.  BiPAP ST is contraindicated in patients with ejection fraction less than 45% as it can increase risk morbidity/mortality.  I will place order for repeat echocardiogram to be scheduled in December 2020.    Patient has some questions about recommendations from cardiology nurse practitioner from appointment on 3/18/2020.  In her note she mentions considering discontinuing digoxin given lower heart rate and dyspnea on exertion.  However patient does not seem to have digoxin on his medication list.  He is on dofetilide.  He has not made any changes in his medications because he was confused as well.  Advised patient to call cardiology office tomorrow and to keep appointment with cardiology nurse practitioner for July 17, 2020.    Patient uses the CPAP device and benefits from its use in terms of reduction of hypersomnia and snoring.Weight loss will be strongly beneficial to reduce the severity of sleep-disordered breathing.  Caution during activities that require prolonged concentration is strongly advised if sleepiness returns. Changing of PAP supplies regularly is important for  effective use. Patient needs to change cushion on the mask or plugs on nasal pillows along with disposable filters once every month and change mask frame, tubing, headgear and Velcro straps every 6 months at the minimum.    Galen Meng MD  Sleep Medicine  07/01/20  16:37

## 2020-07-17 ENCOUNTER — OFFICE VISIT (OUTPATIENT)
Dept: CARDIOLOGY | Facility: CLINIC | Age: 75
End: 2020-07-17

## 2020-07-17 VITALS
BODY MASS INDEX: 37.39 KG/M2 | HEART RATE: 49 BPM | HEIGHT: 64 IN | SYSTOLIC BLOOD PRESSURE: 102 MMHG | WEIGHT: 219 LBS | DIASTOLIC BLOOD PRESSURE: 68 MMHG

## 2020-07-17 DIAGNOSIS — G47.33 OBSTRUCTIVE SLEEP APNEA: ICD-10-CM

## 2020-07-17 DIAGNOSIS — I48.91 ATRIAL FIBRILLATION, UNSPECIFIED TYPE (HCC): Primary | ICD-10-CM

## 2020-07-17 DIAGNOSIS — I10 BENIGN ESSENTIAL HTN: ICD-10-CM

## 2020-07-17 DIAGNOSIS — R06.09 DOE (DYSPNEA ON EXERTION): ICD-10-CM

## 2020-07-17 PROCEDURE — 93000 ELECTROCARDIOGRAM COMPLETE: CPT | Performed by: NURSE PRACTITIONER

## 2020-07-17 PROCEDURE — 99214 OFFICE O/P EST MOD 30 MIN: CPT | Performed by: NURSE PRACTITIONER

## 2020-07-17 NOTE — PROGRESS NOTES
Date of Office Visit: 2020  Encounter Provider: Nathaly Lundberg, RACHEL, APRN  Place of Service: [facility] CARDIOLOGY  Patient Name: Ray Pratt  :1945        Subjective:     Chief Complaint:  Follow-up, history of atrial fibrillation/flutter, hypertension      History of Present Illness:  Ray Pratt is a 74 y.o. male patient of Dr. Escalante and Dr. Vazquez.  This is my first time seeing this patient in the office today and I have reviewed his records.    Patient has a history of tachycardia mediated cardiomyopathy, hypertension, diabetes, syncope, atrial flutter    Patient was hospitalized 2019 after presenting to the ER with syncope and lightheadedness.  He was found to be in atrial flutter with rapid ventricular response.  He was rate controlled with digoxin and atenolol and anticoagulated with Eliquis.  TSH was normal.  Echocardiogram showed severe LV dysfunction with EF of 22%.  Heart catheterization showed no evidence of coronary artery disease, significantly elevated left ventricular end-diastolic pressure.  He was diagnosed with obstructive sleep apnea and started on BiPAP therapy.  He was admitted to the hospital 2020 for cardioversion.  He had missed Eliquis.  He was discharged with plans to bring him back in 3 weeks after not missing any blood thinner dosages.  He was admitted in 2020 and initiated on dofetilide.  He had an echocardiogram 2020 showing normal LV systolic function with EF of 61-65, mildly dilated left atrial cavity, trace mitral and aortic valvular regurgitation, trace to mild tricuspid regurgitation with normal RVSP.  He was seen in the office 2020 by FLAKITA MORELAND at which point he was felt to be doing well with QTC okay and no recurrence of atrial fibrillation since starting the medication.  He was continued on apixaban for anticoagulation.  He was instructed to keep appointment with ANICETO in 2 weeks for  repeat EKG and see Dr. Vazquez in 6 months.      Patient presents to office today for follow-up appointment.  Patient reports he is doing well since last visit.  He reports that since he saw FLAKITA MORELAND in the office he has been slowly increasing the amount of time he walks daily.  He is currently doing about 30 minutes a day.  He does feel that he is noticing slow but steady improvement in his stamina as he continues to increase walking distance.  He reports that heart rate at home stays in the 50s.  He has not seen any heart rates in the 40s since seeing FLAKITA MORELAND.  He gets a little bit of shortness of breath with activities however has noticed improvement of stamina as he has continued to walk.  Denies any chest pain/discomfort, palpitations, racing heartbeat sensation, lower extremity edema, dizziness, syncope, near syncope, falls, abnormal bleeding.  He does have fatigue and reports that his central apnea was not well controlled with CPAP and he is now getting BiPAP.  He is hoping that this will help him sleep better and have improved energy levels.  He will continue to follow with sleep medicine on this.        Past Medical History:   Diagnosis Date   • Acute combined systolic and diastolic congestive heart failure (CMS/HCC)    • Atrial fibrillation (CMS/HCC)    • Atrial flutter with rapid ventricular response (CMS/HCC)    • CHF (congestive heart failure) (CMS/HCC)    • Diabetes mellitus (CMS/HCC)    • Hypercholesterolemia    • Hyperlipidemia    • Hypertension    • Nonischemic cardiomyopathy (CMS/HCC)    • Obesity    • MARCOS (obstructive sleep apnea)     compliant with BiPAP   • Persistent atrial fibrillation (CMS/HCC)    • Pneumonia of left lung due to Haemophilus influenzae (CMS/HCC) 1/18/2017   • Sepsis (CMS/HCC) 1/18/2017     Past Surgical History:   Procedure Laterality Date   • CARDIAC CATHETERIZATION N/A 11/5/2019    Procedure: Left Heart Cath;  Surgeon: Sundeep Tsang MD;  Location: [facility] INVASIVE  LOCATION;  Service: Cardiovascular   • CARDIAC CATHETERIZATION N/A 11/5/2019    Procedure: Coronary angiography;  Surgeon: Sundeep Tsang MD;  Location: [facility] INVASIVE LOCATION;  Service: Cardiovascular   • CARDIAC CATHETERIZATION     • DENTAL PROCEDURE     • MOLE REMOVAL       Outpatient Medications Prior to Visit   Medication Sig Dispense Refill   • apixaban (ELIQUIS) 5 MG tablet tablet Take 1 tablet by mouth Every 12 (Twelve) Hours. 60 tablet 11   • atenolol (TENORMIN) 50 MG tablet Take 0.5 tablets by mouth Every 12 (Twelve) Hours. 60 tablet 11   • dofetilide (TIKOSYN) 125 MCG capsule Take 1 capsule by mouth Every 12 (Twelve) Hours. 60 capsule 5   • Empagliflozin (Jardiance) 25 MG tablet Take 25 mg by mouth Daily With Breakfast. 90 tablet 1   • furosemide (LASIX) 40 MG tablet Take 1 tablet by mouth Daily. 30 tablet 11   • potassium chloride (MICRO-K) 10 MEQ CR capsule TAKE 2 CAPSULES BY MOUTH EVERY DAY 60 capsule 1   • rosuvastatin (CRESTOR) 10 MG tablet Take 1 tablet by mouth Daily. 90 tablet 1   • SITagliptin (Januvia) 100 MG tablet Take 1 tablet by mouth Daily. 90 tablet 1     No facility-administered medications prior to visit.        Allergies as of 07/17/2020   • (No Known Allergies)     Social History     Socioeconomic History   • Marital status:      Spouse name: Not on file   • Number of children: Not on file   • Years of education: Not on file   • Highest education level: Not on file   Tobacco Use   • Smoking status: Never Smoker   • Smokeless tobacco: Never Used   • Tobacco comment: caffeine use   Substance and Sexual Activity   • Alcohol use: No   • Drug use: No   • Sexual activity: Defer     Family History   Problem Relation Age of Onset   • Cancer Mother    • Stroke Father        Review of Systems   Constitution: Positive for malaise/fatigue.   Cardiovascular: Positive for dyspnea on exertion. Negative for chest pain, leg swelling, near-syncope, palpitations and syncope.    "  Respiratory:        Central sleep apnea   Hematologic/Lymphatic: Negative for bleeding problem.   Musculoskeletal: Negative for falls.   Gastrointestinal: Negative for melena.   Genitourinary: Negative for hematuria.   Neurological: Negative for dizziness and light-headedness.   Psychiatric/Behavioral: Negative for altered mental status.          Objective:     Vitals:    07/17/20 1138   BP: 102/68   BP Location: Left arm   Pulse: (!) 49   Weight: 99.3 kg (219 lb)   Height: 162.6 cm (64\")     Body mass index is 37.59 kg/m².      PHYSICAL EXAM:  Physical Exam   Constitutional: He is oriented to person, place, and time. He appears well-developed and well-nourished. No distress.   Obese   HENT:   Head: Normocephalic and atraumatic.   Eyes: Pupils are equal, round, and reactive to light.   Neck: Neck supple. No JVD present. Carotid bruit is not present.   Cardiovascular: Normal rate, regular rhythm, normal heart sounds and intact distal pulses. Exam reveals no gallop and no friction rub.   No murmur heard.  Pulses:       Radial pulses are 2+ on the right side, and 2+ on the left side.        Posterior tibial pulses are 2+ on the right side, and 2+ on the left side.   Pulmonary/Chest: Effort normal and breath sounds normal. No respiratory distress. He has no wheezes. He has no rales.   Abdominal: Soft. Bowel sounds are normal. He exhibits no distension.   Musculoskeletal: He exhibits no edema, tenderness or deformity.   Neurological: He is alert and oriented to person, place, and time.   Skin: Skin is warm and dry. He is not diaphoretic. No erythema.   Psychiatric: He has a normal mood and affect. His behavior is normal. Judgment normal.           ECG 12 Lead  Date/Time: 7/17/2020 12:54 PM  Performed by: Nathaly Lundberg DNP, APRN  Authorized by: Nathaly Lundberg DNP, ANICETO   Comparison: compared with previous ECG from 6/18/2020  Similar to previous ECG  Rhythm: sinus bradycardia  Rate: bradycardic  BPM: " 49  Conduction: left bundle branch block  Other findings comments: QTc okay  Comments: No significant changes from previous EKG              Assessment:       Diagnosis Plan   1. Atrial fibrillation, unspecified type (CMS/HCC)     2. Benign essential HTN     3. VILLA (dyspnea on exertion)     4. Obstructive sleep apnea           Plan:     1. Nonischemic cardiomyopathy: Felt to be tachycardia mediated as no significant coronary disease noted on 11/2019 heart catheterization and EF returned to normal on 6/2020 echo.  He appears euvolemic on exam.  He has been checking daily weights which have remained stable though he is hoping to lose weight.  2. Dyspnea on exertion: Likely multifactorial.  He has noticed improvement in stamina/symptoms since he has increased his walking amount.  Recommended continuing to do this slowly and avoiding extreme heat's.  Notify office of new or worsening issues or if stamina does not continue to improve.    3. Atrial fibrillation/flutter: Anticoagulated with apixaban.  On dofetilide.  QTc okay.  No known recurrence of atrial fibrillation/flutter since initiating medication.  4. Hypertension: Well controlled.  Patient continue to monitor.  I asked that he check heart rate and blood pressure at least 1 to 2 hours after morning medications and after resting calmly approximately 10 minutes, keep a log, and call with readings in 1 week or send via my chart.  5. Central sleep apnea: Getting BiPAP.  Continue to follow with sleep medicine.  6. Diabetes: Managed by outside provider  7. Obesity: Healthy diet and exercise discussed.    Patient to keep 1/13/2021 follow-up with Dr. Vazquez as scheduled and follow-up with Dr. Escalante in 6 months or follow-up sooner if needed for any new, recurrent, or worsening symptoms or other issues/concerns.             Your medication list           Accurate as of July 17, 2020 12:56 PM. If you have any questions, ask your nurse or doctor.               CONTINUE  taking these medications      Instructions Last Dose Given Next Dose Due   apixaban 5 MG tablet tablet  Commonly known as:  ELIQUIS      Take 1 tablet by mouth Every 12 (Twelve) Hours.       atenolol 50 MG tablet  Commonly known as:  TENORMIN      Take 0.5 tablets by mouth Every 12 (Twelve) Hours.       dofetilide 125 MCG capsule  Commonly known as:  TIKOSYN      Take 1 capsule by mouth Every 12 (Twelve) Hours.       Empagliflozin 25 MG tablet  Commonly known as:  Jardiance      Take 25 mg by mouth Daily With Breakfast.       furosemide 40 MG tablet  Commonly known as:  LASIX      Take 1 tablet by mouth Daily.       potassium chloride 10 MEQ CR capsule  Commonly known as:  MICRO-K      TAKE 2 CAPSULES BY MOUTH EVERY DAY       rosuvastatin 10 MG tablet  Commonly known as:  CRESTOR      Take 1 tablet by mouth Daily.       SITagliptin 100 MG tablet  Commonly known as:  Januvia      Take 1 tablet by mouth Daily.              I did not stop or change the above medications.  Patient's medication list was updated to reflect medications they are currently taking including medication changes made by other providers.        Thanks,    Nathaly Lundberg, RACHEL, APRN  07/17/2020         Dictated utilizing Dragon dictation

## 2020-07-24 RX ORDER — POTASSIUM CHLORIDE 750 MG/1
CAPSULE, EXTENDED RELEASE ORAL
Qty: 60 CAPSULE | Refills: 1 | Status: SHIPPED | OUTPATIENT
Start: 2020-07-24 | End: 2020-08-17

## 2020-07-27 ENCOUNTER — TELEPHONE (OUTPATIENT)
Dept: CARDIOLOGY | Facility: CLINIC | Age: 75
End: 2020-07-27

## 2020-07-27 NOTE — TELEPHONE ENCOUNTER
----- Message from aRy Miller sent at 7/25/2020 11:57 AM EDT -----  Regarding: Visit Follow-Up Question  Contact: [phone]  Dr Escalante:  During my visit with Nathaly Lundberg on 7/17/2020.  I told her I took my pills and my blood pressure at the same time in the morning.  She told me to wait an hour and half to two hours after I took my pills tO take my blood pressure and after a week send the results to you, her and Kandice Atkinson.  7/18 weight 216.8 /71  P rate 56  7/19 W 216.8 /75 [place] 71  7/20 W 217.8  /75  [place] 52  7/21 W 217.8 /66  [place] 54  7/22 W 217.8 /70  [place] 51  7/23 W 215.8 /73  [place] 60  7/24 W 215.8  /76  [place] 66  7/25 W 214.4 /68 [place] 53    ESTEE MILLRE  [phone]

## 2020-07-27 NOTE — TELEPHONE ENCOUNTER
I spoke with the patient about his BP looking great and that no changes are needed. He verbalized that he understood. natasha

## 2020-08-17 RX ORDER — POTASSIUM CHLORIDE 750 MG/1
CAPSULE, EXTENDED RELEASE ORAL
Qty: 60 CAPSULE | Refills: 1 | Status: SHIPPED | OUTPATIENT
Start: 2020-08-17 | End: 2020-09-08

## 2020-08-17 RX ORDER — DOFETILIDE 0.12 MG/1
125 CAPSULE ORAL EVERY 12 HOURS
Qty: 180 CAPSULE | Refills: 1 | Status: SHIPPED | OUTPATIENT
Start: 2020-08-17 | End: 2021-01-13

## 2020-09-08 RX ORDER — POTASSIUM CHLORIDE 750 MG/1
CAPSULE, EXTENDED RELEASE ORAL
Qty: 60 CAPSULE | Refills: 1 | Status: SHIPPED | OUTPATIENT
Start: 2020-09-08 | End: 2020-10-07

## 2020-09-16 ENCOUNTER — OFFICE VISIT (OUTPATIENT)
Dept: SLEEP MEDICINE | Facility: HOSPITAL | Age: 75
End: 2020-09-16

## 2020-09-16 VITALS
HEIGHT: 64 IN | WEIGHT: 216 LBS | HEART RATE: 62 BPM | BODY MASS INDEX: 36.88 KG/M2 | DIASTOLIC BLOOD PRESSURE: 65 MMHG | OXYGEN SATURATION: 97 % | SYSTOLIC BLOOD PRESSURE: 101 MMHG

## 2020-09-16 DIAGNOSIS — G47.39 MIXED SLEEP APNEA: Primary | ICD-10-CM

## 2020-09-16 DIAGNOSIS — G47.33 SEVERE OBSTRUCTIVE SLEEP APNEA: ICD-10-CM

## 2020-09-16 PROCEDURE — 99213 OFFICE O/P EST LOW 20 MIN: CPT | Performed by: FAMILY MEDICINE

## 2020-09-16 PROCEDURE — G0463 HOSPITAL OUTPT CLINIC VISIT: HCPCS

## 2020-09-16 NOTE — PROGRESS NOTES
Follow Up Sleep Disorders Center Note     Chief Complaint:  MARCOS     Primary Care Physician: Galen Meng MD    Ray Pratt is a 74 y.o.male  was last seen at [facility] sleep lab: 7/1/2020.  We discussed previously he needs to continue to work on medical management of his CHF with cardiology to help control central events.  He cannot use BiPAP ST machine due to ejection fraction of 22%.  However echocardiogram from June 2020 showed ejection fraction of 61 to 65%.  I therefore ordered BiPAP ST 26/24 cm H2O with breathing rate of 8 for patient.  He is advised to continue care per cardiology and return to clinic in 2 months to see me.  At last visit overall AHI of 28 events per hour.  Majority of events were central events.  He had good usage and no significant leak from mask.  Has some air leaking today but feeling better.    Results Review:  DME is [employer].  Downloads between 8/15/2021-9/13/2020.  Average usage is 7 hours 38 minutes.  Average AHI is 7.6.  BiPAP ST 26/24 cm H2O, breath rate 8.  CANDACE: 1.7.    Current Medications:    Current Outpatient Medications:   •  apixaban (ELIQUIS) 5 MG tablet tablet, Take 1 tablet by mouth Every 12 (Twelve) Hours., Disp: 60 tablet, Rfl: 11  •  atenolol (TENORMIN) 50 MG tablet, Take 0.5 tablets by mouth Every 12 (Twelve) Hours., Disp: 60 tablet, Rfl: 11  •  dofetilide (TIKOSYN) 125 MCG capsule, Take 1 capsule by mouth Every 12 (Twelve) Hours., Disp: 180 capsule, Rfl: 1  •  Empagliflozin (Jardiance) 25 MG tablet, Take 25 mg by mouth Daily With Breakfast., Disp: 90 tablet, Rfl: 1  •  furosemide (LASIX) 40 MG tablet, Take 1 tablet by mouth Daily., Disp: 30 tablet, Rfl: 11  •  potassium chloride (MICRO-K) 10 MEQ CR capsule, TAKE 2 CAPSULES BY MOUTH EVERY DAY, Disp: 60 capsule, Rfl: 1  •  rosuvastatin (CRESTOR) 10 MG tablet, Take 1 tablet by mouth Daily., Disp: 90 tablet, Rfl: 1  •  SITagliptin (Januvia) 100 MG tablet, Take 1 tablet by mouth Daily., Disp: 90 tablet, Rfl: 1   also  "entered in Sleep Questionnaire    Patient  has a past medical history of Acute combined systolic and diastolic congestive heart failure (CMS/[facility]), Atrial fibrillation (CMS/[facility]), Atrial flutter with rapid ventricular response (CMS/[facility]), CHF (congestive heart failure) (CMS/[facility]), Diabetes mellitus (CMS/[facility]), Hypercholesterolemia, Hyperlipidemia, Hypertension, Nonischemic cardiomyopathy (CMS/[facility]), Obesity, MARCOS (obstructive sleep apnea), Persistent atrial fibrillation (CMS/[facility]), Pneumonia of left lung due to Haemophilus influenzae (CMS/[facility]) (1/18/2017), and Sepsis (CMS/[facility]) (1/18/2017).    Social History:    Social History     Socioeconomic History   • Marital status:      Spouse name: Not on file   • Number of children: Not on file   • Years of education: Not on file   • Highest education level: Not on file   Tobacco Use   • Smoking status: Never Smoker   • Smokeless tobacco: Never Used   • Tobacco comment: caffeine use   Substance and Sexual Activity   • Alcohol use: No   • Drug use: No   • Sexual activity: Defer       Allergies:  Patient has no known allergies.    Review of Systems:    A complete review of systems was done and all were negative with the exception of all negative    Vital Signs:    Vitals:    09/16/20 0842   BP: 101/65   Pulse: 62   SpO2: 97%   Weight: 98 kg (216 lb)   Height: 162.6 cm (64\")     Body mass index is 37.08 kg/m².    Vital Signs /65   Pulse 62   Ht 162.6 cm (64\")   Wt 98 kg (216 lb)   SpO2 97%   BMI 37.08 kg/m²  Body mass index is 37.08 kg/m².    General Alert and oriented. No acute distress noted   Pharynx/Throat Class III Mallampati airway, large tongue, no evidence of redundant lateral pharyngeal tissue. No oral lesions. No thrush. Moist mucous membranes.   Head Normocephalic. Symmetrical. Atraumatic.    Nose No septal deviation. No drainage   Chest Wall Normal shape. Symmetric expansion with respiration. No tenderness.   Neck Trachea midline, no thyromegaly or " adenopathy    Lungs Clear to auscultation bilaterally. No wheezes. No rhonchi. No rales. Respirations regular, even and unlabored.   Heart Regular rhythm and normal rate. Normal S1 and S2. No murmur   Abdomen Soft, non-tender and non-distended. Normal bowel sounds. No masses.   Extremities Moves all extremities well. No edema   Psychiatric Normal mood and affect.     Impression:  1. Mixed sleep apnea    2. Severe obstructive sleep apnea        Obstructive sleep apnea adequately treated with BiPAP ST 24/26 cm H2O with breath rate of 8 with good compliance and usage and no complaints of hypersomnolence. Mask fitting today. RTC in 3 months for follow up and ECHO.    Patient uses the CPAP device and benefits from its use in terms of reduction of hypersomnia and snoring.Weight loss will be strongly beneficial to reduce the severity of sleep-disordered breathing.  Caution during activities that require prolonged concentration is strongly advised if sleepiness returns. Changing of PAP supplies regularly is important for effective use. Patient needs to change cushion on the mask or plugs on nasal pillows along with disposable filters once every month and change mask frame, tubing, headgear and Velcro straps every 6 months at the minimum.    Galen Meng MD  Sleep Medicine  09/16/20  09:11 EDT

## 2020-10-07 RX ORDER — POTASSIUM CHLORIDE 750 MG/1
CAPSULE, EXTENDED RELEASE ORAL
Qty: 90 CAPSULE | Refills: 3 | Status: SHIPPED | OUTPATIENT
Start: 2020-10-07 | End: 2021-05-12

## 2020-10-21 RX ORDER — FUROSEMIDE 40 MG/1
TABLET ORAL
Qty: 90 TABLET | Refills: 3 | Status: SHIPPED | OUTPATIENT
Start: 2020-10-21 | End: 2021-09-30

## 2020-10-30 RX ORDER — APIXABAN 5 MG/1
TABLET, FILM COATED ORAL
Qty: 60 TABLET | Refills: 11 | Status: SHIPPED | OUTPATIENT
Start: 2020-10-30 | End: 2021-10-18

## 2020-11-16 RX ORDER — ROSUVASTATIN CALCIUM 10 MG/1
TABLET, COATED ORAL
Qty: 30 TABLET | Refills: 0 | Status: SHIPPED | OUTPATIENT
Start: 2020-11-16 | End: 2020-12-03 | Stop reason: SDUPTHER

## 2020-12-02 ENCOUNTER — TELEPHONE (OUTPATIENT)
Dept: FAMILY MEDICINE CLINIC | Facility: CLINIC | Age: 75
End: 2020-12-02

## 2020-12-02 DIAGNOSIS — E11.65 TYPE 2 DIABETES MELLITUS WITH HYPERGLYCEMIA, WITHOUT LONG-TERM CURRENT USE OF INSULIN (HCC): ICD-10-CM

## 2020-12-02 DIAGNOSIS — I10 BENIGN ESSENTIAL HTN: Primary | ICD-10-CM

## 2020-12-03 NOTE — TELEPHONE ENCOUNTER
Pt requesting 90 day refill.  Insurance will not approve 30 day refill.  Pt states he is out of meds.  Thanks

## 2020-12-04 RX ORDER — ROSUVASTATIN CALCIUM 10 MG/1
10 TABLET, COATED ORAL DAILY
Qty: 90 TABLET | Refills: 0 | Status: SHIPPED | OUTPATIENT
Start: 2020-12-04 | End: 2021-02-16

## 2020-12-15 ENCOUNTER — TELEMEDICINE (OUTPATIENT)
Dept: FAMILY MEDICINE CLINIC | Facility: CLINIC | Age: 75
End: 2020-12-15

## 2020-12-15 VITALS — BODY MASS INDEX: 37.39 KG/M2 | WEIGHT: 219 LBS | HEIGHT: 64 IN

## 2020-12-15 DIAGNOSIS — Z00.00 MEDICARE ANNUAL WELLNESS VISIT, SUBSEQUENT: Primary | ICD-10-CM

## 2020-12-15 PROCEDURE — G0439 PPPS, SUBSEQ VISIT: HCPCS | Performed by: FAMILY MEDICINE

## 2020-12-15 RX ORDER — EMPAGLIFLOZIN 25 MG/1
1 TABLET, FILM COATED ORAL
Qty: 90 TABLET | Refills: 1 | Status: SHIPPED | OUTPATIENT
Start: 2020-12-15 | End: 2021-06-17 | Stop reason: SDUPTHER

## 2020-12-15 RX ORDER — LATANOPROST 50 UG/ML
SOLUTION/ DROPS OPHTHALMIC
COMMUNITY
Start: 2020-09-22 | End: 2021-07-19

## 2020-12-15 NOTE — PROGRESS NOTES
The ABCs of the Annual Wellness Visit  Subsequent Medicare Wellness Visit    Chief Complaint   Patient presents with   • Medicare Wellness-subsequent       Subjective   History of Present Illness:  Ray Pratt is a 75 y.o. male who presents today via video visit for a Subsequent Medicare Wellness Visit.    Hyperlipidemia: Rosuvastatin 10 mg a day.  Needs refill.    Type 2 diabetes mellitus: No nephropathy neuropathy retinopathy.  Currently on Jardiance 25 mg a day and Januvia 100 mg a day.  Due for labs.  And blood sugars .  Overall ranges 130s.    History of atrial fibrillation persistent atrial flutter/CHF/dilated cardiomyopathy: Atenolol 50 mg tablet; half a tablet twice a day Lasix 40 mg a day Eliquis 5 mg a day KCl 10 mEq a day. BP yesterday 108/76 and HR 59. BP ranges 115-120/70s.     MARCOS: Follows up in the sleep lab.  Due for echo as he is on BiPAP ST.  To ensure ejection fraction is above 45%.    Flu vaccine: UTD  Tdap: Needs this   Colon cancer screening: Up-to-date  Pneumonia vaccine: UTD  Shingrix: Needs 1 more    HEALTH RISK ASSESSMENT    Recent Hospitalizations:  No hospitalization(s) within the last year.    Current Medical Providers:  Patient Care Team:  Galen eMng MD as PCP - General (Family Medicine)  Reginald Vazquez MD as Referring Physician (Cardiology)    Smoking Status:  Social History     Tobacco Use   Smoking Status Never Smoker   Smokeless Tobacco Never Used   Tobacco Comment    caffeine use       Alcohol Consumption:  Social History     Substance and Sexual Activity   Alcohol Use No       Depression Screen:   PHQ-2/PHQ-9 Depression Screening 12/15/2020   Little interest or pleasure in doing things 0   Feeling down, depressed, or hopeless 0   Trouble falling or staying asleep, or sleeping too much 1   Feeling tired or having little energy 1   Poor appetite or overeating 0   Feeling bad about yourself - or that you are a failure or have let yourself or your family down 0    Trouble concentrating on things, such as reading the newspaper or watching television 0   Moving or speaking so slowly that other people could have noticed. Or the opposite - being so fidgety or restless that you have been moving around a lot more than usual 0   Thoughts that you would be better off dead, or of hurting yourself in some way 0   Total Score 2   If you checked off any problems, how difficult have these problems made it for you to do your work, take care of things at home, or get along with other people? Not difficult at all       Fall Risk Screen:  STEADI Fall Risk Assessment was completed, and patient is at LOW risk for falls.Assessment completed on:12/15/2020    Health Habits and Functional and Cognitive Screening:  Functional & Cognitive Status 12/15/2020   Do you have difficulty preparing food and eating? No   Do you have difficulty bathing yourself, getting dressed or grooming yourself? No   Do you have difficulty using the toilet? No   Do you have difficulty moving around from place to place? No   Do you have trouble with steps or getting out of a bed or a chair? No   Current Diet Well Balanced Diet   Dental Exam Up to date   Eye Exam Up to date   Exercise (times per week) 3 times per week   Current Exercises Include Walking   Do you need help using the phone?  No   Are you deaf or do you have serious difficulty hearing?  No   Do you need help with transportation? No   Do you need help shopping? No   Do you need help preparing meals?  No   Do you need help with housework?  No   Do you need help with laundry? No   Do you need help taking your medications? No   Do you need help managing money? No   Do you ever drive or ride in a car without wearing a seat belt? No   Have you felt unusual stress, anger or loneliness in the last month? No   Who do you live with? Spouse   If you need help, do you have trouble finding someone available to you? No   Have you been bothered in the last four weeks by  sexual problems? No   Do you have difficulty concentrating, remembering or making decisions? No         Does the patient have evidence of cognitive impairment? No    Asprin use counseling:Does not need ASA (and currently is not on it)    Age-appropriate Screening Schedule:  Refer to the list below for future screening recommendations based on patient's age, sex and/or medical conditions. Orders for these recommended tests are listed in the plan section. The patient has been provided with a written plan.    Health Maintenance   Topic Date Due   • TDAP/TD VACCINES (1 - Tdap) 09/29/1964   • DIABETIC FOOT EXAM  05/07/2019   • LIPID PANEL  05/07/2020   • URINE MICROALBUMIN  05/07/2020   • HEMOGLOBIN A1C  11/29/2020   • ZOSTER VACCINE (2 of 2) 12/24/2021 (Originally 7/2/2019)   • DIABETIC EYE EXAM  09/22/2021   • COLONOSCOPY  01/15/2027   • INFLUENZA VACCINE  Completed          The following portions of the patient's history were reviewed and updated as appropriate: allergies, current medications, past family history, past medical history, past social history, past surgical history and problem list.    Outpatient Medications Prior to Visit   Medication Sig Dispense Refill   • atenolol (TENORMIN) 50 MG tablet Take 0.5 tablets by mouth Every 12 (Twelve) Hours. 60 tablet 11   • dofetilide (TIKOSYN) 125 MCG capsule Take 1 capsule by mouth Every 12 (Twelve) Hours. 180 capsule 1   • Eliquis 5 MG tablet tablet TAKE 1 TABLET BY MOUTH EVERY 12 HOURS 60 tablet 11   • furosemide (LASIX) 40 MG tablet TAKE 1 TABLET BY MOUTH EVERY DAY 90 tablet 3   • potassium chloride (MICRO-K) 10 MEQ CR capsule TAKE 2 CAPSULES BY MOUTH EVERY DAY 90 capsule 3   • rosuvastatin (CRESTOR) 10 MG tablet Take 1 tablet by mouth Daily. 90 tablet 0   • Empagliflozin (Jardiance) 25 MG tablet Take 25 mg by mouth Daily With Breakfast. 90 tablet 1   • SITagliptin (Januvia) 100 MG tablet Take 1 tablet by mouth Daily. 90 tablet 1   • latanoprost (XALATAN) 0.005 %  ophthalmic solution INSTILL 1 DROP INTO RIGHT EYE EVERY DAY AT BEDTIME       No facility-administered medications prior to visit.        Patient Active Problem List   Diagnosis   • Hypoxia   • Type 2 diabetes mellitus with hyperglycemia (CMS/[facility])   • Benign essential HTN   • Atrial fibrillation, persistent (CMS/[facility])   • Atrial flutter with rapid ventricular response (CMS/[facility])   • Acute combined systolic and diastolic congestive heart failure (CMS/[facility])   • Obstructive sleep apnea   • Class 3 severe obesity due to excess calories without serious comorbidity with body mass index (BMI) of 40.0 to 44.9 in adult (CMS/[facility])   • A-fib (CMS/[facility])   • On dofetilide therapy   • Class 2 severe obesity due to excess calories with serious comorbidity and body mass index (BMI) of 37.0 to 37.9 in adult (CMS/[facility])   • Cardiomyopathy, dilated (CMS/[facility])   • VILLA (dyspnea on exertion)       Advanced Care Planning:  ACP discussion was held with the patient during this visit. Patient does not have an advance directive, information provided.    Review of Systems   Constitutional: Negative for fever.   HENT: Negative for nosebleeds and trouble swallowing.    Eyes: Negative for visual disturbance.   Respiratory: Negative for choking and stridor.    Cardiovascular: Negative for chest pain.   Gastrointestinal: Negative for blood in stool.   Endocrine: Negative for polydipsia.   Genitourinary: Negative for genital sores and hematuria.   Musculoskeletal: Negative for joint swelling.   Skin: Negative for color change and rash.   Allergic/Immunologic: Negative for immunocompromised state.   Neurological: Negative for seizures, facial asymmetry and speech difficulty.   Hematological: Negative for adenopathy.   Psychiatric/Behavioral: Negative for behavioral problems, self-injury and suicidal ideas.       Compared to one year ago, the patient feels his physical health is the same.  Compared to one year ago, the patient feels his mental health is the  "same.    Reviewed chart for potential of high risk medication in the elderly: yes  Reviewed chart for potential of harmful drug interactions in the elderly:yes    Objective         Vitals:    12/15/20 0947   Weight: 99.3 kg (219 lb)   Height: 162.6 cm (64\")   PainSc: 0-No pain       Body mass index is 37.59 kg/m².  Discussed the patient's BMI with him. The BMI is above average; BMI management plan is completed.    Physical Exam  Constitutional:       Appearance: He is well-developed.   HENT:      Head: Normocephalic and atraumatic.      Right Ear: External ear normal.      Left Ear: External ear normal.   Pulmonary:      Effort: Pulmonary effort is normal. No respiratory distress.   Neurological:      Mental Status: He is alert and oriented to person, place, and time.   Psychiatric:         Behavior: Behavior normal.               Assessment/Plan   Medicare Risks and Personalized Health Plan  CMS Preventative Services Quick Reference  Advance Directive Discussion  Cardiovascular risk  Diabetic Lab Screening   Immunizations Discussed/Encouraged (specific immunizations; Td )  Obesity/Overweight   Polypharmacy    The above risks/problems have been discussed with the patient.  Pertinent information has been shared with the patient in the After Visit Summary.  Follow up plans and orders are seen below in the Assessment/Plan Section.    Diagnoses and all orders for this visit:    1. Medicare annual wellness visit, subsequent (Primary)    Other orders  -     Empagliflozin (Jardiance) 25 MG tablet; Take 25 mg by mouth Daily With Breakfast.  Dispense: 90 tablet; Refill: 1  -     SITagliptin (Januvia) 100 MG tablet; Take 1 tablet by mouth Daily.  Dispense: 90 tablet; Refill: 1      Follow Up:  No follow-ups on file.     An After Visit Summary and PPPS were given to the patient.        Follow-up with me in the sleep clinic in 3 months.  Echo is already been ordered; needs to get this scheduled.  Follow-up lab work.  Orders " already placed.  Patient will get tetanus shot at pharmacy.  Return to primary care clinic in 6 months for follow-up or sooner if needed.    Time spent during visit: 25 minutes.

## 2020-12-16 ENCOUNTER — APPOINTMENT (OUTPATIENT)
Dept: SLEEP MEDICINE | Facility: HOSPITAL | Age: 75
End: 2020-12-16

## 2021-01-13 ENCOUNTER — OFFICE VISIT (OUTPATIENT)
Dept: CARDIOLOGY | Facility: CLINIC | Age: 76
End: 2021-01-13

## 2021-01-13 VITALS
HEIGHT: 64 IN | WEIGHT: 220 LBS | SYSTOLIC BLOOD PRESSURE: 126 MMHG | DIASTOLIC BLOOD PRESSURE: 80 MMHG | HEART RATE: 60 BPM | BODY MASS INDEX: 37.56 KG/M2

## 2021-01-13 DIAGNOSIS — Z79.899 ON DOFETILIDE THERAPY: ICD-10-CM

## 2021-01-13 DIAGNOSIS — E66.01 CLASS 3 SEVERE OBESITY DUE TO EXCESS CALORIES WITHOUT SERIOUS COMORBIDITY WITH BODY MASS INDEX (BMI) OF 40.0 TO 44.9 IN ADULT (HCC): Primary | ICD-10-CM

## 2021-01-13 DIAGNOSIS — I48.19 ATRIAL FIBRILLATION, PERSISTENT (HCC): ICD-10-CM

## 2021-01-13 PROCEDURE — 93000 ELECTROCARDIOGRAM COMPLETE: CPT | Performed by: INTERNAL MEDICINE

## 2021-01-13 PROCEDURE — 99214 OFFICE O/P EST MOD 30 MIN: CPT | Performed by: INTERNAL MEDICINE

## 2021-01-13 RX ORDER — ATENOLOL 25 MG/1
25 TABLET ORAL EVERY 12 HOURS SCHEDULED
Qty: 180 TABLET | Refills: 3 | Status: SHIPPED | OUTPATIENT
Start: 2021-01-13 | End: 2022-01-17 | Stop reason: SDUPTHER

## 2021-01-13 RX ORDER — DOFETILIDE 0.12 MG/1
125 CAPSULE ORAL EVERY 12 HOURS
Qty: 180 CAPSULE | Refills: 1 | Status: SHIPPED | OUTPATIENT
Start: 2021-01-13 | End: 2021-07-16

## 2021-01-13 NOTE — PROGRESS NOTES
Date of Office Visit: 2021  Encounter Provider: Reginald Vazquez MD  Place of Service: [facility] CARDIOLOG[facility]  Patient Name: Ray Pratt  : 1945    Subjective:     Encounter Date:2021      Patient ID: Ray Pratt is a 75 y.o. male who has a cc of  PAF and is here dofetilide fu.    He's doing well over the past 6 months. No further episodes of AF.     He had normal EF in July--which is great b/c he had EF of 22% 6 months before.       No anginal chest pain,   Mild to moderate martin,   No soa,   No fainting,  No orthostasis.   No edema.   Exercise tolerance: he is sedentary.     There have been no hospital admission since the last visit.     There have been no bleeding events.       Past Medical History:   Diagnosis Date   • Acute combined systolic and diastolic congestive heart failure (CMS/HCC)    • Atrial fibrillation (CMS/HCC)    • Atrial flutter with rapid ventricular response (CMS/HCC)    • CHF (congestive heart failure) (CMS/HCC)    • Diabetes mellitus (CMS/HCC)    • Hypercholesterolemia    • Hyperlipidemia    • Hypertension    • Nonischemic cardiomyopathy (CMS/HCC)    • Obesity    • MARCOS (obstructive sleep apnea)     compliant with BiPAP   • Persistent atrial fibrillation (CMS/HCC)    • Pneumonia of left lung due to Haemophilus influenzae (CMS/HCC) 2017   • Sepsis (CMS/HCC) 2017       Social History     Socioeconomic History   • Marital status:      Spouse name: Not on file   • Number of children: Not on file   • Years of education: Not on file   • Highest education level: Not on file   Tobacco Use   • Smoking status: Never Smoker   • Smokeless tobacco: Never Used   • Tobacco comment: caffeine use   Substance and Sexual Activity   • Alcohol use: No   • Drug use: No   • Sexual activity: Defer       Family History   Problem Relation Age of Onset   • Cancer Mother    • Stroke Father        Review of Systems   Constitution: Negative for  "fever and night sweats.   HENT: Negative for ear pain and stridor.    Eyes: Negative for discharge and visual halos.   Cardiovascular: Negative for cyanosis.   Respiratory: Negative for hemoptysis and sputum production.    Hematologic/Lymphatic: Negative for adenopathy.   Skin: Negative for nail changes and unusual hair distribution.   Musculoskeletal: Positive for arthritis and back pain. Negative for gout and joint swelling.   Gastrointestinal: Negative for bowel incontinence and flatus.   Genitourinary: Negative for dysuria and flank pain.   Neurological: Negative for seizures and tremors.   Psychiatric/Behavioral: Negative for altered mental status. The patient is not nervous/anxious.             Objective:     Vitals:    01/13/21 1146   BP: 126/80   Pulse: 60   Weight: 99.8 kg (220 lb)   Height: 162.6 cm (64\")         Eyes:      General:         Right eye: No discharge.         Left eye: No discharge.   HENT:      Head: Normocephalic and atraumatic.   Neck:      Thyroid: No thyromegaly.      Vascular: No JVD.   Pulmonary:      Effort: Pulmonary effort is normal.      Breath sounds: Normal breath sounds. No rales.   Cardiovascular:      Normal rate. Regular rhythm.      No gallop.   Edema:     Peripheral edema absent.   Abdominal:      General: Bowel sounds are normal.      Palpations: Abdomen is soft.      Tenderness: There is no abdominal tenderness.   Musculoskeletal: Normal range of motion.         General: No deformity.   Skin:     General: Skin is warm and dry.      Findings: No erythema.   Neurological:      Mental Status: Alert and oriented to person, place, and time.      Motor: Normal muscle tone.   Psychiatric:         Behavior: Behavior normal.         Thought Content: Thought content normal.           ECG 12 Lead    Date/Time: 1/13/2021 12:06 PM  Performed by: Reginald Vazquez MD  Authorized by: Reginald Vazquez MD   Comparison: compared with previous ECG   Similar to previous ECG  Rhythm: sinus " rhythm  Conduction: right bundle branch block    Clinical impression: abnormal EKG            Lab Review:       Assessment:          Diagnosis Plan   1. Class 3 severe obesity due to excess calories without serious comorbidity with body mass index (BMI) of 40.0 to 44.9 in adult (CMS/[facility])     2. On dofetilide therapy     3. Atrial fibrillation, persistent (CMS/[facility])            Plan:       AF -- all is good. No symptoms, controlled on dofetilide and QT is ok. He is tolerating apixaban well. No issues.     Big problem is Body mass index is 37.76 kg/m². --    We disc weight loss. This would help a lot.

## 2021-01-18 ENCOUNTER — OFFICE VISIT (OUTPATIENT)
Dept: CARDIOLOGY | Facility: CLINIC | Age: 76
End: 2021-01-18

## 2021-01-18 VITALS
BODY MASS INDEX: 37.56 KG/M2 | WEIGHT: 220 LBS | SYSTOLIC BLOOD PRESSURE: 117 MMHG | DIASTOLIC BLOOD PRESSURE: 76 MMHG | HEART RATE: 59 BPM | HEIGHT: 64 IN

## 2021-01-18 DIAGNOSIS — R06.09 DOE (DYSPNEA ON EXERTION): ICD-10-CM

## 2021-01-18 DIAGNOSIS — Z79.899 ON DOFETILIDE THERAPY: ICD-10-CM

## 2021-01-18 DIAGNOSIS — I48.92 ATRIAL FLUTTER WITH RAPID VENTRICULAR RESPONSE (HCC): ICD-10-CM

## 2021-01-18 DIAGNOSIS — Z79.01 LONG TERM (CURRENT) USE OF ANTICOAGULANTS: ICD-10-CM

## 2021-01-18 DIAGNOSIS — I50.41 ACUTE COMBINED SYSTOLIC AND DIASTOLIC CONGESTIVE HEART FAILURE (HCC): ICD-10-CM

## 2021-01-18 DIAGNOSIS — G47.33 OBSTRUCTIVE SLEEP APNEA: ICD-10-CM

## 2021-01-18 DIAGNOSIS — I42.0 CARDIOMYOPATHY, DILATED (HCC): ICD-10-CM

## 2021-01-18 DIAGNOSIS — I48.19 ATRIAL FIBRILLATION, PERSISTENT (HCC): Primary | ICD-10-CM

## 2021-01-18 PROCEDURE — 99442 PR PHYS/QHP TELEPHONE EVALUATION 11-20 MIN: CPT | Performed by: INTERNAL MEDICINE

## 2021-01-18 NOTE — PROGRESS NOTES
"PATIENTINFORMATION    Date of Office Visit: 21  Encounter Provider: Chelita Escalante MD  Place of Service: [facility] CARDIOLOG[facility]  Patient Name: Ray Pratt  : 1945    Subjective:         Patient ID: Ray Pratt is a 75 y.o. male.      History of Present Illness    This is a gentleman who was hospitalized in 2019.  He has diabetes and hypertension.  He presented to the emergency room with syncope and lightheadedness.  He was found to be in atrial flutter he was rate controlled with digoxin and atenolol and anticoagulated with Eliquis.  With rapid ventricular response.  TSH was normal.  Echocardiogram showed severe LV dysfunction with ejection fraction 22%.  Heart catheterization showed no evidence of coronary disease with significantly elevated left ventricular end-diastolic pressure.  He was diagnosed with obstructive sleep apnea and started on BiPAP.  He was admitted to the hospital and 2020 for a cardioversion.  He had missed abciximab.  He was discharged home with the plan of bringing him back in 3 weeks once he had not missed any blood thinner.  He saw electrophysiology and was started on dofetilide.  He converted back to sinus rhythm.  He is on abciximab and for anticoagulation. He had a repeat echocardiogram in 2020 which showed normal LV function with an ejection fraction of about 65% with mild left atrial enlargement no significant valve disease.    We spoke over the phone today and he is feeling well.  He is not exercising religiously but when he does do physical exertion he is not having any problems.  He denies chest pain, shortness of breath, palpitations or any bleeding complications from his anticoagulation.         Objective:     /76   Pulse 59   Ht 162.6 cm (64\")   Wt 99.8 kg (220 lb)   BMI 37.76 kg/m²  Body mass index is 37.76 kg/m².         Assessment/Plan:       1.  Persistent atrial fibrillation.  Has " maintained sinus rhythm to the best of her knowledge since being on dofetilide.  Anticoagulated with Eliquis.  No bleeding side effects.  Follows with Sarah and Dr. Vazquez  2.  Nonischemic cardiomyopathy.  Likely tachycardia mediated.  Last ejection fraction was normal.  The doctor treating sleep medicine wants another echocardiogram.  The order is in so I will just make sure it gets scheduled.  Continue current medications.  3.  Diabetes  4.  Obstructive sleep apnea.  5.  Hypertension.  Well controlled.  Monitors his blood pressure at home and has not had any elevated numbers.    Myself or Ritu will see him back in 1 year.  He sees Sarah in July.    You have chosen to receive care through a telephone visit today. Do you consent to use a telephone visit for your medical care today? Yes.    Telemedicine visit lasted 11 minutes.    No orders of the defined types were placed in this encounter.       Discharge Medications          Accurate as of January 18, 2021  2:00 PM. If you have any questions, ask your nurse or doctor.            Continue These Medications      Instructions Start Date   atenolol 25 MG tablet  Commonly known as: TENORMIN   25 mg, Oral, Every 12 Hours Scheduled      dofetilide 125 MCG capsule  Commonly known as: TIKOSYN   125 mcg, Oral, Every 12 Hours      Eliquis 5 MG tablet tablet  Generic drug: apixaban   TAKE 1 TABLET BY MOUTH EVERY 12 HOURS      furosemide 40 MG tablet  Commonly known as: LASIX   TAKE 1 TABLET BY MOUTH EVERY DAY      Jardiance 25 MG tablet  Generic drug: Empagliflozin   25 mg, Oral, Daily With Breakfast      latanoprost 0.005 % ophthalmic solution  Commonly known as: XALATAN   INSTILL 1 DROP INTO RIGHT EYE EVERY DAY AT BEDTIME      potassium chloride 10 MEQ CR capsule  Commonly known as: MICRO-K   TAKE 2 CAPSULES BY MOUTH EVERY DAY      rosuvastatin 10 MG tablet  Commonly known as: CRESTOR   10 mg, Oral, Daily      SITagliptin 100 MG tablet  Commonly known as: Januvia   100  mg, Oral, Daily                    Chelita Escalante MD  01/18/21  14:00 EST

## 2021-01-18 NOTE — PROGRESS NOTES
Patient was seen last week by Dr Vazquez and had an EKG     Palps no  SOA VILLA  Edema no  Lightheaded no  Chest Pain no  Fatigue no

## 2021-01-28 ENCOUNTER — HOSPITAL ENCOUNTER (OUTPATIENT)
Dept: CARDIOLOGY | Facility: HOSPITAL | Age: 76
Discharge: HOME | End: 2021-01-28
Admitting: FAMILY MEDICINE

## 2021-01-28 VITALS
HEIGHT: 64 IN | DIASTOLIC BLOOD PRESSURE: 70 MMHG | HEART RATE: 57 BPM | OXYGEN SATURATION: 96 % | BODY MASS INDEX: 37.56 KG/M2 | SYSTOLIC BLOOD PRESSURE: 110 MMHG | WEIGHT: 220 LBS

## 2021-01-28 DIAGNOSIS — G47.33 SEVERE OBSTRUCTIVE SLEEP APNEA: ICD-10-CM

## 2021-01-28 DIAGNOSIS — G47.39 MIXED SLEEP APNEA: ICD-10-CM

## 2021-01-28 LAB
AORTIC ARCH: 3 CM
ASCENDING AORTA: 3.3 CM
BH CV ECHO MEAS - ACS: 1.8 CM
BH CV ECHO MEAS - AO MAX PG (FULL): 4.8 MMHG
BH CV ECHO MEAS - AO MAX PG: 9.5 MMHG
BH CV ECHO MEAS - AO MEAN PG (FULL): 4.1 MMHG
BH CV ECHO MEAS - AO MEAN PG: 6.7 MMHG
BH CV ECHO MEAS - AO ROOT AREA (BSA CORRECTED): 1.4
BH CV ECHO MEAS - AO ROOT AREA: 6.2 CM^2
BH CV ECHO MEAS - AO ROOT DIAM: 2.8 CM
BH CV ECHO MEAS - AO V2 MAX: 153.8 CM/SEC
BH CV ECHO MEAS - AO V2 MEAN: 126.4 CM/SEC
BH CV ECHO MEAS - AO V2 VTI: 43.2 CM
BH CV ECHO MEAS - ASC AORTA: 3.3 CM
BH CV ECHO MEAS - AVA(I,A): 2.1 CM^2
BH CV ECHO MEAS - AVA(I,D): 2.1 CM^2
BH CV ECHO MEAS - AVA(V,A): 2.5 CM^2
BH CV ECHO MEAS - AVA(V,D): 2.5 CM^2
BH CV ECHO MEAS - BSA(HAYCOCK): 2.2 M^2
BH CV ECHO MEAS - BSA: 2 M^2
BH CV ECHO MEAS - BZI_BMI: 37.8 KILOGRAMS/M^2
BH CV ECHO MEAS - BZI_METRIC_HEIGHT: 162.6 CM
BH CV ECHO MEAS - BZI_METRIC_WEIGHT: 99.8 KG
BH CV ECHO MEAS - EDV(MOD-SP2): 65 ML
BH CV ECHO MEAS - EDV(MOD-SP4): 83 ML
BH CV ECHO MEAS - EDV(TEICH): 140.9 ML
BH CV ECHO MEAS - EF(CUBED): 78.4 %
BH CV ECHO MEAS - EF(MOD-BP): 65.2 %
BH CV ECHO MEAS - EF(MOD-SP2): 64.6 %
BH CV ECHO MEAS - EF(MOD-SP4): 67.5 %
BH CV ECHO MEAS - EF(TEICH): 70.1 %
BH CV ECHO MEAS - ESV(MOD-SP2): 23 ML
BH CV ECHO MEAS - ESV(MOD-SP4): 27 ML
BH CV ECHO MEAS - ESV(TEICH): 42.2 ML
BH CV ECHO MEAS - FS: 40 %
BH CV ECHO MEAS - IVS/LVPW: 1.1
BH CV ECHO MEAS - IVSD: 1.1 CM
BH CV ECHO MEAS - LAT PEAK E' VEL: 7.1 CM/SEC
BH CV ECHO MEAS - LV DIASTOLIC VOL/BSA (35-75): 40.7 ML/M^2
BH CV ECHO MEAS - LV MASS(C)D: 228.4 GRAMS
BH CV ECHO MEAS - LV MASS(C)DI: 112.1 GRAMS/M^2
BH CV ECHO MEAS - LV MAX PG: 4.6 MMHG
BH CV ECHO MEAS - LV MEAN PG: 2.6 MMHG
BH CV ECHO MEAS - LV SYSTOLIC VOL/BSA (12-30): 13.3 ML/M^2
BH CV ECHO MEAS - LV V1 MAX: 107.8 CM/SEC
BH CV ECHO MEAS - LV V1 MEAN: 75.8 CM/SEC
BH CV ECHO MEAS - LV V1 VTI: 25.4 CM
BH CV ECHO MEAS - LVIDD: 5.4 CM
BH CV ECHO MEAS - LVIDS: 3.2 CM
BH CV ECHO MEAS - LVLD AP2: 6.8 CM
BH CV ECHO MEAS - LVLD AP4: 6.9 CM
BH CV ECHO MEAS - LVLS AP2: 5.6 CM
BH CV ECHO MEAS - LVLS AP4: 5.9 CM
BH CV ECHO MEAS - LVOT AREA (M): 3.5 CM^2
BH CV ECHO MEAS - LVOT AREA: 3.6 CM^2
BH CV ECHO MEAS - LVOT DIAM: 2.1 CM
BH CV ECHO MEAS - LVPWD: 1 CM
BH CV ECHO MEAS - MED PEAK E' VEL: 6.2 CM/SEC
BH CV ECHO MEAS - MV A DUR: 0.11 SEC
BH CV ECHO MEAS - MV A MAX VEL: 94.6 CM/SEC
BH CV ECHO MEAS - MV DEC SLOPE: 443.3 CM/SEC^2
BH CV ECHO MEAS - MV DEC TIME: 0.22 SEC
BH CV ECHO MEAS - MV E MAX VEL: 90.8 CM/SEC
BH CV ECHO MEAS - MV E/A: 0.96
BH CV ECHO MEAS - MV MAX PG: 3.6 MMHG
BH CV ECHO MEAS - MV MEAN PG: 1.4 MMHG
BH CV ECHO MEAS - MV P1/2T MAX VEL: 93.8 CM/SEC
BH CV ECHO MEAS - MV P1/2T: 62 MSEC
BH CV ECHO MEAS - MV V2 MAX: 94.7 CM/SEC
BH CV ECHO MEAS - MV V2 MEAN: 53.5 CM/SEC
BH CV ECHO MEAS - MV V2 VTI: 39.7 CM
BH CV ECHO MEAS - MVA P1/2T LCG: 2.3 CM^2
BH CV ECHO MEAS - MVA(P1/2T): 3.5 CM^2
BH CV ECHO MEAS - MVA(VTI): 2.3 CM^2
BH CV ECHO MEAS - PA ACC TIME: 0.09 SEC
BH CV ECHO MEAS - PA MAX PG (FULL): 0.82 MMHG
BH CV ECHO MEAS - PA MAX PG: 2.3 MMHG
BH CV ECHO MEAS - PA PR(ACCEL): 39.8 MMHG
BH CV ECHO MEAS - PA V2 MAX: 76.6 CM/SEC
BH CV ECHO MEAS - PULM A REVS DUR: 0.11 SEC
BH CV ECHO MEAS - PULM A REVS VEL: 31.4 CM/SEC
BH CV ECHO MEAS - PULM DIAS VEL: 56.7 CM/SEC
BH CV ECHO MEAS - PULM S/D: 1
BH CV ECHO MEAS - PULM SYS VEL: 57.6 CM/SEC
BH CV ECHO MEAS - PVA(V,A): 3.8 CM^2
BH CV ECHO MEAS - PVA(V,D): 3.8 CM^2
BH CV ECHO MEAS - QP/QS: 0.92
BH CV ECHO MEAS - RAP SYSTOLE: 3 MMHG
BH CV ECHO MEAS - RV MAX PG: 1.5 MMHG
BH CV ECHO MEAS - RV MEAN PG: 0.94 MMHG
BH CV ECHO MEAS - RV V1 MAX: 61.7 CM/SEC
BH CV ECHO MEAS - RV V1 MEAN: 46.7 CM/SEC
BH CV ECHO MEAS - RV V1 VTI: 17.9 CM
BH CV ECHO MEAS - RVOT AREA: 4.7 CM^2
BH CV ECHO MEAS - RVOT DIAM: 2.4 CM
BH CV ECHO MEAS - RVSP: 24.8 MMHG
BH CV ECHO MEAS - SI(AO): 132.1 ML/M^2
BH CV ECHO MEAS - SI(CUBED): 60.3 ML/M^2
BH CV ECHO MEAS - SI(LVOT): 45 ML/M^2
BH CV ECHO MEAS - SI(MOD-SP2): 20.6 ML/M^2
BH CV ECHO MEAS - SI(MOD-SP4): 27.5 ML/M^2
BH CV ECHO MEAS - SI(TEICH): 48.5 ML/M^2
BH CV ECHO MEAS - SUP REN AO DIAM: 1.4 CM
BH CV ECHO MEAS - SV(AO): 269.1 ML
BH CV ECHO MEAS - SV(CUBED): 122.9 ML
BH CV ECHO MEAS - SV(LVOT): 91.7 ML
BH CV ECHO MEAS - SV(MOD-SP2): 42 ML
BH CV ECHO MEAS - SV(MOD-SP4): 56 ML
BH CV ECHO MEAS - SV(RVOT): 84 ML
BH CV ECHO MEAS - SV(TEICH): 98.7 ML
BH CV ECHO MEAS - TAPSE (>1.6): 2.5 CM
BH CV ECHO MEAS - TR MAX VEL: 233.7 CM/SEC
BH CV ECHO MEASUREMENTS AVERAGE E/E' RATIO: 13.65
BH CV VAS BP RIGHT ARM: NORMAL MMHG
BH CV XLRA - RV BASE: 3.3 CM
BH CV XLRA - RV LENGTH: 7.5 CM
BH CV XLRA - RV MID: 3.5 CM
BH CV XLRA - TDI S': 10.9 CM/SEC
LEFT ATRIUM VOLUME INDEX: 25 ML/M2
LV EF 2D ECHO EST: 65 %
MAXIMAL PREDICTED HEART RATE: 145 BPM
SINUS: 3.1 CM
STJ: 3.2 CM
STRESS TARGET HR: 123 BPM

## 2021-01-28 PROCEDURE — 93306 TTE W/DOPPLER COMPLETE: CPT

## 2021-01-28 PROCEDURE — 93306 TTE W/DOPPLER COMPLETE: CPT | Performed by: INTERNAL MEDICINE

## 2021-01-28 NOTE — PROGRESS NOTES
Echocardiogram shows normal left ventricular diastolic function and systolic function; ejection fraction of 65%.  He is okay to continue using BiPAP ST.  Can repeat in 1 year.

## 2021-01-29 LAB
ALBUMIN SERPL-MCNC: 4 G/DL (ref 3.7–4.7)
ALBUMIN/GLOB SERPL: 1.3 {RATIO} (ref 1.2–2.2)
ALP SERPL-CCNC: 64 IU/L (ref 39–117)
ALT SERPL-CCNC: 10 IU/L (ref 0–44)
AST SERPL-CCNC: 16 IU/L (ref 0–40)
BASOPHILS # BLD AUTO: 0.1 X10E3/UL (ref 0–0.2)
BASOPHILS NFR BLD AUTO: 1 %
BILIRUB SERPL-MCNC: 0.4 MG/DL (ref 0–1.2)
BUN SERPL-MCNC: 21 MG/DL (ref 8–27)
BUN/CREAT SERPL: 19 (ref 10–24)
CALCIUM SERPL-MCNC: 9.3 MG/DL (ref 8.6–10.2)
CHLORIDE SERPL-SCNC: 101 MMOL/L (ref 96–106)
CHOLEST SERPL-MCNC: 149 MG/DL (ref 100–199)
CO2 SERPL-SCNC: 27 MMOL/L (ref 20–29)
CREAT SERPL-MCNC: 1.08 MG/DL (ref 0.76–1.27)
EOSINOPHIL # BLD AUTO: 0.1 X10E3/UL (ref 0–0.4)
EOSINOPHIL NFR BLD AUTO: 2 %
ERYTHROCYTE [DISTWIDTH] IN BLOOD BY AUTOMATED COUNT: 14.1 % (ref 11.6–15.4)
GLOBULIN SER CALC-MCNC: 3 G/DL (ref 1.5–4.5)
GLUCOSE SERPL-MCNC: 97 MG/DL (ref 65–99)
HBA1C MFR BLD: 6.5 % (ref 4.8–5.6)
HCT VFR BLD AUTO: 46.8 % (ref 37.5–51)
HDLC SERPL-MCNC: 53 MG/DL
HGB BLD-MCNC: 15.5 G/DL (ref 13–17.7)
IMM GRANULOCYTES # BLD AUTO: 0 X10E3/UL (ref 0–0.1)
IMM GRANULOCYTES NFR BLD AUTO: 1 %
LDLC SERPL CALC-MCNC: 83 MG/DL (ref 0–99)
LDLC/HDLC SERPL: 1.6 RATIO (ref 0–3.6)
LYMPHOCYTES # BLD AUTO: 2.1 X10E3/UL (ref 0.7–3.1)
LYMPHOCYTES NFR BLD AUTO: 31 %
MCH RBC QN AUTO: 30 PG (ref 26.6–33)
MCHC RBC AUTO-ENTMCNC: 33.1 G/DL (ref 31.5–35.7)
MCV RBC AUTO: 91 FL (ref 79–97)
MICROALBUMIN UR-MCNC: 8 UG/ML
MONOCYTES # BLD AUTO: 1 X10E3/UL (ref 0.1–0.9)
MONOCYTES NFR BLD AUTO: 15 %
NEUTROPHILS # BLD AUTO: 3.3 X10E3/UL (ref 1.4–7)
NEUTROPHILS NFR BLD AUTO: 50 %
PLATELET # BLD AUTO: 238 X10E3/UL (ref 150–450)
POTASSIUM SERPL-SCNC: 4.6 MMOL/L (ref 3.5–5.2)
PROT SERPL-MCNC: 7 G/DL (ref 6–8.5)
RBC # BLD AUTO: 5.16 X10E6/UL (ref 4.14–5.8)
SODIUM SERPL-SCNC: 142 MMOL/L (ref 134–144)
TRIGL SERPL-MCNC: 66 MG/DL (ref 0–149)
TSH SERPL DL<=0.005 MIU/L-ACNC: 0.7 UIU/ML (ref 0.45–4.5)
VLDLC SERPL CALC-MCNC: 13 MG/DL (ref 5–40)
WBC # BLD AUTO: 6.6 X10E3/UL (ref 3.4–10.8)

## 2021-02-03 ENCOUNTER — IMMUNIZATION (OUTPATIENT)
Dept: VACCINE CLINIC | Facility: HOSPITAL | Age: 76
End: 2021-02-03

## 2021-02-03 PROCEDURE — 0001A: CPT | Performed by: INTERNAL MEDICINE

## 2021-02-03 PROCEDURE — 91300 HC SARSCOV02 VAC 30MCG/0.3ML IM: CPT | Performed by: INTERNAL MEDICINE

## 2021-02-16 RX ORDER — ROSUVASTATIN CALCIUM 10 MG/1
TABLET, COATED ORAL
Qty: 90 TABLET | Refills: 0 | Status: SHIPPED | OUTPATIENT
Start: 2021-02-16 | End: 2021-05-10

## 2021-02-24 ENCOUNTER — IMMUNIZATION (OUTPATIENT)
Dept: VACCINE CLINIC | Facility: HOSPITAL | Age: 76
End: 2021-02-24

## 2021-02-24 PROCEDURE — 91300 HC SARSCOV02 VAC 30MCG/0.3ML IM: CPT | Performed by: INTERNAL MEDICINE

## 2021-02-24 PROCEDURE — 0002A: CPT | Performed by: INTERNAL MEDICINE

## 2021-03-01 ENCOUNTER — DOCUMENTATION (OUTPATIENT)
Dept: CARDIOLOGY | Facility: CLINIC | Age: 76
End: 2021-03-01

## 2021-05-10 RX ORDER — ROSUVASTATIN CALCIUM 10 MG/1
TABLET, COATED ORAL
Qty: 30 TABLET | Refills: 0 | Status: SHIPPED | OUTPATIENT
Start: 2021-05-10 | End: 2021-05-17 | Stop reason: SDUPTHER

## 2021-05-12 RX ORDER — POTASSIUM CHLORIDE 750 MG/1
CAPSULE, EXTENDED RELEASE ORAL
Qty: 180 CAPSULE | Refills: 1 | Status: SHIPPED | OUTPATIENT
Start: 2021-05-12 | End: 2021-11-04

## 2021-05-17 RX ORDER — ROSUVASTATIN CALCIUM 10 MG/1
10 TABLET, COATED ORAL DAILY
Qty: 90 TABLET | Refills: 3 | Status: SHIPPED | OUTPATIENT
Start: 2021-05-17 | End: 2022-05-23 | Stop reason: SDUPTHER

## 2021-06-17 ENCOUNTER — OFFICE VISIT (OUTPATIENT)
Dept: FAMILY MEDICINE CLINIC | Facility: CLINIC | Age: 76
End: 2021-06-17

## 2021-06-17 VITALS
TEMPERATURE: 97.3 F | OXYGEN SATURATION: 98 % | RESPIRATION RATE: 20 BRPM | BODY MASS INDEX: 40.8 KG/M2 | HEART RATE: 56 BPM | DIASTOLIC BLOOD PRESSURE: 66 MMHG | WEIGHT: 239 LBS | HEIGHT: 64 IN | SYSTOLIC BLOOD PRESSURE: 123 MMHG

## 2021-06-17 DIAGNOSIS — I10 BENIGN ESSENTIAL HTN: ICD-10-CM

## 2021-06-17 DIAGNOSIS — E11.65 TYPE 2 DIABETES MELLITUS WITH HYPERGLYCEMIA, WITHOUT LONG-TERM CURRENT USE OF INSULIN (HCC): Primary | ICD-10-CM

## 2021-06-17 PROCEDURE — 99213 OFFICE O/P EST LOW 20 MIN: CPT | Performed by: FAMILY MEDICINE

## 2021-06-17 RX ORDER — EMPAGLIFLOZIN 25 MG/1
1 TABLET, FILM COATED ORAL
Qty: 90 TABLET | Refills: 1 | Status: SHIPPED | OUTPATIENT
Start: 2021-06-17 | End: 2021-12-28

## 2021-06-17 NOTE — PROGRESS NOTES
Subjective   Ray Pratt is a 75 y.o. male.     History of Present Illness     Answers for HPI/ROS submitted by the patient on 6/11/2021  What is the primary reason for your visit?: Diabetes    75-year-old male presents today for 6-month follow-up on hypertension and type 2 diabetes mellitus.    Hypertension: Atenolol 25 mg twice a day; prescribed by cardiology.  Also on Lasix 40 mg once a day.  Potassium chloride 10 mEq; 2 capsules every day.  Blood pressure in the office today 123/66.    Hyperlipidemia: Crestor 10 mg a day.    Type 2 diabetes mellitus: Januvia 100 mg a day, Jardiance 25 mg a day.  Last A1c was checked in January 2021 and was 6.5%.  Microalbumin creatinine ratio was normal.  Today patient reports fatigue polyuria and dizziness.  Reports this has improved since adjusting how he wears his CPAP machine.    The following portions of the patient's history were reviewed and updated as appropriate: allergies, current medications, past family history, past medical history, past social history, past surgical history and problem list.    Review of Systems   Constitutional: Positive for fatigue.   Endocrine: Positive for polyuria.   Skin: Negative for pallor.   Neurological: Positive for dizziness. Negative for tremors, seizures, speech difficulty and confusion.   Psychiatric/Behavioral: The patient is not nervous/anxious.        Objective   Physical Exam  Constitutional:       Appearance: He is well-developed.   HENT:      Head: Normocephalic and atraumatic.   Eyes:      Pupils: Pupils are equal, round, and reactive to light.   Cardiovascular:      Rate and Rhythm: Normal rate and regular rhythm.      Heart sounds: Normal heart sounds. No murmur heard.     Pulmonary:      Effort: Pulmonary effort is normal. No respiratory distress.      Breath sounds: Normal breath sounds. No stridor. No wheezing or rales.   Musculoskeletal:      Cervical back: Normal range of motion and neck supple.   Skin:      General: Skin is warm.   Neurological:      Mental Status: He is alert and oriented to person, place, and time.   Psychiatric:         Behavior: Behavior normal.                 Assessment/Plan     Diagnoses and all orders for this visit:    1. Type 2 diabetes mellitus with hyperglycemia, without long-term current use of insulin (CMS/[facility]) (Primary)  -     Comprehensive Metabolic Panel  -     Hemoglobin A1c  -     Microalbumin / Creatinine Urine Ratio - Urine, Clean Catch    2. Benign essential HTN  -     Comprehensive Metabolic Panel    Other orders  -     Empagliflozin (Jardiance) 25 MG tablet; Take 25 mg by mouth Daily With Breakfast.  Dispense: 90 tablet; Refill: 1  -     SITagliptin (Januvia) 100 MG tablet; Take 1 tablet by mouth Daily.  Dispense: 90 tablet; Refill: 1

## 2021-06-18 LAB
ALBUMIN SERPL-MCNC: 4.4 G/DL (ref 3.5–5.2)
ALBUMIN/CREAT UR: 23 MG/G CREAT (ref 0–29)
ALBUMIN/GLOB SERPL: 1.6 G/DL
ALP SERPL-CCNC: 66 U/L (ref 39–117)
ALT SERPL-CCNC: 13 U/L (ref 1–41)
AST SERPL-CCNC: 13 U/L (ref 1–40)
BILIRUB SERPL-MCNC: 0.5 MG/DL (ref 0–1.2)
BUN SERPL-MCNC: 22 MG/DL (ref 8–23)
BUN/CREAT SERPL: 21.8 (ref 7–25)
CALCIUM SERPL-MCNC: 9.7 MG/DL (ref 8.6–10.5)
CHLORIDE SERPL-SCNC: 103 MMOL/L (ref 98–107)
CO2 SERPL-SCNC: 30.8 MMOL/L (ref 22–29)
CREAT SERPL-MCNC: 1.01 MG/DL (ref 0.76–1.27)
CREAT UR-MCNC: 16.9 MG/DL
GLOBULIN SER CALC-MCNC: 2.8 GM/DL
GLUCOSE SERPL-MCNC: 120 MG/DL (ref 65–99)
HBA1C MFR BLD: 7 % (ref 4.8–5.6)
MICROALBUMIN UR-MCNC: 3.9 UG/ML
POTASSIUM SERPL-SCNC: 4.7 MMOL/L (ref 3.5–5.2)
PROT SERPL-MCNC: 7.2 G/DL (ref 6–8.5)
SODIUM SERPL-SCNC: 143 MMOL/L (ref 136–145)

## 2021-06-18 NOTE — PROGRESS NOTES
A1c 7%; goal is less than 7% all other labs essentially normal.  Watch diet and exercise so A1c does not get higher plan to recheck in 3 months.

## 2021-07-09 ENCOUNTER — TELEPHONE (OUTPATIENT)
Dept: SLEEP MEDICINE | Facility: HOSPITAL | Age: 76
End: 2021-07-09

## 2021-07-09 NOTE — TELEPHONE ENCOUNTER
----- Message from Galen Meng MD sent at 7/9/2021  1:43 PM EDT -----  Please let patient know I reviewed his data from his Pap machine.  Looks like AHI is not under control.  Please pull data from last 30 days for me to review next time I am in the sleep lab.  Let patient know once I review more recent data I will let him know if any changes need to be made.

## 2021-07-16 RX ORDER — DOFETILIDE 0.12 MG/1
125 CAPSULE ORAL EVERY 12 HOURS
Qty: 180 CAPSULE | Refills: 0 | Status: SHIPPED | OUTPATIENT
Start: 2021-07-16 | End: 2021-10-11

## 2021-07-19 ENCOUNTER — OFFICE VISIT (OUTPATIENT)
Dept: FAMILY MEDICINE CLINIC | Facility: CLINIC | Age: 76
End: 2021-07-19

## 2021-07-19 VITALS
HEART RATE: 61 BPM | TEMPERATURE: 97.5 F | DIASTOLIC BLOOD PRESSURE: 68 MMHG | WEIGHT: 237.6 LBS | RESPIRATION RATE: 20 BRPM | BODY MASS INDEX: 40.56 KG/M2 | SYSTOLIC BLOOD PRESSURE: 120 MMHG | OXYGEN SATURATION: 96 % | HEIGHT: 64 IN

## 2021-07-19 DIAGNOSIS — I48.91 ATRIAL FIBRILLATION, UNSPECIFIED TYPE (HCC): ICD-10-CM

## 2021-07-19 DIAGNOSIS — E11.65 TYPE 2 DIABETES MELLITUS WITH HYPERGLYCEMIA, WITHOUT LONG-TERM CURRENT USE OF INSULIN (HCC): Primary | ICD-10-CM

## 2021-07-19 DIAGNOSIS — I10 BENIGN ESSENTIAL HTN: ICD-10-CM

## 2021-07-19 DIAGNOSIS — I42.0 CARDIOMYOPATHY, DILATED (HCC): ICD-10-CM

## 2021-07-19 DIAGNOSIS — G47.33 OBSTRUCTIVE SLEEP APNEA: ICD-10-CM

## 2021-07-19 DIAGNOSIS — E66.01 OBESITY, MORBID, BMI 40.0-49.9 (HCC): ICD-10-CM

## 2021-07-19 DIAGNOSIS — Z79.899 HIGH RISK MEDICATION USE: ICD-10-CM

## 2021-07-19 PROCEDURE — 99214 OFFICE O/P EST MOD 30 MIN: CPT | Performed by: FAMILY MEDICINE

## 2021-07-19 NOTE — PROGRESS NOTES
HPI  Ray Pratt is a 75 y.o. male who is here to establish a new primary care physician.  Previous primary care physician will be specializing in sleep apnea therapy which patient is also being followed for.  Also followed by cardiologist for atrial fibrillation etc.  Most recent lab work reviewed from 1 month ago and all seems to be doing well.  Patient does seem eye doctor once a year and has a lazy right eye.  Records reviewed and again overall doing well on current regimen and will plan for follow-up visit in December for 6-month lab as well has Medicare wellness evaluation.  Other health maintenance issues discussed and recommend checking with pharmacy for Tdap.  Will do hepatitis C screen with other lab work in December.      Review of Systems   All other systems reviewed and are negative.        Past Medical History:   Diagnosis Date   • Acute combined systolic and diastolic congestive heart failure (CMS/HCC)    • Atrial fibrillation (CMS/HCC)    • Atrial flutter with rapid ventricular response (CMS/HCC)    • CHF (congestive heart failure) (CMS/HCC)    • Diabetes mellitus (CMS/HCC)    • Dilated cardiomyopathy (CMS/HCC)    • Hypercholesterolemia    • Hyperlipidemia    • Hypertension    • Nonischemic cardiomyopathy (CMS/HCC)    • Obesity     class 2 severe, BMI 37.0-37.9   • On dofetilide therapy    • MARCOS (obstructive sleep apnea)     compliant with BiPAP   • Persistent atrial fibrillation (CMS/HCC)    • Pneumonia of left lung due to Haemophilus influenzae (CMS/HCC) 1/18/2017   • Sepsis (CMS/HCC) 1/18/2017       Past Surgical History:   Procedure Laterality Date   • CARDIAC CATHETERIZATION N/A 11/5/2019    Procedure: Left Heart Cath;  Surgeon: Sundeep Tsang MD;  Location: [facility] CATH INVASIVE LOCATION;  Service: Cardiovascular   • CARDIAC CATHETERIZATION N/A 11/5/2019    Procedure: Coronary angiography;  Surgeon: Sundeep Tsang MD;  Location:  [facility] CATH INVASIVE LOCATION;  Service:  Cardiovascular   • CARDIAC CATHETERIZATION     • DENTAL PROCEDURE     • MOLE REMOVAL         Family History   Problem Relation Age of Onset   • Cancer Mother    • Stroke Father        Social History     Socioeconomic History   • Marital status:      Spouse name: Not on file   • Number of children: Not on file   • Years of education: Not on file   • Highest education level: Not on file   Tobacco Use   • Smoking status: Never Smoker   • Smokeless tobacco: Never Used   • Tobacco comment: caffeine use   Substance and Sexual Activity   • Alcohol use: No   • Drug use: No   • Sexual activity: Defer       Vitals:    07/19/21 1006   BP: 120/68   Pulse: 61   Resp: 20   Temp: 97.5 °F (36.4 °C)   SpO2: 96%        Body mass index is 40.78 kg/m².      Physical Exam  Vitals and nursing note reviewed.   Constitutional:       General: He is not in acute distress.     Appearance: He is well-developed. He is obese. He is not ill-appearing.   HENT:      Head: Normocephalic and atraumatic.      Nose:      Comments: Patient with mask.  Provider with mask and shield  Eyes:      Conjunctiva/sclera: Conjunctivae normal.      Pupils: Pupils are equal, round, and reactive to light.      Comments: Right eye deviated laterally   Neck:      Thyroid: No thyromegaly.   Cardiovascular:      Rate and Rhythm: Normal rate and regular rhythm.      Heart sounds: Normal heart sounds.   Pulmonary:      Effort: Pulmonary effort is normal. No respiratory distress.      Breath sounds: Normal breath sounds.   Abdominal:      General: There is no distension.      Palpations: There is no mass.      Tenderness: There is no abdominal tenderness.      Hernia: No hernia is present.   Musculoskeletal:         General: No tenderness or deformity. Normal range of motion.      Cervical back: Normal range of motion.   Lymphadenopathy:      Cervical: No cervical adenopathy.   Skin:     General: Skin is warm and dry.      Coloration: Skin is not pale.       Findings: No rash.   Neurological:      General: No focal deficit present.      Mental Status: He is alert and oriented to person, place, and time.      Motor: No abnormal muscle tone.      Coordination: Coordination normal.   Psychiatric:         Mood and Affect: Mood normal.         Behavior: Behavior normal.         Thought Content: Thought content normal.         Judgment: Judgment normal.           Assessment/Plan    Diagnoses and all orders for this visit:    1. Type 2 diabetes mellitus with hyperglycemia, without long-term current use of insulin (CMS/HCC) (Primary)    2. Obstructive sleep apnea    3. Benign essential HTN    4. Cardiomyopathy, dilated (CMS/HCC)    5. Atrial fibrillation, unspecified type (CMS/HCC)    6. High risk medication use      Patient with multiple ongoing medical issues some of which are noted above.  Changing primary care physicians as discussed.  Lab work from 1 month ago reviewed and indicated diabetes control is adequate.  Encouraged healthy diet exercise and weight loss.  Health maintenance evaluation in December along with 6-month lab work.    This note includes information entered using a voice recognition dictation system.

## 2021-07-26 ENCOUNTER — OFFICE VISIT (OUTPATIENT)
Dept: CARDIOLOGY | Facility: CLINIC | Age: 76
End: 2021-07-26

## 2021-07-26 VITALS
SYSTOLIC BLOOD PRESSURE: 124 MMHG | HEIGHT: 64 IN | WEIGHT: 232 LBS | DIASTOLIC BLOOD PRESSURE: 90 MMHG | HEART RATE: 76 BPM | BODY MASS INDEX: 39.61 KG/M2

## 2021-07-26 DIAGNOSIS — I48.19 ATRIAL FIBRILLATION, PERSISTENT (HCC): Primary | ICD-10-CM

## 2021-07-26 DIAGNOSIS — Z79.899 ON DOFETILIDE THERAPY: ICD-10-CM

## 2021-07-26 DIAGNOSIS — I10 BENIGN ESSENTIAL HTN: ICD-10-CM

## 2021-07-26 DIAGNOSIS — G47.33 OBSTRUCTIVE SLEEP APNEA: ICD-10-CM

## 2021-07-26 DIAGNOSIS — E66.01 CLASS 3 SEVERE OBESITY DUE TO EXCESS CALORIES WITHOUT SERIOUS COMORBIDITY WITH BODY MASS INDEX (BMI) OF 40.0 TO 44.9 IN ADULT (HCC): ICD-10-CM

## 2021-07-26 DIAGNOSIS — I42.0 CARDIOMYOPATHY, DILATED (HCC): ICD-10-CM

## 2021-07-26 PROCEDURE — 93000 ELECTROCARDIOGRAM COMPLETE: CPT | Performed by: NURSE PRACTITIONER

## 2021-07-26 PROCEDURE — 99213 OFFICE O/P EST LOW 20 MIN: CPT | Performed by: NURSE PRACTITIONER

## 2021-07-26 RX ORDER — LATANOPROST 50 UG/ML
1 SOLUTION/ DROPS OPHTHALMIC NIGHTLY
COMMUNITY
End: 2022-09-23

## 2021-07-26 NOTE — PROGRESS NOTES
Date of Office Visit: 2021  Encounter Provider: ANICETO Stone  Place of Service: [facility] CARDIOLOGY  Patient Name: Ray Pratt  :1945    Chief Complaint   Patient presents with   • persistent AFIB     6 month f/u   • dofetilide therapy   :     HPI: Ray Pratt is a 75 y.o. male who is a patient of Dr. Escalante and Dr. Vazquez.  He had symptomatic persistent A. fib as well as nonischemic cardiomyopathy (resolved).  He was admitted in 2020 and started on dofetilide, he spontaneously converted after the first dose--QTC was prolonged on higher doses but acceptable on 125 mcg twice daily and he has done well on this dose. He also has a history of HTN, MARCOS, DM and obesity.    He had a follow-up echo and  of last year and another one in January of this year and both show normal LV systolic function.    He presents today for routine follow-up.    He is doing okay. He denies chest pain, dyspnea, PND, orthopnea, dizziness or edema.     He has not had episodes of AF that he is aware of---he checks his b/p from time to time and it has never said his heart beat was irregular.     He has gained some weight---he was walking and has not been doing that recently but is trying to get back on track.     He has some intermittent fatigue but is not walking regularly.     Apixaban for AC---no bleeding issues.     He has some issues with his Bipap with Dr. Meng---recent adjustments, follow up appt in Sept.     Had routine labs in ---reviewed creat and electrolytes ok.        Past Medical History:   Diagnosis Date   • Acute combined systolic and diastolic congestive heart failure (CMS/HCC)    • Atrial fibrillation (CMS/HCC)    • Atrial flutter with rapid ventricular response (CMS/HCC)    • CHF (congestive heart failure) (CMS/HCC)    • Diabetes mellitus (CMS/HCC)    • Dilated cardiomyopathy (CMS/HCC)    • Hypercholesterolemia    • Hyperlipidemia    •  Hypertension    • Nonischemic cardiomyopathy (CMS/[facility])    • Obesity     class 2 severe, BMI 37.0-37.9   • On dofetilide therapy    • MARCOS (obstructive sleep apnea)     compliant with BiPAP   • Persistent atrial fibrillation (CMS/[facility])    • Pneumonia of left lung due to Haemophilus influenzae (CMS/[facility]) 1/18/2017   • Sepsis (CMS/[facility]) 1/18/2017       Past Surgical History:   Procedure Laterality Date   • CARDIAC CATHETERIZATION N/A 11/5/2019    Procedure: Left Heart Cath;  Surgeon: Sundeep Tsang MD;  Location:  [facility] CATH INVASIVE LOCATION;  Service: Cardiovascular   • CARDIAC CATHETERIZATION N/A 11/5/2019    Procedure: Coronary angiography;  Surgeon: Sundeep Tsang MD;  Location:  [facility] CATH INVASIVE LOCATION;  Service: Cardiovascular   • CARDIAC CATHETERIZATION     • DENTAL PROCEDURE     • MOLE REMOVAL         Social History     Socioeconomic History   • Marital status:      Spouse name: Not on file   • Number of children: Not on file   • Years of education: Not on file   • Highest education level: Not on file   Tobacco Use   • Smoking status: Never Smoker   • Smokeless tobacco: Never Used   • Tobacco comment: caffeine use   Vaping Use   • Vaping Use: Never used   Substance and Sexual Activity   • Alcohol use: No   • Drug use: No   • Sexual activity: Defer       Family History   Problem Relation Age of Onset   • Cancer Mother    • Stroke Father        Review of Systems   Constitutional: Positive for malaise/fatigue and weight gain. Negative for chills and fever.   Cardiovascular: Negative for chest pain, dyspnea on exertion, leg swelling, near-syncope, orthopnea, palpitations, paroxysmal nocturnal dyspnea and syncope.   Respiratory: Negative for cough and shortness of breath.    Hematologic/Lymphatic: Negative.    Musculoskeletal: Negative for joint pain, joint swelling and myalgias.   Gastrointestinal: Negative for abdominal pain, diarrhea, melena, nausea and vomiting.   Genitourinary: Negative for  "frequency and hematuria.   Neurological: Negative for light-headedness, numbness, paresthesias and seizures.   Allergic/Immunologic: Negative.    All other systems reviewed and are negative.      No Known Allergies      Current Outpatient Medications:   •  atenolol (TENORMIN) 25 MG tablet, Take 1 tablet by mouth Every 12 (Twelve) Hours., Disp: 180 tablet, Rfl: 3  •  dofetilide (TIKOSYN) 125 MCG capsule, TAKE 1 CAPSULE BY MOUTH EVERY 12 (TWELVE) HOURS., Disp: 180 capsule, Rfl: 0  •  Eliquis 5 MG tablet tablet, TAKE 1 TABLET BY MOUTH EVERY 12 HOURS, Disp: 60 tablet, Rfl: 11  •  Empagliflozin (Jardiance) 25 MG tablet, Take 25 mg by mouth Daily With Breakfast., Disp: 90 tablet, Rfl: 1  •  furosemide (LASIX) 40 MG tablet, TAKE 1 TABLET BY MOUTH EVERY DAY, Disp: 90 tablet, Rfl: 3  •  latanoprost (XALATAN) 0.005 % ophthalmic solution, 1 drop Every Night., Disp: , Rfl:   •  potassium chloride (MICRO-K) 10 MEQ CR capsule, TAKE 2 CAPSULES BY MOUTH EVERY DAY, Disp: 180 capsule, Rfl: 1  •  rosuvastatin (CRESTOR) 10 MG tablet, Take 1 tablet by mouth Daily., Disp: 90 tablet, Rfl: 3  •  SITagliptin (Januvia) 100 MG tablet, Take 1 tablet by mouth Daily., Disp: 90 tablet, Rfl: 1      Objective:     Vitals:    07/26/21 1110   BP: 124/90   Pulse: 76   Weight: 105 kg (232 lb)   Height: 162.6 cm (64\")     Body mass index is 39.82 kg/m².    PHYSICAL EXAM:    Vitals Reviewed.   General Appearance: No acute distress, well developed and well nourished.   Eyes: Conjunctiva and lids: No erythema, swelling, or discharge. Sclera non-icteric.   HENT: Atraumatic, normocephalic. External eyes, ears, and nose normal.   Respiratory: No signs of respiratory distress. Respiration rhythm and depth normal.   Clear to auscultation. No rales, crackles, rhonchi, or wheezing auscultated.   Cardiovascular:  Jugular Venous Pressure: Normal  Heart Rate and Rhythm: Normal, Heart Sounds: Normal S1 and S2. No S3 or S4 noted.  Murmurs: No murmurs noted. No rubs, " thrills, or gallops.   Arterial Pulses:  Posterior tibialis and dorsalis pedis pulses normal.   Lower Extremities: No edema noted.  Gastrointestinal:  Abdomen soft, non-distended, non-tender.   Musculoskeletal: Normal movement of extremities  Skin and Nails: General appearance normal. No pallor, cyanosis, diaphoresis. Skin temperature normal. No clubbing of fingernails.   Psychiatric: Patient alert and oriented to person, place, and time. Speech and behavior appropriate. Normal mood and affect.       ECG 12 Lead    Date/Time: 7/26/2021 11:13 AM  Performed by: Kandice Wylie APRN  Authorized by: Kandice Wylie APRN   Comparison: compared with previous ECG   Similar to previous ECG  Rhythm: sinus rhythm  BPM: 76  Conduction: right bundle branch block  Comments: QTc okay              Assessment:       Diagnosis Plan   1. Atrial fibrillation, persistent (CMS/HCC)  ECG 12 Lead   2. On dofetilide therapy  ECG 12 Lead   3. Cardiomyopathy, dilated (CMS/HCC)     4. Benign essential HTN     5. Class 3 severe obesity due to excess calories without serious comorbidity with body mass index (BMI) of 40.0 to 44.9 in adult (CMS/HCC)     6. Obstructive sleep apnea            Plan:       1.-2. Persistent afib, admitted 2/2020 for dofetilide---prolonged QTc on higher dose, has been fine on 125 mcg BID     3. Dilated CM---resolved by most recent echo 1/2021--EF normal.     4. HTN, controlled.     5. For the problem of overweight/obesity, we discussed the importance of lifestyle measures and strategies for weight loss, such as improved nutrition, regular exercise and sleep hygiene.      6. MARCOS---treated with Bipap--follows with Dr. Meng.     Follow up with Dr. Escalante as directed and Dr. Vazquez in 6 months.     As always, it has been a pleasure to participate in your patient's care.      Sincerely,         ANICETO Newell

## 2021-08-13 ENCOUNTER — OFFICE VISIT (OUTPATIENT)
Dept: FAMILY MEDICINE CLINIC | Facility: CLINIC | Age: 76
End: 2021-08-13

## 2021-08-13 VITALS
HEIGHT: 64 IN | WEIGHT: 234.8 LBS | HEART RATE: 75 BPM | BODY MASS INDEX: 40.08 KG/M2 | OXYGEN SATURATION: 94 % | RESPIRATION RATE: 18 BRPM | DIASTOLIC BLOOD PRESSURE: 80 MMHG | TEMPERATURE: 97.1 F | SYSTOLIC BLOOD PRESSURE: 110 MMHG

## 2021-08-13 DIAGNOSIS — J02.0 STREPTOCOCCAL PHARYNGITIS: ICD-10-CM

## 2021-08-13 DIAGNOSIS — B08.4 HAND, FOOT AND MOUTH DISEASE: Primary | ICD-10-CM

## 2021-08-13 DIAGNOSIS — E11.65 TYPE 2 DIABETES MELLITUS WITH HYPERGLYCEMIA, WITHOUT LONG-TERM CURRENT USE OF INSULIN (HCC): ICD-10-CM

## 2021-08-13 PROCEDURE — 99213 OFFICE O/P EST LOW 20 MIN: CPT | Performed by: FAMILY MEDICINE

## 2021-08-13 NOTE — PROGRESS NOTES
HPI  Ray Pratt is a 75 y.o. male who is here for follow up of rash on hands and feet.  Apparently was seen at urgent care center yesterday and diagnosed with strep throat by swab.  Wife also diagnosed with strep and both were placed on amoxicillin.  Did have recent exposure to 2-year-old grandson.  Apparently for the last couple of days has had a rash initially on his hands but now his feet.  This includes some somewhat unusual lesions with almost hive-like formations, vasculitis?  He has had slight sore throat.  Wife has had more sore throat.  This is complicated by the fact patient is diabetic.  Was given Kenalog cream and there is some very slight itching but mostly pain with the lesions.      Review of Systems   Constitutional: Negative for chills and fever.   HENT: Positive for sore throat.    Skin: Positive for rash.   All other systems reviewed and are negative.        Past Medical History:   Diagnosis Date   • Acute combined systolic and diastolic congestive heart failure (CMS/HCC)    • Atrial fibrillation (CMS/HCC)    • Atrial flutter with rapid ventricular response (CMS/HCC)    • CHF (congestive heart failure) (CMS/HCC)    • Diabetes mellitus (CMS/HCC)    • Dilated cardiomyopathy (CMS/HCC)    • Hypercholesterolemia    • Hyperlipidemia    • Hypertension    • Nonischemic cardiomyopathy (CMS/HCC)    • Obesity     class 2 severe, BMI 37.0-37.9   • On dofetilide therapy    • MARCOS (obstructive sleep apnea)     compliant with BiPAP   • Persistent atrial fibrillation (CMS/HCC)    • Pneumonia of left lung due to Haemophilus influenzae (CMS/HCC) 1/18/2017   • Sepsis (CMS/HCC) 1/18/2017       Past Surgical History:   Procedure Laterality Date   • CARDIAC CATHETERIZATION N/A 11/5/2019    Procedure: Left Heart Cath;  Surgeon: Sundeep Tsang MD;  Location: [facility] INVASIVE LOCATION;  Service: Cardiovascular   • CARDIAC CATHETERIZATION N/A 11/5/2019    Procedure: Coronary angiography;  Surgeon: Trinh  Sundeep PERKINS MD;  Location: [facility] INVASIVE LOCATION;  Service: Cardiovascular   • CARDIAC CATHETERIZATION     • DENTAL PROCEDURE     • MOLE REMOVAL         Family History   Problem Relation Age of Onset   • Cancer Mother    • Stroke Father        Social History     Socioeconomic History   • Marital status:      Spouse name: Not on file   • Number of children: Not on file   • Years of education: Not on file   • Highest education level: Not on file   Tobacco Use   • Smoking status: Never Smoker   • Smokeless tobacco: Never Used   • Tobacco comment: caffeine use   Vaping Use   • Vaping Use: Never used   Substance and Sexual Activity   • Alcohol use: No   • Drug use: No   • Sexual activity: Defer       Vitals:    08/13/21 1129   BP: 110/80   Pulse: 75   Resp: 18   Temp: 97.1 °F (36.2 °C)   SpO2: 94%        Body mass index is 40.3 kg/m².      Physical Exam  Vitals and nursing note reviewed.   Constitutional:       General: He is not in acute distress.     Appearance: He is well-developed.   HENT:      Head: Normocephalic and atraumatic.      Nose:      Comments: Patient and wife with mask.  Provider with mask and shield  Eyes:      Conjunctiva/sclera: Conjunctivae normal.      Pupils: Pupils are equal, round, and reactive to light.   Neck:      Thyroid: No thyromegaly.   Cardiovascular:      Rate and Rhythm: Normal rate and regular rhythm.      Heart sounds: Normal heart sounds.   Pulmonary:      Effort: Pulmonary effort is normal. No respiratory distress.      Breath sounds: Normal breath sounds.   Abdominal:      General: There is no distension.      Palpations: Abdomen is soft. There is no mass.      Tenderness: There is no abdominal tenderness.      Hernia: No hernia is present.   Musculoskeletal:         General: No tenderness or deformity. Normal range of motion.      Cervical back: Normal range of motion.   Lymphadenopathy:      Cervical: No cervical adenopathy.   Skin:     General: Skin is warm and dry.       Coloration: Skin is not pale.      Findings: Rash present. Rash is macular, nodular, papular and vesicular. Rash is not crusting, purpuric, pustular, scaling or urticarial.          Neurological:      General: No focal deficit present.      Mental Status: He is alert and oriented to person, place, and time.      Motor: No abnormal muscle tone.      Coordination: Coordination normal.   Psychiatric:         Mood and Affect: Mood normal.         Behavior: Behavior normal.         Thought Content: Thought content normal.         Judgment: Judgment normal.           Assessment/Plan    Diagnoses and all orders for this visit:    1. Hand, foot and mouth disease (Primary)    2. Streptococcal pharyngitis    3. Type 2 diabetes mellitus with hyperglycemia, without long-term current use of insulin (CMS/[facility])        Patient mostly presents with an unusual rash especially on hands and feet.  Recently seen in urgent care center and diagnosed with strep pharyngitis.  Was started on amoxicillin and given steroid cream for hand rash.  Rash has slightly worsened.  Further complicated by diabetes and cardiac disease..  Unusual rash on hands especially in suspect possibly viral etiology as noted above.  Apparently was recently exposed to 18-year-old grandson.  Patient and his wife both were diagnosed with strep pharyngitis and started on antibiotic.  In view of probable viral etiology informed only treatment is symptomatic?  If worsens consider systemic steroids but rather avoid especially with diabetes diagnosis.  If worsens or persists may need dermatology?    This note includes information entered using a voice recognition dictation system.  Though reviewed, some nonsensible errors may remain.    .

## 2021-09-15 ENCOUNTER — OFFICE VISIT (OUTPATIENT)
Dept: SLEEP MEDICINE | Facility: HOSPITAL | Age: 76
End: 2021-09-15

## 2021-09-15 ENCOUNTER — TELEPHONE (OUTPATIENT)
Dept: SLEEP MEDICINE | Facility: HOSPITAL | Age: 76
End: 2021-09-15

## 2021-09-15 VITALS
OXYGEN SATURATION: 95 % | SYSTOLIC BLOOD PRESSURE: 103 MMHG | HEIGHT: 64 IN | WEIGHT: 230 LBS | HEART RATE: 55 BPM | BODY MASS INDEX: 39.27 KG/M2 | DIASTOLIC BLOOD PRESSURE: 66 MMHG

## 2021-09-15 DIAGNOSIS — G47.39 MIXED SLEEP APNEA: Primary | ICD-10-CM

## 2021-09-15 DIAGNOSIS — G47.33 SEVERE OBSTRUCTIVE SLEEP APNEA: ICD-10-CM

## 2021-09-15 PROCEDURE — 99214 OFFICE O/P EST MOD 30 MIN: CPT | Performed by: FAMILY MEDICINE

## 2021-09-15 PROCEDURE — G0463 HOSPITAL OUTPT CLINIC VISIT: HCPCS

## 2021-09-15 NOTE — TELEPHONE ENCOUNTER
Patient fitted with an Airfit F30 medium.  If patient has difficulty with this mask then he can try an Airtouch F20, medium.

## 2021-09-15 NOTE — PROGRESS NOTES
Follow Up [facility] Note     Chief Complaint:  MARCOS     Primary Care Physician: Chidi Marino MD    Ray Pratt is a 75 y.o.male  was last seen at [facility] sleep lab: 9/16/2020.    In the past had discussed continue to work on medical management of CHF with cardiology to help control central apnea events.  Patient on BiPAP ST.  Last data review was in June 2020.  This showed with BiPAP setting of 26/24 breath rate of 8 overall AHI was not under control with AHI of 11.8.  Of note last echocardiogram was done January 2021 which showed ejection fraction of 65%. Last visit with cardiology was July 26, 2021.    Results Review:  DME is [employer].  Downloads between 6/15/2021-9/12/2021.  Average usage is 6 hours 58 minutes.  Average AHI is 13.7.  Average AutoCPAP pressure is 26/24 cm H2O breath rate of 8.  CANDACE today 2.7.    Current Medications:    Current Outpatient Medications:   •  atenolol (TENORMIN) 25 MG tablet, Take 1 tablet by mouth Every 12 (Twelve) Hours., Disp: 180 tablet, Rfl: 3  •  dofetilide (TIKOSYN) 125 MCG capsule, TAKE 1 CAPSULE BY MOUTH EVERY 12 (TWELVE) HOURS., Disp: 180 capsule, Rfl: 0  •  Eliquis 5 MG tablet tablet, TAKE 1 TABLET BY MOUTH EVERY 12 HOURS, Disp: 60 tablet, Rfl: 11  •  Empagliflozin (Jardiance) 25 MG tablet, Take 25 mg by mouth Daily With Breakfast., Disp: 90 tablet, Rfl: 1  •  furosemide (LASIX) 40 MG tablet, TAKE 1 TABLET BY MOUTH EVERY DAY, Disp: 90 tablet, Rfl: 3  •  latanoprost (XALATAN) 0.005 % ophthalmic solution, 1 drop Every Night., Disp: , Rfl:   •  potassium chloride (MICRO-K) 10 MEQ CR capsule, TAKE 2 CAPSULES BY MOUTH EVERY DAY, Disp: 180 capsule, Rfl: 1  •  rosuvastatin (CRESTOR) 10 MG tablet, Take 1 tablet by mouth Daily., Disp: 90 tablet, Rfl: 3  •  SITagliptin (Januvia) 100 MG tablet, Take 1 tablet by mouth Daily., Disp: 90 tablet, Rfl: 1  •  triamcinolone (KENALOG) 0.1 % cream, Apply  topically to the appropriate area as directed 3 (Three) Times a Day As  "Needed for Rash., Disp: 45 g, Rfl: 0   also entered in Sleep Questionnaire    Patient  has a past medical history of Acute combined systolic and diastolic congestive heart failure (CMS/HCC), Atrial fibrillation (CMS/[facility]), Atrial flutter with rapid ventricular response (CMS/HCC), CHF (congestive heart failure) (CMS/[facility]), Diabetes mellitus (CMS/[facility]), Dilated cardiomyopathy (CMS/[facility]), Hypercholesterolemia, Hyperlipidemia, Hypertension, Nonischemic cardiomyopathy (CMS/[facility]), Obesity, On dofetilide therapy, MARCOS (obstructive sleep apnea), Persistent atrial fibrillation (CMS/[facility]), Pneumonia of left lung due to Haemophilus influenzae (CMS/[facility]) (1/18/2017), and Sepsis (CMS/[facility]) (1/18/2017).    Social History:    Social History     Socioeconomic History   • Marital status:      Spouse name: Not on file   • Number of children: Not on file   • Years of education: Not on file   • Highest education level: Not on file   Tobacco Use   • Smoking status: Never Smoker   • Smokeless tobacco: Never Used   • Tobacco comment: caffeine use   Vaping Use   • Vaping Use: Never used   Substance and Sexual Activity   • Alcohol use: No   • Drug use: No   • Sexual activity: Defer       Allergies:  Patient has no known allergies.    Review of Systems:    A complete review of systems was done and all were negative with the exception of all negative.    Vital Signs:    Vitals:    09/15/21 1012   BP: 103/66   Pulse: 55   SpO2: 95%   Weight: 104 kg (230 lb)   Height: 162.6 cm (64\")     Body mass index is 39.48 kg/m².    Vital Signs /66   Pulse 55   Ht 162.6 cm (64\")   Wt 104 kg (230 lb)   SpO2 95%   BMI 39.48 kg/m²  Body mass index is 39.48 kg/m².    General Alert and oriented. No acute distress noted   Pharynx/Throat Class IV Mallampati airway, large tongue, no evidence of redundant lateral pharyngeal tissue. No oral lesions. No thrush. Moist mucous membranes.   Head Normocephalic. Symmetrical. Atraumatic.    Nose No septal " deviation. No drainage   Chest Wall Normal shape. Symmetric expansion with respiration. No tenderness.   Neck Trachea midline, no thyromegaly or adenopathy    Lungs Clear to auscultation bilaterally. No wheezes. No rhonchi. No rales. Respirations regular, even and unlabored.   Heart Regular rhythm and normal rate. Normal S1 and S2. No murmur   Abdomen Soft, non-tender and non-distended. Normal bowel sounds. No masses.   Extremities Moves all extremities well. No edema   Psychiatric Normal mood and affect.     Impression:  1. Mixed sleep apnea    2. Severe obstructive sleep apnea        Continued air leak issues.  We will do mask fitting today to decrease air leak which will hopefully help decrease AHI.  Return to clinic in 6 weeks for follow-up or sooner if needed.    Patient uses the CPAP device and benefits from its use in terms of reduction of hypersomnia and snoring.Weight loss will be strongly beneficial to reduce the severity of sleep-disordered breathing.  Caution during activities that require prolonged concentration is strongly advised if sleepiness returns. Changing of PAP supplies regularly is important for effective use. Patient needs to change cushion on the mask or plugs on nasal pillows along with disposable filters once every month and change mask frame, tubing, headgear and Velcro straps every 6 months at the minimum.    Time spent during visit: 20 minutes of which at least 50% of the time was spent counseling patient.    Galen Meng MD  Sleep Medicine  09/15/21  10:38 EDT

## 2021-09-30 RX ORDER — FUROSEMIDE 40 MG/1
TABLET ORAL
Qty: 90 TABLET | Refills: 3 | Status: SHIPPED | OUTPATIENT
Start: 2021-09-30 | End: 2022-10-10

## 2021-10-11 RX ORDER — DOFETILIDE 0.12 MG/1
125 CAPSULE ORAL EVERY 12 HOURS
Qty: 60 CAPSULE | Refills: 2 | Status: SHIPPED | OUTPATIENT
Start: 2021-10-11 | End: 2022-02-01 | Stop reason: SDUPTHER

## 2021-10-14 ENCOUNTER — OFFICE VISIT (OUTPATIENT)
Dept: FAMILY MEDICINE CLINIC | Facility: CLINIC | Age: 76
End: 2021-10-14

## 2021-10-14 VITALS
HEIGHT: 64 IN | DIASTOLIC BLOOD PRESSURE: 70 MMHG | RESPIRATION RATE: 20 BRPM | SYSTOLIC BLOOD PRESSURE: 110 MMHG | TEMPERATURE: 96.4 F | OXYGEN SATURATION: 93 % | WEIGHT: 235.4 LBS | BODY MASS INDEX: 40.19 KG/M2 | HEART RATE: 70 BPM

## 2021-10-14 DIAGNOSIS — J45.901 BRONCHITIS, ALLERGIC, UNSPECIFIED ASTHMA SEVERITY, WITH ACUTE EXACERBATION: Primary | ICD-10-CM

## 2021-10-14 PROCEDURE — 99213 OFFICE O/P EST LOW 20 MIN: CPT | Performed by: FAMILY MEDICINE

## 2021-10-14 RX ORDER — LORATADINE 10 MG/1
10 TABLET ORAL DAILY
Qty: 30 TABLET | Refills: 0 | Status: SHIPPED | OUTPATIENT
Start: 2021-10-14 | End: 2021-11-05

## 2021-10-14 RX ORDER — DOXYCYCLINE 100 MG/1
100 CAPSULE ORAL 2 TIMES DAILY
Qty: 20 CAPSULE | Refills: 0 | Status: SHIPPED | OUTPATIENT
Start: 2021-10-14 | End: 2021-10-28 | Stop reason: ALTCHOICE

## 2021-10-14 NOTE — PROGRESS NOTES
HPI  Ray Pratt is a 76 y.o. male who is here for persistent cough and congestion for the last several days.  Also reports a GI.  Denies fever or chills.  Did recommend Covid test several days ago at home which was negative.  Patient is fully vaccinated.      Review of Systems   Constitutional: Negative for chills and fever.   HENT: Positive for congestion.    Eyes: Positive for itching.   Respiratory: Positive for cough. Negative for wheezing.    Allergic/Immunologic: Positive for environmental allergies.   All other systems reviewed and are negative.        Past Medical History:   Diagnosis Date   • Acute combined systolic and diastolic congestive heart failure (HCC)    • Atrial fibrillation (HCC)    • Atrial flutter with rapid ventricular response (HCC)    • CHF (congestive heart failure) (HCC)    • Diabetes mellitus (HCC)    • Dilated cardiomyopathy (HCC)    • Hypercholesterolemia    • Hyperlipidemia    • Hypertension    • Nonischemic cardiomyopathy (HCC)    • Obesity     class 2 severe, BMI 37.0-37.9   • On dofetilide therapy    • MARCOS (obstructive sleep apnea)     compliant with BiPAP   • Persistent atrial fibrillation (HCC)    • Pneumonia of left lung due to Haemophilus influenzae (HCC) 1/18/2017   • Sepsis (HCC) 1/18/2017       Past Surgical History:   Procedure Laterality Date   • CARDIAC CATHETERIZATION N/A 11/5/2019    Procedure: Left Heart Cath;  Surgeon: Sundeep Tsang MD;  Location: [facility] CATH INVASIVE LOCATION;  Service: Cardiovascular   • CARDIAC CATHETERIZATION N/A 11/5/2019    Procedure: Coronary angiography;  Surgeon: Sundeep Tsang MD;  Location:  [facility] CATH INVASIVE LOCATION;  Service: Cardiovascular   • CARDIAC CATHETERIZATION     • DENTAL PROCEDURE     • MOLE REMOVAL         Family History   Problem Relation Age of Onset   • Cancer Mother    • Stroke Father        Social History     Socioeconomic History   • Marital status:    Tobacco Use   • Smoking status: Never Smoker    • Smokeless tobacco: Never Used   • Tobacco comment: caffeine use   Vaping Use   • Vaping Use: Never used   Substance and Sexual Activity   • Alcohol use: No   • Drug use: No   • Sexual activity: Defer       Vitals:    10/14/21 1207   BP: 110/70   Pulse: 70   Resp: 20   Temp: 96.4 °F (35.8 °C)   SpO2: 93%        Body mass index is 40.41 kg/m².      Physical Exam  Vitals and nursing note reviewed.   Constitutional:       General: He is not in acute distress.     Appearance: He is well-developed.   HENT:      Head: Normocephalic and atraumatic.      Nose:      Comments: Patient and wife with masks.  Provider with mask and shield  Eyes:      Extraocular Movements: Extraocular movements intact.      Conjunctiva/sclera: Conjunctivae normal.   Neck:      Thyroid: No thyromegaly.   Cardiovascular:      Rate and Rhythm: Normal rate and regular rhythm.      Heart sounds: Normal heart sounds.   Pulmonary:      Effort: Pulmonary effort is normal. No respiratory distress.      Breath sounds: Normal breath sounds.   Abdominal:      General: There is no distension.      Palpations: Abdomen is soft. There is no mass.      Tenderness: There is no abdominal tenderness.      Hernia: No hernia is present.   Musculoskeletal:         General: No tenderness or deformity. Normal range of motion.      Cervical back: Normal range of motion.   Lymphadenopathy:      Cervical: No cervical adenopathy.   Skin:     General: Skin is warm and dry.      Coloration: Skin is not pale.      Findings: No rash.   Neurological:      General: No focal deficit present.      Mental Status: He is alert and oriented to person, place, and time.      Motor: No abnormal muscle tone.      Coordination: Coordination normal.   Psychiatric:         Mood and Affect: Mood normal.         Behavior: Behavior normal.         Thought Content: Thought content normal.         Judgment: Judgment normal.           Assessment/Plan    Diagnoses and all orders for this  visit:    1. Bronchitis, allergic, unspecified asthma severity, with acute exacerbation (Primary)  -     COVID-19,[facility] ROUTINE, NP/OP SWAB IN TRANSPORT MEDIA OR ESWAB 72 HR TAT - Swab, Nasopharynx; Future  -     doxycycline (MONODOX) 100 MG capsule; Take 1 capsule by mouth 2 (Two) Times a Day.  Dispense: 20 capsule; Refill: 0  -     loratadine (Claritin) 10 MG tablet; Take 1 tablet by mouth Daily.  Dispense: 30 tablet; Refill: 0  -     COVID-19,[facility] ROUTINE, NP/OP SWAB IN TRANSPORT MEDIA OR ESWAB 72 HR TAT - Swab, Nasopharynx        Patient here for itchy eyes and congestion.  Most likely allergic etiology but in view of persistence will give empiric antibiotic therapy.  Told to call if symptoms worsen or persist.

## 2021-10-15 LAB
LABCORP SARS-COV-2, NAA 2 DAY TAT: NORMAL
SARS-COV-2 RNA RESP QL NAA+PROBE: NOT DETECTED

## 2021-10-18 RX ORDER — APIXABAN 5 MG/1
TABLET, FILM COATED ORAL
Qty: 60 TABLET | Refills: 11 | Status: SHIPPED | OUTPATIENT
Start: 2021-10-18 | End: 2022-10-12

## 2021-10-20 ENCOUNTER — OFFICE VISIT (OUTPATIENT)
Dept: SLEEP MEDICINE | Facility: HOSPITAL | Age: 76
End: 2021-10-20

## 2021-10-20 VITALS
BODY MASS INDEX: 39.95 KG/M2 | HEART RATE: 81 BPM | WEIGHT: 234 LBS | HEIGHT: 64 IN | DIASTOLIC BLOOD PRESSURE: 76 MMHG | SYSTOLIC BLOOD PRESSURE: 117 MMHG | OXYGEN SATURATION: 92 %

## 2021-10-20 DIAGNOSIS — E66.01 MORBID OBESITY (HCC): ICD-10-CM

## 2021-10-20 DIAGNOSIS — G47.33 SEVERE OBSTRUCTIVE SLEEP APNEA: ICD-10-CM

## 2021-10-20 DIAGNOSIS — G47.39 MIXED SLEEP APNEA: Primary | ICD-10-CM

## 2021-10-20 PROCEDURE — G0463 HOSPITAL OUTPT CLINIC VISIT: HCPCS

## 2021-10-20 PROCEDURE — 99214 OFFICE O/P EST MOD 30 MIN: CPT | Performed by: FAMILY MEDICINE

## 2021-10-20 NOTE — PROGRESS NOTES
Follow Up Sleep Disorders Center Note     Chief Complaint:  MARCOS     Primary Care Physician: Chidi Marino MD    Ray Pratt is a 76 y.o.male  was last seen at [facility] sleep lab: 9/15/2021.    In the past had discussed continue to work on medical management of CHF with cardiology to help control central apnea events.  Patient on BiPAP ST. Of note last echocardiogram was done January 2021 which showed ejection fraction of 65%. Last visit with cardiology was July 26, 2021.    At last visit average AHI was 17.7 and CANDACE was 2.7.  Patient was continued to have air leak issues.  Mask fitting was done at last visit to decrease air leak and hopefully decrease AHI.  Presents today for 6-week follow-up.  Has not gotten a new mask yet however he adjusted the way he was tying his full facemask; has had improvement since then.  CANDACE and AHI are improved today.    Results Review:  DME is [employer].  Downloads between 9/18/2021-10/17/2021.  Average usage is 7 hours 28 minutes.  Average AHI is 10.9.  CANDACE: 1.5.    Current Medications:    Current Outpatient Medications:   •  atenolol (TENORMIN) 25 MG tablet, Take 1 tablet by mouth Every 12 (Twelve) Hours., Disp: 180 tablet, Rfl: 3  •  dofetilide (TIKOSYN) 125 MCG capsule, TAKE 1 CAPSULE BY MOUTH EVERY 12 (TWELVE) HOURS., Disp: 60 capsule, Rfl: 2  •  doxycycline (MONODOX) 100 MG capsule, Take 1 capsule by mouth 2 (Two) Times a Day., Disp: 20 capsule, Rfl: 0  •  Eliquis 5 MG tablet tablet, TAKE 1 TABLET BY MOUTH EVERY 12 HOURS, Disp: 60 tablet, Rfl: 11  •  Empagliflozin (Jardiance) 25 MG tablet, Take 25 mg by mouth Daily With Breakfast., Disp: 90 tablet, Rfl: 1  •  furosemide (LASIX) 40 MG tablet, TAKE 1 TABLET BY MOUTH EVERY DAY, Disp: 90 tablet, Rfl: 3  •  latanoprost (XALATAN) 0.005 % ophthalmic solution, 1 drop Every Night., Disp: , Rfl:   •  loratadine (Claritin) 10 MG tablet, Take 1 tablet by mouth Daily., Disp: 30 tablet, Rfl: 0  •  potassium chloride (MICRO-K) 10  "MEQ CR capsule, TAKE 2 CAPSULES BY MOUTH EVERY DAY, Disp: 180 capsule, Rfl: 1  •  rosuvastatin (CRESTOR) 10 MG tablet, Take 1 tablet by mouth Daily., Disp: 90 tablet, Rfl: 3  •  SITagliptin (Januvia) 100 MG tablet, Take 1 tablet by mouth Daily., Disp: 90 tablet, Rfl: 1  •  triamcinolone (KENALOG) 0.1 % cream, Apply  topically to the appropriate area as directed 3 (Three) Times a Day As Needed for Rash., Disp: 45 g, Rfl: 0   also entered in Sleep Questionnaire    Patient  has a past medical history of Acute combined systolic and diastolic congestive heart failure (HCC), Atrial fibrillation (HCC), Atrial flutter with rapid ventricular response (HCC), CHF (congestive heart failure) (HCC), Diabetes mellitus (HCC), Dilated cardiomyopathy (HCC), Hypercholesterolemia, Hyperlipidemia, Hypertension, Nonischemic cardiomyopathy (HCC), Obesity, On dofetilide therapy, MARCOS (obstructive sleep apnea), Persistent atrial fibrillation (HCC), Pneumonia of left lung due to Haemophilus influenzae (HCC) (1/18/2017), and Sepsis (HCC) (1/18/2017).    Social History:    Social History     Socioeconomic History   • Marital status:    Tobacco Use   • Smoking status: Never Smoker   • Smokeless tobacco: Never Used   • Tobacco comment: caffeine use   Vaping Use   • Vaping Use: Never used   Substance and Sexual Activity   • Alcohol use: No   • Drug use: No   • Sexual activity: Defer       Allergies:  Patient has no known allergies.    Review of Systems:    A complete review of systems was done and all were negative with the exception of nasal congestion    Vital Signs:    Vitals:    10/20/21 1100   BP: 117/76   Pulse: 81   SpO2: 92%   Weight: 106 kg (234 lb)   Height: 162.6 cm (64\")     Body mass index is 40.17 kg/m².    Vital Signs /76   Pulse 81   Ht 162.6 cm (64\")   Wt 106 kg (234 lb)   SpO2 92%   BMI 40.17 kg/m²  Body mass index is 40.17 kg/m².    General Alert and oriented. No acute distress noted   Pharynx/Throat Class IV " Mallampati airway, large tongue, no evidence of redundant lateral pharyngeal tissue. No oral lesions. No thrush. Moist mucous membranes.   Head Normocephalic. Symmetrical. Atraumatic.    Nose No septal deviation. No drainage   Chest Wall Normal shape. Symmetric expansion with respiration. No tenderness.   Neck Trachea midline, no thyromegaly or adenopathy    Lungs Clear to auscultation bilaterally. No wheezes. No rhonchi. No rales. Respirations regular, even and unlabored.   Heart Regular rhythm and normal rate. Normal S1 and S2. No murmur   Abdomen Soft, non-tender and non-distended. Normal bowel sounds. No masses.   Extremities Moves all extremities well. No edema   Psychiatric Normal mood and affect.     Impression:  1. Mixed sleep apnea    2. Severe obstructive sleep apnea    3. Morbid obesity (HCC)        Obstructive sleep apnea adequately treated with BiPAP ST with good compliance and usage and no complaints of hypersomnolence.  Improved CANDACE and AHI today.  Continue with current mask.  Return to clinic in 3 months for follow-up or sooner if needed.  At that time he will be due for annual echocardiogram to ensure ejection fraction is about 45%.    Patient uses the CPAP device and benefits from its use in terms of reduction of hypersomnia and snoring.Weight loss will be strongly beneficial to reduce the severity of sleep-disordered breathing.  Caution during activities that require prolonged concentration is strongly advised if sleepiness returns. Changing of PAP supplies regularly is important for effective use. Patient needs to change cushion on the mask or plugs on nasal pillows along with disposable filters once every month and change mask frame, tubing, headgear and Velcro straps every 6 months at the minimum.    Time spent during visit: 30 minutes of which at least 50% of the time was spent counseling patient.    Galen Meng MD  Sleep Medicine  10/20/21  11:27 EDT

## 2021-10-21 ENCOUNTER — OFFICE VISIT (OUTPATIENT)
Dept: FAMILY MEDICINE CLINIC | Facility: CLINIC | Age: 76
End: 2021-10-21

## 2021-10-21 ENCOUNTER — HOSPITAL ENCOUNTER (OUTPATIENT)
Dept: GENERAL RADIOLOGY | Facility: HOSPITAL | Age: 76
Discharge: HOME | End: 2021-10-21
Admitting: INTERNAL MEDICINE

## 2021-10-21 VITALS
BODY MASS INDEX: 40.6 KG/M2 | OXYGEN SATURATION: 95 % | HEIGHT: 64 IN | SYSTOLIC BLOOD PRESSURE: 100 MMHG | DIASTOLIC BLOOD PRESSURE: 70 MMHG | HEART RATE: 106 BPM | WEIGHT: 237.8 LBS

## 2021-10-21 DIAGNOSIS — I42.0 CARDIOMYOPATHY, DILATED (HCC): ICD-10-CM

## 2021-10-21 DIAGNOSIS — R05.9 COUGH: Primary | ICD-10-CM

## 2021-10-21 DIAGNOSIS — R06.00 DYSPNEA, UNSPECIFIED TYPE: ICD-10-CM

## 2021-10-21 DIAGNOSIS — R04.2 HEMOPTYSIS: ICD-10-CM

## 2021-10-21 DIAGNOSIS — R05.9 COUGH: ICD-10-CM

## 2021-10-21 PROCEDURE — 71046 X-RAY EXAM CHEST 2 VIEWS: CPT

## 2021-10-21 PROCEDURE — 99214 OFFICE O/P EST MOD 30 MIN: CPT | Performed by: INTERNAL MEDICINE

## 2021-10-21 RX ORDER — BENZONATATE 100 MG/1
100 CAPSULE ORAL 3 TIMES DAILY
Qty: 30 CAPSULE | Refills: 1 | Status: SHIPPED | OUTPATIENT
Start: 2021-10-21 | End: 2022-02-01 | Stop reason: ALTCHOICE

## 2021-10-21 NOTE — PROGRESS NOTES
Subjective Chief complaint is cough with some blood  Ray Pratt is a 76 y.o. male.     History of Present Illness Oli is here today for complaints of a cough.  He saw Dr. Marino approximately week ago and was having a cough.  He tested negative for Covid at that time.  He was treated with some doxycycline and apparently some Coricidin.  The cough is not improved.  Coricidin may have helped his itchy eyes.  He is now coughing sometimes hard enough that he cannot catch his breath.  He has coughed up intermittent blood.  I did review her chest x-ray from 2019.  It did show changes of congestive heart failure at that time.  He is little bit more short of breath than usual.  He is on some Eliquis for atrial fibrillation.  The following portions of the patient's history were reviewed and updated as appropriate: allergies, current medications, past family history, past medical history, past social history, past surgical history and problem list.    Review of Systems   Constitutional: Negative for chills and fever.   HENT: Negative for nosebleeds.    Respiratory: Positive for cough and shortness of breath.        Objective   Physical Exam  Vitals and nursing note reviewed.   Constitutional:       Appearance: Normal appearance.   HENT:      Right Ear: Tympanic membrane and ear canal normal.      Left Ear: Tympanic membrane and ear canal normal.      Nose: Congestion present.      Comments: There is some mild posterior right sided epistaxis.     Mouth/Throat:      Mouth: Mucous membranes are moist.      Pharynx: Oropharynx is clear.   Neck:      Vascular: JVD present.   Cardiovascular:      Rate and Rhythm: Regular rhythm. Tachycardia present.      Pulses: Normal pulses.   Pulmonary:      Effort: Pulmonary effort is normal.      Breath sounds: No wheezing, rhonchi or rales.   Neurological:      Mental Status: He is alert.           Assessment/Plan   Diagnoses and all orders for this visit:    1. Cough (Primary)  -      XR Chest PA & Lateral; Future    2. Cardiomyopathy, dilated (HCC)  -     Cancel: proBNP; Future  -     proBNP    3. Hemoptysis  -     XR Chest PA & Lateral; Future    4. Dyspnea, unspecified type  -     Cancel: proBNP; Future  -     proBNP    Other orders  -     benzonatate (Tessalon Perles) 100 MG capsule; Take 1 capsule by mouth 3 (Three) Times a Day.  Dispense: 30 capsule; Refill: 1      Oli is here today for cough and coughing up blood.  It could be that the blood is coming from the right nares.  However because of his shortness of breath and slightly diminished O2 saturation I am going to get a chest x-ray.  We did recheck for Covid.  I am a little bit concerned that this could be heart failure.  I am going to check a proBNP.  In the interim I have added Tessalon Perles for the cough.  If his proBNP comes back elevated we will increase his furosemide dose.

## 2021-10-22 LAB
LABCORP SARS-COV-2, NAA 2 DAY TAT: NORMAL
NT-PROBNP SERPL-MCNC: 252 PG/ML (ref 0–486)
SARS-COV-2 RNA RESP QL NAA+PROBE: NOT DETECTED

## 2021-10-22 RX ORDER — CEFDINIR 300 MG/1
300 CAPSULE ORAL 2 TIMES DAILY
Qty: 10 CAPSULE | Refills: 0 | Status: SHIPPED | OUTPATIENT
Start: 2021-10-22 | End: 2021-10-28 | Stop reason: SDUPTHER

## 2021-10-28 ENCOUNTER — TELEPHONE (OUTPATIENT)
Dept: FAMILY MEDICINE CLINIC | Facility: CLINIC | Age: 76
End: 2021-10-28

## 2021-10-28 DIAGNOSIS — J18.9 PNEUMONIA OF RIGHT MIDDLE LOBE DUE TO INFECTIOUS ORGANISM: Primary | ICD-10-CM

## 2021-10-28 RX ORDER — CEFDINIR 300 MG/1
300 CAPSULE ORAL 2 TIMES DAILY
Qty: 10 CAPSULE | Refills: 0 | Status: SHIPPED | OUTPATIENT
Start: 2021-10-28 | End: 2021-11-22

## 2021-10-28 NOTE — TELEPHONE ENCOUNTER
Caller: Ray Pratt    Relationship: Self    Best call back number: [phone] (M)    What is the best time to reach you: ANYTIME    Who are you requesting to speak with (clinical staff, provider,  specific staff member): CLINICAL STAFF    Do you know the name of the person who called:     What was the call regarding: cefdinir (OMNICEF) 300 MG capsule. PATIENT CALLED TO ADVISE THAT HE WAS TOLD THAT HE STILL HAD A COUGH ONCE HE FINISHED THE PRESCRIPTION TO CALL  BACK AND LET DR. KNIGHT KNOW.     PHARMACY:[facility]/pharmacy #[id number] [place], [place] - [address] - [phone]  - [phone] FX        Do you require a callback: YES        THANKS

## 2021-10-29 ENCOUNTER — HOSPITAL ENCOUNTER (OUTPATIENT)
Dept: GENERAL RADIOLOGY | Facility: HOSPITAL | Age: 76
Discharge: HOME | End: 2021-10-29
Admitting: FAMILY MEDICINE

## 2021-10-29 DIAGNOSIS — J18.9 PNEUMONIA OF RIGHT MIDDLE LOBE DUE TO INFECTIOUS ORGANISM: ICD-10-CM

## 2021-10-29 PROCEDURE — 71046 X-RAY EXAM CHEST 2 VIEWS: CPT

## 2021-11-04 RX ORDER — POTASSIUM CHLORIDE 750 MG/1
CAPSULE, EXTENDED RELEASE ORAL
Qty: 180 CAPSULE | Refills: 1 | Status: SHIPPED | OUTPATIENT
Start: 2021-11-04 | End: 2022-05-06

## 2021-11-05 DIAGNOSIS — J45.901 BRONCHITIS, ALLERGIC, UNSPECIFIED ASTHMA SEVERITY, WITH ACUTE EXACERBATION: ICD-10-CM

## 2021-11-05 RX ORDER — LORATADINE 10 MG/1
TABLET ORAL
Qty: 30 TABLET | Refills: 5 | Status: SHIPPED | OUTPATIENT
Start: 2021-11-05 | End: 2022-05-06

## 2021-11-05 NOTE — TELEPHONE ENCOUNTER
Rx Refill Note  Requested Prescriptions     Pending Prescriptions Disp Refills   • loratadine (CLARITIN) 10 MG tablet [Pharmacy Med Name: LORATADINE 10 MG TABLET] 30 tablet 0     Sig: TAKE 1 TABLET BY MOUTH EVERY DAY      Last office visit with prescribing clinician: 10/14/2021      Next office visit with prescribing clinician: 1/20/2022            Jhonny Atkinson MA  11/05/21, 10:18 EDT

## 2021-11-22 ENCOUNTER — OFFICE VISIT (OUTPATIENT)
Dept: FAMILY MEDICINE CLINIC | Facility: CLINIC | Age: 76
End: 2021-11-22

## 2021-11-22 VITALS
HEIGHT: 64 IN | DIASTOLIC BLOOD PRESSURE: 60 MMHG | WEIGHT: 234 LBS | OXYGEN SATURATION: 96 % | BODY MASS INDEX: 39.95 KG/M2 | SYSTOLIC BLOOD PRESSURE: 98 MMHG | HEART RATE: 89 BPM

## 2021-11-22 DIAGNOSIS — R05.9 COUGH: Primary | ICD-10-CM

## 2021-11-22 DIAGNOSIS — J34.89 NASAL DRAINAGE: ICD-10-CM

## 2021-11-22 PROCEDURE — 99213 OFFICE O/P EST LOW 20 MIN: CPT | Performed by: INTERNAL MEDICINE

## 2021-11-22 NOTE — PROGRESS NOTES
Subjective Chief complaint is cough and nasal drainage  Ray Pratt is a 76 y.o. male.     History of Present Illness Oli is here today for complaints of a cough.  The cough he actually says has been present on and off since his pneumonia.  It really is not much different than it was a week ago.  He is now experiencing some nasal drainage despite taking some loratadine at home.  He did try taking some Coricidin HBP cold and flu and that did not seem to help.  He is not having fever or chills.  He does report that he feels a little more short of breath than usual however his O2 saturation today actually looks better than usual.  The following portions of the patient's history were reviewed and updated as appropriate: allergies, current medications, past family history, past medical history, past social history, past surgical history and problem list.    Review of Systems   Constitutional: Negative for chills and fever.   HENT: Positive for rhinorrhea.    Respiratory: Positive for cough.        Objective   Physical Exam  Vitals and nursing note reviewed.   HENT:      Right Ear: Tympanic membrane and ear canal normal.      Left Ear: Tympanic membrane and ear canal normal.      Nose: Congestion and rhinorrhea present.      Mouth/Throat:      Mouth: Mucous membranes are moist.      Pharynx: Oropharynx is clear. No oropharyngeal exudate or posterior oropharyngeal erythema.   Cardiovascular:      Rate and Rhythm: Normal rate.   Pulmonary:      Effort: Pulmonary effort is normal.      Breath sounds: No wheezing or rales.   Neurological:      Mental Status: He is alert.           Assessment/Plan   Diagnoses and all orders for this visit:    1. Cough (Primary)  -     COVID-19,[facility] ROUTINE, NP/OP SWAB IN TRANSPORT MEDIA OR ESWAB 72 HR TAT - Swab, Nasopharynx    2. Nasal drainage      Oli is here today for respiratory symptoms.  We did do a Covid screen but I do not think this is going to be yet.  I am going to have  him continue the loratadine.  He can use Mucinex DM for cough and also use the leftover Tessalon Perles.

## 2021-11-23 LAB
LABCORP SARS-COV-2, NAA 2 DAY TAT: NORMAL
SARS-COV-2 RNA RESP QL NAA+PROBE: NOT DETECTED

## 2021-12-28 RX ORDER — EMPAGLIFLOZIN 25 MG/1
TABLET, FILM COATED ORAL
Qty: 90 TABLET | Refills: 1 | Status: SHIPPED | OUTPATIENT
Start: 2021-12-28 | End: 2022-01-11 | Stop reason: SDUPTHER

## 2022-01-09 RX ORDER — SITAGLIPTIN 100 MG/1
TABLET, FILM COATED ORAL
Qty: 90 TABLET | Refills: 1 | OUTPATIENT
Start: 2022-01-09

## 2022-01-11 ENCOUNTER — CLINICAL SUPPORT (OUTPATIENT)
Dept: FAMILY MEDICINE CLINIC | Facility: CLINIC | Age: 77
End: 2022-01-11

## 2022-01-11 DIAGNOSIS — R05.9 COUGH: Primary | ICD-10-CM

## 2022-01-11 PROCEDURE — 99211 OFF/OP EST MAY X REQ PHY/QHP: CPT | Performed by: FAMILY MEDICINE

## 2022-01-11 NOTE — TELEPHONE ENCOUNTER
Rx Refill Note  Requested Prescriptions      No prescriptions requested or ordered in this encounter      Last office visit with prescribing clinician: 10/14/2021      Next office visit with prescribing clinician: 1/20/2022            Bianca Marie MA  01/11/22, 08:59 EST

## 2022-01-13 LAB
LABCORP SARS-COV-2, NAA 2 DAY TAT: NORMAL
SARS-COV-2 RNA RESP QL NAA+PROBE: DETECTED

## 2022-01-14 ENCOUNTER — TELEPHONE (OUTPATIENT)
Dept: FAMILY MEDICINE CLINIC | Facility: CLINIC | Age: 77
End: 2022-01-14

## 2022-01-17 RX ORDER — ATENOLOL 25 MG/1
25 TABLET ORAL EVERY 12 HOURS SCHEDULED
Qty: 180 TABLET | Refills: 3 | Status: SHIPPED | OUTPATIENT
Start: 2022-01-17 | End: 2022-05-09

## 2022-01-19 ENCOUNTER — APPOINTMENT (OUTPATIENT)
Dept: SLEEP MEDICINE | Facility: HOSPITAL | Age: 77
End: 2022-01-19

## 2022-01-20 ENCOUNTER — OFFICE VISIT (OUTPATIENT)
Dept: FAMILY MEDICINE CLINIC | Facility: CLINIC | Age: 77
End: 2022-01-20

## 2022-01-20 VITALS
SYSTOLIC BLOOD PRESSURE: 110 MMHG | DIASTOLIC BLOOD PRESSURE: 74 MMHG | WEIGHT: 234 LBS | OXYGEN SATURATION: 97 % | HEART RATE: 68 BPM | HEIGHT: 64 IN | TEMPERATURE: 96.8 F | BODY MASS INDEX: 39.95 KG/M2

## 2022-01-20 DIAGNOSIS — G47.33 OBSTRUCTIVE SLEEP APNEA: ICD-10-CM

## 2022-01-20 DIAGNOSIS — E78.5 HYPERLIPIDEMIA, UNSPECIFIED HYPERLIPIDEMIA TYPE: ICD-10-CM

## 2022-01-20 DIAGNOSIS — E66.01 CLASS 3 SEVERE OBESITY DUE TO EXCESS CALORIES WITHOUT SERIOUS COMORBIDITY WITH BODY MASS INDEX (BMI) OF 40.0 TO 44.9 IN ADULT: ICD-10-CM

## 2022-01-20 DIAGNOSIS — I10 BENIGN ESSENTIAL HTN: ICD-10-CM

## 2022-01-20 DIAGNOSIS — I50.41 ACUTE COMBINED SYSTOLIC AND DIASTOLIC CONGESTIVE HEART FAILURE: Primary | ICD-10-CM

## 2022-01-20 DIAGNOSIS — Z79.01 LONG TERM (CURRENT) USE OF ANTICOAGULANTS: ICD-10-CM

## 2022-01-20 DIAGNOSIS — Z79.899 HIGH RISK MEDICATION USE: ICD-10-CM

## 2022-01-20 DIAGNOSIS — I48.19 ATRIAL FIBRILLATION, PERSISTENT: ICD-10-CM

## 2022-01-20 DIAGNOSIS — Z11.59 ENCOUNTER FOR HEPATITIS C SCREENING TEST FOR LOW RISK PATIENT: ICD-10-CM

## 2022-01-20 DIAGNOSIS — E11.65 TYPE 2 DIABETES MELLITUS WITH HYPERGLYCEMIA, WITHOUT LONG-TERM CURRENT USE OF INSULIN: ICD-10-CM

## 2022-01-20 DIAGNOSIS — J30.9 ALLERGIC RHINITIS, UNSPECIFIED SEASONALITY, UNSPECIFIED TRIGGER: ICD-10-CM

## 2022-01-20 PROCEDURE — 99214 OFFICE O/P EST MOD 30 MIN: CPT | Performed by: FAMILY MEDICINE

## 2022-01-20 NOTE — PROGRESS NOTES
HPI  Ray Pratt is a 76 y.o. male who is here for follow up of multiple medical problems especially cardiac disease and is scheduled for follow-up appointment with cardiologist next month.  Overall denies any new complaints.  Patient and his wife both tested positive for COVID last week but basically have remained asymptomatic.  They are both fully vaccinated including booster.  Had previously discussed over the phone and did not feel further intervention was necessary.      Review of Systems   All other systems reviewed and are negative.        Past Medical History:   Diagnosis Date   • Acute combined systolic and diastolic congestive heart failure (HCC)    • Atrial fibrillation (HCC)    • Atrial flutter with rapid ventricular response (HCC)    • CHF (congestive heart failure) (HCC)    • Diabetes mellitus (HCC)    • Dilated cardiomyopathy (HCC)    • Hypercholesterolemia    • Hyperlipidemia    • Hypertension    • Nonischemic cardiomyopathy (HCC)    • Obesity     class 2 severe, BMI 37.0-37.9   • On dofetilide therapy    • MARCOS (obstructive sleep apnea)     compliant with BiPAP   • Persistent atrial fibrillation (HCC)    • Pneumonia of left lung due to Haemophilus influenzae (HCC) 1/18/2017   • Sepsis (HCC) 1/18/2017       Past Surgical History:   Procedure Laterality Date   • CARDIAC CATHETERIZATION N/A 11/5/2019    Procedure: Left Heart Cath;  Surgeon: Sundeep Tsang MD;  Location:  [facility];  Service: Cardiovascular   • CARDIAC CATHETERIZATION N/A 11/5/2019    Procedure: Coronary angiography;  Surgeon: Sundeep Tsang MD;  Location:  [facility];  Service: Cardiovascular   • CARDIAC CATHETERIZATION     • DENTAL PROCEDURE     • MOLE REMOVAL         Family History   Problem Relation Age of Onset   • Cancer Mother    • Stroke Father        Social History     Socioeconomic History   • Marital status:    Tobacco Use   • Smoking status: Never Smoker   • Smokeless  tobacco: Never Used   • Tobacco comment: caffeine use   Vaping Use   • Vaping Use: Never used   Substance and Sexual Activity   • Alcohol use: No   • Drug use: No   • Sexual activity: Defer       Vitals:    01/20/22 0906   BP: 110/74   Pulse: 68   Temp: 96.8 °F (36 °C)   SpO2: 97%        Body mass index is 40.15 kg/m².      Physical Exam  Vitals and nursing note reviewed.   Constitutional:       General: He is not in acute distress.     Appearance: He is well-developed. He is obese.   HENT:      Head: Normocephalic and atraumatic.   Eyes:      Comments: Opacification of right eye   Neck:      Thyroid: No thyromegaly.   Cardiovascular:      Rate and Rhythm: Normal rate and regular rhythm.      Heart sounds: Normal heart sounds.   Pulmonary:      Effort: Pulmonary effort is normal. No respiratory distress.      Breath sounds: Normal breath sounds.   Abdominal:      General: There is no distension.      Palpations: Abdomen is soft. There is no mass.      Tenderness: There is no abdominal tenderness.      Hernia: No hernia is present.   Musculoskeletal:         General: No tenderness or deformity. Normal range of motion.      Cervical back: Normal range of motion.   Lymphadenopathy:      Cervical: No cervical adenopathy.   Skin:     General: Skin is warm and dry.      Coloration: Skin is not pale.      Findings: No rash.   Neurological:      General: No focal deficit present.      Mental Status: He is alert and oriented to person, place, and time.      Motor: No abnormal muscle tone.      Coordination: Coordination normal.   Psychiatric:         Mood and Affect: Mood normal.         Behavior: Behavior normal.         Thought Content: Thought content normal.         Judgment: Judgment normal.           Assessment/Plan    Diagnoses and all orders for this visit:    1. Acute combined systolic and diastolic congestive heart failure (HCC) (Primary)    2. Atrial fibrillation, persistent (CMS/HCC)    3. Benign essential HTN  -      Comprehensive Metabolic Panel    4. Long term (current) use of anticoagulants  -     CBC & Differential    5. Class 3 severe obesity due to excess calories without serious comorbidity with body mass index (BMI) of 40.0 to 44.9 in adult ([facility])    6. Obstructive sleep apnea    7. Type 2 diabetes mellitus with hyperglycemia, without long-term current use of insulin ([facility])  -     Comprehensive Metabolic Panel  -     Lipid Panel  -     Hemoglobin A1c    8. High risk medication use  -     CBC & Differential  -     Comprehensive Metabolic Panel    9. Allergic rhinitis, unspecified seasonality, unspecified trigger    10. Encounter for hepatitis C screening test for low risk patient  -     Hepatitis C Antibody    11. Hyperlipidemia, unspecified hyperlipidemia type  -     Lipid Panel      Patient here for routine follow-up of multiple medical problems most of which are noted above.  Also due for routine 6-month lab work which is obtained as above.  He is past due for annual Medicare wellness which will be scheduled along with wife in April.  Other health maintenance issues will be addressed at that time.

## 2022-01-21 LAB
ALBUMIN SERPL-MCNC: 4 G/DL (ref 3.7–4.7)
ALBUMIN/GLOB SERPL: 1.3 {RATIO} (ref 1.2–2.2)
ALP SERPL-CCNC: 60 IU/L (ref 44–121)
ALT SERPL-CCNC: 11 IU/L (ref 0–44)
AST SERPL-CCNC: 15 IU/L (ref 0–40)
BASOPHILS # BLD AUTO: 0.1 X10E3/UL (ref 0–0.2)
BASOPHILS NFR BLD AUTO: 1 %
BILIRUB SERPL-MCNC: 0.6 MG/DL (ref 0–1.2)
BUN SERPL-MCNC: 20 MG/DL (ref 8–27)
BUN/CREAT SERPL: 19 (ref 10–24)
CALCIUM SERPL-MCNC: 9.3 MG/DL (ref 8.6–10.2)
CHLORIDE SERPL-SCNC: 100 MMOL/L (ref 96–106)
CHOLEST SERPL-MCNC: 159 MG/DL (ref 100–199)
CO2 SERPL-SCNC: 27 MMOL/L (ref 20–29)
CREAT SERPL-MCNC: 1.04 MG/DL (ref 0.76–1.27)
EOSINOPHIL # BLD AUTO: 0.2 X10E3/UL (ref 0–0.4)
EOSINOPHIL NFR BLD AUTO: 2 %
ERYTHROCYTE [DISTWIDTH] IN BLOOD BY AUTOMATED COUNT: 14 % (ref 11.6–15.4)
GLOBULIN SER CALC-MCNC: 3.1 G/DL (ref 1.5–4.5)
GLUCOSE SERPL-MCNC: 110 MG/DL (ref 65–99)
HBA1C MFR BLD: 7 % (ref 4.8–5.6)
HCT VFR BLD AUTO: 46.8 % (ref 37.5–51)
HCV AB S/CO SERPL IA: <0.1 S/CO RATIO (ref 0–0.9)
HDLC SERPL-MCNC: 47 MG/DL
HGB BLD-MCNC: 15.2 G/DL (ref 13–17.7)
IMM GRANULOCYTES # BLD AUTO: 0 X10E3/UL (ref 0–0.1)
IMM GRANULOCYTES NFR BLD AUTO: 1 %
LDLC SERPL CALC-MCNC: 96 MG/DL (ref 0–99)
LYMPHOCYTES # BLD AUTO: 1.6 X10E3/UL (ref 0.7–3.1)
LYMPHOCYTES NFR BLD AUTO: 23 %
MCH RBC QN AUTO: 29.1 PG (ref 26.6–33)
MCHC RBC AUTO-ENTMCNC: 32.5 G/DL (ref 31.5–35.7)
MCV RBC AUTO: 90 FL (ref 79–97)
MONOCYTES # BLD AUTO: 0.8 X10E3/UL (ref 0.1–0.9)
MONOCYTES NFR BLD AUTO: 12 %
NEUTROPHILS # BLD AUTO: 4.5 X10E3/UL (ref 1.4–7)
NEUTROPHILS NFR BLD AUTO: 61 %
PLATELET # BLD AUTO: 262 X10E3/UL (ref 150–450)
POTASSIUM SERPL-SCNC: 4.3 MMOL/L (ref 3.5–5.2)
PROT SERPL-MCNC: 7.1 G/DL (ref 6–8.5)
RBC # BLD AUTO: 5.22 X10E6/UL (ref 4.14–5.8)
SODIUM SERPL-SCNC: 140 MMOL/L (ref 134–144)
TRIGL SERPL-MCNC: 83 MG/DL (ref 0–149)
VLDLC SERPL CALC-MCNC: 16 MG/DL (ref 5–40)
WBC # BLD AUTO: 7.2 X10E3/UL (ref 3.4–10.8)

## 2022-01-26 ENCOUNTER — OFFICE VISIT (OUTPATIENT)
Dept: SLEEP MEDICINE | Facility: HOSPITAL | Age: 77
End: 2022-01-26

## 2022-01-26 VITALS
HEIGHT: 64 IN | SYSTOLIC BLOOD PRESSURE: 109 MMHG | OXYGEN SATURATION: 95 % | HEART RATE: 62 BPM | BODY MASS INDEX: 40.63 KG/M2 | DIASTOLIC BLOOD PRESSURE: 73 MMHG | WEIGHT: 238 LBS

## 2022-01-26 DIAGNOSIS — G47.39 MIXED SLEEP APNEA: Primary | ICD-10-CM

## 2022-01-26 DIAGNOSIS — E66.01 MORBID OBESITY: ICD-10-CM

## 2022-01-26 DIAGNOSIS — G47.33 SEVERE OBSTRUCTIVE SLEEP APNEA: ICD-10-CM

## 2022-01-26 PROCEDURE — 99213 OFFICE O/P EST LOW 20 MIN: CPT | Performed by: FAMILY MEDICINE

## 2022-01-26 PROCEDURE — G0463 HOSPITAL OUTPT CLINIC VISIT: HCPCS

## 2022-01-26 NOTE — PROGRESS NOTES
Follow Up [facility] Note     Chief Complaint:  MARCOS     Primary Care Physician: Chidi Marino MD    Ray Pratt is a 76 y.o.male  was last seen at [facility] sleep lab: 10/20/2021.    In the past had discussed continue to work on medical management of CHF with cardiology to help control central apnea events.  Patient on BiPAP ST. Of note last echocardiogram was done January 2021 which showed ejection fraction of 65%. PSG in 2019 showed severe sleep apnea with overall AHI of 72 events per hour of which 55 events were central.    Last visit AHI was 10.9 CI 1.5. Today AHI improved at 10.8. CANDACE 1.9. At last visit was still waiting for new mask. Due for annual echo to ensure ejection fraction is above 45%.    Results Review:  DME is [facility].  Downloads between 12/26/2021-1/24/2022.  Average usage is 7 hours 36 minutes.  Average AHI is 10.8.    Current Medications:    Current Outpatient Medications:   •  atenolol (TENORMIN) 25 MG tablet, Take 1 tablet by mouth Every 12 (Twelve) Hours., Disp: 180 tablet, Rfl: 3  •  benzonatate (Tessalon Perles) 100 MG capsule, Take 1 capsule by mouth 3 (Three) Times a Day., Disp: 30 capsule, Rfl: 1  •  dofetilide (TIKOSYN) 125 MCG capsule, TAKE 1 CAPSULE BY MOUTH EVERY 12 (TWELVE) HOURS., Disp: 60 capsule, Rfl: 2  •  Eliquis 5 MG tablet tablet, TAKE 1 TABLET BY MOUTH EVERY 12 HOURS, Disp: 60 tablet, Rfl: 11  •  empagliflozin (Jardiance) 25 MG tablet tablet, Take 1 tablet by mouth Daily With Breakfast., Disp: 90 tablet, Rfl: 1  •  furosemide (LASIX) 40 MG tablet, TAKE 1 TABLET BY MOUTH EVERY DAY, Disp: 90 tablet, Rfl: 3  •  latanoprost (XALATAN) 0.005 % ophthalmic solution, 1 drop Every Night., Disp: , Rfl:   •  loratadine (CLARITIN) 10 MG tablet, TAKE 1 TABLET BY MOUTH EVERY DAY, Disp: 30 tablet, Rfl: 5  •  potassium chloride (MICRO-K) 10 MEQ CR capsule, TAKE 2 CAPSULES BY MOUTH EVERY DAY, Disp: 180 capsule, Rfl: 1  •  rosuvastatin (CRESTOR) 10 MG tablet, Take 1 tablet by  "mouth Daily., Disp: 90 tablet, Rfl: 3  •  SITagliptin (Januvia) 100 MG tablet, Take 1 tablet by mouth Daily., Disp: 90 tablet, Rfl: 3  •  triamcinolone (KENALOG) 0.1 % cream, Apply  topically to the appropriate area as directed 3 (Three) Times a Day As Needed for Rash., Disp: 45 g, Rfl: 0   also entered in Sleep Questionnaire    Patient  has a past medical history of Acute combined systolic and diastolic congestive heart failure (HCC), Atrial fibrillation (HCC), Atrial flutter with rapid ventricular response (HCC), CHF (congestive heart failure) (HCC), Diabetes mellitus (HCC), Dilated cardiomyopathy (HCC), Hypercholesterolemia, Hyperlipidemia, Hypertension, Nonischemic cardiomyopathy (HCC), Obesity, On dofetilide therapy, MARCOS (obstructive sleep apnea), Persistent atrial fibrillation (HCC), Pneumonia of left lung due to Haemophilus influenzae (HCC) (1/18/2017), and Sepsis (HCC) (1/18/2017).    Social History:    Social History     Socioeconomic History   • Marital status:    Tobacco Use   • Smoking status: Never Smoker   • Smokeless tobacco: Never Used   • Tobacco comment: caffeine use   Vaping Use   • Vaping Use: Never used   Substance and Sexual Activity   • Alcohol use: No   • Drug use: No   • Sexual activity: Defer       Allergies:  Patient has no known allergies.    Vital Signs:    Vitals:    01/26/22 1351   BP: 109/73   Pulse: 62   SpO2: 95%   Weight: 108 kg (238 lb)   Height: 162.6 cm (64.02\")     Body mass index is 40.83 kg/m².    REVIEW OF SYSTEMS.  Full review of systems available on the intake form which is scanned in the media tab.  The relevant positive are noted below  1. Daytime excessive sleepiness with [place] Sleepiness Scale :Total score: 11   2. Snoring  3. All negative      Physical exam:  Vitals:    01/26/22 1351   BP: 109/73   Pulse: 62   SpO2: 95%   Weight: 108 kg (238 lb)   Height: 162.6 cm (64.02\")    Body mass index is 40.83 kg/m².    HEENT: Head is atraumatic, normocephalic  Eyes: " pupils are round equal and reacting to light and accommodation, conjunctiva normal  Nose: no nasal septal defects or deviation and the nasal passages are clear, no nasal polyps,  Throat:  tongue normal, oral airway Mallampati class iv  NECK: , trachea is in the midline, thyroid not enlarged  RESPIRATORY SYSTEM: Breath sounds are equal on both sides, there are no wheezes   CARDIOVASULAR SYSTEM: Heart sounds are regular rhythm and harmony rate, no edema  EXTREMITES: No cyanosis, clubbing  NEUROLOGICAL SYSTEM: Oriented x 3, no gross motor defects, gait normal    Impression:  1. Mixed sleep apnea    2. Severe obstructive sleep apnea    3. Morbid obesity (HCC)        Obstructive sleep apnea adequately treated with BiPAP ST with good compliance and usage and some complaints of hypersomnolence.  Follow-up annual echo to ensure ejection fraction about 45%.  Return to clinic in 3 months for follow-up or sooner if needed.    Patient uses the CPAP device and benefits from its use in terms of reduction of hypersomnia and snoring.Weight loss will be strongly beneficial to reduce the severity of sleep-disordered breathing.  Caution during activities that require prolonged concentration is strongly advised if sleepiness returns. Changing of PAP supplies regularly is important for effective use. Patient needs to change cushion on the mask or plugs on nasal pillows along with disposable filters once every month and change mask frame, tubing, headgear and Velcro straps every 6 months at the minimum.    Time spent during visit: 20 minutes of which at least 50% of the time was spent counseling patient.    Galen Meng MD  Sleep Medicine  01/26/22  14:46 EST

## 2022-01-27 ENCOUNTER — TELEPHONE (OUTPATIENT)
Dept: CARDIOLOGY | Facility: CLINIC | Age: 77
End: 2022-01-27

## 2022-02-01 ENCOUNTER — OFFICE VISIT (OUTPATIENT)
Dept: CARDIOLOGY | Facility: CLINIC | Age: 77
End: 2022-02-01

## 2022-02-01 VITALS
DIASTOLIC BLOOD PRESSURE: 70 MMHG | HEIGHT: 64 IN | SYSTOLIC BLOOD PRESSURE: 116 MMHG | HEART RATE: 77 BPM | WEIGHT: 236 LBS | BODY MASS INDEX: 40.29 KG/M2

## 2022-02-01 DIAGNOSIS — I10 BENIGN ESSENTIAL HTN: ICD-10-CM

## 2022-02-01 DIAGNOSIS — I48.19 ATRIAL FIBRILLATION, PERSISTENT: ICD-10-CM

## 2022-02-01 DIAGNOSIS — R06.09 DYSPNEA ON EXERTION: ICD-10-CM

## 2022-02-01 DIAGNOSIS — Z79.899 ON DOFETILIDE THERAPY: Primary | ICD-10-CM

## 2022-02-01 DIAGNOSIS — G47.33 OBSTRUCTIVE SLEEP APNEA: ICD-10-CM

## 2022-02-01 PROCEDURE — 93000 ELECTROCARDIOGRAM COMPLETE: CPT | Performed by: NURSE PRACTITIONER

## 2022-02-01 PROCEDURE — 99214 OFFICE O/P EST MOD 30 MIN: CPT | Performed by: NURSE PRACTITIONER

## 2022-02-01 RX ORDER — DOFETILIDE 0.12 MG/1
125 CAPSULE ORAL EVERY 12 HOURS
Qty: 60 CAPSULE | Refills: 4 | Status: SHIPPED | OUTPATIENT
Start: 2022-02-01 | End: 2022-07-05

## 2022-02-01 NOTE — PROGRESS NOTES
"Date of Office Visit: 22  Encounter Provider: ANICETO Gomez  Place of Service: [facility] CARDIOLOGY  Patient Name: Ray Pratt  :1945    Chief Complaint   Patient presents with   • Atrial fibrillation, persistent   • Sleep Apnea   • Congestive Heart Failure   • Cardiomyopathy   • Hypertension   • Follow-up   :     HPI: Ray Pratt is a 76 y.o. male  with hypertension, obstructive sleep apnea, atrial fibrillation, dilated cardiomyopathy, diabetes mellitus, obstructive sleep apnea and dofetilide therapy.      He is followed by Dr. Escalante and Dr. Vazquez. I will visit with him for the first time and have reviewed his medical record.     He has history of nonischemic cardiomyopathy and symptomatic, persistent atrial fibrillation.  He was admitted in 2020 and started on dofetilide.  His QTC was prolonged on higher doses but acceptable on 125 MCG twice daily.      He presents today for annual reassessment. He has not had any palpitations suspicious for atrial fibrillation. He continues to take Tikosyn as scheduled. He has no bleeding with Eliquis. He is compliant with BiPAP. He \"tries to walk\" for exercise but he needs to increase his physical activity. He and his wife are busy doing some remodeling the house that he is taking somewhat active doing night. He denies chest pain, near-syncope, syncope, palpitations or swelling. He is to square dance prior to the pandemic but no longer does that. He has some shortness of breath with exertion which resolves in a couple minutes. It may be a little progressive but he relates to inactivity and his weight.  No Known Allergies        Family and social history reviewed.     ROS  All other systems were reviewed and are negative          Objective:     Vitals:    22 1043   BP: 116/70   BP Location: Left arm   Patient Position: Sitting   Pulse: 77   Weight: 107 kg (236 lb)   Height: 162.6 cm (64\")     Body " mass index is 40.51 kg/m².    PHYSICAL EXAM:  Cardiovascular:      Regular rhythm.           ECG 12 Lead    Date/Time: 2/1/2022 10:53 AM  Performed by: Ritu Lugo APRN  Authorized by: Ritu Lugo APRN   Comparison: compared with previous ECG   Similar to previous ECG  Rhythm: sinus rhythm  Rate: normal  Conduction: right bundle branch block              Current Outpatient Medications   Medication Sig Dispense Refill   • atenolol (TENORMIN) 25 MG tablet Take 1 tablet by mouth Every 12 (Twelve) Hours. 180 tablet 3   • dofetilide (TIKOSYN) 125 MCG capsule Take 1 capsule by mouth Every 12 (Twelve) Hours. 60 capsule 4   • Eliquis 5 MG tablet tablet TAKE 1 TABLET BY MOUTH EVERY 12 HOURS 60 tablet 11   • empagliflozin (Jardiance) 25 MG tablet tablet Take 1 tablet by mouth Daily With Breakfast. 90 tablet 1   • furosemide (LASIX) 40 MG tablet TAKE 1 TABLET BY MOUTH EVERY DAY 90 tablet 3   • latanoprost (XALATAN) 0.005 % ophthalmic solution 1 drop Every Night.     • loratadine (CLARITIN) 10 MG tablet TAKE 1 TABLET BY MOUTH EVERY DAY 30 tablet 5   • potassium chloride (MICRO-K) 10 MEQ CR capsule TAKE 2 CAPSULES BY MOUTH EVERY  capsule 1   • rosuvastatin (CRESTOR) 10 MG tablet Take 1 tablet by mouth Daily. 90 tablet 3   • SITagliptin (Januvia) 100 MG tablet Take 1 tablet by mouth Daily. 90 tablet 3     No current facility-administered medications for this visit.     Assessment:       Diagnosis Plan   1. On dofetilide therapy     2. Atrial fibrillation, persistent (HCC)     3. Benign essential HTN     4. Obstructive sleep apnea     5. Dyspnea on exertion  Adult Transthoracic Echo Complete W/ Cont if Necessary Per Protocol        Orders Placed This Encounter   Procedures   • ECG 12 Lead     This order was created via procedure documentation     Order Specific Question:   Release to patient     Answer:   Immediate   • Adult Transthoracic Echo Complete W/ Cont if Necessary Per Protocol     Standing Status:   Future      Standing Expiration Date:   2/1/2023     Order Specific Question:   Reason for exam?     Answer:   Dyspnea         Plan:   1.  76-year-old gentleman with history of persistent symptomatic atrial fibrillation treated with dofetilide started in 2020.  His QTC was prolonged on lower doses but acceptable on 125 twice daily. He is stable. He is normal sinus rhythm with no prolonged QT interval. Continue Eliquis and atenolol and Tikosyn  2.  History of nonischemic cardiomyopathy, likely tachycardia mediated. EF of 22% in 11/2019 and normalized 06/2020. Also normal January 2021.  3.  No significant coronary artery disease on cardiac catheterization November 2019.  Here evidence of luminal irregularities-no angina  4.  Diabetes mellitus on therapy  5.  Obstructive sleep apnea on BiPAP follows with Dr. Meng  6.  Hyperlipidemia  7. Need for ACE/ARB-he is not on either due to concern these would drop his blood pressure too low. His blood pressure is stable on atenolol. He is on atenolol for arrhythmia  8. Dyspnea on exertion-check echo. He also needs to ensure ejection fraction is greater than 45% to continue on BiPAP            It has been a pleasure to participate in this patient's care.      Thank you,  ANICETO Gomez      **I used Dragon to dictate this note:**

## 2022-02-10 ENCOUNTER — TELEPHONE (OUTPATIENT)
Dept: SLEEP MEDICINE | Facility: HOSPITAL | Age: 77
End: 2022-02-10

## 2022-02-10 ENCOUNTER — HOSPITAL ENCOUNTER (OUTPATIENT)
Dept: CARDIOLOGY | Facility: HOSPITAL | Age: 77
Discharge: HOME | End: 2022-02-10
Admitting: NURSE PRACTITIONER

## 2022-02-10 VITALS
BODY MASS INDEX: 40.29 KG/M2 | HEIGHT: 64 IN | HEART RATE: 58 BPM | WEIGHT: 236 LBS | DIASTOLIC BLOOD PRESSURE: 70 MMHG | SYSTOLIC BLOOD PRESSURE: 120 MMHG | OXYGEN SATURATION: 95 %

## 2022-02-10 DIAGNOSIS — R06.09 DYSPNEA ON EXERTION: ICD-10-CM

## 2022-02-10 LAB
AORTIC ARCH: 2.5 CM
ASCENDING AORTA: 2.9 CM
BH CV ECHO MEAS - ACS: 1.6 CM
BH CV ECHO MEAS - AO ARCH DIAM (PROXIMAL TRANS.): 2.5 CM
BH CV ECHO MEAS - AO MAX PG (FULL): 3.1 MMHG
BH CV ECHO MEAS - AO MAX PG: 8.3 MMHG
BH CV ECHO MEAS - AO MEAN PG (FULL): 1.6 MMHG
BH CV ECHO MEAS - AO MEAN PG: 4.7 MMHG
BH CV ECHO MEAS - AO ROOT AREA (BSA CORRECTED): 1.4
BH CV ECHO MEAS - AO ROOT AREA: 6.5 CM^2
BH CV ECHO MEAS - AO ROOT DIAM: 2.9 CM
BH CV ECHO MEAS - AO V2 MAX: 144.5 CM/SEC
BH CV ECHO MEAS - AO V2 MEAN: 103.4 CM/SEC
BH CV ECHO MEAS - AO V2 VTI: 32.4 CM
BH CV ECHO MEAS - ASC AORTA: 2.9 CM
BH CV ECHO MEAS - AVA(I,A): 2.3 CM^2
BH CV ECHO MEAS - AVA(I,D): 2.3 CM^2
BH CV ECHO MEAS - AVA(V,A): 2.3 CM^2
BH CV ECHO MEAS - AVA(V,D): 2.3 CM^2
BH CV ECHO MEAS - BSA(HAYCOCK): 2.3 M^2
BH CV ECHO MEAS - BSA: 2.1 M^2
BH CV ECHO MEAS - BZI_BMI: 40.5 KILOGRAMS/M^2
BH CV ECHO MEAS - BZI_METRIC_HEIGHT: 162.6 CM
BH CV ECHO MEAS - BZI_METRIC_WEIGHT: 107.1 KG
BH CV ECHO MEAS - EDV(CUBED): 111 ML
BH CV ECHO MEAS - EDV(MOD-SP2): 74 ML
BH CV ECHO MEAS - EDV(MOD-SP4): 91 ML
BH CV ECHO MEAS - EDV(TEICH): 107.9 ML
BH CV ECHO MEAS - EF(CUBED): 65 %
BH CV ECHO MEAS - EF(MOD-BP): 62 %
BH CV ECHO MEAS - EF(MOD-SP2): 62.2 %
BH CV ECHO MEAS - EF(MOD-SP4): 64.8 %
BH CV ECHO MEAS - EF(TEICH): 56.4 %
BH CV ECHO MEAS - EF_3D-VOL: 65 %
BH CV ECHO MEAS - ESV(CUBED): 38.9 ML
BH CV ECHO MEAS - ESV(MOD-SP2): 28 ML
BH CV ECHO MEAS - ESV(MOD-SP4): 32 ML
BH CV ECHO MEAS - ESV(TEICH): 47.1 ML
BH CV ECHO MEAS - FS: 29.5 %
BH CV ECHO MEAS - IVS/LVPW: 0.98
BH CV ECHO MEAS - IVSD: 1.2 CM
BH CV ECHO MEAS - LAT PEAK E' VEL: 7.5 CM/SEC
BH CV ECHO MEAS - LV DIASTOLIC VOL/BSA (35-75): 43.4 ML/M^2
BH CV ECHO MEAS - LV MASS(C)D: 212.8 GRAMS
BH CV ECHO MEAS - LV MASS(C)DI: 101.4 GRAMS/M^2
BH CV ECHO MEAS - LV MAX PG: 5.2 MMHG
BH CV ECHO MEAS - LV MEAN PG: 3.1 MMHG
BH CV ECHO MEAS - LV SYSTOLIC VOL/BSA (12-30): 15.2 ML/M^2
BH CV ECHO MEAS - LV V1 MAX: 114.5 CM/SEC
BH CV ECHO MEAS - LV V1 MEAN: 83.6 CM/SEC
BH CV ECHO MEAS - LV V1 VTI: 25 CM
BH CV ECHO MEAS - LVIDD: 4.8 CM
BH CV ECHO MEAS - LVIDS: 3.4 CM
BH CV ECHO MEAS - LVLD AP2: 6.9 CM
BH CV ECHO MEAS - LVLD AP4: 7.1 CM
BH CV ECHO MEAS - LVLS AP2: 5.5 CM
BH CV ECHO MEAS - LVLS AP4: 6.1 CM
BH CV ECHO MEAS - LVOT AREA (M): 2.8 CM^2
BH CV ECHO MEAS - LVOT AREA: 3 CM^2
BH CV ECHO MEAS - LVOT DIAM: 1.9 CM
BH CV ECHO MEAS - LVPWD: 1.2 CM
BH CV ECHO MEAS - MED PEAK E' VEL: 5.4 CM/SEC
BH CV ECHO MEAS - MV A DUR: 0.12 SEC
BH CV ECHO MEAS - MV A MAX VEL: 98.5 CM/SEC
BH CV ECHO MEAS - MV DEC SLOPE: 312.6 CM/SEC^2
BH CV ECHO MEAS - MV DEC TIME: 0.21 SEC
BH CV ECHO MEAS - MV E MAX VEL: 84.2 CM/SEC
BH CV ECHO MEAS - MV E/A: 0.86
BH CV ECHO MEAS - MV MAX PG: 4.1 MMHG
BH CV ECHO MEAS - MV MEAN PG: 1.6 MMHG
BH CV ECHO MEAS - MV P1/2T MAX VEL: 88 CM/SEC
BH CV ECHO MEAS - MV P1/2T: 82.4 MSEC
BH CV ECHO MEAS - MV V2 MAX: 101 CM/SEC
BH CV ECHO MEAS - MV V2 MEAN: 57.3 CM/SEC
BH CV ECHO MEAS - MV V2 VTI: 43.2 CM
BH CV ECHO MEAS - MVA P1/2T LCG: 2.5 CM^2
BH CV ECHO MEAS - MVA(P1/2T): 2.7 CM^2
BH CV ECHO MEAS - MVA(VTI): 1.7 CM^2
BH CV ECHO MEAS - PA ACC TIME: 0.1 SEC
BH CV ECHO MEAS - PA MAX PG (FULL): 0.02 MMHG
BH CV ECHO MEAS - PA MAX PG: 1.4 MMHG
BH CV ECHO MEAS - PA PR(ACCEL): 33.1 MMHG
BH CV ECHO MEAS - PA V2 MAX: 58.6 CM/SEC
BH CV ECHO MEAS - PULM A REVS DUR: 0.11 SEC
BH CV ECHO MEAS - PULM A REVS VEL: 28 CM/SEC
BH CV ECHO MEAS - PULM DIAS VEL: 39.7 CM/SEC
BH CV ECHO MEAS - PULM S/D: 1.6
BH CV ECHO MEAS - PULM SYS VEL: 62.6 CM/SEC
BH CV ECHO MEAS - PVA(V,A): 4.8 CM^2
BH CV ECHO MEAS - PVA(V,D): 4.8 CM^2
BH CV ECHO MEAS - QP/QS: 1
BH CV ECHO MEAS - RAP SYSTOLE: 3 MMHG
BH CV ECHO MEAS - RV MAX PG: 1.4 MMHG
BH CV ECHO MEAS - RV MEAN PG: 0.82 MMHG
BH CV ECHO MEAS - RV V1 MAX: 58.3 CM/SEC
BH CV ECHO MEAS - RV V1 MEAN: 43.2 CM/SEC
BH CV ECHO MEAS - RV V1 VTI: 16 CM
BH CV ECHO MEAS - RVOT AREA: 4.8 CM^2
BH CV ECHO MEAS - RVOT DIAM: 2.5 CM
BH CV ECHO MEAS - RVSP: 9.6 MMHG
BH CV ECHO MEAS - SI(AO): 99.6 ML/M^2
BH CV ECHO MEAS - SI(CUBED): 34.4 ML/M^2
BH CV ECHO MEAS - SI(LVOT): 35.3 ML/M^2
BH CV ECHO MEAS - SI(MOD-SP2): 21.9 ML/M^2
BH CV ECHO MEAS - SI(MOD-SP4): 28.1 ML/M^2
BH CV ECHO MEAS - SI(TEICH): 29 ML/M^2
BH CV ECHO MEAS - SV(AO): 209 ML
BH CV ECHO MEAS - SV(CUBED): 72.1 ML
BH CV ECHO MEAS - SV(LVOT): 74.1 ML
BH CV ECHO MEAS - SV(MOD-SP2): 46 ML
BH CV ECHO MEAS - SV(MOD-SP4): 59 ML
BH CV ECHO MEAS - SV(RVOT): 77.4 ML
BH CV ECHO MEAS - SV(TEICH): 60.8 ML
BH CV ECHO MEAS - TAPSE (>1.6): 2.4 CM
BH CV ECHO MEAS - TR MAX VEL: 128 CM/SEC
BH CV ECHO MEASUREMENTS AVERAGE E/E' RATIO: 13.05
BH CV VAS BP RIGHT ARM: NORMAL MMHG
BH CV XLRA - RV BASE: 3.3 CM
BH CV XLRA - RV LENGTH: 7.4 CM
BH CV XLRA - RV MID: 2.7 CM
BH CV XLRA - TDI S': 10 CM/SEC
LEFT ATRIUM VOLUME INDEX: 20 ML/M2
LV EF 2D ECHO EST: 65 %
MAXIMAL PREDICTED HEART RATE: 144 BPM
SINUS: 3.1 CM
STJ: 2.8 CM
STRESS TARGET HR: 122 BPM

## 2022-02-10 PROCEDURE — 93306 TTE W/DOPPLER COMPLETE: CPT

## 2022-02-10 PROCEDURE — 93306 TTE W/DOPPLER COMPLETE: CPT | Performed by: INTERNAL MEDICINE

## 2022-02-10 NOTE — PROGRESS NOTES
Reviewed results with Ray Pratt and pt verbalized understanding    Thank you,  Bianca Devi RN  Triage Nurse

## 2022-02-10 NOTE — TELEPHONE ENCOUNTER
Spoke with patient patient and  Notified him Dr reviewed results, ejection fraction is 65%, ok to continue bipap st.

## 2022-02-18 ENCOUNTER — OFFICE VISIT (OUTPATIENT)
Dept: CARDIOLOGY | Facility: CLINIC | Age: 77
End: 2022-02-18

## 2022-02-18 VITALS
HEIGHT: 64 IN | BODY MASS INDEX: 40.12 KG/M2 | DIASTOLIC BLOOD PRESSURE: 62 MMHG | WEIGHT: 235 LBS | SYSTOLIC BLOOD PRESSURE: 100 MMHG | HEART RATE: 59 BPM

## 2022-02-18 DIAGNOSIS — I42.0 CARDIOMYOPATHY, DILATED: ICD-10-CM

## 2022-02-18 DIAGNOSIS — I48.19 ATRIAL FIBRILLATION, PERSISTENT: Primary | ICD-10-CM

## 2022-02-18 DIAGNOSIS — Z79.899 ON DOFETILIDE THERAPY: ICD-10-CM

## 2022-02-18 DIAGNOSIS — I48.92 ATRIAL FLUTTER WITH RAPID VENTRICULAR RESPONSE: ICD-10-CM

## 2022-02-18 PROCEDURE — 99214 OFFICE O/P EST MOD 30 MIN: CPT | Performed by: INTERNAL MEDICINE

## 2022-02-18 PROCEDURE — 93000 ELECTROCARDIOGRAM COMPLETE: CPT | Performed by: INTERNAL MEDICINE

## 2022-02-18 NOTE — PROGRESS NOTES
Date of Office Visit: 2022  Encounter Provider: Reginald Vazquez MD  Place of Service: [facility] CARDIOLOGY  Patient Name: Ray Pratt  : 1945    Subjective:     Encounter Date:2022      Patient ID: Ray Pratt is a 76 y.o. male who has a cc of PAF and is here for dofetilide fu.     He has some increase in fatigue and martin.     · Echo -- Calculated left ventricular 3D EF = 65% Estimated left ventricular EF = 65% Left ventricular systolic function is normal.  · Left ventricular wall thickness is consistent with borderline concentric hypertrophy.  · Left ventricular diastolic function was normal.    Pre-CV his EF was 22%        No anginal chest pain,      No soa,   No fainting,  No orthostasis.   No edema.   Exercise tolerance: reduced.     There have been no hospital admission since the last visit.     There have been no bleeding events.       Past Medical History:   Diagnosis Date   • Acute combined systolic and diastolic congestive heart failure (HCC)    • Atrial fibrillation (HCC)    • Atrial flutter with rapid ventricular response (HCC)    • CHF (congestive heart failure) (HCC)    • Diabetes mellitus (HCC)    • Dilated cardiomyopathy (HCC)    • Hypercholesterolemia    • Hyperlipidemia    • Hypertension    • Nonischemic cardiomyopathy (HCC)    • Obesity     class 2 severe, BMI 37.0-37.9   • On dofetilide therapy    • MARCOS (obstructive sleep apnea)     compliant with BiPAP   • Persistent atrial fibrillation (HCC)    • Pneumonia of left lung due to Haemophilus influenzae (HCC) 2017   • Sepsis (HCC) 2017       Social History     Socioeconomic History   • Marital status:    Tobacco Use   • Smoking status: Never Smoker   • Smokeless tobacco: Never Used   • Tobacco comment: caffeine use - 3 bottles of diet soda    Vaping Use   • Vaping Use: Never used   Substance and Sexual Activity   • Alcohol use: No   • Drug use: No   • Sexual activity: Defer  "      Family History   Problem Relation Age of Onset   • Cancer Mother    • Stroke Father        Review of Systems   Constitutional: Negative for fever and night sweats.   HENT: Negative for ear pain and stridor.    Eyes: Negative for discharge and visual halos.   Cardiovascular: Negative for cyanosis.   Respiratory: Negative for hemoptysis and sputum production.    Hematologic/Lymphatic: Negative for adenopathy.   Skin: Negative for nail changes and unusual hair distribution.   Musculoskeletal: Positive for arthritis and joint pain. Negative for gout and joint swelling.   Gastrointestinal: Negative for bowel incontinence and flatus.   Genitourinary: Negative for dysuria and flank pain.   Neurological: Negative for seizures and tremors.   Psychiatric/Behavioral: Negative for altered mental status. The patient is not nervous/anxious.             Objective:     Vitals:    02/18/22 1118   BP: 100/62   Pulse: 59   Weight: 107 kg (235 lb)   Height: 162.6 cm (64\")         Eyes:      General:         Right eye: No discharge.         Left eye: No discharge.   HENT:      Head: Normocephalic and atraumatic.   Neck:      Thyroid: No thyromegaly.      Vascular: No JVD.   Pulmonary:      Effort: Pulmonary effort is normal.      Breath sounds: Normal breath sounds. No rales.   Cardiovascular:      Normal rate. Regular rhythm.      No gallop.   Edema:     Peripheral edema absent.   Abdominal:      General: Bowel sounds are normal.      Palpations: Abdomen is soft.      Tenderness: There is no abdominal tenderness.   Musculoskeletal: Normal range of motion.         General: No deformity. Skin:     General: Skin is warm and dry.      Findings: No erythema.   Neurological:      Mental Status: Alert and oriented to person, place, and time.      Motor: Normal muscle tone.   Psychiatric:         Behavior: Behavior normal.         Thought Content: Thought content normal.           ECG 12 Lead    Date/Time: 2/18/2022 11:39 AM  Performed " by: Reginald Vazquez MD  Authorized by: Reginald Vazquez MD   Comparison: compared with previous ECG   Similar to previous ECG  Rhythm: sinus rhythm  Conduction: right bundle branch block            Lab Review:       Assessment:          Diagnosis Plan   1. Atrial fibrillation, persistent (CMS/HCC)     2. Atrial flutter with rapid ventricular response (HCC)     3. Cardiomyopathy, dilated (HCC)     4. On dofetilide therapy            Plan:     AF -- it is controlled on dofetilide and his QT is ok. Doing ok on a-ban    Fatigue -- I think with lowish BP we could reduce the atenolol from 25 BID to 25 once day     Body mass index is 40.34 kg/m². we disc weight loss and daily exercise.

## 2022-04-26 ENCOUNTER — OFFICE VISIT (OUTPATIENT)
Dept: SLEEP MEDICINE | Facility: HOSPITAL | Age: 77
End: 2022-04-26

## 2022-04-26 VITALS
OXYGEN SATURATION: 99 % | DIASTOLIC BLOOD PRESSURE: 74 MMHG | HEIGHT: 64 IN | SYSTOLIC BLOOD PRESSURE: 129 MMHG | HEART RATE: 63 BPM | WEIGHT: 242.4 LBS | BODY MASS INDEX: 41.38 KG/M2

## 2022-04-26 DIAGNOSIS — G47.8 NON-RESTORATIVE SLEEP: ICD-10-CM

## 2022-04-26 DIAGNOSIS — I42.0 CARDIOMYOPATHY, DILATED: ICD-10-CM

## 2022-04-26 DIAGNOSIS — E66.01 MORBID OBESITY: ICD-10-CM

## 2022-04-26 DIAGNOSIS — G47.33 SEVERE OBSTRUCTIVE SLEEP APNEA: ICD-10-CM

## 2022-04-26 DIAGNOSIS — G47.10 HYPERSOMNIA: ICD-10-CM

## 2022-04-26 DIAGNOSIS — G47.39 MIXED SLEEP APNEA: Primary | ICD-10-CM

## 2022-04-26 PROCEDURE — 99214 OFFICE O/P EST MOD 30 MIN: CPT | Performed by: FAMILY MEDICINE

## 2022-04-26 PROCEDURE — G0463 HOSPITAL OUTPT CLINIC VISIT: HCPCS

## 2022-04-26 RX ORDER — ZOLPIDEM TARTRATE 5 MG/1
TABLET ORAL
Qty: 2 TABLET | Refills: 0 | Status: SHIPPED | OUTPATIENT
Start: 2022-04-26 | End: 2022-05-09

## 2022-04-29 ENCOUNTER — LAB (OUTPATIENT)
Dept: LAB | Facility: HOSPITAL | Age: 77
End: 2022-04-29

## 2022-04-29 DIAGNOSIS — G47.33 SEVERE OBSTRUCTIVE SLEEP APNEA: ICD-10-CM

## 2022-04-29 DIAGNOSIS — G47.8 NON-RESTORATIVE SLEEP: ICD-10-CM

## 2022-04-29 DIAGNOSIS — G47.10 HYPERSOMNIA: ICD-10-CM

## 2022-04-29 DIAGNOSIS — I42.0 CARDIOMYOPATHY, DILATED: ICD-10-CM

## 2022-04-29 DIAGNOSIS — E66.01 MORBID OBESITY: ICD-10-CM

## 2022-04-29 DIAGNOSIS — G47.39 MIXED SLEEP APNEA: ICD-10-CM

## 2022-04-29 LAB — SARS-COV-2 ORF1AB RESP QL NAA+PROBE: NOT DETECTED

## 2022-04-29 PROCEDURE — U0004 COV-19 TEST NON-CDC HGH THRU: HCPCS

## 2022-04-29 PROCEDURE — U0005 INFEC AGEN DETEC AMPLI PROBE: HCPCS

## 2022-04-29 PROCEDURE — C9803 HOPD COVID-19 SPEC COLLECT: HCPCS

## 2022-05-01 ENCOUNTER — HOSPITAL ENCOUNTER (OUTPATIENT)
Dept: SLEEP MEDICINE | Facility: HOSPITAL | Age: 77
Discharge: HOME | End: 2022-05-01
Admitting: FAMILY MEDICINE

## 2022-05-01 DIAGNOSIS — E66.01 MORBID OBESITY: ICD-10-CM

## 2022-05-01 DIAGNOSIS — G47.33 SEVERE OBSTRUCTIVE SLEEP APNEA: ICD-10-CM

## 2022-05-01 DIAGNOSIS — G47.10 HYPERSOMNIA: ICD-10-CM

## 2022-05-01 DIAGNOSIS — G47.8 NON-RESTORATIVE SLEEP: ICD-10-CM

## 2022-05-01 DIAGNOSIS — I42.0 CARDIOMYOPATHY, DILATED: ICD-10-CM

## 2022-05-01 DIAGNOSIS — G47.39 MIXED SLEEP APNEA: ICD-10-CM

## 2022-05-01 PROCEDURE — 95811 POLYSOM 6/>YRS CPAP 4/> PARM: CPT | Performed by: FAMILY MEDICINE

## 2022-05-01 PROCEDURE — 95811 POLYSOM 6/>YRS CPAP 4/> PARM: CPT

## 2022-05-06 DIAGNOSIS — J45.901 BRONCHITIS, ALLERGIC, UNSPECIFIED ASTHMA SEVERITY, WITH ACUTE EXACERBATION: ICD-10-CM

## 2022-05-06 RX ORDER — POTASSIUM CHLORIDE 750 MG/1
CAPSULE, EXTENDED RELEASE ORAL
Qty: 180 CAPSULE | Refills: 1 | Status: SHIPPED | OUTPATIENT
Start: 2022-05-06 | End: 2022-11-07

## 2022-05-06 RX ORDER — LORATADINE 10 MG/1
TABLET ORAL
Qty: 90 TABLET | Refills: 1 | Status: SHIPPED | OUTPATIENT
Start: 2022-05-06

## 2022-05-06 NOTE — TELEPHONE ENCOUNTER
Rx Refill Note  Requested Prescriptions     Pending Prescriptions Disp Refills   • loratadine (CLARITIN) 10 MG tablet [Pharmacy Med Name: LORATADINE 10 MG TABLET] 90 tablet 1     Sig: TAKE 1 TABLET BY MOUTH EVERY DAY      Last office visit with prescribing clinician: 1/20/2022      Next office visit with prescribing clinician: Visit date not found            Jhonny Atkinson MA  05/06/22, 07:05 EDT

## 2022-05-09 ENCOUNTER — OFFICE VISIT (OUTPATIENT)
Dept: INTERNAL MEDICINE | Facility: CLINIC | Age: 77
End: 2022-05-09

## 2022-05-09 VITALS
WEIGHT: 240 LBS | HEIGHT: 64 IN | HEART RATE: 62 BPM | DIASTOLIC BLOOD PRESSURE: 70 MMHG | BODY MASS INDEX: 40.97 KG/M2 | SYSTOLIC BLOOD PRESSURE: 110 MMHG | TEMPERATURE: 97.4 F | OXYGEN SATURATION: 95 %

## 2022-05-09 DIAGNOSIS — E11.65 TYPE 2 DIABETES MELLITUS WITH HYPERGLYCEMIA, WITHOUT LONG-TERM CURRENT USE OF INSULIN: Primary | ICD-10-CM

## 2022-05-09 DIAGNOSIS — I10 BENIGN ESSENTIAL HTN: ICD-10-CM

## 2022-05-09 DIAGNOSIS — I48.91 ATRIAL FIBRILLATION, UNSPECIFIED TYPE: ICD-10-CM

## 2022-05-09 DIAGNOSIS — Z79.899 ON DOFETILIDE THERAPY: ICD-10-CM

## 2022-05-09 PROCEDURE — 99214 OFFICE O/P EST MOD 30 MIN: CPT | Performed by: STUDENT IN AN ORGANIZED HEALTH CARE EDUCATION/TRAINING PROGRAM

## 2022-05-09 RX ORDER — ATENOLOL 25 MG/1
25 TABLET ORAL DAILY
Qty: 180 TABLET | Refills: 3 | COMMUNITY
Start: 2022-05-09

## 2022-05-09 NOTE — PROGRESS NOTES
Marcial Jackson D.O.  Internal Medicine  [facility]  [address]  [place] [address]  [phone]      Chief Complaint  Establish Care    SUBJECTIVE    History of Present Illness    Ray Pratt is a 76 y.o. male who presents to the office today as a new patient to establish care.   Pt previously saw Dr Marino who is retiring.     Atrial fib/ HTN/CHF/NICM: follows with [facility]. Takes atenolol 25 mg once daily, dofetilide 125 mcg twice daily and anticoagulated with eliquis 5 mg twice daily. Also takes lasix 40 mg daily and takes potassium supplementation 20 meq daily. Pt states his cardiologist recently decreased atenolol from 25 mg twice daily to 25 mg once daily due to fatigue and he has noticed a slight improvement. Reports also noticing an improvement with warmer weather. States she doesn't get out and do exercise as much as he should.     Type 2 diabetes: takes empagliflozin 25 mg daily, sitagliptin 100 mg daily. He doesn't regularly check blood sugar at home. He states his blood sugar was in the 90s one time but that is the lowest he recalls.     HLD: takes rosuvastatin 10 mg daily    Allergies: takes loratadine     MARCOS: sees [facility] and is compliant with BiPAP every night    Has a ptosis for 5 or 6 years, he was offered surgery but never followed through.     No Known Allergies     Outpatient Medications Marked as Taking for the 5/9/22 encounter (Office Visit) with Marcial Jackson,    Medication Sig Dispense Refill   • atenolol (TENORMIN) 25 MG tablet Take 1 tablet by mouth Daily. 180 tablet 3   • dofetilide (TIKOSYN) 125 MCG capsule Take 1 capsule by mouth Every 12 (Twelve) Hours. 60 capsule 4   • Eliquis 5 MG tablet tablet TAKE 1 TABLET BY MOUTH EVERY 12 HOURS 60 tablet 11   • empagliflozin (Jardiance) 25 MG tablet tablet Take 1 tablet by mouth Daily With Breakfast. 90 tablet 1   • furosemide (LASIX) 40 MG tablet TAKE 1 TABLET BY MOUTH EVERY  DAY 90 tablet 3   • latanoprost (XALATAN) 0.005 % ophthalmic solution 1 drop Every Night.     • loratadine (CLARITIN) 10 MG tablet TAKE 1 TABLET BY MOUTH EVERY DAY 90 tablet 1   • potassium chloride (MICRO-K) 10 MEQ CR capsule TAKE 2 CAPSULES BY MOUTH EVERY  capsule 1   • rosuvastatin (CRESTOR) 10 MG tablet Take 1 tablet by mouth Daily. 90 tablet 3   • SITagliptin (Januvia) 100 MG tablet Take 1 tablet by mouth Daily. 90 tablet 3   • [DISCONTINUED] atenolol (TENORMIN) 25 MG tablet Take 1 tablet by mouth Every 12 (Twelve) Hours. 180 tablet 3        Past Medical History:   Diagnosis Date   • Acute combined systolic and diastolic congestive heart failure (HCC)    • Atrial fibrillation (HCC)    • Atrial flutter with rapid ventricular response ([facility])    • CHF (congestive heart failure) ([facility])    • Dilated cardiomyopathy (HCC)    • Hyperlipidemia    • Hypertension    • Nonischemic cardiomyopathy ([facility])    • Obesity    • On dofetilide therapy    • MARCOS (obstructive sleep apnea)     compliant with BiPAP   • Persistent atrial fibrillation ([facility])    • Pneumonia of left lung due to Haemophilus influenzae ([facility]) 01/18/2017   • Ptosis of right eyelid    • Sepsis ([facility]) 01/18/2017   • Type 2 diabetes mellitus, without long-term current use of insulin ([facility])      Past Surgical History:   Procedure Laterality Date   • CARDIAC CATHETERIZATION N/A 11/05/2019    Procedure: Left Heart Cath;  Surgeon: Sundeep Tsang MD;  Location: [facility] CATH INVASIVE LOCATION;  Service: Cardiovascular   • CARDIAC CATHETERIZATION N/A 11/05/2019    Procedure: Coronary angiography;  Surgeon: Sundeep Tsang MD;  Location: [facility] CATH INVASIVE LOCATION;  Service: Cardiovascular   • DENTAL PROCEDURE     • MOLE REMOVAL Right     above right eye, benign per patient report     Family History   Problem Relation Age of Onset   • Other Mother         history of cancer   • Stroke Father    • Epilepsy Sister     reports that he has never smoked. He has never  "used smokeless tobacco. He reports previous alcohol use. He reports that he does not use drugs.    OBJECTIVE    Vital Signs:   /70   Pulse 62   Temp 97.4 °F (36.3 °C) (Temporal)   Ht 162.6 cm (64\")   Wt 109 kg (240 lb)   SpO2 95%   BMI 41.20 kg/m²     Physical Exam  Vitals reviewed.   Constitutional:       General: He is not in acute distress.     Appearance: He is obese. He is not ill-appearing.   HENT:      Ears:      Comments: Ptosis right    Eyes:      General: No scleral icterus.  Cardiovascular:      Rate and Rhythm: Normal rate and regular rhythm.      Heart sounds: Normal heart sounds. No murmur heard.  Pulmonary:      Effort: Pulmonary effort is normal. No respiratory distress.      Breath sounds: Normal breath sounds. No wheezing or rhonchi.   Musculoskeletal:      Right lower leg: Edema (trace pretibial) present.      Left lower leg: Edema (trace pretibial) present.   Skin:     General: Skin is warm.      Coloration: Skin is not jaundiced.   Neurological:      Mental Status: He is alert.   Psychiatric:         Mood and Affect: Mood normal.         Behavior: Behavior normal.         Thought Content: Thought content normal.            The following data was reviewed by: Marcial Jackson DO on 05/09/2022:  Common labs    Common Labsle 6/17/21 6/17/21 6/17/21 1/20/22 1/20/22 1/20/22 1/20/22    0954 [id number] [id number] 0932 0932 0932 0932   Glucose 120 (A)    110 (A)     BUN 22    20     Creatinine 1.01    1.04     eGFR Non  Am 72    69     eGFR African Am 87    80     Sodium 143    140     Potassium 4.7    4.3     Chloride 103    100     Calcium 9.7    9.3     Total Protein 7.2    7.1     Albumin 4.40    4.0     Total Bilirubin 0.5    0.6     Alkaline Phosphatase 66    60     AST (SGOT) 13    15     ALT (SGPT) 13    11     WBC    7.2      Hemoglobin    15.2      Hematocrit    46.8      Platelets    262      Total Cholesterol      159    Triglycerides      83    HDL Cholesterol      47    LDL Cholesterol   "     96    Hemoglobin A1C  7.00 (A)     7.0 (A)   Microalbumin, Urine   3.9       (A) Abnormal value       Comments are available for some flowsheets but are not being displayed.                        ASSESSMENT & PLAN     Diagnoses and all orders for this visit:    1. Type 2 diabetes mellitus with hyperglycemia, without long-term current use of insulin (HCC) (Primary)  -A1c trends on file for this patient:   A1C Last 3 Results    HGBA1C Last 3 Results 6/17/21 1/20/22   Hemoglobin A1C 7.00 (A) 7.0 (A)   (A) Abnormal value       Comments are available for some flowsheets but are not being displayed.           -Goal A1c for this patient is less than 7.0%  -Current diabetes regimen:  takes empagliflozin 25 mg daily, sitagliptin 100 mg daily  -Summary of patient's most recent blood glucose trends at home: doesn't check blood glucose at home  -Changes made to diabetes regimen today:  None, continue current regimen. Recheck A1c today  -Microalbuminuria screen: up to date as of 6/2021        -will obtain pt's last diabetic eye exam result from [facility]    2. Benign essential HTN  3. Atrial fibrillation, unspecified type (HCC)  4. On dofetilide therapy  - follows with [facility]. Takes atenolol 25 mg once daily, dofetilide 125 mcg twice daily and anticoagulated with eliquis 5 mg twice daily. Also takes lasix 40 mg daily and takes potassium supplementation 20 meq daily. Pt states his cardiologist recently decreased atenolol from 25 mg twice daily to 25 mg once daily due to fatigue and he has noticed a slight improvement.   -/70 in office today, continue current antihypertensive regimen  -rest of care for these issues per cardiology          The following social determinates of health impact the patient's medical decision making: No social determinates of health were factored in to today's visit.     Follow Up  Return in about 3 months (around 8/9/2022) for Medicare Wellness.    Patient/family had no  further questions at this time and verbalized understanding of the plan discussed today.

## 2022-05-10 LAB — HBA1C MFR BLD: 7.2 % (ref 4.8–5.6)

## 2022-05-15 RX ORDER — ROSUVASTATIN CALCIUM 10 MG/1
TABLET, COATED ORAL
Qty: 90 TABLET | Refills: 3 | OUTPATIENT
Start: 2022-05-15

## 2022-05-18 RX ORDER — ROSUVASTATIN CALCIUM 10 MG/1
TABLET, COATED ORAL
Qty: 90 TABLET | Refills: 3 | OUTPATIENT
Start: 2022-05-18

## 2022-05-23 RX ORDER — ROSUVASTATIN CALCIUM 10 MG/1
10 TABLET, COATED ORAL DAILY
Qty: 90 TABLET | Refills: 3 | Status: SHIPPED | OUTPATIENT
Start: 2022-05-23
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
## 2022-07-05 RX ORDER — DOFETILIDE 0.12 MG/1
CAPSULE ORAL
Qty: 60 CAPSULE | Refills: 4 | Status: SHIPPED | OUTPATIENT
Start: 2022-07-05 | End: 2022-11-16

## 2022-07-20 ENCOUNTER — APPOINTMENT (OUTPATIENT)
Dept: SLEEP MEDICINE | Facility: HOSPITAL | Age: 77
End: 2022-07-20

## 2022-07-20 ENCOUNTER — TELEPHONE (OUTPATIENT)
Dept: SLEEP MEDICINE | Facility: HOSPITAL | Age: 77
End: 2022-07-20

## 2022-07-20 NOTE — TELEPHONE ENCOUNTER
Pressure changed via modem IPAP20, EPAP16, rate 10 flex 2 . Patient notified ,follow up appointment moved

## 2022-08-01 ENCOUNTER — OFFICE VISIT (OUTPATIENT)
Dept: INTERNAL MEDICINE | Facility: CLINIC | Age: 77
End: 2022-08-01

## 2022-08-01 VITALS
HEIGHT: 64 IN | WEIGHT: 237 LBS | SYSTOLIC BLOOD PRESSURE: 108 MMHG | BODY MASS INDEX: 40.46 KG/M2 | TEMPERATURE: 97.3 F | OXYGEN SATURATION: 94 % | DIASTOLIC BLOOD PRESSURE: 70 MMHG | HEART RATE: 61 BPM

## 2022-08-01 DIAGNOSIS — E11.65 TYPE 2 DIABETES MELLITUS WITH HYPERGLYCEMIA, WITHOUT LONG-TERM CURRENT USE OF INSULIN: ICD-10-CM

## 2022-08-01 DIAGNOSIS — Z00.00 MEDICARE ANNUAL WELLNESS VISIT, SUBSEQUENT: Primary | ICD-10-CM

## 2022-08-01 DIAGNOSIS — Z23 NEED FOR PNEUMOCOCCAL VACCINATION: ICD-10-CM

## 2022-08-01 PROCEDURE — 1159F MED LIST DOCD IN RCRD: CPT | Performed by: STUDENT IN AN ORGANIZED HEALTH CARE EDUCATION/TRAINING PROGRAM

## 2022-08-01 PROCEDURE — G0009 ADMIN PNEUMOCOCCAL VACCINE: HCPCS | Performed by: STUDENT IN AN ORGANIZED HEALTH CARE EDUCATION/TRAINING PROGRAM

## 2022-08-01 PROCEDURE — 90677 PCV20 VACCINE IM: CPT | Performed by: STUDENT IN AN ORGANIZED HEALTH CARE EDUCATION/TRAINING PROGRAM

## 2022-08-01 PROCEDURE — 1170F FXNL STATUS ASSESSED: CPT | Performed by: STUDENT IN AN ORGANIZED HEALTH CARE EDUCATION/TRAINING PROGRAM

## 2022-08-01 PROCEDURE — 99214 OFFICE O/P EST MOD 30 MIN: CPT | Performed by: STUDENT IN AN ORGANIZED HEALTH CARE EDUCATION/TRAINING PROGRAM

## 2022-08-01 PROCEDURE — G0439 PPPS, SUBSEQ VISIT: HCPCS | Performed by: STUDENT IN AN ORGANIZED HEALTH CARE EDUCATION/TRAINING PROGRAM

## 2022-08-01 NOTE — PROGRESS NOTES
The ABCs of the Annual Wellness Visit  Subsequent Medicare Wellness Visit    Chief Complaint   Patient presents with   • Medicare Wellness-subsequent      Subjective    History of Present Illness:  Ray Pratt is a 76 y.o. male who presents for a Subsequent Medicare Wellness Visit.    The following portions of the patient's history were reviewed and   updated as appropriate: allergies, current medications, past family history, past medical history, past social history, past surgical history and problem list.    Compared to one year ago, the patient feels his physical   health is the same.    Diabetes: States he has decreased sweet foods such as doughnuts and poptarts at breakfast. Now for breakfast he eats an egg and a cracker and watermelon. He also reports trying to lose weight. He is trying to replace a meal with salad once per week. He states he is not able to exercise as long as he could before. He does not check his blood sugar regularly.     Compared to one year ago, the patient feels his mental   health is the same.    Recent Hospitalizations:  He was not admitted to the hospital during the last year.       Current Medical Providers:  Patient Care Team:  Marcial Jackson DO as PCP - General (Internal Medicine)  Reginald Vazquez MD as Referring Physician (Cardiology)    Outpatient Medications Prior to Visit   Medication Sig Dispense Refill   • atenolol (TENORMIN) 25 MG tablet Take 1 tablet by mouth Daily. 180 tablet 3   • dofetilide (TIKOSYN) 125 MCG capsule TAKE 1 CAPSULE BY MOUTH EVERY 12 HOURS. 60 capsule 4   • Eliquis 5 MG tablet tablet TAKE 1 TABLET BY MOUTH EVERY 12 HOURS 60 tablet 11   • empagliflozin (Jardiance) 25 MG tablet tablet Take 1 tablet by mouth Daily With Breakfast. 90 tablet 1   • furosemide (LASIX) 40 MG tablet TAKE 1 TABLET BY MOUTH EVERY DAY 90 tablet 3   • loratadine (CLARITIN) 10 MG tablet TAKE 1 TABLET BY MOUTH EVERY DAY 90 tablet 1   • potassium chloride (MICRO-K) 10 MEQ CR capsule  "TAKE 2 CAPSULES BY MOUTH EVERY  capsule 1   • rosuvastatin (CRESTOR) 10 MG tablet Take 1 tablet by mouth Daily. 90 tablet 3   • SITagliptin (Januvia) 100 MG tablet Take 1 tablet by mouth Daily. 90 tablet 3   • latanoprost (XALATAN) 0.005 % ophthalmic solution 1 drop Every Night.       No facility-administered medications prior to visit.       No opioid medication identified on active medication list. I have reviewed chart for other potential  high risk medication/s and harmful drug interactions in the elderly.          Aspirin is not on active medication list.  Aspirin use is not indicated based on review of current medical condition/s. Risk of harm outweighs potential benefits.  .    Patient Active Problem List   Diagnosis   • Hypoxia   • Type 2 diabetes mellitus with hyperglycemia (HCC)   • Benign essential HTN   • Atrial fibrillation, persistent (CMS/HCC)   • Atrial flutter with rapid ventricular response ([facility])   • Acute combined systolic and diastolic congestive heart failure ([facility])   • Obstructive sleep apnea   • Class 3 severe obesity due to excess calories without serious comorbidity with body mass index (BMI) of 40.0 to 44.9 in adult ([facility])   • A-fib ([facility])   • High risk medication use   • Obesity, morbid, BMI 40.0-49.9 ([facility])   • Cardiomyopathy, dilated ([facility])   • VILLA (dyspnea on exertion)   • Long term (current) use of anticoagulants   • On dofetilide therapy     Advance Care Planning  Advance Directive is not on file.  ACP discussion was held with the patient during this visit. Patient does not have an advance directive, information provided.          Objective    Vitals:    08/01/22 1107   BP: 108/70   Pulse: 61   Temp: 97.3 °F (36.3 °C)   TempSrc: Infrared   SpO2: 94%   Weight: 108 kg (237 lb)   Height: 162.6 cm (64\")     Estimated body mass index is 40.68 kg/m² as calculated from the following:    Height as of this encounter: 162.6 cm (64\").    Weight as of this encounter: 108 kg (237 lb).    Class 3 " Severe Obesity (BMI >=40). Obesity-related health conditions include the following: obstructive sleep apnea, hypertension, diabetes mellitus and dyslipidemias. Obesity is improving with lifestyle modifications. BMI is is above average; BMI management plan is completed. We discussed portion control, increasing exercise and joining a fitness center or start home based exercise program.      Does the patient have evidence of cognitive impairment? No    Physical Exam  Vitals reviewed.   Constitutional:       General: He is not in acute distress.     Appearance: Normal appearance. He is obese. He is not ill-appearing.   HENT:      Head: Atraumatic.      Right Ear: Tympanic membrane, ear canal and external ear normal. There is no impacted cerumen.      Left Ear: Tympanic membrane, ear canal and external ear normal. There is no impacted cerumen.   Eyes:      General: No scleral icterus.  Cardiovascular:      Rate and Rhythm: Normal rate and regular rhythm.      Heart sounds: Normal heart sounds. No murmur heard.  Pulmonary:      Effort: Pulmonary effort is normal. No respiratory distress.      Breath sounds: Normal breath sounds. No wheezing or rhonchi.   Abdominal:      General: Bowel sounds are normal. There is no distension.      Palpations: Abdomen is soft.      Tenderness: There is no abdominal tenderness.   Musculoskeletal:      Right lower leg: No edema.      Left lower leg: No edema.   Skin:     Coloration: Skin is not jaundiced.   Neurological:      Mental Status: He is alert.   Psychiatric:         Mood and Affect: Mood normal.         Behavior: Behavior normal.         Thought Content: Thought content normal.       Lab Results   Component Value Date    HGBA1C 7.2 (H) 05/09/2022            HEALTH RISK ASSESSMENT    Smoking Status:  Social History     Tobacco Use   Smoking Status Never Smoker   Smokeless Tobacco Never Used     Alcohol Consumption:  Social History     Substance and Sexual Activity   Alcohol Use Not  Currently     Fall Risk Screen:    DAYNEADI Fall Risk Assessment was completed, and patient is at LOW risk for falls.Assessment completed on:8/1/2022    Depression Screening:  PHQ-2/PHQ-9 Depression Screening 8/1/2022   Retired PHQ-9 Total Score -   Retired Total Score -   Little Interest or Pleasure in Doing Things 0-->not at all   Feeling Down, Depressed or Hopeless 0-->not at all   PHQ-9: Brief Depression Severity Measure Score 0       Health Habits and Functional and Cognitive Screening:  Functional & Cognitive Status 8/1/2022   Do you have difficulty preparing food and eating? No   Do you have difficulty bathing yourself, getting dressed or grooming yourself? No   Do you have difficulty using the toilet? No   Do you have difficulty moving around from place to place? No   Do you have trouble with steps or getting out of a bed or a chair? No   Current Diet Limited Junk Food   Dental Exam Up to date   Eye Exam Not up to date   Exercise (times per week) 1 times per week   Current Exercises Include Walking   Do you need help using the phone?  No   Are you deaf or do you have serious difficulty hearing?  No   Do you need help with transportation? No   Do you need help shopping? No   Do you need help preparing meals?  No   Do you need help with housework?  No   Do you need help with laundry? No   Do you need help taking your medications? No   Do you need help managing money? No   Do you ever drive or ride in a car without wearing a seat belt? No   Have you felt unusual stress, anger or loneliness in the last month? No   Who do you live with? Spouse   If you need help, do you have trouble finding someone available to you? No   Have you been bothered in the last four weeks by sexual problems? -   Do you have difficulty concentrating, remembering or making decisions? No       Age-appropriate Screening Schedule:  Refer to the list below for future screening recommendations based on patient's age, sex and/or medical  conditions. Orders for these recommended tests are listed in the plan section. The patient has been provided with a written plan.    Health Maintenance   Topic Date Due   • TDAP/TD VACCINES (1 - Tdap) Never done   • ZOSTER VACCINE (2 of 2) 07/02/2019   • DIABETIC EYE EXAM  01/21/2022   • URINE MICROALBUMIN  06/17/2022   • INFLUENZA VACCINE  10/01/2022   • HEMOGLOBIN A1C  11/09/2022   • LIPID PANEL  01/20/2023              Assessment & Plan   CMS Preventative Services Quick Reference  Risk Factors Identified During Encounter  Cardiovascular Disease: pt follows with cardiology  Immunizations Discussed/Encouraged (specific Immunizations; Td, Prevnar 20 (Pneumococcal 20-valent conjugate) and Shingrix)  Obesity/Overweight : discussed weight loss interventions as above    The above risks/problems have been discussed with the patient.  Follow up actions/plans if indicated are seen below in the Assessment/Plan Section.  Pertinent information has been shared with the patient in the After Visit Summary.    A problem-based visit was also conducted on the same day, see below for assessment and plan    Diagnoses and all orders for this visit:    1. Type 2 diabetes mellitus with hyperglycemia, without long-term current use of insulin (HCC) (Primary)  -uncontrolled  -A1c trends on file for this patient:   A1C Last 3 Results    HGBA1C Last 3 Results 1/20/22 5/9/22   Hemoglobin A1C 7.0 (A) 7.2 (A)   (A) Abnormal value       Comments are available for some flowsheets but are not being displayed.           -Goal A1c for this patient is less than 7.0%  -Current diabetes regimen: empagliflozin 25 mg daily and sitagliptin 100 mg daily  -Summary of patient's most recent blood glucose trends at home: does not check at home   -Changes made to diabetes regimen today: check A1c today, pt reports making some changes to diet and eating less desserts  -Microalbuminuria screen: will update today       -refer to new provider for eye exam per his  request          The following social determinates of health impact the patient's medical decision making: No social determinates of health were factored in to today's visit.     Follow Up  Return in about 3 months (around 11/1/2022) for Recheck.        An After Visit Summary and PPPS were made available to the patient.

## 2022-08-01 NOTE — PATIENT INSTRUCTIONS
Medicare Wellness  Personal Prevention Plan of Service     Date of Office Visit:    Encounter Provider:  Marcial Jackson DO  Place of Service:  [facility]  Patient Name: Ray Pratt  :  1945    As part of the Medicare Wellness portion of your visit today, we are providing you with this personalized preventive plan of services (PPPS). This plan is based upon recommendations of the [place] Preventive Services Task Force (USPSTF) and the Advisory Committee on Immunization Practices (ACIP).    This lists the preventive care services that should be considered, and provides dates of when you are due. Items listed as completed are up-to-date and do not require any further intervention.    Health Maintenance   Topic Date Due    TDAP/TD VACCINES (1 - Tdap) Never done    ZOSTER VACCINE (2 of 2) 2019    Pneumococcal Vaccine 65+ (2 - PPSV23 or PCV20) 2021    DIABETIC EYE EXAM  2022    URINE MICROALBUMIN  2022    INFLUENZA VACCINE  10/01/2022    HEMOGLOBIN A1C  2022    LIPID PANEL  2023    ANNUAL WELLNESS VISIT  2023    HEPATITIS C SCREENING  Completed    COVID-19 Vaccine  Completed       Orders Placed This Encounter   Procedures    Pneumococcal Conjugate Vaccine 20-Valent All    Hemoglobin A1c     Order Specific Question:   Release to patient     Answer:   Immediate    Basic metabolic panel     Order Specific Question:   Release to patient     Answer:   Immediate    Microalbumin / Creatinine Urine Ratio - Urine, Clean Catch       Return in about 3 months (around 2022) for Recheck.

## 2022-08-02 LAB
ALBUMIN/CREAT UR: ABNORMAL MG/G CREAT (ref 0–29)
BUN SERPL-MCNC: 19 MG/DL (ref 8–27)
BUN/CREAT SERPL: 18 (ref 10–24)
CALCIUM SERPL-MCNC: 9.3 MG/DL (ref 8.6–10.2)
CHLORIDE SERPL-SCNC: 102 MMOL/L (ref 96–106)
CO2 SERPL-SCNC: 26 MMOL/L (ref 20–29)
CREAT SERPL-MCNC: 1.03 MG/DL (ref 0.76–1.27)
CREAT UR-MCNC: 8.8 MG/DL
EGFRCR SERPLBLD CKD-EPI 2021: 75 ML/MIN/1.73
GLUCOSE SERPL-MCNC: 116 MG/DL (ref 65–99)
HBA1C MFR BLD: 7.2 % (ref 4.8–5.6)
MICROALBUMIN UR-MCNC: <3 UG/ML
POTASSIUM SERPL-SCNC: 4.3 MMOL/L (ref 3.5–5.2)
SODIUM SERPL-SCNC: 143 MMOL/L (ref 134–144)

## 2022-08-03 ENCOUNTER — OFFICE VISIT (OUTPATIENT)
Dept: INTERNAL MEDICINE | Facility: CLINIC | Age: 77
End: 2022-08-03

## 2022-08-03 VITALS
HEART RATE: 91 BPM | DIASTOLIC BLOOD PRESSURE: 76 MMHG | BODY MASS INDEX: 40.46 KG/M2 | OXYGEN SATURATION: 94 % | SYSTOLIC BLOOD PRESSURE: 122 MMHG | WEIGHT: 237 LBS | HEIGHT: 64 IN | TEMPERATURE: 98.6 F

## 2022-08-03 DIAGNOSIS — R50.9 SUBJECTIVE FEVER: Primary | ICD-10-CM

## 2022-08-03 DIAGNOSIS — R53.83 FATIGUE, UNSPECIFIED TYPE: ICD-10-CM

## 2022-08-03 LAB
BILIRUB BLD-MCNC: NEGATIVE MG/DL
CLARITY, POC: CLEAR
COLOR UR: YELLOW
EXPIRATION DATE: ABNORMAL
EXPIRATION DATE: NORMAL
GLUCOSE UR STRIP-MCNC: ABNORMAL MG/DL
INTERNAL CONTROL: NORMAL
KETONES UR QL: ABNORMAL
LEUKOCYTE EST, POC: NEGATIVE
Lab: ABNORMAL
Lab: NORMAL
NITRITE UR-MCNC: NEGATIVE MG/ML
PH UR: 5 [PH] (ref 5–8)
PROT UR STRIP-MCNC: NEGATIVE MG/DL
RBC # UR STRIP: ABNORMAL /UL
SARS-COV-2 AG UPPER RESP QL IA.RAPID: NOT DETECTED
SP GR UR: 1.01 (ref 1–1.03)
UROBILINOGEN UR QL: NORMAL

## 2022-08-03 PROCEDURE — 99214 OFFICE O/P EST MOD 30 MIN: CPT | Performed by: STUDENT IN AN ORGANIZED HEALTH CARE EDUCATION/TRAINING PROGRAM

## 2022-08-03 PROCEDURE — 87426 SARSCOV CORONAVIRUS AG IA: CPT | Performed by: STUDENT IN AN ORGANIZED HEALTH CARE EDUCATION/TRAINING PROGRAM

## 2022-08-03 PROCEDURE — 81003 URINALYSIS AUTO W/O SCOPE: CPT | Performed by: STUDENT IN AN ORGANIZED HEALTH CARE EDUCATION/TRAINING PROGRAM

## 2022-08-03 NOTE — PROGRESS NOTES
Marcial Jackson D.O.  Internal Medicine  [facility]  [address], [address]  [place] [address]  [phone]      Chief Complaint  Fever    SUBJECTIVE    History of Present Illness    Ray Pratt is a 76 y.o. male who presents to the office today as an established patient that last saw me on 8/1/2022.   Here today for acute care visit.     Last night had chills, felt like he was hot as well.   He went to bed and woke up an hour later and put a sweater on because he was that cold.   A few hours later the chills were gone. He overall feels ran down. He has not been around anyone ill.   No cough, no runny nose, no sore throat, abdominal pain, vomiting or diarrhea, ear ache, no burning with urination. He has felt nauseated on and off.     No Known Allergies     Outpatient Medications Marked as Taking for the 8/3/22 encounter (Office Visit) with Marcial Jackson,    Medication Sig Dispense Refill   • atenolol (TENORMIN) 25 MG tablet Take 1 tablet by mouth Daily. 180 tablet 3   • dofetilide (TIKOSYN) 125 MCG capsule TAKE 1 CAPSULE BY MOUTH EVERY 12 HOURS. 60 capsule 4   • Eliquis 5 MG tablet tablet TAKE 1 TABLET BY MOUTH EVERY 12 HOURS 60 tablet 11   • empagliflozin (Jardiance) 25 MG tablet tablet Take 1 tablet by mouth Daily With Breakfast. 90 tablet 1   • furosemide (LASIX) 40 MG tablet TAKE 1 TABLET BY MOUTH EVERY DAY 90 tablet 3   • latanoprost (XALATAN) 0.005 % ophthalmic solution 1 drop Every Night.     • loratadine (CLARITIN) 10 MG tablet TAKE 1 TABLET BY MOUTH EVERY DAY 90 tablet 1   • potassium chloride (MICRO-K) 10 MEQ CR capsule TAKE 2 CAPSULES BY MOUTH EVERY  capsule 1   • rosuvastatin (CRESTOR) 10 MG tablet Take 1 tablet by mouth Daily. 90 tablet 3   • SITagliptin (Januvia) 100 MG tablet Take 1 tablet by mouth Daily. 90 tablet 3        Past Medical History:   Diagnosis Date   • Acute combined systolic and diastolic congestive heart failure (HCC)    • Atrial fibrillation  "([facility])    • Atrial flutter with rapid ventricular response ([facility])    • CHF (congestive heart failure) ([facility])    • Dilated cardiomyopathy ([facility])    • Hyperlipidemia    • Hypertension    • Low-tension glaucoma of right eye    • Nonischemic cardiomyopathy ([facility])    • Obesity    • Ocular hypertension of left eye    • On dofetilide therapy    • MARCOS (obstructive sleep apnea)     compliant with BiPAP   • Persistent atrial fibrillation ([facility])    • Pneumonia of left lung due to Haemophilus influenzae ([facility]) 01/18/2017   • Ptosis of right eyelid    • Sepsis ([facility]) 01/18/2017   • Type 2 diabetes mellitus, without long-term current use of insulin ([facility])        OBJECTIVE    Vital Signs:   /76   Pulse 91   Temp 98.6 °F (37 °C) (Oral)   Ht 162.6 cm (64\")   Wt 108 kg (237 lb)   SpO2 94%   BMI 40.68 kg/m²     Physical Exam  Vitals reviewed.   Constitutional:       General: He is not in acute distress.     Appearance: Normal appearance. He is not ill-appearing.   HENT:      Head: Atraumatic.   Eyes:      General: No scleral icterus.  Cardiovascular:      Rate and Rhythm: Normal rate and regular rhythm.      Heart sounds: Normal heart sounds. No murmur heard.  Pulmonary:      Effort: Pulmonary effort is normal. No respiratory distress.      Breath sounds: Normal breath sounds. No wheezing.   Abdominal:      General: Bowel sounds are normal. There is no distension.      Palpations: Abdomen is soft.      Tenderness: There is no abdominal tenderness. There is no guarding.   Skin:     Coloration: Skin is not jaundiced.   Neurological:      Mental Status: He is alert.   Psychiatric:         Mood and Affect: Mood normal.         Behavior: Behavior normal.         Thought Content: Thought content normal.                             ASSESSMENT & PLAN     Diagnoses and all orders for this visit:    1. Subjective fever (Primary)  2. Fatigue, unspecified type  -pt presents to office today for acute care visit, was feeling well until last " night when he had subjective fever/chills, fatigue as well as some intermittent nausea  -he denies other symptoms that could lead to a more specific source of infection: No cough, no runny nose, no sore throat, abdominal pain, vomiting or diarrhea, ear ache, no burning with urination.  -on exam he is in no acute distress, normotensive, afebrile, satting 94% on room air which is his baseline; abdomen is nontender to palpation and lungs are clear to auscultation  -point of care urine dipstick not suggestive of infection  -point of care COVID 19 in office negative , will send for PCR testing given suspicion   -will obtain BMP and CBC today; if WBC count is elevated will escalate workup of infectious etiology   -I have low suspicion for DVT given he is anticoagulated with eliquis and no leg pain/swelling  -at this time I would like pt to begin testing for COVID 19 with at home test daily for the next 4 days.... it is definitely possible that he has COVID 19 but given symptoms are less than 12 hours old he is not testing positive;  recommended he isolate from his wife; call me daily over the next 2 days to let me know how his symptoms progress so that I can determine where to focus attention and make future plan        The following social determinates of health impact the patient's medical decision making: No social determinates of health were factored in to today's visit.     Follow Up  No follow-ups on file.    Patient/family had no further questions at this time and verbalized understanding of the plan discussed today.

## 2022-08-04 ENCOUNTER — TELEPHONE (OUTPATIENT)
Dept: INTERNAL MEDICINE | Facility: CLINIC | Age: 77
End: 2022-08-04

## 2022-08-04 LAB
BASOPHILS # BLD AUTO: 0.1 X10E3/UL (ref 0–0.2)
BASOPHILS NFR BLD AUTO: 1 %
BUN SERPL-MCNC: 20 MG/DL (ref 8–27)
BUN/CREAT SERPL: 19 (ref 10–24)
CALCIUM SERPL-MCNC: 8.7 MG/DL (ref 8.6–10.2)
CHLORIDE SERPL-SCNC: 99 MMOL/L (ref 96–106)
CO2 SERPL-SCNC: 22 MMOL/L (ref 20–29)
CREAT SERPL-MCNC: 1.08 MG/DL (ref 0.76–1.27)
EGFRCR SERPLBLD CKD-EPI 2021: 71 ML/MIN/1.73
EOSINOPHIL # BLD AUTO: 0 X10E3/UL (ref 0–0.4)
EOSINOPHIL NFR BLD AUTO: 0 %
ERYTHROCYTE [DISTWIDTH] IN BLOOD BY AUTOMATED COUNT: 14.4 % (ref 11.6–15.4)
GLUCOSE SERPL-MCNC: 85 MG/DL (ref 65–99)
HCT VFR BLD AUTO: 45.1 % (ref 37.5–51)
HGB BLD-MCNC: 14.8 G/DL (ref 13–17.7)
IMM GRANULOCYTES # BLD AUTO: 0.1 X10E3/UL (ref 0–0.1)
IMM GRANULOCYTES NFR BLD AUTO: 1 %
LYMPHOCYTES # BLD AUTO: 2.6 X10E3/UL (ref 0.7–3.1)
LYMPHOCYTES NFR BLD AUTO: 25 %
MCH RBC QN AUTO: 29 PG (ref 26.6–33)
MCHC RBC AUTO-ENTMCNC: 32.8 G/DL (ref 31.5–35.7)
MCV RBC AUTO: 88 FL (ref 79–97)
MONOCYTES # BLD AUTO: 1 X10E3/UL (ref 0.1–0.9)
MONOCYTES NFR BLD AUTO: 9 %
NEUTROPHILS # BLD AUTO: 7 X10E3/UL (ref 1.4–7)
NEUTROPHILS NFR BLD AUTO: 64 %
PLATELET # BLD AUTO: 241 X10E3/UL (ref 150–450)
POTASSIUM SERPL-SCNC: 4.4 MMOL/L (ref 3.5–5.2)
RBC # BLD AUTO: 5.1 X10E6/UL (ref 4.14–5.8)
SODIUM SERPL-SCNC: 137 MMOL/L (ref 134–144)
WBC # BLD AUTO: 10.8 X10E3/UL (ref 3.4–10.8)

## 2022-08-04 NOTE — TELEPHONE ENCOUNTER
Caller: Ray Pratt    Relationship: Self    Best call back number: [phone]    What is the best time to reach you: ANY    Who are you requesting to speak with (clinical staff, provider,  specific staff member): CLINICAL    Do you know the name of the person who called: PATIENT    What was the call regarding: TEMP 98 AND PATIENT TOOK ANOTHER HOME TEST AND IT WAS NEGATIVE FOR COVID.  PATIENT TIRED BUT IS FEELING BETTER TODAY.    Do you require a callback: DOESN'T MATTER

## 2022-08-05 ENCOUNTER — TELEPHONE (OUTPATIENT)
Dept: INTERNAL MEDICINE | Facility: CLINIC | Age: 77
End: 2022-08-05

## 2022-08-05 NOTE — TELEPHONE ENCOUNTER
PATIENT STATES HE AND HIS WIFE ARE FEELING EVEN BETTER TODAY, STILL COVID NEGATIVE AND NO FEVERS. PLEASE MAKE SURE DR. HARDY IS AWARE OF BOTH THEIR DAILY RECOVERY     PATIENT> [phone]

## 2022-08-06 LAB
LABCORP SARS-COV-2, NAA 2 DAY TAT: NORMAL
SARS-COV-2 RNA RESP QL NAA+PROBE: NOT DETECTED

## 2022-08-31 ENCOUNTER — OFFICE VISIT (OUTPATIENT)
Dept: SLEEP MEDICINE | Facility: HOSPITAL | Age: 77
End: 2022-08-31

## 2022-08-31 VITALS
WEIGHT: 243 LBS | OXYGEN SATURATION: 96 % | BODY MASS INDEX: 41.48 KG/M2 | DIASTOLIC BLOOD PRESSURE: 71 MMHG | SYSTOLIC BLOOD PRESSURE: 117 MMHG | HEIGHT: 64 IN | HEART RATE: 64 BPM

## 2022-08-31 DIAGNOSIS — G47.39 MIXED SLEEP APNEA: Primary | ICD-10-CM

## 2022-08-31 DIAGNOSIS — G47.33 SEVERE OBSTRUCTIVE SLEEP APNEA: ICD-10-CM

## 2022-08-31 DIAGNOSIS — I42.0 CARDIOMYOPATHY, DILATED: ICD-10-CM

## 2022-08-31 DIAGNOSIS — E66.01 MORBID OBESITY: ICD-10-CM

## 2022-08-31 PROCEDURE — 99214 OFFICE O/P EST MOD 30 MIN: CPT | Performed by: FAMILY MEDICINE

## 2022-08-31 PROCEDURE — G0463 HOSPITAL OUTPT CLINIC VISIT: HCPCS

## 2022-08-31 NOTE — PROGRESS NOTES
Follow Up Sleep Disorders Center Note     Chief Complaint:  MARCOS     Primary Care Physician: Marcial Jackson DO Joseph MADDIE Pratt is a 76 y.o.male  was last seen at [facility] sleep lab: 5/1/2022 for titration study.  On BiPAP ST.  Ejection fraction in February 2022 showed EF of 65%.  PSG 2019 showed severe sleep apnea with overall AHI of 72 of which 55 events were central.Titration study was done due to residual hypersomnia and central events.  We changed to 20/16 cm H2O with backup rate of 10.  Presents today for first follow-up since pressure changes.  Doing well since last visit.    Results Review:  DME is [facility].  Downloads between 6/1/2022 - 8/29/2022.  Average usage is 8 hours 7 minutes.  Average AHI is 12.3.  CANDACE 1.2.  Average AutoCPAP pressure is 20/16 cm H2O backup rate of 10.    Current Medications:    Current Outpatient Medications:   •  atenolol (TENORMIN) 25 MG tablet, Take 1 tablet by mouth Daily., Disp: 180 tablet, Rfl: 3  •  dofetilide (TIKOSYN) 125 MCG capsule, TAKE 1 CAPSULE BY MOUTH EVERY 12 HOURS., Disp: 60 capsule, Rfl: 4  •  Eliquis 5 MG tablet tablet, TAKE 1 TABLET BY MOUTH EVERY 12 HOURS, Disp: 60 tablet, Rfl: 11  •  empagliflozin (Jardiance) 25 MG tablet tablet, Take 1 tablet by mouth Daily With Breakfast., Disp: 90 tablet, Rfl: 1  •  furosemide (LASIX) 40 MG tablet, TAKE 1 TABLET BY MOUTH EVERY DAY, Disp: 90 tablet, Rfl: 3  •  latanoprost (XALATAN) 0.005 % ophthalmic solution, 1 drop Every Night., Disp: , Rfl:   •  loratadine (CLARITIN) 10 MG tablet, TAKE 1 TABLET BY MOUTH EVERY DAY, Disp: 90 tablet, Rfl: 1  •  potassium chloride (MICRO-K) 10 MEQ CR capsule, TAKE 2 CAPSULES BY MOUTH EVERY DAY, Disp: 180 capsule, Rfl: 1  •  rosuvastatin (CRESTOR) 10 MG tablet, Take 1 tablet by mouth Daily., Disp: 90 tablet, Rfl: 3  •  SITagliptin (Januvia) 100 MG tablet, Take 1 tablet by mouth Daily., Disp: 90 tablet, Rfl: 3   also entered in Sleep Questionnaire    Patient  has a past medical history of Acute  "combined systolic and diastolic congestive heart failure (HCC), Atrial fibrillation (HCC), Atrial flutter with rapid ventricular response (HCC), CHF (congestive heart failure) (HCC), Dilated cardiomyopathy (HCC), Hyperlipidemia, Hypertension, Low-tension glaucoma of right eye, Nonischemic cardiomyopathy (HCC), Obesity, Ocular hypertension of left eye, On dofetilide therapy, MARCOS (obstructive sleep apnea), Persistent atrial fibrillation ([facility]), Pneumonia of left lung due to Haemophilus influenzae ([facility]) (01/18/2017), Ptosis of right eyelid, Sepsis ([facility]) (01/18/2017), and Type 2 diabetes mellitus, without long-term current use of insulin ([facility]).    Social History:    Social History     Socioeconomic History   • Marital status:    • Number of children: 0   Tobacco Use   • Smoking status: Never Smoker   • Smokeless tobacco: Never Used   Vaping Use   • Vaping Use: Never used   Substance and Sexual Activity   • Alcohol use: Not Currently   • Drug use: Never       Allergies:  Patient has no known allergies.    Vital Signs:    Vitals:    08/31/22 1100   BP: 117/71   Pulse: 64   SpO2: 96%   Weight: 110 kg (243 lb)   Height: 162.6 cm (64.02\")     Body mass index is 41.69 kg/m².    REVIEW OF SYSTEMS.  Full review of systems available on the intake form which is scanned in the media tab.  The relevant positive are noted below  1. Daytime excessive sleepiness with [place] Sleepiness Scale :Total score: 9   2. Snoring  3. negative      Physical exam:  Vitals:    08/31/22 1100   BP: 117/71   Pulse: 64   SpO2: 96%   Weight: 110 kg (243 lb)   Height: 162.6 cm (64.02\")    Body mass index is 41.69 kg/m².    HEENT: Head is atraumatic, normocephalic  Eyes: pupils are round equal and reacting to light and accommodation, conjunctiva normal  RESPIRATORY SYSTEM: Regular respirations  CARDIOVASULAR SYSTEM: Regular rate  EXTREMITES: No cyanosis, clubbing  NEUROLOGICAL SYSTEM: Oriented x 3, no gross motor defects, gait " normal    Impression:  1. Mixed sleep apnea    2. Severe obstructive sleep apnea    3. Morbid obesity (HCC)    4. Cardiomyopathy, dilated (HCC)        Obstructive sleep apnea adequately treated with BiPAP ST 20/16 cm H2O backup rate of 10 with good compliance and usage and no complaints of hypersomnolence.  Residual AHI acceptable.  CANDACE normal now.  Repeat echocardiogram annually in February 2023 orders placed.  Return to clinic in May 2023 for follow-up or sooner if needed.    Patient uses the CPAP device and benefits from its use in terms of reduction of hypersomnia and snoring.Weight loss will be strongly beneficial to reduce the severity of sleep-disordered breathing.  Caution during activities that require prolonged concentration is strongly advised if sleepiness returns. Changing of PAP supplies regularly is important for effective use. Patient needs to change cushion on the mask or plugs on nasal pillows along with disposable filters once every month and change mask frame, tubing, headgear and Velcro straps every 6 months at the minimum.    Galen Meng MD  Sleep Medicine  08/31/22  11:34 EDT

## 2022-09-19 RX ORDER — EMPAGLIFLOZIN 25 MG/1
TABLET, FILM COATED ORAL
Qty: 90 TABLET | Refills: 1 | OUTPATIENT
Start: 2022-09-19

## 2022-09-20 NOTE — TELEPHONE ENCOUNTER
Caller: Ray Pratt    Relationship: Self    Best call back number: [phone]    Requested Prescriptions:   Requested Prescriptions     Pending Prescriptions Disp Refills   • empagliflozin (Jardiance) 25 MG tablet tablet 90 tablet 1     Sig: Take 1 tablet by mouth Daily With Breakfast.        Pharmacy where request should be sent: [facility] #[id number] - [place], [place] - [address] - [phone]  - [phone] FX     Additional details provided by patient: PATIENT IS OUT OF MEDICATION    Does the patient have less than a 3 day supply:  [x] Yes  [] No    Juanita Delarosa Rep   09/20/22 08:24 EDT

## 2022-09-23 ENCOUNTER — OFFICE VISIT (OUTPATIENT)
Dept: CARDIOLOGY | Facility: CLINIC | Age: 77
End: 2022-09-23

## 2022-09-23 VITALS
SYSTOLIC BLOOD PRESSURE: 110 MMHG | HEIGHT: 64 IN | DIASTOLIC BLOOD PRESSURE: 74 MMHG | WEIGHT: 243 LBS | HEART RATE: 62 BPM | BODY MASS INDEX: 41.48 KG/M2

## 2022-09-23 DIAGNOSIS — I48.19 ATRIAL FIBRILLATION, PERSISTENT: Primary | ICD-10-CM

## 2022-09-23 DIAGNOSIS — E66.01 OBESITY, MORBID, BMI 40.0-49.9: ICD-10-CM

## 2022-09-23 DIAGNOSIS — I10 BENIGN ESSENTIAL HTN: ICD-10-CM

## 2022-09-23 DIAGNOSIS — Z79.899 ON DOFETILIDE THERAPY: ICD-10-CM

## 2022-09-23 DIAGNOSIS — I42.0 CARDIOMYOPATHY, DILATED: ICD-10-CM

## 2022-09-23 PROBLEM — E66.813 CLASS 3 SEVERE OBESITY DUE TO EXCESS CALORIES WITHOUT SERIOUS COMORBIDITY WITH BODY MASS INDEX (BMI) OF 40.0 TO 44.9 IN ADULT: Status: RESOLVED | Noted: 2020-01-17 | Resolved: 2022-09-23

## 2022-09-23 PROCEDURE — 93000 ELECTROCARDIOGRAM COMPLETE: CPT | Performed by: NURSE PRACTITIONER

## 2022-09-23 PROCEDURE — 99213 OFFICE O/P EST LOW 20 MIN: CPT | Performed by: NURSE PRACTITIONER

## 2022-09-23 NOTE — PROGRESS NOTES
Date of Office Visit: 2022  Encounter Provider: ANICETO Stone  Place of Service: [facility] CARDIOLOGY  Patient Name: Ray Pratt  :1945    Chief Complaint   Patient presents with   • persistent AFIB   • atrial flutter w/RVR     6 month f/u    • Cardiomyopathy   • dofetilide therapy   :     HPI: Ray Pratt is a 76 y.o. male who follows with Dr. Escalante and Dr. Vazquez.  He had symptomatic persistent A. fib as well as nonischemic cardiomyopathy (resolved), he was admitted in 2020, started on dofetilide, QTC too long on the higher dose but has been stable  125 mcg twice daily.    He has done very well since that time from an arrhythmia standpoint.    He also has hypertension, MARCOS, diabetes and morbid obesity.    Presents today for routine follow-up.    Reports that he is doing okay.  He denies chest pain, PND, orthopnea, dizziness, palpitations or edema.  He does have some mild to moderate dyspnea with exertion, this is unchanged.    He is not consistently active or exercise, he has gained about 12 pounds since his last office visit with us.  He has been eating ice cream at night.    We discussed the importance of regular activity and weight loss.    He is on apixaban for anticoagulation and no issues.     Past Medical History:   Diagnosis Date   • Acute combined systolic and diastolic congestive heart failure (HCC)    • Atrial fibrillation (HCC)    • Atrial flutter with rapid ventricular response (HCC)    • CHF (congestive heart failure) (HCC)    • Dilated cardiomyopathy (HCC)    • Hyperlipidemia    • Hypertension    • Low-tension glaucoma of right eye    • Nonischemic cardiomyopathy (HCC)    • Obesity    • Ocular hypertension of left eye    • On dofetilide therapy    • MARCOS (obstructive sleep apnea)     compliant with BiPAP   • Persistent atrial fibrillation (HCC)    • Pneumonia of left lung due to Haemophilus influenzae (HCC) 2017   • Ptosis of  right eyelid    • Sepsis (HCC) 01/18/2017   • Type 2 diabetes mellitus, without long-term current use of insulin (HCC)        Past Surgical History:   Procedure Laterality Date   • CARDIAC CATHETERIZATION N/A 11/05/2019    Procedure: Left Heart Cath;  Surgeon: Sundeep Tsang MD;  Location:  [facility];  Service: Cardiovascular   • CARDIAC CATHETERIZATION N/A 11/05/2019    Procedure: Coronary angiography;  Surgeon: Sundeep Tsang MD;  Location:  [facility];  Service: Cardiovascular   • DENTAL PROCEDURE     • MOLE REMOVAL Right     above right eye, benign per patient report       Social History     Socioeconomic History   • Marital status:    • Number of children: 0   Tobacco Use   • Smoking status: Never Smoker   • Smokeless tobacco: Never Used   Vaping Use   • Vaping Use: Never used   Substance and Sexual Activity   • Alcohol use: Not Currently   • Drug use: Never       Family History   Problem Relation Age of Onset   • Other Mother         history of cancer   • Stroke Father    • Epilepsy Sister        Review of Systems   Constitutional: Negative for chills, fever and malaise/fatigue.   Cardiovascular: Negative for chest pain, dyspnea on exertion, leg swelling, near-syncope, orthopnea, palpitations, paroxysmal nocturnal dyspnea and syncope.   Respiratory: Negative for cough and shortness of breath.    Hematologic/Lymphatic: Negative.    Musculoskeletal: Negative for joint pain, joint swelling and myalgias.   Gastrointestinal: Negative for abdominal pain, diarrhea, melena, nausea and vomiting.   Genitourinary: Negative for frequency and hematuria.   Neurological: Negative for light-headedness, numbness, paresthesias and seizures.   Allergic/Immunologic: Negative.    All other systems reviewed and are negative.      No Known Allergies      Current Outpatient Medications:   •  atenolol (TENORMIN) 25 MG tablet, Take 1 tablet by mouth Daily., Disp: 180 tablet, Rfl: 3  •  " dofetilide (TIKOSYN) 125 MCG capsule, TAKE 1 CAPSULE BY MOUTH EVERY 12 HOURS., Disp: 60 capsule, Rfl: 4  •  Eliquis 5 MG tablet tablet, TAKE 1 TABLET BY MOUTH EVERY 12 HOURS, Disp: 60 tablet, Rfl: 11  •  empagliflozin (Jardiance) 25 MG tablet tablet, Take 1 tablet by mouth Daily With Breakfast., Disp: 90 tablet, Rfl: 1  •  furosemide (LASIX) 40 MG tablet, TAKE 1 TABLET BY MOUTH EVERY DAY, Disp: 90 tablet, Rfl: 3  •  loratadine (CLARITIN) 10 MG tablet, TAKE 1 TABLET BY MOUTH EVERY DAY, Disp: 90 tablet, Rfl: 1  •  potassium chloride (MICRO-K) 10 MEQ CR capsule, TAKE 2 CAPSULES BY MOUTH EVERY DAY, Disp: 180 capsule, Rfl: 1  •  rosuvastatin (CRESTOR) 10 MG tablet, Take 1 tablet by mouth Daily., Disp: 90 tablet, Rfl: 3  •  SITagliptin (Januvia) 100 MG tablet, Take 1 tablet by mouth Daily., Disp: 90 tablet, Rfl: 3      Objective:     Vitals:    09/23/22 1141   BP: 110/74   Pulse: 62   Weight: 110 kg (243 lb)   Height: 162.6 cm (64\")     Body mass index is 41.71 kg/m².    PHYSICAL EXAM:    Vitals Reviewed.   General Appearance: No acute distress, obese.  Eyes: Conjunctiva and lids: No erythema, swelling, or discharge. Sclera non-icteric.   HENT: Atraumatic, normocephalic.   Respiratory: No signs of respiratory distress. Respiration rhythm and depth normal.   Clear to auscultation. No rales, crackles, rhonchi, or wheezing auscultated.   Cardiovascular:  Heart Rate and Rhythm: Normal, Heart Sounds: Normal S1 and S2.   Murmurs: No murmurs noted. No rubs, thrills, or gallops.   Lower Extremities: No edema noted.  Gastrointestinal:  Abdomen obese, nontender.  Musculoskeletal: Normal movement of extremities  Skin: Warm and dry.   Psychiatric: Patient alert and oriented to person, place, and time. Speech and behavior appropriate. Normal mood and affect.       ECG 12 Lead    Date/Time: 9/23/2022 1:18 PM  Performed by: Kandice Wylie APRN  Authorized by: Kandice Wylie APRN   Comparison: compared with previous ECG   Similar to " previous ECG  Rhythm: sinus rhythm  BPM: 62  Conduction: right bundle branch block  Comments: QTC is okay              Assessment:       Diagnosis Plan   1. Atrial fibrillation, persistent (CMS/HCC)     2. On dofetilide therapy     3. Cardiomyopathy, dilated (HCC)     4. Benign essential HTN     5. Obesity, morbid, BMI 40.0-49.9 (HCC)            Plan:       1.-2.  Persistent A. fib, admitted and February 2020 for dofetilide and has done well since that time.  QTC is stable on 125 mcg twice daily.  Remains on apixaban for anticoagulation atenolol.    3.  Dilated cardiomyopathy, resolved.    4.  Hypertension, controlled.    5. For the problem of overweight/obesity, we discussed the importance of lifestyle measures and strategies for weight loss, such as improved nutrition, regular exercise and sleep hygiene.      Follow-up with Dr. Vazquez in 6 months and Dr. Escalante as scheduled.    As always, it has been a pleasure to participate in your patient's care.      Sincerely,         ANICETO Newell

## 2022-10-10 RX ORDER — FUROSEMIDE 40 MG/1
TABLET ORAL
Qty: 90 TABLET | Refills: 3 | Status: SHIPPED | OUTPATIENT
Start: 2022-10-10

## 2022-10-12 RX ORDER — APIXABAN 5 MG/1
TABLET, FILM COATED ORAL
Qty: 60 TABLET | Refills: 11 | Status: SHIPPED | OUTPATIENT
Start: 2022-10-12

## 2022-11-03 ENCOUNTER — OFFICE VISIT (OUTPATIENT)
Dept: INTERNAL MEDICINE | Facility: CLINIC | Age: 77
End: 2022-11-03

## 2022-11-03 VITALS
WEIGHT: 242 LBS | BODY MASS INDEX: 41.32 KG/M2 | HEIGHT: 64 IN | DIASTOLIC BLOOD PRESSURE: 70 MMHG | SYSTOLIC BLOOD PRESSURE: 114 MMHG | TEMPERATURE: 96.8 F | OXYGEN SATURATION: 95 % | HEART RATE: 65 BPM

## 2022-11-03 DIAGNOSIS — E11.65 TYPE 2 DIABETES MELLITUS WITH HYPERGLYCEMIA, WITHOUT LONG-TERM CURRENT USE OF INSULIN: Primary | ICD-10-CM

## 2022-11-03 PROCEDURE — 99214 OFFICE O/P EST MOD 30 MIN: CPT | Performed by: STUDENT IN AN ORGANIZED HEALTH CARE EDUCATION/TRAINING PROGRAM

## 2022-11-03 NOTE — PROGRESS NOTES
Marcial Jackson D.O.  Internal Medicine  [facility]  [address], [address]  [place] [address]  [phone]      Chief Complaint  Diabetes    SUBJECTIVE    History of Present Illness    Ray Pratt is a 77 y.o. male who presents to the office today as an established patient that last saw me on 8/3/2022.     Type 2 diabetes: takes empagliflozin 25 mg daily and sitagliptin 100 mg daily. Does not check his sugar at home. States he was out of medication for a few weeks due to issues with insurance and pharmacy. States he has been trying to make dietary changes and less snacks but it is up and down. His major issues is eating ice cream. He is trying to eat fruit bars instead and he is also trying to cut out bread as well. He is trying to walk more but he is not certain he actually increased much. States he went on vacation and gained weight back unfortunately. Doesn't check sugar often at home.     No Known Allergies     Outpatient Medications Marked as Taking for the 11/3/22 encounter (Office Visit) with Marcial Jackson,    Medication Sig Dispense Refill   • atenolol (TENORMIN) 25 MG tablet Take 1 tablet by mouth Daily. 180 tablet 3   • dofetilide (TIKOSYN) 125 MCG capsule TAKE 1 CAPSULE BY MOUTH EVERY 12 HOURS. 60 capsule 4   • Eliquis 5 MG tablet tablet TAKE 1 TABLET BY MOUTH EVERY 12 HOURS 60 tablet 11   • empagliflozin (Jardiance) 25 MG tablet tablet Take 1 tablet by mouth Daily With Breakfast. 90 tablet 2   • furosemide (LASIX) 40 MG tablet TAKE 1 TABLET BY MOUTH EVERY DAY 90 tablet 3   • loratadine (CLARITIN) 10 MG tablet TAKE 1 TABLET BY MOUTH EVERY DAY 90 tablet 1   • potassium chloride (MICRO-K) 10 MEQ CR capsule TAKE 2 CAPSULES BY MOUTH EVERY  capsule 1   • rosuvastatin (CRESTOR) 10 MG tablet Take 1 tablet by mouth Daily. 90 tablet 3   • SITagliptin (Januvia) 100 MG tablet Take 1 tablet by mouth Daily. 90 tablet 3        Past Medical History:   Diagnosis Date   • Acute  "combined systolic and diastolic congestive heart failure ([facility])    • Atrial fibrillation ([facility])    • Atrial flutter with rapid ventricular response ([facility])    • CHF (congestive heart failure) ([facility])    • Dilated cardiomyopathy ([facility])    • Hyperlipidemia    • Hypertension    • Low-tension glaucoma of right eye    • Nonischemic cardiomyopathy ([facility])    • Obesity    • Ocular hypertension of left eye    • On dofetilide therapy    • MARCOS (obstructive sleep apnea)     compliant with BiPAP   • Persistent atrial fibrillation ([facility])    • Pneumonia of left lung due to Haemophilus influenzae ([facility]) 01/18/2017   • Ptosis of right eyelid    • Sepsis ([facility]) 01/18/2017   • Type 2 diabetes mellitus, without long-term current use of insulin ([facility])        OBJECTIVE    Vital Signs:   /70   Pulse 65   Temp 96.8 °F (36 °C) (Infrared)   Ht 162.6 cm (64\")   Wt 110 kg (242 lb)   SpO2 95%   BMI 41.54 kg/m²     Physical Exam  Vitals reviewed.   Constitutional:       General: He is not in acute distress.     Appearance: He is obese. He is not ill-appearing.   HENT:      Head: Atraumatic.   Eyes:      General: No scleral icterus.  Pulmonary:      Effort: Pulmonary effort is normal. No respiratory distress.   Skin:     Coloration: Skin is not jaundiced.   Neurological:      Mental Status: He is alert.   Psychiatric:         Mood and Affect: Mood normal.         Behavior: Behavior normal.         Thought Content: Thought content normal.                             ASSESSMENT & PLAN     Diagnoses and all orders for this visit:    1. Type 2 diabetes mellitus with hyperglycemia, without long-term current use of insulin ([facility]) (Primary)  -A1c trends on file for this patient:   A1C Last 3 Results    HGBA1C Last 3 Results 1/20/22 5/9/22 8/1/22   Hemoglobin A1C 7.0 (A) 7.2 (A) 7.2 (A)   (A) Abnormal value       Comments are available for some flowsheets but are not being displayed.           -Goal A1c for this patient is less than 7.0%  -Current " diabetes regimen: empagliflozin 25 mg daily and sitagliptin 100 mg daily  -Summary of patient's most recent blood glucose trends at home: doesn't check sugar at home  -Changes made to diabetes regimen today: uncontrolled in August 2022; recheck A1c today; if still above goal we will add metformin ; discussed common side effects of metformin including abdominal discomfort and diarrhea ; strongly encouraged continued dietary changes as he has had some successes but also still some bad dietary choices and overall lack of exercise   -Microalbuminuria screen: up to date  -states he has had flu shot this year as well as newest COVID 19 booster.         The following social determinates of health impact the patient's medical decision making: No social determinates of health were factored in to today's visit.     Follow Up  No follow-ups on file.    Patient/family had no further questions at this time and verbalized understanding of the plan discussed today.

## 2022-11-04 LAB — HBA1C MFR BLD: 7.4 % (ref 4.8–5.6)

## 2022-11-04 RX ORDER — METFORMIN HYDROCHLORIDE 500 MG/1
1000 TABLET, EXTENDED RELEASE ORAL
Qty: 180 TABLET | Refills: 1 | Status: SHIPPED | OUTPATIENT
Start: 2022-11-04

## 2022-11-07 RX ORDER — POTASSIUM CHLORIDE 750 MG/1
CAPSULE, EXTENDED RELEASE ORAL
Qty: 180 CAPSULE | Refills: 0 | Status: SHIPPED | OUTPATIENT
Start: 2022-11-07 | End: 2023-01-25 | Stop reason: SDUPTHER

## 2022-11-16 RX ORDER — DOFETILIDE 0.12 MG/1
CAPSULE ORAL
Qty: 180 CAPSULE | Refills: 2 | Status: SHIPPED | OUTPATIENT
Start: 2022-11-16

## 2022-12-28 ENCOUNTER — TELEPHONE (OUTPATIENT)
Dept: CARDIOLOGY | Facility: CLINIC | Age: 77
End: 2022-12-28

## 2023-01-25 RX ORDER — POTASSIUM CHLORIDE 750 MG/1
20 CAPSULE, EXTENDED RELEASE ORAL DAILY
Qty: 180 CAPSULE | Refills: 0 | Status: SHIPPED | OUTPATIENT
Start: 2023-01-25 | End: 2023-02-06

## 2023-02-01 ENCOUNTER — OFFICE VISIT (OUTPATIENT)
Dept: CARDIOLOGY | Facility: CLINIC | Age: 78
End: 2023-02-01
Payer: MEDICARE

## 2023-02-01 ENCOUNTER — OFFICE VISIT (OUTPATIENT)
Dept: INTERNAL MEDICINE | Facility: CLINIC | Age: 78
End: 2023-02-01
Payer: MEDICARE

## 2023-02-01 VITALS
SYSTOLIC BLOOD PRESSURE: 136 MMHG | HEART RATE: 81 BPM | DIASTOLIC BLOOD PRESSURE: 74 MMHG | WEIGHT: 236 LBS | OXYGEN SATURATION: 99 % | BODY MASS INDEX: 40.29 KG/M2 | HEIGHT: 64 IN | RESPIRATION RATE: 16 BRPM

## 2023-02-01 VITALS
SYSTOLIC BLOOD PRESSURE: 110 MMHG | OXYGEN SATURATION: 94 % | BODY MASS INDEX: 40.29 KG/M2 | TEMPERATURE: 97.1 F | HEART RATE: 80 BPM | DIASTOLIC BLOOD PRESSURE: 70 MMHG | WEIGHT: 236 LBS | HEIGHT: 64 IN

## 2023-02-01 DIAGNOSIS — E66.01 OBESITY, MORBID, BMI 40.0-49.9: ICD-10-CM

## 2023-02-01 DIAGNOSIS — I42.0 CARDIOMYOPATHY, DILATED: ICD-10-CM

## 2023-02-01 DIAGNOSIS — G47.33 OBSTRUCTIVE SLEEP APNEA: ICD-10-CM

## 2023-02-01 DIAGNOSIS — I10 BENIGN ESSENTIAL HTN: ICD-10-CM

## 2023-02-01 DIAGNOSIS — E11.65 TYPE 2 DIABETES MELLITUS WITH HYPERGLYCEMIA, WITHOUT LONG-TERM CURRENT USE OF INSULIN: Primary | ICD-10-CM

## 2023-02-01 DIAGNOSIS — I48.19 ATRIAL FIBRILLATION, PERSISTENT: Primary | ICD-10-CM

## 2023-02-01 DIAGNOSIS — Z79.899 ON DOFETILIDE THERAPY: ICD-10-CM

## 2023-02-01 LAB
ALBUMIN SERPL-MCNC: 4.1 G/DL (ref 3.5–5.2)
ALBUMIN/GLOB SERPL: 1.5 G/DL
ALP SERPL-CCNC: 58 U/L (ref 39–117)
ALT SERPL-CCNC: 11 U/L (ref 1–41)
AST SERPL-CCNC: 15 U/L (ref 1–40)
BILIRUB SERPL-MCNC: 0.4 MG/DL (ref 0–1.2)
BUN SERPL-MCNC: 18 MG/DL (ref 8–23)
BUN/CREAT SERPL: 17.3 (ref 7–25)
CALCIUM SERPL-MCNC: 9.3 MG/DL (ref 8.6–10.5)
CHLORIDE SERPL-SCNC: 103 MMOL/L (ref 98–107)
CHOLEST SERPL-MCNC: 146 MG/DL (ref 0–200)
CO2 SERPL-SCNC: 29.3 MMOL/L (ref 22–29)
CREAT SERPL-MCNC: 1.04 MG/DL (ref 0.76–1.27)
EGFRCR SERPLBLD CKD-EPI 2021: 74 ML/MIN/1.73
GLOBULIN SER CALC-MCNC: 2.7 GM/DL
GLUCOSE SERPL-MCNC: 110 MG/DL (ref 65–99)
HBA1C MFR BLD: 7 % (ref 4.8–5.6)
HDLC SERPL-MCNC: 50 MG/DL (ref 40–60)
LDLC SERPL CALC-MCNC: 83 MG/DL (ref 0–100)
LDLC/HDLC SERPL: 1.66 {RATIO}
POTASSIUM SERPL-SCNC: 3.9 MMOL/L (ref 3.5–5.2)
PROT SERPL-MCNC: 6.8 G/DL (ref 6–8.5)
SODIUM SERPL-SCNC: 141 MMOL/L (ref 136–145)
TRIGL SERPL-MCNC: 66 MG/DL (ref 0–150)
VLDLC SERPL CALC-MCNC: 13 MG/DL (ref 5–40)

## 2023-02-01 PROCEDURE — 99214 OFFICE O/P EST MOD 30 MIN: CPT | Performed by: STUDENT IN AN ORGANIZED HEALTH CARE EDUCATION/TRAINING PROGRAM

## 2023-02-01 PROCEDURE — 93000 ELECTROCARDIOGRAM COMPLETE: CPT | Performed by: INTERNAL MEDICINE

## 2023-02-01 PROCEDURE — 99214 OFFICE O/P EST MOD 30 MIN: CPT | Performed by: INTERNAL MEDICINE

## 2023-02-01 NOTE — PROGRESS NOTES
CARDIOLOGY    Chelita Escalante MD    ENCOUNTER DATE:  02/01/2023    Ray Pratt / 77 y.o. / male        CHIEF COMPLAINT / REASON FOR OFFICE VISIT     Heart Problem (1 year follow up/Dilated cardiomyopathy and atrial fib )      HISTORY OF PRESENT ILLNESS       HPI    Ray Pratt is a 77 y.o. male     This is a gentleman who was hospitalized in November 2019.  He has diabetes and hypertension.  He presented to the emergency room with syncope and lightheadedness.  He was found to be in atrial flutter he was rate controlled with digoxin and atenolol and anticoagulated with Eliquis.  With rapid ventricular response.  TSH was normal.  Echocardiogram showed severe LV dysfunction with ejection fraction 22%.  Heart catheterization showed no evidence of coronary disease with significantly elevated left ventricular end-diastolic pressure.  He was diagnosed with obstructive sleep apnea and started on BiPAP.  He was admitted to the hospital and January 13, 2020 for a cardioversion.  He had missed abciximab.  He was discharged home with the plan of bringing him back in 3 weeks once he had not missed any blood thinner.  He saw electrophysiology and was started on dofetilide.  He converted back to sinus rhythm.  He is on abciximab and for anticoagulation. He had a repeat echocardiogram in June 2020 which showed normal LV function with an ejection fraction of about 65% with mild left atrial enlargement no significant valve disease.  He had another echo in February 2022 which showed normal LV function ejection fraction of 65%, borderline left ventricular hypertrophy and no valve disease.  He also follows with Dr. Vazquez and ANICETO Mahoney.     He has no new symptoms today.  Denies palpitations, edema or dizziness.    REVIEW OF SYSTEMS     Review of Systems   Constitutional: Negative for chills, fever, weight gain and weight loss.   Cardiovascular: Negative for leg swelling.   Respiratory: Negative for cough,  "snoring and wheezing.    Hematologic/Lymphatic: Negative for bleeding problem. Does not bruise/bleed easily.   Skin: Negative for color change.   Musculoskeletal: Negative for falls, joint pain and myalgias.   Gastrointestinal: Negative for melena.   Genitourinary: Negative for hematuria.   Neurological: Negative for excessive daytime sleepiness.   Psychiatric/Behavioral: Negative for depression. The patient is not nervous/anxious.          VITAL SIGNS     Visit Vitals  /74 (BP Location: Left arm, Patient Position: Sitting, Cuff Size: Large Adult)   Pulse 81   Resp 16   Ht 162.6 cm (64\")   Wt 107 kg (236 lb)   SpO2 99%   BMI 40.51 kg/m²         Wt Readings from Last 3 Encounters:   02/01/23 107 kg (236 lb)   02/01/23 107 kg (236 lb)   11/03/22 110 kg (242 lb)     Body mass index is 40.51 kg/m².      PHYSICAL EXAMINATION     Constitutional:       General: Not in acute distress.  Neck:      Vascular: No carotid bruit or JVD.   Pulmonary:      Effort: Pulmonary effort is normal.      Breath sounds: Normal breath sounds.   Cardiovascular:      Normal rate. Regular rhythm.      Murmurs: There is no murmur.   Psychiatric:         Mood and Affect: Mood and affect normal.           REVIEWED DATA       ECG 12 Lead    Date/Time: 2/1/2023 12:50 PM  Performed by: Chelita Escalante MD  Authorized by: Chelita Escalante MD   Comparison: compared with previous ECG from 9/23/2022  Similar to previous ECG  Rhythm: sinus rhythm  BPM: 74  Conduction: right bundle branch block  ST Segments: ST segments normal  T Waves: T waves normal    Clinical impression: abnormal EKG                  Lab Results   Component Value Date    GLUCOSE 85 08/03/2022    BUN 20 08/03/2022    CREATININE 1.08 08/03/2022    EGFRIFNONA 69 01/20/2022    EGFRIFAFRI 80 01/20/2022    BCR 19 08/03/2022    K 4.4 08/03/2022    CO2 22 08/03/2022    CALCIUM 8.7 08/03/2022    PROTENTOTREF 7.1 01/20/2022    ALBUMIN 4.0 01/20/2022    LABIL2 1.3 01/20/2022    AST 15 " 01/20/2022    ALT 11 01/20/2022       ASSESSMENT & PLAN      Diagnosis Plan   1. Atrial fibrillation, persistent (HCC)  Adult Transthoracic Echo Complete W/ Cont if Necessary Per Protocol      2. On dofetilide therapy  Adult Transthoracic Echo Complete W/ Cont if Necessary Per Protocol      3. Cardiomyopathy, dilated (HCC)  Adult Transthoracic Echo Complete W/ Cont if Necessary Per Protocol      4. Benign essential HTN  Adult Transthoracic Echo Complete W/ Cont if Necessary Per Protocol      5. Obesity, morbid, BMI 40.0-49.9 (HCC)  Adult Transthoracic Echo Complete W/ Cont if Necessary Per Protocol      6. Obstructive sleep apnea  Adult Transthoracic Echo Complete W/ Cont if Necessary Per Protocol            1.  Persistent atrial fibrillation.  Has maintained sinus rhythm to the best of our knowledge since being on dofetilide.  Anticoagulated with Eliquis.  No bleeding side effects.  Follows with Sarah and Dr. Vazquez  2.  Nonischemic cardiomyopathy.  Likely tachycardia mediated.  Last ejection fraction was normal.  The doctor treating sleep medicine wants another echocardiogram.  The order is in so I will just make sure it gets scheduled.  Continue current medications.  3.  Diabetes  4.  Obstructive sleep apnea.  5.  Hypertension.  Well controlled.  Monitors his blood pressure at home and has not had any elevated numbers.       One of my nurses or I will go over the results of his echo when it becomes available.  Follow-up with Ritu in 1 year.  Keep scheduled follow-up with Dr. Vazquez.    Orders Placed This Encounter   Procedures   • ECG 12 Lead     This order was created via procedure documentation     Order Specific Question:   Release to patient     Answer:   Routine Release   • Adult Transthoracic Echo Complete W/ Cont if Necessary Per Protocol     Standing Status:   Future     Standing Expiration Date:   2/1/2024     Order Specific Question:   Reason for exam?     Answer:   Dyspnea     Order Specific  Question:   Reason for exam?     Answer:   Arrhythmia           MEDICATIONS         Discharge Medications          Accurate as of February 1, 2023 12:51 PM. If you have any questions, ask your nurse or doctor.            Continue These Medications      Instructions Start Date   atenolol 25 MG tablet  Commonly known as: TENORMIN   25 mg, Oral, Daily      dofetilide 125 MCG capsule  Commonly known as: TIKOSYN   TAKE 1 CAPSULE BY MOUTH EVERY 12 HOURS      Eliquis 5 MG tablet tablet  Generic drug: apixaban   TAKE 1 TABLET BY MOUTH EVERY 12 HOURS      empagliflozin 25 MG tablet tablet  Commonly known as: Jardiance   25 mg, Oral, Daily With Breakfast      furosemide 40 MG tablet  Commonly known as: LASIX   TAKE 1 TABLET BY MOUTH EVERY DAY      loratadine 10 MG tablet  Commonly known as: CLARITIN   TAKE 1 TABLET BY MOUTH EVERY DAY      metFORMIN  MG 24 hr tablet  Commonly known as: GLUCOPHAGE-XR   1,000 mg, Oral, Daily With Breakfast      potassium chloride 10 MEQ CR capsule  Commonly known as: MICRO-K   20 mEq, Oral, Daily      rosuvastatin 10 MG tablet  Commonly known as: CRESTOR   10 mg, Oral, Daily      SITagliptin 100 MG tablet  Commonly known as: Januvia   100 mg, Oral, Daily               Chelita Escalante MD  02/01/23  12:51 EST    Part of this note may be an electronic transcription/translation of spoken language to printed text using the Dragon dictation system.

## 2023-02-01 NOTE — PROGRESS NOTES
Marcial Jackson D.O.  Internal Medicine  [facility]  [address], [address]  [place] [address]  [phone]      Chief Complaint  Diabetes    SUBJECTIVE    History of Present Illness    Ray Pratt is a 77 y.o. male who presents to the office today as an established patient that last saw me on 11/3/2022.     Type 2 diabetes: takes metformin ER 1000 mg daily (started last visit), empagliflozin 25 mg daily and sitagliptin 100 mg daily. Does not check his sugar at home. Doesn't check blood sugar at home regularly but it was 108 in the morning a few days ago. Takes rosuvastatin 10 mg daily for primary prevention.    No Known Allergies     Outpatient Medications Marked as Taking for the 2/1/23 encounter (Office Visit) with Marcial Jackson,    Medication Sig Dispense Refill   • atenolol (TENORMIN) 25 MG tablet Take 1 tablet by mouth Daily. 180 tablet 3   • dofetilide (TIKOSYN) 125 MCG capsule TAKE 1 CAPSULE BY MOUTH EVERY 12 HOURS 180 capsule 2   • Eliquis 5 MG tablet tablet TAKE 1 TABLET BY MOUTH EVERY 12 HOURS 60 tablet 11   • empagliflozin (Jardiance) 25 MG tablet tablet Take 1 tablet by mouth Daily With Breakfast. 90 tablet 2   • furosemide (LASIX) 40 MG tablet TAKE 1 TABLET BY MOUTH EVERY DAY 90 tablet 3   • loratadine (CLARITIN) 10 MG tablet TAKE 1 TABLET BY MOUTH EVERY DAY 90 tablet 1   • metFORMIN ER (GLUCOPHAGE-XR) 500 MG 24 hr tablet Take 2 tablets by mouth Daily With Breakfast. 180 tablet 1   • potassium chloride (MICRO-K) 10 MEQ CR capsule Take 2 capsules by mouth Daily. 180 capsule 0   • rosuvastatin (CRESTOR) 10 MG tablet Take 1 tablet by mouth Daily. 90 tablet 3   • SITagliptin (Januvia) 100 MG tablet Take 1 tablet by mouth Daily. 90 tablet 3        Past Medical History:   Diagnosis Date   • Acute combined systolic and diastolic congestive heart failure (HCC)    • Atrial fibrillation (HCC)    • Atrial flutter with rapid ventricular response (HCC)    • CHF (congestive heart  "failure) ([facility])    • Dilated cardiomyopathy ([facility])    • Hyperlipidemia    • Hypertension    • Low-tension glaucoma of right eye    • Nonischemic cardiomyopathy ([facility])    • Obesity    • Ocular hypertension of left eye    • On dofetilide therapy    • MARCOS (obstructive sleep apnea)     compliant with BiPAP   • Persistent atrial fibrillation ([facility])    • Pneumonia of left lung due to Haemophilus influenzae ([facility]) 01/18/2017   • Ptosis of right eyelid    • Sepsis ([facility]) 01/18/2017   • Type 2 diabetes mellitus, without long-term current use of insulin ([facility])        OBJECTIVE    Vital Signs:   /70   Pulse 80   Temp 97.1 °F (36.2 °C) (Infrared)   Ht 162.6 cm (64\")   Wt 107 kg (236 lb)   SpO2 94%   BMI 40.51 kg/m²     Physical Exam  Vitals reviewed.   Constitutional:       General: He is not in acute distress.     Appearance: He is obese. He is not ill-appearing.   HENT:      Head: Atraumatic.   Eyes:      General: No scleral icterus.  Cardiovascular:      Rate and Rhythm: Normal rate and regular rhythm.      Heart sounds: Normal heart sounds. No murmur heard.  Pulmonary:      Effort: Pulmonary effort is normal. No respiratory distress.      Breath sounds: Normal breath sounds. No stridor. No wheezing or rhonchi.   Musculoskeletal:      Right lower leg: No edema.      Left lower leg: No edema.   Skin:     Coloration: Skin is not jaundiced.   Neurological:      Mental Status: He is alert.   Psychiatric:         Mood and Affect: Mood normal.         Behavior: Behavior normal.         Thought Content: Thought content normal.                             ASSESSMENT & PLAN     Diagnoses and all orders for this visit:    1. Type 2 diabetes mellitus with hyperglycemia, without long-term current use of insulin ([facility]) (Primary)  -A1c trends on file for this patient:   A1C Last 3 Results    HGBA1C Last 3 Results 5/9/22 8/1/22 11/3/22   Hemoglobin A1C 7.2 (A) 7.2 (A) 7.40 (A)   (A) Abnormal value       Comments are available for some " flowsheets but are not being displayed.           -Goal A1c for this patient is less than 7.0%  -Current diabetes regimen:  takes metformin ER 1000 mg daily (started last visit), empagliflozin 25 mg daily and sitagliptin 100 mg daily. Does not check his sugar at home. Doesn't check blood sugar at home regularly but it was 108 in the morning a few days ago. Takes rosuvastatin 10 mg daily for primary prevention.  -Changes made to diabetes regimen today: recheck A1c , CMP, lipid profile today. Adjust diabetes regimen as needed but hopefully the addition of metformin has brought him to goal.             The following social determinates of health impact the patient's medical decision making: No social determinates of health were factored in to today's visit.     Follow Up  Return in about 3 months (around 5/1/2023) for Recheck.    Patient/family had no further questions at this time and verbalized understanding of the plan discussed today.

## 2023-02-06 RX ORDER — POTASSIUM CHLORIDE 750 MG/1
CAPSULE, EXTENDED RELEASE ORAL
Qty: 180 CAPSULE | Refills: 0 | Status: SHIPPED | OUTPATIENT
Start: 2023-02-06

## 2023-02-16 ENCOUNTER — HOSPITAL ENCOUNTER (OUTPATIENT)
Dept: CARDIOLOGY | Facility: HOSPITAL | Age: 78
Discharge: HOME | End: 2023-02-16
Admitting: INTERNAL MEDICINE
Payer: MEDICARE

## 2023-02-16 VITALS
SYSTOLIC BLOOD PRESSURE: 118 MMHG | WEIGHT: 236 LBS | BODY MASS INDEX: 40.29 KG/M2 | HEIGHT: 64 IN | HEART RATE: 70 BPM | DIASTOLIC BLOOD PRESSURE: 70 MMHG

## 2023-02-16 DIAGNOSIS — Z79.899 ON DOFETILIDE THERAPY: ICD-10-CM

## 2023-02-16 DIAGNOSIS — G47.33 OBSTRUCTIVE SLEEP APNEA: ICD-10-CM

## 2023-02-16 DIAGNOSIS — I10 BENIGN ESSENTIAL HTN: ICD-10-CM

## 2023-02-16 DIAGNOSIS — E66.01 OBESITY, MORBID, BMI 40.0-49.9: ICD-10-CM

## 2023-02-16 DIAGNOSIS — I42.0 CARDIOMYOPATHY, DILATED: ICD-10-CM

## 2023-02-16 DIAGNOSIS — I48.19 ATRIAL FIBRILLATION, PERSISTENT: ICD-10-CM

## 2023-02-16 PROCEDURE — 93306 TTE W/DOPPLER COMPLETE: CPT | Performed by: INTERNAL MEDICINE

## 2023-02-16 PROCEDURE — 93306 TTE W/DOPPLER COMPLETE: CPT

## 2023-02-17 ENCOUNTER — TELEPHONE (OUTPATIENT)
Dept: CARDIOLOGY | Facility: CLINIC | Age: 78
End: 2023-02-17
Payer: MEDICARE

## 2023-02-17 LAB
AORTIC ARCH: 2.2 CM
ASCENDING AORTA: 3.3 CM
BH CV ECHO MEAS - ACS: 1.98 CM
BH CV ECHO MEAS - AO MAX PG: 6.5 MMHG
BH CV ECHO MEAS - AO MEAN PG: 3 MMHG
BH CV ECHO MEAS - AO ROOT DIAM: 3 CM
BH CV ECHO MEAS - AO V2 MAX: 127 CM/SEC
BH CV ECHO MEAS - AO V2 VTI: 25.6 CM
BH CV ECHO MEAS - AVA(I,D): 3.3 CM2
BH CV ECHO MEAS - EDV(CUBED): 68.9 ML
BH CV ECHO MEAS - EDV(MOD-SP2): 76 ML
BH CV ECHO MEAS - EDV(MOD-SP4): 80 ML
BH CV ECHO MEAS - EF(MOD-BP): 65.1 %
BH CV ECHO MEAS - EF(MOD-SP2): 64.5 %
BH CV ECHO MEAS - EF(MOD-SP4): 62.5 %
BH CV ECHO MEAS - ESV(CUBED): 14.7 ML
BH CV ECHO MEAS - ESV(MOD-SP2): 27 ML
BH CV ECHO MEAS - ESV(MOD-SP4): 30 ML
BH CV ECHO MEAS - FS: 40.3 %
BH CV ECHO MEAS - IVS/LVPW: 1.1 CM
BH CV ECHO MEAS - IVSD: 1.14 CM
BH CV ECHO MEAS - LAT PEAK E' VEL: 9.7 CM/SEC
BH CV ECHO MEAS - LV DIASTOLIC VOL/BSA (35-75): 38.1 CM2
BH CV ECHO MEAS - LV MASS(C)D: 147.8 GRAMS
BH CV ECHO MEAS - LV MAX PG: 5.4 MMHG
BH CV ECHO MEAS - LV MEAN PG: 2.6 MMHG
BH CV ECHO MEAS - LV SYSTOLIC VOL/BSA (12-30): 14.3 CM2
BH CV ECHO MEAS - LV V1 MAX: 116.1 CM/SEC
BH CV ECHO MEAS - LV V1 VTI: 25.1 CM
BH CV ECHO MEAS - LVIDD: 4.1 CM
BH CV ECHO MEAS - LVIDS: 2.45 CM
BH CV ECHO MEAS - LVOT AREA: 3.4 CM2
BH CV ECHO MEAS - LVOT DIAM: 2.07 CM
BH CV ECHO MEAS - LVPWD: 1.03 CM
BH CV ECHO MEAS - MED PEAK E' VEL: 10.6 CM/SEC
BH CV ECHO MEAS - MR MAX PG: 61.6 MMHG
BH CV ECHO MEAS - MR MAX VEL: 392.3 CM/SEC
BH CV ECHO MEAS - MV A DUR: 0.11 SEC
BH CV ECHO MEAS - MV A MAX VEL: 89.5 CM/SEC
BH CV ECHO MEAS - MV DEC SLOPE: 525.8 CM/SEC2
BH CV ECHO MEAS - MV DEC TIME: 0.19 MSEC
BH CV ECHO MEAS - MV E MAX VEL: 99.8 CM/SEC
BH CV ECHO MEAS - MV E/A: 1.11
BH CV ECHO MEAS - MV MAX PG: 5.2 MMHG
BH CV ECHO MEAS - MV MEAN PG: 1.7 MMHG
BH CV ECHO MEAS - MV P1/2T: 64.8 MSEC
BH CV ECHO MEAS - MV V2 VTI: 41.7 CM
BH CV ECHO MEAS - MVA(P1/2T): 3.4 CM2
BH CV ECHO MEAS - MVA(VTI): 2.03 CM2
BH CV ECHO MEAS - PA ACC TIME: 0.12 SEC
BH CV ECHO MEAS - PA PR(ACCEL): 26.7 MMHG
BH CV ECHO MEAS - PA V2 MAX: 82.2 CM/SEC
BH CV ECHO MEAS - PULM A REVS DUR: 0.13 SEC
BH CV ECHO MEAS - PULM A REVS VEL: 32.6 CM/SEC
BH CV ECHO MEAS - PULM DIAS VEL: 33 CM/SEC
BH CV ECHO MEAS - PULM S/D: 1.48
BH CV ECHO MEAS - PULM SYS VEL: 48.8 CM/SEC
BH CV ECHO MEAS - QP/QS: 0.6
BH CV ECHO MEAS - RV MAX PG: 1.4 MMHG
BH CV ECHO MEAS - RV V1 MAX: 59.1 CM/SEC
BH CV ECHO MEAS - RV V1 VTI: 13.6 CM
BH CV ECHO MEAS - RVOT DIAM: 2.18 CM
BH CV ECHO MEAS - SI(MOD-SP2): 23.3 ML/M2
BH CV ECHO MEAS - SI(MOD-SP4): 23.8 ML/M2
BH CV ECHO MEAS - SUP REN AO DIAM: 1.7 CM
BH CV ECHO MEAS - SV(LVOT): 84.6 ML
BH CV ECHO MEAS - SV(MOD-SP2): 49 ML
BH CV ECHO MEAS - SV(MOD-SP4): 50 ML
BH CV ECHO MEAS - SV(RVOT): 50.7 ML
BH CV ECHO MEAS - TAPSE (>1.6): 2.21 CM
BH CV ECHO MEASUREMENTS AVERAGE E/E' RATIO: 9.83
BH CV XLRA - RV BASE: 3.5 CM
BH CV XLRA - RV LENGTH: 7.3 CM
BH CV XLRA - RV MID: 2.8 CM
BH CV XLRA - TDI S': 13.1 CM/SEC
LEFT ATRIUM VOLUME INDEX: 31.4 ML/M2
MAXIMAL PREDICTED HEART RATE: 143 BPM
SINUS: 3 CM
STJ: 2.9 CM
STRESS TARGET HR: 122 BPM

## 2023-02-17 NOTE — TELEPHONE ENCOUNTER
Notified patient of results. Patient verbalized understanding. Results faxed to Dr. Meng who was the requesting physician.     Willow Bland RN  Triage [facility]

## 2023-02-17 NOTE — TELEPHONE ENCOUNTER
Please let him know that his echo was normal.  The echo was ordered because his pulmonologist wanted one.  Can you find out who his pulmonologist is and make sure they get a copy?

## 2023-03-10 ENCOUNTER — APPOINTMENT (OUTPATIENT)
Dept: GENERAL RADIOLOGY | Facility: HOSPITAL | Age: 78
DRG: 199 | End: 2023-03-10
Payer: MEDICARE

## 2023-03-10 ENCOUNTER — HOSPITAL ENCOUNTER (INPATIENT)
Facility: HOSPITAL | Age: 78
LOS: 6 days | Discharge: HOME | DRG: 199 | End: 2023-03-16
Attending: EMERGENCY MEDICINE | Admitting: STUDENT IN AN ORGANIZED HEALTH CARE EDUCATION/TRAINING PROGRAM
Payer: MEDICARE

## 2023-03-10 ENCOUNTER — OFFICE VISIT (OUTPATIENT)
Dept: INTERNAL MEDICINE | Facility: CLINIC | Age: 78
End: 2023-03-10
Payer: MEDICARE

## 2023-03-10 VITALS
BODY MASS INDEX: 39.61 KG/M2 | OXYGEN SATURATION: 92 % | WEIGHT: 232 LBS | HEIGHT: 64 IN | SYSTOLIC BLOOD PRESSURE: 104 MMHG | TEMPERATURE: 97.8 F | HEART RATE: 80 BPM | DIASTOLIC BLOOD PRESSURE: 70 MMHG

## 2023-03-10 DIAGNOSIS — I48.91 ATRIAL FIBRILLATION, UNSPECIFIED TYPE: ICD-10-CM

## 2023-03-10 DIAGNOSIS — R06.02 SHORTNESS OF BREATH: Primary | ICD-10-CM

## 2023-03-10 DIAGNOSIS — I50.9 CONGESTIVE HEART FAILURE, UNSPECIFIED HF CHRONICITY, UNSPECIFIED HEART FAILURE TYPE: ICD-10-CM

## 2023-03-10 DIAGNOSIS — Z79.01 ANTICOAGULATED: ICD-10-CM

## 2023-03-10 DIAGNOSIS — J93.9 PNEUMOTHORAX ON RIGHT: Primary | ICD-10-CM

## 2023-03-10 DIAGNOSIS — J93.9 PNEUMOTHORAX ON RIGHT: ICD-10-CM

## 2023-03-10 PROBLEM — J96.01 ACUTE RESPIRATORY FAILURE WITH HYPOXIA: Status: ACTIVE | Noted: 2023-03-10

## 2023-03-10 LAB
ALBUMIN SERPL-MCNC: 4.2 G/DL (ref 3.5–5.2)
ALBUMIN/GLOB SERPL: 1.1 G/DL
ALP SERPL-CCNC: 61 U/L (ref 39–117)
ALT SERPL W P-5'-P-CCNC: 14 U/L (ref 1–41)
ANION GAP SERPL CALCULATED.3IONS-SCNC: 11 MMOL/L (ref 5–15)
AST SERPL-CCNC: 21 U/L (ref 1–40)
BASOPHILS # BLD AUTO: 0.05 10*3/MM3 (ref 0–0.2)
BASOPHILS NFR BLD AUTO: 0.6 % (ref 0–1.5)
BILIRUB SERPL-MCNC: 0.5 MG/DL (ref 0–1.2)
BUN SERPL-MCNC: 19 MG/DL (ref 8–23)
BUN/CREAT SERPL: 19.2 (ref 7–25)
CALCIUM SPEC-SCNC: 9.4 MG/DL (ref 8.6–10.5)
CHLORIDE SERPL-SCNC: 102 MMOL/L (ref 98–107)
CO2 SERPL-SCNC: 24 MMOL/L (ref 22–29)
CREAT SERPL-MCNC: 0.99 MG/DL (ref 0.76–1.27)
DEPRECATED RDW RBC AUTO: 46.9 FL (ref 37–54)
EGFRCR SERPLBLD CKD-EPI 2021: 78.5 ML/MIN/1.73
EOSINOPHIL # BLD AUTO: 0.06 10*3/MM3 (ref 0–0.4)
EOSINOPHIL NFR BLD AUTO: 0.7 % (ref 0.3–6.2)
ERYTHROCYTE [DISTWIDTH] IN BLOOD BY AUTOMATED COUNT: 14 % (ref 12.3–15.4)
GLOBULIN UR ELPH-MCNC: 4 GM/DL
GLUCOSE BLDC GLUCOMTR-MCNC: 168 MG/DL (ref 70–130)
GLUCOSE BLDC GLUCOMTR-MCNC: 97 MG/DL (ref 70–130)
GLUCOSE SERPL-MCNC: 130 MG/DL (ref 65–99)
HCT VFR BLD AUTO: 50 % (ref 37.5–51)
HGB BLD-MCNC: 16.2 G/DL (ref 13–17.7)
HOLD SPECIMEN: NORMAL
HOLD SPECIMEN: NORMAL
IMM GRANULOCYTES # BLD AUTO: 0.05 10*3/MM3 (ref 0–0.05)
IMM GRANULOCYTES NFR BLD AUTO: 0.6 % (ref 0–0.5)
INR PPP: 1.07 (ref 0.9–1.1)
LYMPHOCYTES # BLD AUTO: 2.08 10*3/MM3 (ref 0.7–3.1)
LYMPHOCYTES NFR BLD AUTO: 25.2 % (ref 19.6–45.3)
MCH RBC QN AUTO: 29.6 PG (ref 26.6–33)
MCHC RBC AUTO-ENTMCNC: 32.4 G/DL (ref 31.5–35.7)
MCV RBC AUTO: 91.4 FL (ref 79–97)
MONOCYTES # BLD AUTO: 0.9 10*3/MM3 (ref 0.1–0.9)
MONOCYTES NFR BLD AUTO: 10.9 % (ref 5–12)
NEUTROPHILS NFR BLD AUTO: 5.13 10*3/MM3 (ref 1.7–7)
NEUTROPHILS NFR BLD AUTO: 62 % (ref 42.7–76)
NRBC BLD AUTO-RTO: 0 /100 WBC (ref 0–0.2)
NT-PROBNP SERPL-MCNC: 322 PG/ML (ref 0–1800)
PLATELET # BLD AUTO: 226 10*3/MM3 (ref 140–450)
PMV BLD AUTO: 9.5 FL (ref 6–12)
POTASSIUM SERPL-SCNC: 3.9 MMOL/L (ref 3.5–5.2)
PROT SERPL-MCNC: 8.2 G/DL (ref 6–8.5)
PROTHROMBIN TIME: 14 SECONDS (ref 11.7–14.2)
QT INTERVAL: 371 MS
RBC # BLD AUTO: 5.47 10*6/MM3 (ref 4.14–5.8)
SODIUM SERPL-SCNC: 137 MMOL/L (ref 136–145)
TROPONIN T SERPL HS-MCNC: 22 NG/L
WBC NRBC COR # BLD: 8.27 10*3/MM3 (ref 3.4–10.8)
WHOLE BLOOD HOLD COAG: NORMAL
WHOLE BLOOD HOLD SPECIMEN: NORMAL

## 2023-03-10 PROCEDURE — 84484 ASSAY OF TROPONIN QUANT: CPT | Performed by: PHYSICIAN ASSISTANT

## 2023-03-10 PROCEDURE — 25010000002 CEFAZOLIN IN DEXTROSE 2-4 GM/100ML-% SOLUTION: Performed by: EMERGENCY MEDICINE

## 2023-03-10 PROCEDURE — 0 LIDOCAINE 1 % SOLUTION: Performed by: EMERGENCY MEDICINE

## 2023-03-10 PROCEDURE — 93010 ELECTROCARDIOGRAM REPORT: CPT | Performed by: INTERNAL MEDICINE

## 2023-03-10 PROCEDURE — 83880 ASSAY OF NATRIURETIC PEPTIDE: CPT | Performed by: PHYSICIAN ASSISTANT

## 2023-03-10 PROCEDURE — 25010000002 ONDANSETRON PER 1 MG: Performed by: EMERGENCY MEDICINE

## 2023-03-10 PROCEDURE — 99285 EMERGENCY DEPT VISIT HI MDM: CPT

## 2023-03-10 PROCEDURE — 25010000002 MORPHINE PER 10 MG: Performed by: EMERGENCY MEDICINE

## 2023-03-10 PROCEDURE — 36415 COLL VENOUS BLD VENIPUNCTURE: CPT

## 2023-03-10 PROCEDURE — 82962 GLUCOSE BLOOD TEST: CPT

## 2023-03-10 PROCEDURE — 71045 X-RAY EXAM CHEST 1 VIEW: CPT

## 2023-03-10 PROCEDURE — 0W9930Z DRAINAGE OF RIGHT PLEURAL CAVITY WITH DRAINAGE DEVICE, PERCUTANEOUS APPROACH: ICD-10-PCS | Performed by: EMERGENCY MEDICINE

## 2023-03-10 PROCEDURE — 93005 ELECTROCARDIOGRAM TRACING: CPT | Performed by: EMERGENCY MEDICINE

## 2023-03-10 PROCEDURE — 85025 COMPLETE CBC W/AUTO DIFF WBC: CPT | Performed by: EMERGENCY MEDICINE

## 2023-03-10 PROCEDURE — 99223 1ST HOSP IP/OBS HIGH 75: CPT | Performed by: NURSE PRACTITIONER

## 2023-03-10 PROCEDURE — 99214 OFFICE O/P EST MOD 30 MIN: CPT | Performed by: STUDENT IN AN ORGANIZED HEALTH CARE EDUCATION/TRAINING PROGRAM

## 2023-03-10 PROCEDURE — 85610 PROTHROMBIN TIME: CPT | Performed by: EMERGENCY MEDICINE

## 2023-03-10 PROCEDURE — 80053 COMPREHEN METABOLIC PANEL: CPT | Performed by: EMERGENCY MEDICINE

## 2023-03-10 PROCEDURE — 25010000002 MIDAZOLAM PER 1 MG: Performed by: EMERGENCY MEDICINE

## 2023-03-10 RX ORDER — ROSUVASTATIN CALCIUM 10 MG/1
10 TABLET, COATED ORAL DAILY
Status: DISCONTINUED | OUTPATIENT
Start: 2023-03-10 | End: 2023-03-16 | Stop reason: HOSPADM

## 2023-03-10 RX ORDER — BISACODYL 10 MG
10 SUPPOSITORY, RECTAL RECTAL DAILY PRN
Status: DISCONTINUED | OUTPATIENT
Start: 2023-03-10 | End: 2023-03-16 | Stop reason: HOSPADM

## 2023-03-10 RX ORDER — ONDANSETRON 2 MG/ML
4 INJECTION INTRAMUSCULAR; INTRAVENOUS EVERY 6 HOURS PRN
Status: DISCONTINUED | OUTPATIENT
Start: 2023-03-10 | End: 2023-03-16 | Stop reason: HOSPADM

## 2023-03-10 RX ORDER — MORPHINE SULFATE 2 MG/ML
4 INJECTION, SOLUTION INTRAMUSCULAR; INTRAVENOUS ONCE
Status: COMPLETED | OUTPATIENT
Start: 2023-03-10 | End: 2023-03-10

## 2023-03-10 RX ORDER — FUROSEMIDE 40 MG/1
40 TABLET ORAL DAILY
Status: DISCONTINUED | OUTPATIENT
Start: 2023-03-10 | End: 2023-03-16 | Stop reason: HOSPADM

## 2023-03-10 RX ORDER — ONDANSETRON 4 MG/1
4 TABLET, FILM COATED ORAL EVERY 6 HOURS PRN
Status: DISCONTINUED | OUTPATIENT
Start: 2023-03-10 | End: 2023-03-16 | Stop reason: HOSPADM

## 2023-03-10 RX ORDER — DOFETILIDE 0.12 MG/1
125 CAPSULE ORAL EVERY 12 HOURS
Status: DISCONTINUED | OUTPATIENT
Start: 2023-03-10 | End: 2023-03-16 | Stop reason: HOSPADM

## 2023-03-10 RX ORDER — POLYETHYLENE GLYCOL 3350 17 G/17G
17 POWDER, FOR SOLUTION ORAL DAILY PRN
Status: DISCONTINUED | OUTPATIENT
Start: 2023-03-10 | End: 2023-03-16 | Stop reason: HOSPADM

## 2023-03-10 RX ORDER — ACETAMINOPHEN 650 MG/1
650 SUPPOSITORY RECTAL EVERY 4 HOURS PRN
Status: DISCONTINUED | OUTPATIENT
Start: 2023-03-10 | End: 2023-03-11

## 2023-03-10 RX ORDER — INSULIN LISPRO 100 [IU]/ML
0-9 INJECTION, SOLUTION INTRAVENOUS; SUBCUTANEOUS
Status: DISCONTINUED | OUTPATIENT
Start: 2023-03-10 | End: 2023-03-16 | Stop reason: HOSPADM

## 2023-03-10 RX ORDER — LIDOCAINE HYDROCHLORIDE 10 MG/ML
10 INJECTION, SOLUTION INFILTRATION; PERINEURAL ONCE
Status: COMPLETED | OUTPATIENT
Start: 2023-03-10 | End: 2023-03-10

## 2023-03-10 RX ORDER — OXYCODONE HYDROCHLORIDE AND ACETAMINOPHEN 5; 325 MG/1; MG/1
TABLET ORAL
Status: ACTIVE
Start: 2023-03-10 | End: 2023-03-11

## 2023-03-10 RX ORDER — OXYCODONE HYDROCHLORIDE AND ACETAMINOPHEN 5; 325 MG/1; MG/1
1 TABLET ORAL EVERY 4 HOURS PRN
Status: DISCONTINUED | OUTPATIENT
Start: 2023-03-10 | End: 2023-03-16 | Stop reason: HOSPADM

## 2023-03-10 RX ORDER — SODIUM CHLORIDE 0.9 % (FLUSH) 0.9 %
10 SYRINGE (ML) INJECTION AS NEEDED
Status: DISCONTINUED | OUTPATIENT
Start: 2023-03-10 | End: 2023-03-11

## 2023-03-10 RX ORDER — AMOXICILLIN 250 MG
2 CAPSULE ORAL 2 TIMES DAILY PRN
Status: DISCONTINUED | OUTPATIENT
Start: 2023-03-10 | End: 2023-03-16 | Stop reason: HOSPADM

## 2023-03-10 RX ORDER — ONDANSETRON 2 MG/ML
4 INJECTION INTRAMUSCULAR; INTRAVENOUS ONCE
Status: COMPLETED | OUTPATIENT
Start: 2023-03-10 | End: 2023-03-10

## 2023-03-10 RX ORDER — CEFAZOLIN SODIUM 2 G/100ML
2 INJECTION, SOLUTION INTRAVENOUS ONCE
Status: COMPLETED | OUTPATIENT
Start: 2023-03-10 | End: 2023-03-10

## 2023-03-10 RX ORDER — ACETAMINOPHEN 325 MG/1
650 TABLET ORAL EVERY 4 HOURS PRN
Status: DISCONTINUED | OUTPATIENT
Start: 2023-03-10 | End: 2023-03-16 | Stop reason: HOSPADM

## 2023-03-10 RX ORDER — MIDAZOLAM HYDROCHLORIDE 1 MG/ML
2 INJECTION INTRAMUSCULAR; INTRAVENOUS ONCE
Status: COMPLETED | OUTPATIENT
Start: 2023-03-10 | End: 2023-03-10

## 2023-03-10 RX ORDER — METFORMIN HYDROCHLORIDE 500 MG/1
1000 TABLET, EXTENDED RELEASE ORAL
Status: DISCONTINUED | OUTPATIENT
Start: 2023-03-11 | End: 2023-03-10

## 2023-03-10 RX ORDER — NITROGLYCERIN 0.4 MG/1
0.4 TABLET SUBLINGUAL
Status: DISCONTINUED | OUTPATIENT
Start: 2023-03-10 | End: 2023-03-16 | Stop reason: HOSPADM

## 2023-03-10 RX ORDER — IBUPROFEN 600 MG/1
1 TABLET ORAL
Status: DISCONTINUED | OUTPATIENT
Start: 2023-03-10 | End: 2023-03-16 | Stop reason: HOSPADM

## 2023-03-10 RX ORDER — NICOTINE POLACRILEX 4 MG
15 LOZENGE BUCCAL
Status: DISCONTINUED | OUTPATIENT
Start: 2023-03-10 | End: 2023-03-16 | Stop reason: HOSPADM

## 2023-03-10 RX ORDER — BISACODYL 5 MG/1
5 TABLET, DELAYED RELEASE ORAL DAILY PRN
Status: DISCONTINUED | OUTPATIENT
Start: 2023-03-10 | End: 2023-03-16 | Stop reason: HOSPADM

## 2023-03-10 RX ORDER — ATENOLOL 25 MG/1
25 TABLET ORAL DAILY
Status: DISCONTINUED | OUTPATIENT
Start: 2023-03-10 | End: 2023-03-16 | Stop reason: HOSPADM

## 2023-03-10 RX ORDER — POTASSIUM CHLORIDE 750 MG/1
20 TABLET, FILM COATED, EXTENDED RELEASE ORAL DAILY
Status: DISCONTINUED | OUTPATIENT
Start: 2023-03-10 | End: 2023-03-16 | Stop reason: HOSPADM

## 2023-03-10 RX ORDER — DEXTROSE MONOHYDRATE 25 G/50ML
25 INJECTION, SOLUTION INTRAVENOUS
Status: DISCONTINUED | OUTPATIENT
Start: 2023-03-10 | End: 2023-03-16 | Stop reason: HOSPADM

## 2023-03-10 RX ORDER — SODIUM CHLORIDE 0.9 % (FLUSH) 0.9 %
10 SYRINGE (ML) INJECTION EVERY 12 HOURS SCHEDULED
Status: DISCONTINUED | OUTPATIENT
Start: 2023-03-10 | End: 2023-03-11

## 2023-03-10 RX ORDER — ACETAMINOPHEN 160 MG/5ML
650 SOLUTION ORAL EVERY 4 HOURS PRN
Status: DISCONTINUED | OUTPATIENT
Start: 2023-03-10 | End: 2023-03-11

## 2023-03-10 RX ORDER — SODIUM CHLORIDE 9 MG/ML
40 INJECTION, SOLUTION INTRAVENOUS AS NEEDED
Status: DISCONTINUED | OUTPATIENT
Start: 2023-03-10 | End: 2023-03-11

## 2023-03-10 RX ADMIN — LIDOCAINE HYDROCHLORIDE 10 ML: 10 INJECTION, SOLUTION INFILTRATION; PERINEURAL at 12:33

## 2023-03-10 RX ADMIN — ONDANSETRON 4 MG: 2 INJECTION INTRAMUSCULAR; INTRAVENOUS at 11:05

## 2023-03-10 RX ADMIN — POTASSIUM CHLORIDE 20 MEQ: 750 TABLET, EXTENDED RELEASE ORAL at 16:33

## 2023-03-10 RX ADMIN — DOFETILIDE 125 MCG: 0.12 CAPSULE ORAL at 16:34

## 2023-03-10 RX ADMIN — LINAGLIPTIN 5 MG: 5 TABLET, FILM COATED ORAL at 16:34

## 2023-03-10 RX ADMIN — ROSUVASTATIN CALCIUM 10 MG: 10 TABLET, FILM COATED ORAL at 16:34

## 2023-03-10 RX ADMIN — OXYCODONE AND ACETAMINOPHEN 1 TABLET: 5; 325 TABLET ORAL at 18:43

## 2023-03-10 RX ADMIN — MIDAZOLAM 2 MG: 1 INJECTION INTRAMUSCULAR; INTRAVENOUS at 11:36

## 2023-03-10 RX ADMIN — CEFAZOLIN SODIUM 2 G: 2 INJECTION, SOLUTION INTRAVENOUS at 11:05

## 2023-03-10 RX ADMIN — MORPHINE SULFATE 4 MG: 2 INJECTION, SOLUTION INTRAMUSCULAR; INTRAVENOUS at 11:04

## 2023-03-10 RX ADMIN — Medication 10 ML: at 20:05

## 2023-03-10 RX ADMIN — FUROSEMIDE 40 MG: 40 TABLET ORAL at 16:34

## 2023-03-10 RX ADMIN — ATENOLOL 25 MG: 25 TABLET ORAL at 16:34

## 2023-03-10 NOTE — ED TRIAGE NOTES
"Patient to ER via car from PCP for SOA x yesterday  PCP reports patient has \"collapsed lung\"    Patient wearing mask this RN in PPE  "

## 2023-03-10 NOTE — ED PROVIDER NOTES
EMERGENCY DEPARTMENT ENCOUNTER    Room Number:  E547/1  Date of encounter:  3/10/2023  PCP: Marcial Jackson DO  Historian: Patient, wife  Chronic or social conditions impacting care (social determinants of health): Nothing      I used full protective equipment while examining this patient.  This includes face mask, gloves and protective eyewear.  I washed my hands before entering the room and immediately upon leaving the room      HPI:  Chief Complaint: Shortness of breath  A complete HPI/ROS/PMH/PSH/SH/FH are unobtainable due to: Nothing    Context: Ray Pratt is a 77 y.o. male who presents to the ED c/o 2-day history of shortness of breath.  Patient states he woke with shortness of breath yesterday morning.  His symptoms are worse with exertion.  Denies any chest pain, fever.  He does have history of A-fib, CHF, diabetes.  He is on Eliquis.  He does complain of chronic pedal edema without any significant worsening.  He did see his primary care physician today who performed a chest x-ray that found a pneumothorax.  He was sent to the ER for further evaluation.    Review of prior external notes (non-ED):   I reviewed patient's last cardiology office visit from 2/1/2023.  Patient being treated for atrial fibrillation, CHF.    Review of prior external test results outside of this encounter:  I reviewed CMP from 2/1/2023.  This was fairly normal except for glucose of 110.    PAST MEDICAL HISTORY  Active Ambulatory Problems     Diagnosis Date Noted   • Hypoxia 01/18/2017   • Type 2 diabetes mellitus with hyperglycemia (HCC) 01/18/2017   • Benign essential HTN 01/18/2017   • Atrial fibrillation, persistent (CMS/[facility]) 11/04/2019   • Atrial flutter with rapid ventricular response ([facility]) 11/06/2019   • Acute combined systolic and diastolic congestive heart failure ([facility]) 11/06/2019   • Obstructive sleep apnea 01/17/2020   • A-fib ([facility]) 02/10/2020   • High risk medication use 06/18/2020   • Obesity, morbid, BMI 40.0-49.9  ([facility]) 06/18/2020   • Cardiomyopathy, dilated ([facility]) 06/18/2020   • VILLA (dyspnea on exertion) 07/17/2020   • Long term (current) use of anticoagulants 01/18/2021   • On dofetilide therapy 07/26/2021     Resolved Ambulatory Problems     Diagnosis Date Noted   • Pneumonia of left lung due to Haemophilus influenzae ([facility]) 01/18/2017   • Sepsis ([facility]) 01/18/2017   • A-fib ([facility]) 01/13/2020   • Class 3 severe obesity due to excess calories without serious comorbidity with body mass index (BMI) of 40.0 to 44.9 in adult ([facility]) 01/17/2020     Past Medical History:   Diagnosis Date   • Atrial fibrillation ([facility])    • CHF (congestive heart failure) ([facility])    • Dilated cardiomyopathy ([facility])    • Hyperlipidemia    • Hypertension    • Low-tension glaucoma of right eye    • Nonischemic cardiomyopathy ([facility])    • Obesity    • Ocular hypertension of left eye    • MARCOS (obstructive sleep apnea)    • Persistent atrial fibrillation ([facility])    • Ptosis of right eyelid    • Type 2 diabetes mellitus, without long-term current use of insulin ([facility])          PAST SURGICAL HISTORY  Past Surgical History:   Procedure Laterality Date   • CARDIAC CATHETERIZATION N/A 11/05/2019    Procedure: Left Heart Cath;  Surgeon: Sundeep Tsang MD;  Location:  [facility];  Service: Cardiovascular   • CARDIAC CATHETERIZATION N/A 11/05/2019    Procedure: Coronary angiography;  Surgeon: Sundeep Tsang MD;  Location:  [facility];  Service: Cardiovascular   • DENTAL PROCEDURE     • MOLE REMOVAL Right     above right eye, benign per patient report         FAMILY HISTORY  Family History   Problem Relation Age of Onset   • Other Mother         history of cancer   • Stroke Father    • Epilepsy Sister          SOCIAL HISTORY  Social History     Socioeconomic History   • Marital status:    • Number of children: 0   Tobacco Use   • Smoking status: Never   • Smokeless tobacco: Never   Vaping Use   • Vaping Use: Never used    Substance and Sexual Activity   • Alcohol use: Not Currently   • Drug use: Never         ALLERGIES  Patient has no known allergies.        REVIEW OF SYSTEMS  All systems reviewed and negative except for those discussed in HPI.       PHYSICAL EXAM    I have reviewed the triage vital signs and nursing notes.    ED Triage Vitals   Temp Heart Rate Resp BP SpO2   03/10/23 0949 03/10/23 0949 03/10/23 0949 03/10/23 0956 03/10/23 0949   97.6 °F (36.4 °C) (!) 123 20 (!) 156/111 (!) 89 %      Temp src Heart Rate Source Patient Position BP Location FiO2 (%)   -- -- -- -- --              Physical Exam  GENERAL: Alert, oriented, no distress  HENT: head atraumatic, no nuchal rigidity  EYES: no scleral icterus, EOMI  CV: regular rhythm, regular rate, no murmur  RESPIRATORY: Mild distress, absent breath sounds on the right  ABDOMEN: soft, nontender  MUSCULOSKELETAL: no deformity, FROM, 2+ pedal edema bilaterally  NEURO: alert, moves all extremities, follows commands  SKIN: warm, dry        LAB RESULTS  Recent Results (from the past 24 hour(s))   ECG 12 Lead Dyspnea    Collection Time: 03/10/23  9:59 AM   Result Value Ref Range    QT Interval 371 ms   Comprehensive Metabolic Panel    Collection Time: 03/10/23 10:07 AM    Specimen: Blood   Result Value Ref Range    Glucose 130 (H) 65 - 99 mg/dL    BUN 19 8 - 23 mg/dL    Creatinine 0.99 0.76 - 1.27 mg/dL    Sodium 137 136 - 145 mmol/L    Potassium 3.9 3.5 - 5.2 mmol/L    Chloride 102 98 - 107 mmol/L    CO2 24.0 22.0 - 29.0 mmol/L    Calcium 9.4 8.6 - 10.5 mg/dL    Total Protein 8.2 6.0 - 8.5 g/dL    Albumin 4.2 3.5 - 5.2 g/dL    ALT (SGPT) 14 1 - 41 U/L    AST (SGOT) 21 1 - 40 U/L    Alkaline Phosphatase 61 39 - 117 U/L    Total Bilirubin 0.5 0.0 - 1.2 mg/dL    Globulin 4.0 gm/dL    A/G Ratio 1.1 g/dL    BUN/Creatinine Ratio 19.2 7.0 - 25.0    Anion Gap 11.0 5.0 - 15.0 mmol/L    eGFR 78.5 >60.0 mL/min/1.73   Protime-INR    Collection Time: 03/10/23 10:07 AM    Specimen: Blood    Result Value Ref Range    Protime 14.0 11.7 - 14.2 Seconds    INR 1.07 0.90 - 1.10   Green Top (Gel)    Collection Time: 03/10/23 10:07 AM   Result Value Ref Range    Extra Tube Hold for add-ons.    Lavender Top    Collection Time: 03/10/23 10:07 AM   Result Value Ref Range    Extra Tube hold for add-on    Gold Top - SST    Collection Time: 03/10/23 10:07 AM   Result Value Ref Range    Extra Tube Hold for add-ons.    Light Blue Top    Collection Time: 03/10/23 10:07 AM   Result Value Ref Range    Extra Tube Hold for add-ons.    CBC Auto Differential    Collection Time: 03/10/23 10:07 AM    Specimen: Blood   Result Value Ref Range    WBC 8.27 3.40 - 10.80 10*3/mm3    RBC 5.47 4.14 - 5.80 10*6/mm3    Hemoglobin 16.2 13.0 - 17.7 g/dL    Hematocrit 50.0 37.5 - 51.0 %    MCV 91.4 79.0 - 97.0 fL    MCH 29.6 26.6 - 33.0 pg    MCHC 32.4 31.5 - 35.7 g/dL    RDW 14.0 12.3 - 15.4 %    RDW-SD 46.9 37.0 - 54.0 fl    MPV 9.5 6.0 - 12.0 fL    Platelets 226 140 - 450 10*3/mm3    Neutrophil % 62.0 42.7 - 76.0 %    Lymphocyte % 25.2 19.6 - 45.3 %    Monocyte % 10.9 5.0 - 12.0 %    Eosinophil % 0.7 0.3 - 6.2 %    Basophil % 0.6 0.0 - 1.5 %    Immature Grans % 0.6 (H) 0.0 - 0.5 %    Neutrophils, Absolute 5.13 1.70 - 7.00 10*3/mm3    Lymphocytes, Absolute 2.08 0.70 - 3.10 10*3/mm3    Monocytes, Absolute 0.90 0.10 - 0.90 10*3/mm3    Eosinophils, Absolute 0.06 0.00 - 0.40 10*3/mm3    Basophils, Absolute 0.05 0.00 - 0.20 10*3/mm3    Immature Grans, Absolute 0.05 0.00 - 0.05 10*3/mm3    nRBC 0.0 0.0 - 0.2 /100 WBC   BNP    Collection Time: 03/10/23 10:07 AM    Specimen: Blood   Result Value Ref Range    proBNP 322.0 0.0 - 1,800.0 pg/mL   High Sensitivity Troponin T    Collection Time: 03/10/23 10:07 AM    Specimen: Blood   Result Value Ref Range    HS Troponin T 22 (H) <15 ng/L   POC Glucose Once    Collection Time: 03/10/23  4:19 PM    Specimen: Blood   Result Value Ref Range    Glucose 97 70 - 130 mg/dL       Ordered the above labs and  independently reviewed the results.        RADIOLOGY  XR Chest 1 View    Result Date: 3/10/2023  PORTABLE CHEST  HISTORY: Chest tube insertion  NOTE: The study was performed at 1157 hours but not made available for interpretation until 1302 hours.  FINDINGS: A single view of the chest demonstrates a right-sided chest tube in place. A large pneumothorax is still present but there is slightly greater expansion of the right lung. The mediastinal shift to the left consistent with a tension pneumothorax has decreased.  The above information was called to and discussed with Dr. Yen.      XR Chest 1 View    Result Date: 3/10/2023  ONE VIEW PORTABLE CHEST AT 9:59 AM  HISTORY: Pneumothorax. Shortness of breath.  There is an extremely large right-sided pneumothorax measuring approximately 85-90% as also noted on the recent outside chest x-ray. There is minimal mediastinal shift to the left. The left lung is clear and the heart size is normal.  This report was finalized on 3/10/2023 10:14 AM by Dr. Romel Moreno M.D.        I ordered the above noted radiological studies. Reviewed by me and discussed with radiologist.  See dictation for official radiology interpretation.      MEDICATIONS GIVEN IN ER    Medications   atenolol (TENORMIN) tablet 25 mg (25 mg Oral Given 3/10/23 1634)   dofetilide (TIKOSYN) capsule 125 mcg (125 mcg Oral Given 3/10/23 1634)   empagliflozin (JARDIANCE) tablet 25 mg (has no administration in time range)   furosemide (LASIX) tablet 40 mg (40 mg Oral Given 3/10/23 1634)   potassium chloride (K-DUR,KLOR-CON) ER tablet 20 mEq (20 mEq Oral Given 3/10/23 1633)   rosuvastatin (CRESTOR) tablet 10 mg (10 mg Oral Given 3/10/23 1634)   linagliptin (TRADJENTA) tablet 5 mg (5 mg Oral Given 3/10/23 1634)   sodium chloride 0.9 % flush 10 mL (has no administration in time range)   sodium chloride 0.9 % flush 10 mL (has no administration in time range)   sodium chloride 0.9 % infusion 40 mL (has no administration in  time range)   nitroglycerin (NITROSTAT) SL tablet 0.4 mg (has no administration in time range)   acetaminophen (TYLENOL) tablet 650 mg (has no administration in time range)     Or   acetaminophen (TYLENOL) 160 MG/5ML solution 650 mg (has no administration in time range)     Or   acetaminophen (TYLENOL) suppository 650 mg (has no administration in time range)   sennosides-docusate (PERICOLACE) 8.6-50 MG per tablet 2 tablet (has no administration in time range)     And   polyethylene glycol (MIRALAX) packet 17 g (has no administration in time range)     And   bisacodyl (DULCOLAX) EC tablet 5 mg (has no administration in time range)     And   bisacodyl (DULCOLAX) suppository 10 mg (has no administration in time range)   ondansetron (ZOFRAN) tablet 4 mg (has no administration in time range)     Or   ondansetron (ZOFRAN) injection 4 mg (has no administration in time range)   dextrose (GLUTOSE) oral gel 15 g (has no administration in time range)   dextrose (D50W) (25 g/50 mL) IV injection 25 g (has no administration in time range)   glucagon (GLUCAGEN) injection 1 mg (has no administration in time range)   insulin lispro (ADMELOG) injection 0-9 Units (0 Units Subcutaneous Not Given 3/10/23 1712)   oxyCODONE-acetaminophen (PERCOCET) 5-325 MG per tablet 1 tablet (has no administration in time range)   morphine injection 4 mg (4 mg Intravenous Given 3/10/23 1104)   ondansetron (ZOFRAN) injection 4 mg (4 mg Intravenous Given 3/10/23 1105)   ceFAZolin in dextrose (ANCEF) IVPB solution 2 g (0 g Intravenous Stopped 3/10/23 1135)   lidocaine (XYLOCAINE) 1 % injection 10 mL (10 mL Injection Given by Other 3/10/23 1233)   midazolam (VERSED) injection 2 mg (2 mg Intravenous Given 3/10/23 1136)         ADDITIONAL ORDERS CONSIDERED BUT NOT ORDERED:  Nothing      PROGRESS, DATA ANALYSIS, CONSULTS, AND MEDICAL DECISION MAKING    All labs have been independently interpreted by myself.  All radiology studies have been independently  interpreted by myself and discussed with radiologist dictating the report.   EKG's independently interpreted by myself.  Discussion below represents my analysis of pertinent findings related to patient's condition, differential diagnosis, treatment plan and final disposition.    I have discussed case with Dr. Yen, emergency room physician.  He has performed his own bedside examination and agrees with treatment plan.    ED Course as of 03/10/23 1725   Fri Mar 10, 2023   1005 Patient presents with 2-day history of shortness of breath.  Denies any chest pain.  Differential diagnoses include but not limited to pneumonia, CHF, pneumothorax, COPD. [EE]   1016 Chest x-ray independently interpreted myself shows a near complete pneumothorax on the right. [EE]   1016 EKG independently interpreted myself.  Time 0959.  Sinus rhythm, 90 bpm.  Normal P/DEIDRA.  QRS shows right bundle branch block with normal axis.  Nonspecific T wave changes.  Similar to prior EKG from 2/12/2020. [EE]   1025 Recheck the patient.  He is resting quietly.  He is on oxygen in no significant distress. [EE]   1043 WBC: 8.27 [EE]   1043 Hemoglobin: 16.2 [EE]   1047 Heart Rate(!): 123 [TD]   1047 SpO2(!): 89 % [TD]   1052 I discussed the case with Altagracia Green, nurse practitioner with thoracic surgery.  She agrees with plan for thoracostomy tube and admission.  They will consult. [EE]   1116 EKG independently interpreted by myself.  Time 9:59 AM.  Sinus rhythm.  Heart rate 98.  Right bundle branch block.  No acute ST abnormality.  Low voltage in the precordial leads. [TD]   1236 I discussed the case with ANICETO Gutiérrez for hospitalist medicine.  We reviewed the patient's labs and imaging.  She will admit for Dr. Barriga. [TD]      ED Course User Index  [EE] Bobby Rodas PA  [TD] Gregory Yen II, MD       AS OF 17:25 EST VITALS:    BP - 112/76  HR - 84  TEMP - 98 °F (36.7 °C) (Oral)  O2 SATS - 97%        DIAGNOSIS  Final diagnoses:    Pneumothorax on right   Anticoagulated   Atrial fibrillation, unspecified type (HCC)   Congestive heart failure, unspecified HF chronicity, unspecified heart failure type (HCC)         DISPOSITION  Admitted      Dictated utilizing Dragon dictation     Bobby Rodas PA  03/10/23 0999
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
## 2023-03-10 NOTE — ED PROVIDER NOTES
MD ATTESTATION NOTE    The JEFFREY and I have discussed this patient's history, physical exam, and treatment plan.    I provided a substantive portion of the care of this patient. I personally performed the physical exam, in its entirety. The attached note describes my personal findings.      Ray Pratt is a 77 y.o. male who presents to the ED c/o shortness of breath for the past 2 days.  He is on Eliquis.  No chest pain.  No fever.      On exam:  GENERAL: not distressed  HENT: nares patent  EYES: no scleral icterus  CV: regular rhythm, regular rate  RESPIRATORY: Speaks in full sentences, diminished breath sounds on the right  ABDOMEN: soft, nontender  MUSCULOSKELETAL: no deformity  NEURO: alert, moves all extremities, follows commands  SKIN: warm, dry    Labs  Recent Results (from the past 24 hour(s))   ECG 12 Lead Dyspnea    Collection Time: 03/10/23  9:59 AM   Result Value Ref Range    QT Interval 371 ms   Comprehensive Metabolic Panel    Collection Time: 03/10/23 10:07 AM    Specimen: Blood   Result Value Ref Range    Glucose 130 (H) 65 - 99 mg/dL    BUN 19 8 - 23 mg/dL    Creatinine 0.99 0.76 - 1.27 mg/dL    Sodium 137 136 - 145 mmol/L    Potassium 3.9 3.5 - 5.2 mmol/L    Chloride 102 98 - 107 mmol/L    CO2 24.0 22.0 - 29.0 mmol/L    Calcium 9.4 8.6 - 10.5 mg/dL    Total Protein 8.2 6.0 - 8.5 g/dL    Albumin 4.2 3.5 - 5.2 g/dL    ALT (SGPT) 14 1 - 41 U/L    AST (SGOT) 21 1 - 40 U/L    Alkaline Phosphatase 61 39 - 117 U/L    Total Bilirubin 0.5 0.0 - 1.2 mg/dL    Globulin 4.0 gm/dL    A/G Ratio 1.1 g/dL    BUN/Creatinine Ratio 19.2 7.0 - 25.0    Anion Gap 11.0 5.0 - 15.0 mmol/L    eGFR 78.5 >60.0 mL/min/1.73   Protime-INR    Collection Time: 03/10/23 10:07 AM    Specimen: Blood   Result Value Ref Range    Protime 14.0 11.7 - 14.2 Seconds    INR 1.07 0.90 - 1.10   Green Top (Gel)    Collection Time: 03/10/23 10:07 AM   Result Value Ref Range    Extra Tube Hold for add-ons.    Lavender Top    Collection Time:  03/10/23 10:07 AM   Result Value Ref Range    Extra Tube hold for add-on    Gold Top - SST    Collection Time: 03/10/23 10:07 AM   Result Value Ref Range    Extra Tube Hold for add-ons.    Light Blue Top    Collection Time: 03/10/23 10:07 AM   Result Value Ref Range    Extra Tube Hold for add-ons.    CBC Auto Differential    Collection Time: 03/10/23 10:07 AM    Specimen: Blood   Result Value Ref Range    WBC 8.27 3.40 - 10.80 10*3/mm3    RBC 5.47 4.14 - 5.80 10*6/mm3    Hemoglobin 16.2 13.0 - 17.7 g/dL    Hematocrit 50.0 37.5 - 51.0 %    MCV 91.4 79.0 - 97.0 fL    MCH 29.6 26.6 - 33.0 pg    MCHC 32.4 31.5 - 35.7 g/dL    RDW 14.0 12.3 - 15.4 %    RDW-SD 46.9 37.0 - 54.0 fl    MPV 9.5 6.0 - 12.0 fL    Platelets 226 140 - 450 10*3/mm3    Neutrophil % 62.0 42.7 - 76.0 %    Lymphocyte % 25.2 19.6 - 45.3 %    Monocyte % 10.9 5.0 - 12.0 %    Eosinophil % 0.7 0.3 - 6.2 %    Basophil % 0.6 0.0 - 1.5 %    Immature Grans % 0.6 (H) 0.0 - 0.5 %    Neutrophils, Absolute 5.13 1.70 - 7.00 10*3/mm3    Lymphocytes, Absolute 2.08 0.70 - 3.10 10*3/mm3    Monocytes, Absolute 0.90 0.10 - 0.90 10*3/mm3    Eosinophils, Absolute 0.06 0.00 - 0.40 10*3/mm3    Basophils, Absolute 0.05 0.00 - 0.20 10*3/mm3    Immature Grans, Absolute 0.05 0.00 - 0.05 10*3/mm3    nRBC 0.0 0.0 - 0.2 /100 WBC   BNP    Collection Time: 03/10/23 10:07 AM    Specimen: Blood   Result Value Ref Range    proBNP 322.0 0.0 - 1,800.0 pg/mL   High Sensitivity Troponin T    Collection Time: 03/10/23 10:07 AM    Specimen: Blood   Result Value Ref Range    HS Troponin T 22 (H) <15 ng/L       Radiology  XR Chest 1 View    Result Date: 3/10/2023  ONE VIEW PORTABLE CHEST AT 9:59 AM  HISTORY: Pneumothorax. Shortness of breath.  There is an extremely large right-sided pneumothorax measuring approximately 85-90% as also noted on the recent outside chest x-ray. There is minimal mediastinal shift to the left. The left lung is clear and the heart size is normal.  This report was  finalized on 3/10/2023 10:14 AM by Dr. Romel Moreno M.D.        Medications given in the ED:  Medications   morphine injection 4 mg (4 mg Intravenous Given 3/10/23 1104)   ondansetron (ZOFRAN) injection 4 mg (4 mg Intravenous Given 3/10/23 1105)   ceFAZolin in dextrose (ANCEF) IVPB solution 2 g (0 g Intravenous Stopped 3/10/23 1135)   lidocaine (XYLOCAINE) 1 % injection 10 mL (10 mL Injection Given by Other 3/10/23 1233)   midazolam (VERSED) injection 2 mg (2 mg Intravenous Given 3/10/23 1136)       Orders placed during this visit:  Orders Placed This Encounter   Procedures   • XR Chest 1 View   • XR Chest 1 View   • [place] Draw   • Comprehensive Metabolic Panel   • Protime-INR   • CBC Auto Differential   • BNP   • High Sensitivity Troponin T   • High Sensitivity Troponin T 2Hr   • Cardiac Monitoring   • Family Medicine Consult   • [facility] (on-call MD unless specified) Details   • ECG 12 Lead Dyspnea   • Inpatient Admission   • CBC & Differential   • Green Top (Gel)   • Lavender Top   • Gold Top - SST   • Light Blue Top       Medical Decision Making:  ED Course as of 03/10/23 1236   Fri Mar 10, 2023   1005 Patient presents with 2-day history of shortness of breath.  Denies any chest pain.  Differential diagnoses include but not limited to pneumonia, CHF, pneumothorax, COPD. [EE]   1016 Chest x-ray independently interpreted myself shows a near complete pneumothorax on the right. [EE]   1016 EKG independently interpreted myself.  Time 0959.  Sinus rhythm, 90 bpm.  Normal P/DEIDRA.  QRS shows right bundle branch block with normal axis.  Nonspecific T wave changes.  Similar to prior EKG from 2/12/2020. [EE]   1025 Recheck the patient.  He is resting quietly.  He is on oxygen in no significant distress. [EE]   1043 WBC: 8.27 [EE]   1043 Hemoglobin: 16.2 [EE]   1047 Heart Rate(!): 123 [TD]   1047 SpO2(!): 89 % [TD]   1052 I discussed the case with Altagracia Green, nurse practitioner with thoracic surgery.  She agrees with plan  for thoracostomy tube and admission.  They will consult. [EE]   1116 EKG independently interpreted by myself.  Time 9:59 AM.  Sinus rhythm.  Heart rate 98.  Right bundle branch block.  No acute ST abnormality.  Low voltage in the precordial leads. [TD]   1236 I discussed the case with ANICETO Gutiérrez for hospitalist medicine.  We reviewed the patient's labs and imaging.  She will admit for Dr. Barriga. [TD]      ED Course User Index  [EE] Bobby Rodas PA  [TD] Gregory Yen II, MD       Critical Care  Performed by: Gregory Yen II, MD  Authorized by: Gregory Yen II, MD     Critical care provider statement:     Critical care time (minutes):  40    Critical care was necessary to treat or prevent imminent or life-threatening deterioration of the following conditions:  Respiratory failure    Critical care was time spent personally by me on the following activities:  Ordering and performing treatments and interventions, ordering and review of laboratory studies, ordering and review of radiographic studies, pulse oximetry, re-evaluation of patient's condition, obtaining history from patient or surrogate, discussions with consultants, evaluation of patient's response to treatment, examination of patient and development of treatment plan with patient or surrogate  Chest Tube Insertion    Date/Time: 3/10/2023 12:35 PM  Performed by: Gregory Yen II, MD  Authorized by: Gregory Yen II, MD     Consent:     Consent obtained:  Written    Consent given by:  Patient    Risks discussed:  Bleeding, pain, infection and damage to surrounding structures    Alternatives discussed:  Delayed treatment and no treatment  Anesthesia:     Anesthesia method:  Local infiltration    Local anesthetic:  Lidocaine 1% w/o epi  Procedure details:     Placement location:  L lateral    Scalpel size:  11    Tube size (Spanish): 14.    Tension pneumothorax: no      Tube connected to:  Suction    Drainage  characteristics:  Air only    Suture material:  2-0 silk    Dressing:  4x4 sterile gauze and petrolatum-impregnated gauze  Post-procedure details:     Post-insertion x-ray findings: tube in good position      Procedure completion:  Tolerated well, no immediate complications          PPE: Both the patient and I wore a surgical mask throughout the entire patient encounter.     Diagnosis  Final diagnoses:   Pneumothorax on right   Anticoagulated   Atrial fibrillation, unspecified type (HCC)   Congestive heart failure, unspecified HF chronicity, unspecified heart failure type (HCC)       Admit     Gregory Yen II, MD  03/10/23 4246

## 2023-03-10 NOTE — ED NOTES
Nursing report ED to floor  Ray Pratt  77 y.o.  male    HPI :   Chief Complaint   Patient presents with    Shortness of Breath       Admitting doctor:   Erick Barriga MD    Admitting diagnosis:   The primary encounter diagnosis was Pneumothorax on right. Diagnoses of Anticoagulated, Atrial fibrillation, unspecified type (HCC), and Congestive heart failure, unspecified HF chronicity, unspecified heart failure type (HCC) were also pertinent to this visit.    Code status:   Current Code Status       Date Active Code Status Order ID Comments User Context       Prior            Allergies:   Patient has no known allergies.    Isolation:   No active isolations    Intake and Output    Intake/Output Summary (Last 24 hours) at 3/10/2023 1236  Last data filed at 3/10/2023 1135  Gross per 24 hour   Intake 100 ml   Output --   Net 100 ml       Weight:       03/10/23  0955   Weight: 105 kg (232 lb)       Most recent vitals:   Vitals:    03/10/23 1031 03/10/23 1042 03/10/23 1045 03/10/23 1046   BP: 123/86      Pulse: 98 97 101 98   Resp:       Temp:       SpO2: 95% 95% 96% 95%   Weight:       Height:           Active LDAs/IV Access:   Lines, Drains & Airways       Active LDAs       Name Placement date Placement time Site Days    Peripheral IV 03/10/23 1003 Right Antecubital 03/10/23  1003  Antecubital  less than 1                    Labs (abnormal labs have a star):   Labs Reviewed   COMPREHENSIVE METABOLIC PANEL - Abnormal; Notable for the following components:       Result Value    Glucose 130 (*)     All other components within normal limits    Narrative:     GFR Normal >60  Chronic Kidney Disease <60  Kidney Failure <15    The GFR formula is only valid for adults with stable renal function between ages 18 and 70.   CBC WITH AUTO DIFFERENTIAL - Abnormal; Notable for the following components:    Immature Grans % 0.6 (*)     All other components within normal limits   TROPONIN - Abnormal; Notable for the  following components:    HS Troponin T 22 (*)     All other components within normal limits    Narrative:     High Sensitive Troponin T Reference Range:  <10.0 ng/L- Negative Female for AMI  <15.0 ng/L- Negative Male for AMI  >=10 - Abnormal Female indicating possible myocardial injury.  >=15 - Abnormal Male indicating possible myocardial injury.   Clinicians would have to utilize clinical acumen, EKG, Troponin, and serial changes to determine if it is an Acute Myocardial Infarction or myocardial injury due to an underlying chronic condition.        PROTIME-INR - Normal   BNP (IN-HOUSE) - Normal    Narrative:     Among patients with dyspnea, NT-proBNP is highly sensitive for the detection of acute congestive heart failure. In addition NT-proBNP of <300 pg/ml effectively rules out acute congestive heart failure with 99% negative predictive value.    Results may be falsely decreased if patient taking Biotin.     RAINBOW DRAW    Narrative:     The following orders were created for panel order [place] Draw.  Procedure                               Abnormality         Status                     ---------                               -----------         ------                     Green Top (Gel)[[id number]]                                  Final result               Lavender Top[[id number]]                                     Final result               Gold Top - SST[[id number]]                                   Final result               Light Blue Top[[id number]]                                   Final result                 Please view results for these tests on the individual orders.   CBC AND DIFFERENTIAL    Narrative:     The following orders were created for panel order CBC & Differential.  Procedure                               Abnormality         Status                     ---------                               -----------         ------                     CBC Auto Differential[[id number]]        Abnormal             Final result                 Please view results for these tests on the individual orders.   GREEN TOP   LAVENDER TOP   GOLD TOP - SST   LIGHT BLUE TOP       EKG:   ECG 12 Lead Dyspnea   Preliminary Result   HEART RATE= 98  bpm   RR Interval= 612  ms   NM Interval= 184  ms   P Horizontal Axis= 61  deg   P Front Axis= 93  deg   QRSD Interval= 150  ms   QT Interval= 371  ms   QRS Axis= 18  deg   T Wave Axis= 28  deg   - ABNORMAL ECG -   Sinus rhythm   Right bundle branch block   Electronically Signed By:    Date and Time of Study: 2023-03-10 09:59:37          Meds given in ED:   Medications   morphine injection 4 mg (4 mg Intravenous Given 3/10/23 1104)   ondansetron (ZOFRAN) injection 4 mg (4 mg Intravenous Given 3/10/23 1105)   ceFAZolin in dextrose (ANCEF) IVPB solution 2 g (0 g Intravenous Stopped 3/10/23 1135)   lidocaine (XYLOCAINE) 1 % injection 10 mL (10 mL Injection Given by Other 3/10/23 1233)   midazolam (VERSED) injection 2 mg (2 mg Intravenous Given 3/10/23 1136)       Imaging results:  No radiology results for the last day    Ambulatory status:   - up with assist     Social issues:   Social History     Socioeconomic History    Marital status:     Number of children: 0   Tobacco Use    Smoking status: Never    Smokeless tobacco: Never   Vaping Use    Vaping Use: Never used   Substance and Sexual Activity    Alcohol use: Not Currently    Drug use: Never       NIH Stroke Scale:         Li Lopez RN  03/10/23 12:36 EST

## 2023-03-10 NOTE — CONSULTS
Inpatient Family Practice Consult  Consult performed by: Beth Ludwig, RACHEL, APRN  Consult ordered by: Bobby Rodas PA          Patient Care Team:  Marcial Jackson DO as PCP - General (Internal Medicine)  Reginald Vazquez MD as Referring Physician (Cardiology)    Chief Complaint   Patient presents with   • Shortness of Breath       Subjective     History of Present Illness    Mr. Trevino is a 77-year-old gentleman with a past medical history of type 2 diabetes, hypertension, atrial fibrillation anticoagulated on Eliquis, and obstructive sleep apnea on BiPAP.  He presented to [facility] ER earlier today complaining of increased shortness of breath over the past day after using his BiPAP after not using it for Breana days secondary to power outage.  He reported increased shortness of breath and had trouble walking around [employer] yesterday.  He was seen by his PCP earlier today where chest x-ray demonstrated near complete collapse of the right lung and patient was referred to the ER for further evaluation.  A right chest tube will be placed and thoracic surgery has been consulted for further management    On exam today patient reports continued shortness of breath but is somewhat improved with the use of supplemental oxygen.  He is a never smoker.  He has never had a pneumothorax.  His last dose of Eliquis was on 3/9/2023 at 9 PM.      Review of Systems   Constitutional: Negative.    HENT: Negative.    Eyes: Negative.    Respiratory: Positive for shortness of breath.    Cardiovascular: Positive for chest pain.   Gastrointestinal: Negative.    Endocrine: Negative.    Genitourinary: Negative.    Musculoskeletal: Negative.    Skin: Negative.    Allergic/Immunologic: Negative.    Neurological: Negative.    Hematological: Negative.    Psychiatric/Behavioral: Negative.         Patient Active Problem List   Diagnosis   • Hypoxia   • Type 2 diabetes mellitus with hyperglycemia (HCC)   • Benign essential HTN   • Atrial  fibrillation, persistent (CMS/HCC)   • Atrial flutter with rapid ventricular response (HCC)   • Acute combined systolic and diastolic congestive heart failure (HCC)   • Obstructive sleep apnea   • A-fib ([facility])   • High risk medication use   • Obesity, morbid, BMI 40.0-49.9 ([facility])   • Cardiomyopathy, dilated (HCC)   • VILLA (dyspnea on exertion)   • Long term (current) use of anticoagulants   • On dofetilide therapy   • Pneumothorax on right   • Acute respiratory failure with hypoxia ([facility])     Past Medical History:   Diagnosis Date   • Acute combined systolic and diastolic congestive heart failure ([facility])    • Atrial fibrillation (HCC)    • Atrial flutter with rapid ventricular response ([facility])    • CHF (congestive heart failure) ([facility])    • Dilated cardiomyopathy ([facility])    • Hyperlipidemia    • Hypertension    • Low-tension glaucoma of right eye    • Nonischemic cardiomyopathy ([facility])    • Obesity    • Ocular hypertension of left eye    • On dofetilide therapy    • MARCOS (obstructive sleep apnea)     compliant with BiPAP   • Persistent atrial fibrillation ([facility])    • Pneumonia of left lung due to Haemophilus influenzae ([facility]) 01/18/2017   • Ptosis of right eyelid    • Sepsis ([facility]) 01/18/2017   • Type 2 diabetes mellitus, without long-term current use of insulin ([facility])      Past Surgical History:   Procedure Laterality Date   • CARDIAC CATHETERIZATION N/A 11/05/2019    Procedure: Left Heart Cath;  Surgeon: Sundeep Tsang MD;  Location:  [facility];  Service: Cardiovascular   • CARDIAC CATHETERIZATION N/A 11/05/2019    Procedure: Coronary angiography;  Surgeon: Sundeep Tsang MD;  Location:  [facility];  Service: Cardiovascular   • DENTAL PROCEDURE     • MOLE REMOVAL Right     above right eye, benign per patient report     Family History   Problem Relation Age of Onset   • Other Mother         history of cancer   • Stroke Father    • Epilepsy Sister      Social History     Socioeconomic  History   • Marital status:    • Number of children: 0   Tobacco Use   • Smoking status: Never   • Smokeless tobacco: Never   Vaping Use   • Vaping Use: Never used   Substance and Sexual Activity   • Alcohol use: Not Currently   • Drug use: Never     Medications Prior to Admission   Medication Sig Dispense Refill Last Dose   • atenolol (TENORMIN) 25 MG tablet Take 1 tablet by mouth Daily. 180 tablet 3 3/9/2023   • dofetilide (TIKOSYN) 125 MCG capsule TAKE 1 CAPSULE BY MOUTH EVERY 12 HOURS 180 capsule 2 3/9/2023   • Eliquis 5 MG tablet tablet TAKE 1 TABLET BY MOUTH EVERY 12 HOURS 60 tablet 11 3/9/2023   • empagliflozin (Jardiance) 25 MG tablet tablet Take 1 tablet by mouth Daily With Breakfast. 90 tablet 2 3/9/2023   • furosemide (LASIX) 40 MG tablet TAKE 1 TABLET BY MOUTH EVERY DAY 90 tablet 3 3/9/2023   • loratadine (CLARITIN) 10 MG tablet TAKE 1 TABLET BY MOUTH EVERY DAY 90 tablet 1 3/9/2023   • metFORMIN ER (GLUCOPHAGE-XR) 500 MG 24 hr tablet Take 2 tablets by mouth Daily With Breakfast. 180 tablet 1 3/9/2023   • potassium chloride (MICRO-K) 10 MEQ CR capsule TAKE 2 CAPSULES BY MOUTH EVERY  capsule 0 3/9/2023   • rosuvastatin (CRESTOR) 10 MG tablet Take 1 tablet by mouth Daily. 90 tablet 3 3/9/2023   • SITagliptin (Januvia) 100 MG tablet Take 1 tablet by mouth Daily. 90 tablet 3 3/9/2023     No Known Allergies    Objective      Vital Signs  Temp:  [97.6 °F (36.4 °C)-97.8 °F (36.6 °C)] 97.6 °F (36.4 °C)  Heart Rate:  [] 85  Resp:  [20] 20  BP: (104-156)/() 120/85    Intake & Output (last day)       03/09 0701  03/10 0700 03/10 0701  03/11 0700    IV Piggyback  100    Total Intake(mL/kg)  100 (1)    Net  +100                Physical Exam  Constitutional:       General: He is in acute distress.      Appearance: Normal appearance.      Interventions: Nasal cannula in place.   HENT:      Head: Normocephalic and atraumatic.   Cardiovascular:      Rate and Rhythm: Normal rate.   Pulmonary:       Effort: Tachypnea present.      Breath sounds: Examination of the right-lower field reveals decreased breath sounds. Decreased breath sounds present. No wheezing or rhonchi.      Comments: Increased effort  Musculoskeletal:         General: Normal range of motion.      Cervical back: Normal range of motion.   Skin:     General: Skin is warm.   Neurological:      General: No focal deficit present.      Mental Status: He is alert.   Psychiatric:         Mood and Affect: Mood normal.         Results Review:    I reviewed the patient's new clinical results.  I reviewed the patient's new imaging results and agree with the interpretation.  I reviewed the patient's other test results and agree with the interpretation  Discussed with patient, RN and Dr. Bee    Imaging Results (Last 24 Hours)     Procedure Component Value Units Date/Time    XR Chest 1 View [[id number]] Collected: 03/10/23 1324     Updated: 03/10/23 1324    Narrative:      PORTABLE CHEST     HISTORY: Chest tube insertion     NOTE: The study was performed at 1157 hours but not made available for  interpretation until 1302 hours.     FINDINGS: A single view of the chest demonstrates a right-sided chest  tube in place. A large pneumothorax is still present but there is  slightly greater expansion of the right lung. The mediastinal shift to  the left consistent with a tension pneumothorax has decreased.     The above information was called to and discussed with Dr. Yen.       XR Chest 1 View [[id number]] Collected: 03/10/23 1013     Updated: 03/10/23 1017    Narrative:      ONE VIEW PORTABLE CHEST AT 9:59 AM     HISTORY: Pneumothorax. Shortness of breath.     There is an extremely large right-sided pneumothorax measuring  approximately 85-90% as also noted on the recent outside chest x-ray.  There is minimal mediastinal shift to the left. The left lung is clear  and the heart size is normal.     This report was finalized on 3/10/2023 10:14 AM by Dr. Urena  CINDY Moreno             Lab Results:  Lab Results (last 24 hours)     Procedure Component Value Units Date/Time    [place] Draw [[id number]] Collected: 03/10/23 1007    Specimen: Blood Updated: 03/10/23 1115    Narrative:      The following orders were created for panel order [place] Draw.  Procedure                               Abnormality         Status                     ---------                               -----------         ------                     Green Top (Gel)[[id number]]                                  Final result               Lavender Top[[id number]]                                     Final result               Gold Top - SST[[id number]]                                   Final result               Light Blue Top[[id number]]                                   Final result                 Please view results for these tests on the individual orders.    Green Top (Gel) [[id number]] Collected: 03/10/23 1007    Specimen: Blood Updated: 03/10/23 1115     Extra Tube Hold for add-ons.     Comment: Auto resulted.       Lavender Top [[id number]] Collected: 03/10/23 1007    Specimen: Blood Updated: 03/10/23 1115     Extra Tube hold for add-on     Comment: Auto resulted       Light Blue Top [[id number]] Collected: 03/10/23 1007    Specimen: Blood Updated: 03/10/23 1115     Extra Tube Hold for add-ons.     Comment: Auto resulted       Gold Top - SST [[id number]] Collected: 03/10/23 1007    Specimen: Blood Updated: 03/10/23 1115     Extra Tube Hold for add-ons.     Comment: Auto resulted.       Comprehensive Metabolic Panel [[id number]]  (Abnormal) Collected: 03/10/23 1007    Specimen: Blood Updated: 03/10/23 1113     Glucose 130 mg/dL      BUN 19 mg/dL      Creatinine 0.99 mg/dL      Sodium 137 mmol/L      Potassium 3.9 mmol/L      Chloride 102 mmol/L      CO2 24.0 mmol/L      Calcium 9.4 mg/dL      Total Protein 8.2 g/dL      Albumin 4.2 g/dL      ALT (SGPT) 14 U/L      AST (SGOT) 21 U/L      Alkaline  Phosphatase 61 U/L      Total Bilirubin 0.5 mg/dL      Globulin 4.0 gm/dL      A/G Ratio 1.1 g/dL      BUN/Creatinine Ratio 19.2     Anion Gap 11.0 mmol/L      eGFR 78.5 mL/min/1.73     Narrative:      GFR Normal >60  Chronic Kidney Disease <60  Kidney Failure <15    The GFR formula is only valid for adults with stable renal function between ages 18 and 70.    High Sensitivity Troponin T [[id number]]  (Abnormal) Collected: 03/10/23 1007    Specimen: Blood Updated: 03/10/23 1113     HS Troponin T 22 ng/L     Narrative:      High Sensitive Troponin T Reference Range:  <10.0 ng/L- Negative Female for AMI  <15.0 ng/L- Negative Male for AMI  >=10 - Abnormal Female indicating possible myocardial injury.  >=15 - Abnormal Male indicating possible myocardial injury.   Clinicians would have to utilize clinical acumen, EKG, Troponin, and serial changes to determine if it is an Acute Myocardial Infarction or myocardial injury due to an underlying chronic condition.         BNP [[id number]]  (Normal) Collected: 03/10/23 1007    Specimen: Blood Updated: 03/10/23 1101     proBNP 322.0 pg/mL     Narrative:      Among patients with dyspnea, NT-proBNP is highly sensitive for the detection of acute congestive heart failure. In addition NT-proBNP of <300 pg/ml effectively rules out acute congestive heart failure with 99% negative predictive value.    Results may be falsely decreased if patient taking Biotin.      Protime-INR [[id number]]  (Normal) Collected: 03/10/23 1007    Specimen: Blood Updated: 03/10/23 1049     Protime 14.0 Seconds      INR 1.07    CBC & Differential [[id number]]  (Abnormal) Collected: 03/10/23 1007    Specimen: Blood Updated: 03/10/23 1038    Narrative:      The following orders were created for panel order CBC & Differential.  Procedure                               Abnormality         Status                     ---------                               -----------         ------                     CBC Auto  Differential[[id number]]        Abnormal            Final result                 Please view results for these tests on the individual orders.    CBC Auto Differential [[id number]]  (Abnormal) Collected: 03/10/23 1007    Specimen: Blood Updated: 03/10/23 1038     WBC 8.27 10*3/mm3      RBC 5.47 10*6/mm3      Hemoglobin 16.2 g/dL      Hematocrit 50.0 %      MCV 91.4 fL      MCH 29.6 pg      MCHC 32.4 g/dL      RDW 14.0 %      RDW-SD 46.9 fl      MPV 9.5 fL      Platelets 226 10*3/mm3      Neutrophil % 62.0 %      Lymphocyte % 25.2 %      Monocyte % 10.9 %      Eosinophil % 0.7 %      Basophil % 0.6 %      Immature Grans % 0.6 %      Neutrophils, Absolute 5.13 10*3/mm3      Lymphocytes, Absolute 2.08 10*3/mm3      Monocytes, Absolute 0.90 10*3/mm3      Eosinophils, Absolute 0.06 10*3/mm3      Basophils, Absolute 0.05 10*3/mm3      Immature Grans, Absolute 0.05 10*3/mm3      nRBC 0.0 /100 WBC               Assessment & Plan       Pneumothorax on right    Type 2 diabetes mellitus with hyperglycemia (HCC)    Benign essential HTN    A-fib (HCC)    Obesity, morbid, BMI 40.0-49.9 (HCC)    Cardiomyopathy, dilated (HCC)    Long term (current) use of anticoagulants    Acute respiratory failure with hypoxia (HCC)      Assessment & Plan    I have independently reviewed the patient's radiographic imaging which demonstrates a significant right-sided pneumothorax.    Right pneumothorax: Etiology uncertain, but possibly related to recent BiPAP use.  Follow-up imaging status post chest tube placement demonstrates interval improvement to pleural separation although significant space remains.  Leave chest tube to -20 cm of suction and recheck an x-ray in the morning.  Patient to perform incentive spirometry 10 times per hour.  Avoid oral anticoagulation with chest tube in place.  Transfer to  E. for chest tube management.       I discussed the patients findings and our recommendations with patient, nursing staff and Dr. Bee    Thank you  for this consult and allowing us to participate in the care of your patient.  We will follow along with you during this hospitalization.       Beth Ludwig DNP, APRN  [facility]  03/10/23  14:33 EST    I spent >65 minutes reviewing the patient's chart including medical history, notes, radiographic imaging, labs and performing an assessment and development of a plan and discussion with the patient/family at bedside.

## 2023-03-11 ENCOUNTER — APPOINTMENT (OUTPATIENT)
Dept: GENERAL RADIOLOGY | Facility: HOSPITAL | Age: 78
DRG: 199 | End: 2023-03-11
Payer: MEDICARE

## 2023-03-11 PROBLEM — I48.19 PERSISTENT ATRIAL FIBRILLATION (HCC): Status: ACTIVE | Noted: 2020-02-10

## 2023-03-11 PROBLEM — I50.42 CHRONIC COMBINED SYSTOLIC AND DIASTOLIC CONGESTIVE HEART FAILURE: Status: ACTIVE | Noted: 2019-11-06

## 2023-03-11 LAB
ANION GAP SERPL CALCULATED.3IONS-SCNC: 9.4 MMOL/L (ref 5–15)
BUN SERPL-MCNC: 16 MG/DL (ref 8–23)
BUN/CREAT SERPL: 16.7 (ref 7–25)
CALCIUM SPEC-SCNC: 9.2 MG/DL (ref 8.6–10.5)
CHLORIDE SERPL-SCNC: 102 MMOL/L (ref 98–107)
CO2 SERPL-SCNC: 26.6 MMOL/L (ref 22–29)
CREAT SERPL-MCNC: 0.96 MG/DL (ref 0.76–1.27)
DEPRECATED RDW RBC AUTO: 44.5 FL (ref 37–54)
EGFRCR SERPLBLD CKD-EPI 2021: 81.4 ML/MIN/1.73
ERYTHROCYTE [DISTWIDTH] IN BLOOD BY AUTOMATED COUNT: 13.8 % (ref 12.3–15.4)
GLUCOSE BLDC GLUCOMTR-MCNC: 105 MG/DL (ref 70–130)
GLUCOSE BLDC GLUCOMTR-MCNC: 106 MG/DL (ref 70–130)
GLUCOSE BLDC GLUCOMTR-MCNC: 163 MG/DL (ref 70–130)
GLUCOSE BLDC GLUCOMTR-MCNC: 180 MG/DL (ref 70–130)
GLUCOSE SERPL-MCNC: 125 MG/DL (ref 65–99)
HBA1C MFR BLD: 7 % (ref 4.8–5.6)
HCT VFR BLD AUTO: 44.5 % (ref 37.5–51)
HGB BLD-MCNC: 14.5 G/DL (ref 13–17.7)
MCH RBC QN AUTO: 29.2 PG (ref 26.6–33)
MCHC RBC AUTO-ENTMCNC: 32.6 G/DL (ref 31.5–35.7)
MCV RBC AUTO: 89.5 FL (ref 79–97)
PLATELET # BLD AUTO: 209 10*3/MM3 (ref 140–450)
PMV BLD AUTO: 9.9 FL (ref 6–12)
POTASSIUM SERPL-SCNC: 3.8 MMOL/L (ref 3.5–5.2)
RBC # BLD AUTO: 4.97 10*6/MM3 (ref 4.14–5.8)
SODIUM SERPL-SCNC: 138 MMOL/L (ref 136–145)
WBC NRBC COR # BLD: 7.36 10*3/MM3 (ref 3.4–10.8)

## 2023-03-11 PROCEDURE — 83036 HEMOGLOBIN GLYCOSYLATED A1C: CPT | Performed by: NURSE PRACTITIONER

## 2023-03-11 PROCEDURE — 99232 SBSQ HOSP IP/OBS MODERATE 35: CPT | Performed by: NURSE PRACTITIONER

## 2023-03-11 PROCEDURE — 80048 BASIC METABOLIC PNL TOTAL CA: CPT | Performed by: NURSE PRACTITIONER

## 2023-03-11 PROCEDURE — 63710000001 INSULIN LISPRO (HUMAN) PER 5 UNITS: Performed by: NURSE PRACTITIONER

## 2023-03-11 PROCEDURE — 82962 GLUCOSE BLOOD TEST: CPT

## 2023-03-11 PROCEDURE — 71045 X-RAY EXAM CHEST 1 VIEW: CPT

## 2023-03-11 PROCEDURE — 25010000002 ENOXAPARIN PER 10 MG: Performed by: HOSPITALIST

## 2023-03-11 PROCEDURE — 85027 COMPLETE CBC AUTOMATED: CPT | Performed by: NURSE PRACTITIONER

## 2023-03-11 RX ORDER — ENOXAPARIN SODIUM 100 MG/ML
40 INJECTION SUBCUTANEOUS EVERY 24 HOURS
Status: DISCONTINUED | OUTPATIENT
Start: 2023-03-11 | End: 2023-03-16 | Stop reason: HOSPADM

## 2023-03-11 RX ADMIN — LINAGLIPTIN 5 MG: 5 TABLET, FILM COATED ORAL at 08:51

## 2023-03-11 RX ADMIN — ATENOLOL 25 MG: 25 TABLET ORAL at 08:51

## 2023-03-11 RX ADMIN — ROSUVASTATIN CALCIUM 10 MG: 10 TABLET, FILM COATED ORAL at 08:51

## 2023-03-11 RX ADMIN — POTASSIUM CHLORIDE 20 MEQ: 750 TABLET, EXTENDED RELEASE ORAL at 08:52

## 2023-03-11 RX ADMIN — FUROSEMIDE 40 MG: 40 TABLET ORAL at 08:51

## 2023-03-11 RX ADMIN — DOFETILIDE 125 MCG: 0.12 CAPSULE ORAL at 08:51

## 2023-03-11 RX ADMIN — DOFETILIDE 125 MCG: 0.12 CAPSULE ORAL at 19:53

## 2023-03-11 RX ADMIN — EMPAGLIFLOZIN 25 MG: 25 TABLET, FILM COATED ORAL at 08:52

## 2023-03-11 RX ADMIN — ENOXAPARIN SODIUM 40 MG: 100 INJECTION SUBCUTANEOUS at 16:00

## 2023-03-11 RX ADMIN — Medication 10 ML: at 08:52

## 2023-03-11 RX ADMIN — INSULIN LISPRO 2 UNITS: 100 INJECTION, SOLUTION INTRAVENOUS; SUBCUTANEOUS at 12:43

## 2023-03-11 NOTE — PLAN OF CARE
Goal Outcome Evaluation:  Plan of Care Reviewed With: patient        Progress: improving  Outcome Evaluation: Monitor pain,labs,and vitals. VSS. O2 2L NC with sleep. SBA to transfer. CT to -20 suction. No c/o pain on current shift. Will continue to monitor.

## 2023-03-11 NOTE — PROGRESS NOTES
"[facility] Clinical Pharmacy Services: Enoxaparin Consult    Ray Pratt has a pharmacy consult to dose prophylactic enoxaparin per Dr. walter's request.     Indication: VTE Prophylaxis  Home Anticoagulation: apixaban on hold currently      Relevant clinical data and objective history reviewed:  77 y.o. male 162.6 cm (64\") 105 kg (232 lb)   Body mass index is 39.82 kg/m².   Results from last 7 days   Lab Units 03/11/23  0649   PLATELETS 10*3/mm3 209     Estimated Creatinine Clearance: 70.6 mL/min (by C-G formula based on SCr of 0.96 mg/dL).    Assessment/Plan    Will start patient on 40mg subcutaneous every 24 hours, adjusted for renal function. Consult order will be discontinued but pharmacy will continue to follow.     Magdaleno Villa [facility]  Clinical Pharmacist    "

## 2023-03-11 NOTE — PROGRESS NOTES
"    Chief Complaint: Spontaneous pneumothorax, right  S/P: Right thoracostomy tube placement  POD # 1    Subjective:  Symptoms:  Improved.  He reports shortness of breath and chest pain.    Diet:  No nausea or vomiting.    Activity level: Returning to normal.    Pain:  He complains of pain that is mild.  Pain is well controlled.        Vital Signs:  Temp:  [97 °F (36.1 °C)-98.3 °F (36.8 °C)] 98 °F (36.7 °C)  Heart Rate:  [58-87] 74  Resp:  [18-20] 18  BP: (100-126)/(70-97) 100/97    Intake & Output (last day)       03/10 0701  03/11 0700 03/11 0701  03/12 0700    P.O. 540 240    IV Piggyback 100     Total Intake(mL/kg) 640 (6.1) 240 (2.3)    Urine (mL/kg/hr) 1     Chest Tube 8     Total Output 9     Net +631 +240          Urine Unmeasured Occurrence 4 x 2 x          Objective:  General Appearance:  Comfortable and in no acute distress.    Vital signs: (most recent): Blood pressure 100/97, pulse 74, temperature 98 °F (36.7 °C), temperature source Oral, resp. rate 18, height 162.6 cm (64\"), weight 105 kg (232 lb), SpO2 94 %.  Vital signs are normal.  No fever.    Lungs:  Normal effort and normal respiratory rate.  There are decreased breath sounds.  No rales, wheezes or rhonchi.    Heart: Normal rate.  Regular rhythm.    Chest: Chest wall tenderness (Right chest wall, chest tube site) present.    Abdomen: Abdomen is soft.  Bowel sounds are normal.   There is no abdominal tenderness.     Neurological: Patient is alert and oriented to person, place and time.    Skin:  Warm and dry.              Chest tube:   Site: Right, Clean, Dry, Intact and Securement device intact  Suction: -20 cm  Air Leak: positive  24 Hour Total: 8cc    Results Review:     I reviewed the patient's new clinical results.  I reviewed the patient's new imaging results and agree with the interpretation.  I reviewed the patient's other test results and agree with the interpretation  Discussed with Patient, RN, family at bedside and Dr. Bee.    Imaging " Results (Last 24 Hours)     Procedure Component Value Units Date/Time    XR Chest 1 View [[id number]] Collected: 03/11/23 0647     Updated: 03/11/23 0653    Narrative:      XR CHEST 1 VW-     HISTORY: Male who is 77 years-old,  chest tube, right pneumothorax     TECHNIQUE: Frontal view the chest     COMPARISON: 03/10/2023     FINDINGS: Right chest tube extends the mid right hemithorax. Heart,  mediastinum and pulmonary vasculature are unremarkable. Likely  atelectasis or infiltrate is seen in the right mid to lower lung, left  midlung. Previous right pneumothorax is markedly decreased, measuring as  much as about 6 mm peripherally. Subcutaneous emphysema seen along right  chest wall. No pleural effusion, or left pneumothorax. No acute osseous  process.       Impression:      Right chest tube placement with interval decrease of right  pneumothorax.     This report was finalized on 3/11/2023 6:50 AM by Dr. Peter Daugherty M.D.       XR Chest 1 View [[id number]] Collected: 03/10/23 1324     Updated: 03/10/23 2015    Narrative:      PORTABLE CHEST     HISTORY: Chest tube insertion     NOTE: The study was performed at 1157 hours but not made available for  interpretation until 1302 hours.     FINDINGS: A single view of the chest demonstrates a right-sided chest  tube in place. A large pneumothorax is still present but there is  slightly greater expansion of the right lung. The mediastinal shift to  the left consistent with a tension pneumothorax has decreased.     The above information was called to and discussed with Dr. Yen.     This report was finalized on 3/10/2023 8:12 PM by Dr. Rigo Merino M.D.             Lab Results:     Lab Results (last 24 hours)     Procedure Component Value Units Date/Time    POC Glucose Once [[id number]]  (Abnormal) Collected: 03/11/23 1115    Specimen: Blood Updated: 03/11/23 1117     Glucose 180 mg/dL      Comment: Meter: [id number] : [id number] Luis Angel Cameron  Metabolic Panel [[id number]]  (Abnormal) Collected: 03/11/23 0649    Specimen: Blood Updated: 03/11/23 0749     Glucose 125 mg/dL      BUN 16 mg/dL      Creatinine 0.96 mg/dL      Sodium 138 mmol/L      Potassium 3.8 mmol/L      Chloride 102 mmol/L      CO2 26.6 mmol/L      Calcium 9.2 mg/dL      BUN/Creatinine Ratio 16.7     Anion Gap 9.4 mmol/L      eGFR 81.4 mL/min/1.73     Narrative:      GFR Normal >60  Chronic Kidney Disease <60  Kidney Failure <15    The GFR formula is only valid for adults with stable renal function between ages 18 and 70.    CBC (No Diff) [[id number]]  (Normal) Collected: 03/11/23 0649    Specimen: Blood Updated: 03/11/23 0720     WBC 7.36 10*3/mm3      RBC 4.97 10*6/mm3      Hemoglobin 14.5 g/dL      Hematocrit 44.5 %      MCV 89.5 fL      MCH 29.2 pg      MCHC 32.6 g/dL      RDW 13.8 %      RDW-SD 44.5 fl      MPV 9.9 fL      Platelets 209 10*3/mm3     Hemoglobin A1c [[id number]] Collected: 03/11/23 0649    Specimen: Blood Updated: 03/11/23 0713    POC Glucose Once [[id number]]  (Normal) Collected: 03/11/23 0609    Specimen: Blood Updated: 03/11/23 0611     Glucose 106 mg/dL      Comment: Meter: [id number] : [id number] [employer] NA       POC Glucose Once [[id number]]  (Abnormal) Collected: 03/10/23 2051    Specimen: Blood Updated: 03/10/23 2054     Glucose 168 mg/dL      Comment: Meter: [id number] : [id number] [employer] NA       POC Glucose Once [[id number]]  (Normal) Collected: 03/10/23 1619    Specimen: Blood Updated: 03/10/23 1621     Glucose 97 mg/dL      Comment: Meter: [id number] : [id number] Rob BERNARDO              Assessment & Plan       Pneumothorax on right    Type 2 diabetes mellitus with hyperglycemia (HCC)    Benign essential HTN    A-fib (HCC)    Obesity, morbid, BMI 40.0-49.9 (HCC)    Cardiomyopathy, dilated (HCC)    Long term (current) use of anticoagulants    Acute respiratory failure with hypoxia (HCC)       Assessment & Plan     I dependently reviewed  the patient's radiographic imaging including chest x-ray performed after patient's chest tube placement as well as this morning's imaging.  Good reexpansion of right lung with chest tube insertion.    Spontaneous pneumothorax: Etiology is unclear.  Patient did recently return to using BiPAP after not using it for 6-day secondary to power outage.  I have requested a CT of the chest for assessment of bullous disease.  Chest tube does have air leak on assessment today.  We will wean to waterseal and repeat a morning x-ray.    Acute respiratory failure with hypoxia: Improved s/p chest tube placement for pneumothorax.  Supplemental oxygen as needed.  He will likely need supplemental oxygen at discharge, particularly with inability to use positive pressure ventilation.    Atrial fibrillation: Patient is anticoagulated with Eliquis.  We will need to hold while he has chest tube in place.  Okay for prophylaxis Lovenox or subcu heparin if needed.    Obstructive sleep apnea: Patient utilizes BiPAP at home and follows with sleep medicine.  He will need to refrain from positive pressure ventilation for at least 8 to 12 weeks after he recovers from pneumothorax.    ANICETO Eng  [facility]  03/11/23  12:40 EST    Greater than 43 minutes was spent reviewing the patient's chart, radiographic imaging, labs, provider notes, assessing the patient and developing a plan of care.  This was discussed with the patient and RN.

## 2023-03-11 NOTE — PROGRESS NOTES
Name: Ray Pratt ADMIT: 3/10/2023   : 1945  PCP: Marcial Jackson DO    MRN: [id number] LOS: 1 days   AGE/SEX: 77 y.o. male  ROOM: [facility]     Subjective   Subjective   No new complaints today.  Breathing comfortably.  Expected pain at chest tube site.  Otherwise doing okay.    Review of Systems     Objective   Objective   Vital Signs  Temp:  [97 °F (36.1 °C)-98.3 °F (36.8 °C)] 98 °F (36.7 °C)  Heart Rate:  [58-84] 74  Resp:  [18-20] 18  BP: (100-126)/(70-97) 100/97  SpO2:  [92 %-97 %] 94 %  on  Flow (L/min):  [2] 2;   Device (Oxygen Therapy): nasal cannula  Body mass index is 39.82 kg/m².  Physical Exam  Vitals and nursing note reviewed.   Constitutional:       General: He is not in acute distress.     Appearance: He is obese.   Cardiovascular:      Rate and Rhythm: Normal rate and regular rhythm.   Pulmonary:      Effort: Pulmonary effort is normal.      Breath sounds: Normal breath sounds.   Chest:      Comments: Right-sided chest tube present  Abdominal:      General: Bowel sounds are normal.      Palpations: Abdomen is soft.      Tenderness: There is no abdominal tenderness.   Musculoskeletal:         General: No swelling.   Skin:     General: Skin is warm and dry.   Neurological:      Mental Status: He is alert. Mental status is at baseline.       Results Review     I reviewed the patient's new clinical results.  Results from last 7 days   Lab Units 23  0649 03/10/23  1007   WBC 10*3/mm3 7.36 8.27   HEMOGLOBIN g/dL 14.5 16.2   PLATELETS 10*3/mm3 209 226     Results from last 7 days   Lab Units 23  0649 03/10/23  1007   SODIUM mmol/L 138 137   POTASSIUM mmol/L 3.8 3.9   CHLORIDE mmol/L 102 102   CO2 mmol/L 26.6 24.0   BUN mg/dL 16 19   CREATININE mg/dL 0.96 0.99   GLUCOSE mg/dL 125* 130*   EGFR mL/min/1.73 81.4 78.5     Results from last 7 days   Lab Units 03/10/23  1007   ALBUMIN g/dL 4.2   BILIRUBIN mg/dL 0.5   ALK PHOS U/L 61   AST (SGOT) U/L 21   ALT (SGPT) U/L 14     Results from  last 7 days   Lab Units 03/11/23  0649 03/10/23  1007   CALCIUM mg/dL 9.2 9.4   ALBUMIN g/dL  --  4.2       Glucose   Date/Time Value Ref Range Status   03/11/2023 1115 180 (H) 70 - 130 mg/dL Final     Comment:     Meter: [id number] : [id number] Luis Angel Grimaldo NA   03/11/2023 0609 106 70 - 130 mg/dL Final     Comment:     Meter: [id number] : [id number] Wilbur Frias NA   03/10/2023 2051 168 (H) 70 - 130 mg/dL Final     Comment:     Meter: [id number] : [id number] Wilbur Frias NA   03/10/2023 1619 97 70 - 130 mg/dL Final     Comment:     Meter: [id number] : [id number] Rob BERNARDO       XR Chest 1 View    Result Date: 3/11/2023  Right chest tube placement with interval decrease of right pneumothorax.  This report was finalized on 3/11/2023 6:50 AM by Dr. Peter Daugherty M.D.      I have personally reviewed all medications:  Scheduled Medications  atenolol, 25 mg, Oral, Daily  dofetilide, 125 mcg, Oral, Q12H  empagliflozin, 25 mg, Oral, Daily With Breakfast  furosemide, 40 mg, Oral, Daily  insulin lispro, 0-9 Units, Subcutaneous, 4x Daily With Meals & Nightly  linagliptin, 5 mg, Oral, Daily  potassium chloride, 20 mEq, Oral, Daily  rosuvastatin, 10 mg, Oral, Daily  sodium chloride, 10 mL, Intravenous, Q12H    Infusions   Diet  Diet: Cardiac Diets, Diabetic Diets; Healthy Heart (2-3 Na+); Consistent Carbohydrate; Texture: Regular Texture (IDDSI 7); Fluid Consistency: Thin (IDDSI 0)    I have personally reviewed:  [x]  Laboratory   []  Microbiology   [x]  Radiology   [x]  EKG/Telemetry  [x]  Cardiology/Vascular   []  Pathology    [x]  Records       Assessment/Plan     Active Hospital Problems    Diagnosis  POA   • **Pneumothorax on right [J93.9]  Yes   • Acute respiratory failure with hypoxia (HCC) [J96.01]  Yes   • Long term (current) use of anticoagulants [Z79.01]  Not Applicable   • Obesity, morbid, BMI 40.0-49.9 (HCC) [E66.01]  Yes   • Cardiomyopathy, dilated (HCC) [I42.0]  Yes   • Obstructive  sleep apnea [G47.33]  Yes   • Chronic combined systolic and diastolic congestive heart failure (HCC) [I50.42]  Yes   • Atrial fibrillation, persistent (CMS/HCC) [I48.19]  Yes   • Type 2 diabetes mellitus with hyperglycemia, without long-term current use of insulin (HCC) [E11.65]  Yes   • Benign essential HTN [I10]  Yes      Resolved Hospital Problems   No resolved problems to display.       Spontaneous right pneumothorax  -Currently managed with chest tube per thoracic surgery   -acute hypoxic respiratory failure at time of admission with O2 sat 88% on room air with respiratory distress  -CT chest pending evaluating for bullous disease  -Anticipate will need home O2 at discharge     DM2, non-insulin requiring  -Resume Jardiance and linagliptin  -SSI, monitor BG      Persistent atrial fibrillation  -RC: Atenolol 25 mg; dofetilide 125 mcg twice daily  -AC: Apixaban on hold due to above; will order prophylactic dose Lovenox     Dilated CM/chronic systolic and diastolic CHF  -TTE (2/20/2023): 65%  -Resume Lasix 40 mg daily     Elevated troponin  -Troponin 22 OA  -EKG without ischemic changes; known RBBB  -In setting of hypoxia due to above, not ACS    Obstructive sleep apnea  Per thoracic surgery will need to stop BiPAP for 2 to 3 months after discharge  Will evaluate for home oxygen prior to discharge      · Lovenox 40 mg SC daily for DVT prophylaxis.  · Full code.  · Discussed with patient and family.  · Anticipate discharge home with family next week.      Jose Granados MD  [facility]  03/11/23  13:51 EST

## 2023-03-11 NOTE — PLAN OF CARE
Goal Outcome Evaluation:  Plan of Care Reviewed With: patient        Progress: improving   Vital signs stable. Alert and oriented x 4, pleasant. Denies shortness of breath or discomfort. Right chest tube patent to water seal. No air leak or fluctuation noted. Occlusive dressing dry/intact to chest tube insertion site. Pain medication offered but refused at this time. Family visiting at bedside. On room air. Normal sinus cardiac rhythm. Occasional sinus bradycardia. 3+ pedal edema present. Pedal pulses palpable. Tolerating consistent carbohydrate diet well. Blood sugar monitoring done AC/HS. Voiding without difficulty. Resting quietly in no distress. Will continue to monitor.

## 2023-03-12 ENCOUNTER — APPOINTMENT (OUTPATIENT)
Dept: CT IMAGING | Facility: HOSPITAL | Age: 78
DRG: 199 | End: 2023-03-12
Payer: MEDICARE

## 2023-03-12 ENCOUNTER — APPOINTMENT (OUTPATIENT)
Dept: GENERAL RADIOLOGY | Facility: HOSPITAL | Age: 78
DRG: 199 | End: 2023-03-12
Payer: MEDICARE

## 2023-03-12 PROBLEM — J98.19: Status: ACTIVE | Noted: 2023-03-12

## 2023-03-12 PROBLEM — J43.9: Status: ACTIVE | Noted: 2023-03-12

## 2023-03-12 LAB
GLUCOSE BLDC GLUCOMTR-MCNC: 112 MG/DL (ref 70–130)
GLUCOSE BLDC GLUCOMTR-MCNC: 113 MG/DL (ref 70–130)
GLUCOSE BLDC GLUCOMTR-MCNC: 114 MG/DL (ref 70–130)
GLUCOSE BLDC GLUCOMTR-MCNC: 157 MG/DL (ref 70–130)

## 2023-03-12 PROCEDURE — 71250 CT THORAX DX C-: CPT

## 2023-03-12 PROCEDURE — 82962 GLUCOSE BLOOD TEST: CPT

## 2023-03-12 PROCEDURE — 71045 X-RAY EXAM CHEST 1 VIEW: CPT

## 2023-03-12 PROCEDURE — 63710000001 INSULIN LISPRO (HUMAN) PER 5 UNITS: Performed by: NURSE PRACTITIONER

## 2023-03-12 PROCEDURE — 25010000002 ENOXAPARIN PER 10 MG: Performed by: HOSPITALIST

## 2023-03-12 PROCEDURE — 99232 SBSQ HOSP IP/OBS MODERATE 35: CPT | Performed by: NURSE PRACTITIONER

## 2023-03-12 RX ADMIN — ATENOLOL 25 MG: 25 TABLET ORAL at 09:01

## 2023-03-12 RX ADMIN — EMPAGLIFLOZIN 25 MG: 25 TABLET, FILM COATED ORAL at 09:01

## 2023-03-12 RX ADMIN — POTASSIUM CHLORIDE 20 MEQ: 750 TABLET, EXTENDED RELEASE ORAL at 09:02

## 2023-03-12 RX ADMIN — DOFETILIDE 125 MCG: 0.12 CAPSULE ORAL at 09:02

## 2023-03-12 RX ADMIN — DOFETILIDE 125 MCG: 0.12 CAPSULE ORAL at 20:32

## 2023-03-12 RX ADMIN — FUROSEMIDE 40 MG: 40 TABLET ORAL at 09:02

## 2023-03-12 RX ADMIN — LINAGLIPTIN 5 MG: 5 TABLET, FILM COATED ORAL at 09:02

## 2023-03-12 RX ADMIN — INSULIN LISPRO 2 UNITS: 100 INJECTION, SOLUTION INTRAVENOUS; SUBCUTANEOUS at 12:47

## 2023-03-12 RX ADMIN — ENOXAPARIN SODIUM 40 MG: 100 INJECTION SUBCUTANEOUS at 16:00

## 2023-03-12 RX ADMIN — ROSUVASTATIN CALCIUM 10 MG: 10 TABLET, FILM COATED ORAL at 09:01

## 2023-03-12 NOTE — PLAN OF CARE
Goal Outcome Evaluation:  Plan of Care Reviewed With: patient        Progress: improving  Outcome Evaluation: Monitor pain,labs,and vitals. VSS. O2 2L NC. CT to waterseal. No c/o pain on current shift. Ambulated in gu[place]ro with SBA. Will continue to monitor.

## 2023-03-12 NOTE — PROGRESS NOTES
Name: Ray Pratt ADMIT: 3/10/2023   : 1945  PCP: Marcial Jackson DO    MRN: [id number] LOS: 2 days   AGE/SEX: 77 y.o. male  ROOM: [facility]     Subjective   Subjective   Doing rather well today.  Still with some discomfort right side of chest associated with tube.  Otherwise doing okay.    Review of Systems     Objective   Objective   Vital Signs  Temp:  [98.3 °F (36.8 °C)-98.8 °F (37.1 °C)] 98.7 °F (37.1 °C)  Heart Rate:  [59-93] 82  Resp:  [14-16] 16  BP: ()/(65-80) 114/74  SpO2:  [93 %-95 %] 95 %  on  Flow (L/min):  [2] 2;   Device (Oxygen Therapy): nasal cannula  Body mass index is 39.82 kg/m².  Physical Exam  Vitals and nursing note reviewed.   Constitutional:       General: He is not in acute distress.     Appearance: He is obese.   Cardiovascular:      Rate and Rhythm: Normal rate and regular rhythm.   Pulmonary:      Effort: Pulmonary effort is normal.      Breath sounds: Normal breath sounds.   Chest:      Comments: Right-sided chest tube present  Abdominal:      General: Bowel sounds are normal.      Palpations: Abdomen is soft.      Tenderness: There is no abdominal tenderness.   Musculoskeletal:         General: No swelling.   Skin:     General: Skin is warm and dry.   Neurological:      Mental Status: He is alert. Mental status is at baseline.       Results Review     I reviewed the patient's new clinical results.  Results from last 7 days   Lab Units 23  0649 03/10/23  1007   WBC 10*3/mm3 7.36 8.27   HEMOGLOBIN g/dL 14.5 16.2   PLATELETS 10*3/mm3 209 226     Results from last 7 days   Lab Units 23  0649 03/10/23  1007   SODIUM mmol/L 138 137   POTASSIUM mmol/L 3.8 3.9   CHLORIDE mmol/L 102 102   CO2 mmol/L 26.6 24.0   BUN mg/dL 16 19   CREATININE mg/dL 0.96 0.99   GLUCOSE mg/dL 125* 130*   EGFR mL/min/1.73 81.4 78.5     Results from last 7 days   Lab Units 03/10/23  1007   ALBUMIN g/dL 4.2   BILIRUBIN mg/dL 0.5   ALK PHOS U/L 61   AST (SGOT) U/L 21   ALT (SGPT) U/L 14      Results from last 7 days   Lab Units 03/11/23  0649 03/10/23  1007   CALCIUM mg/dL 9.2 9.4   ALBUMIN g/dL  --  4.2       Hemoglobin A1C   Date/Time Value Ref Range Status   03/11/2023 0649 7.00 (H) 4.80 - 5.60 % Final     Glucose   Date/Time Value Ref Range Status   03/12/2023 1056 157 (H) 70 - 130 mg/dL Final     Comment:     Meter: [id number] : [id number] Navneet Barboza NA   03/12/2023 0534 112 70 - 130 mg/dL Final     Comment:     Meter: [id number] : [id number] [facility]   03/11/2023 2101 163 (H) 70 - 130 mg/dL Final     Comment:     Meter: [id number] : [id number] [facility]   03/11/2023 1607 105 70 - 130 mg/dL Final     Comment:     Meter: [id number] : [id number] Luis Angel Grimaldo NA   03/11/2023 1115 180 (H) 70 - 130 mg/dL Final     Comment:     Meter: [id number] : [id number] Luis Angel Canoly NA   03/11/2023 0609 106 70 - 130 mg/dL Final     Comment:     Meter: [id number] : [id number] Wilbur Frias NA   03/10/2023 2051 168 (H) 70 - 130 mg/dL Final     Comment:     Meter: [id number] : [id number] Wilbur Frias NA       XR Chest 1 View    Result Date: 3/12/2023  Interval increase of right pneumothorax and right sided subcutaneous emphysema.  Discussed by telephone with patient's nurse, Sara, at time of interpretation, 0652, 03/12/2023.  This report was finalized on 3/12/2023 6:56 AM by Dr. Peter Daugherty M.D.      XR Chest 1 View    Result Date: 3/11/2023  Right chest tube placement with interval decrease of right pneumothorax.  This report was finalized on 3/11/2023 6:50 AM by Dr. Peter Daugherty M.D.      I have personally reviewed all medications:  Scheduled Medications  atenolol, 25 mg, Oral, Daily  dofetilide, 125 mcg, Oral, Q12H  empagliflozin, 25 mg, Oral, Daily With Breakfast  enoxaparin, 40 mg, Subcutaneous, Q24H  furosemide, 40 mg, Oral, Daily  insulin lispro, 0-9 Units, Subcutaneous, 4x Daily With Meals & Nightly  linagliptin, 5 mg, Oral,  Daily  potassium chloride, 20 mEq, Oral, Daily  rosuvastatin, 10 mg, Oral, Daily    Infusions   Diet  Diet: Cardiac Diets, Diabetic Diets; Healthy Heart (2-3 Na+); Consistent Carbohydrate; Texture: Regular Texture (IDDSI 7); Fluid Consistency: Thin (IDDSI 0)    I have personally reviewed:  [x]  Laboratory   []  Microbiology   [x]  Radiology   [x]  EKG/Telemetry  [x]  Cardiology/Vascular   []  Pathology    [x]  Records       Assessment/Plan     Active Hospital Problems    Diagnosis  POA   • **Pneumothorax on right [J93.9]  Yes   • 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, 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]  Unknown   • Acute respiratory failure with hypoxia (HCC) [J96.01]  Yes   • Long term (current) use of anticoagulants [Z79.01]  Not Applicable   • Obesity, morbid, BMI 40.0-49.9 (HCC) [E66.01]  Yes   • Cardiomyopathy, dilated (HCC) [I42.0]  Yes   • Obstructive sleep apnea [G47.33]  Yes   • Chronic combined systolic and diastolic congestive heart failure (HCC) [I50.42]  Yes   • Atrial fibrillation, persistent (CMS/HCC) [I48.19]  Yes   • Type 2 diabetes mellitus with hyperglycemia, without long-term current use of insulin (HCC) [E11.65]  Yes   • Benign essential HTN [I10]  Yes      Resolved Hospital Problems   No resolved problems to display.       Bullous emphysema with right pneumothorax  -Currently still requiring chest tube per thoracic surgery   -acute hypoxic respiratory failure at time of admission with O2 sat 88% on room air with respiratory distress; improved  -CT chest does indicate bullous disease  -Anticipate will need home O2 at discharge     DM2, non-insulin requiring  -Jardiance and linagliptin  -SSI, monitor BG      Persistent atrial fibrillation  -RC: Atenolol 25 mg; dofetilide 125 mcg twice daily  -AC: Apixaban on hold due to above; on prophylactic dose Lovenox     Dilated CM/chronic systolic and diastolic CHF  -TTE (2/20/2023): 65%  -Lasix 40 mg daily     Elevated troponin  -Troponin 22 OA  -EKG without ischemic changes;  known RBBB  -In setting of hypoxia due to above, not ACS    Obstructive sleep apnea  Per thoracic surgery will need to stop BiPAP for 2 to 3 months after discharge  Will evaluate for home oxygen prior to discharge      · Lovenox 40 mg SC daily for DVT prophylaxis.  · Full code.  · Discussed with patient and family.  · Anticipate discharge home with family next week.      Jose Granados MD  [facility]  03/12/23  16:04 EDT

## 2023-03-12 NOTE — PROGRESS NOTES
"    Chief Complaint: Spontaneous pneumothorax, right  S/P: Right thoracostomy tube placement  POD # 2    Subjective:  Symptoms:  Stable.  He reports shortness of breath and chest pain.    Diet:  No nausea or vomiting.    Activity level: Returning to normal.    Pain:  He complains of pain that is mild.  Pain is well controlled.        Vital Signs:  Temp:  [98.3 °F (36.8 °C)-98.8 °F (37.1 °C)] 98.7 °F (37.1 °C)  Heart Rate:  [59-93] 82  Resp:  [14-18] 16  BP: ()/(65-80) 114/74    Intake & Output (last day)       03/11 0701  03/12 0700 03/12 0701  03/13 0700    P.O. 1680 480    IV Piggyback      Total Intake(mL/kg) 1680 (16) 480 (4.6)    Urine (mL/kg/hr) 0 (0)     Chest Tube 25     Total Output 25     Net +1655 +480          Urine Unmeasured Occurrence 8 x 3 x          Objective:  General Appearance:  Comfortable and in no acute distress.    Vital signs: (most recent): Blood pressure 114/74, pulse 82, temperature 98.7 °F (37.1 °C), temperature source Oral, resp. rate 16, height 162.6 cm (64\"), weight 105 kg (232 lb), SpO2 95 %.  Vital signs are normal.  No fever.    Lungs:  Normal effort and normal respiratory rate.  There are decreased breath sounds.  No rales, wheezes or rhonchi.    Heart: Normal rate.  Regular rhythm.    Chest: Chest wall tenderness (Right chest wall, chest tube site) present.    Abdomen: Abdomen is soft.  Bowel sounds are normal.   There is no abdominal tenderness.     Neurological: Patient is alert and oriented to person, place and time.    Skin:  Warm and dry.              Chest tube:   Site: Right, Clean, Dry, Intact and Securement device intact  Suction: waterseal  Air Leak: positive  24 Hour Total: 25cc    Results Review:     I reviewed the patient's new clinical results.  I reviewed the patient's new imaging results and agree with the interpretation.  I reviewed the patient's other test results and agree with the interpretation  Discussed with Patient, RN, family at bedside and " Gul.    Imaging Results (Last 24 Hours)     Procedure Component Value Units Date/Time    CT Chest Without Contrast Diagnostic [[id number]] Resulted: 03/12/23 1020     Updated: 03/12/23 1020    XR Chest 1 View [[id number]] Collected: 03/12/23 0652     Updated: 03/12/23 0659    Narrative:      XR CHEST 1 VW-     HISTORY: Male who is 77 years-old,  chest tube, right pneumothorax     TECHNIQUE: Frontal view of the chest     COMPARISON: 03/11/2023     FINDINGS: Right chest tube extends the mid right hemithorax. Heart,  mediastinum and pulmonary vasculature are unremarkable. Likely  atelectasis or infiltrate is seen in the right mid to lower lung, left  midlung, similar to prior exam. Previous right pneumothorax is  increased, measuring about 2.9 cm the apex. Subcutaneous emphysema has  increased along right chest wall, and could be evidence of pulmonary air  leak. No pleural effusion, or left pneumothorax. No acute osseous  process.          Impression:      Interval increase of right pneumothorax and right sided subcutaneous  emphysema.     Discussed by telephone with patient's nurse, Sara, at time of  interpretation, 0652, 03/12/2023.     This report was finalized on 3/12/2023 6:56 AM by Dr. Peter Daugherty M.D.             Lab Results:     Lab Results (last 24 hours)     Procedure Component Value Units Date/Time    POC Glucose Once [[id number]]  (Abnormal) Collected: 03/12/23 1056    Specimen: Blood Updated: 03/12/23 1116     Glucose 157 mg/dL      Comment: Meter: [id number] : [id number] Navneet BERNARDO       POC Glucose Once [[id number]]  (Normal) Collected: 03/12/23 0534    Specimen: Blood Updated: 03/12/23 0535     Glucose 112 mg/dL      Comment: Meter: [id number] : [id number] [employer] PCA       POC Glucose Once [[id number]]  (Abnormal) Collected: 03/11/23 2101    Specimen: Blood Updated: 03/11/23 2102     Glucose 163 mg/dL      Comment: Meter: [id number] : [id number] [employer] D[facility] PCA        Hemoglobin A1c [[id number]]  (Abnormal) Collected: 03/11/23 0649    Specimen: Blood Updated: 03/11/23 1614     Hemoglobin A1C 7.00 %     Narrative:      Hemoglobin A1C Ranges:    Increased Risk for Diabetes  5.7% to 6.4%  Diabetes                     >= 6.5%  Diabetic Goal                < 7.0%    POC Glucose Once [[id number]]  (Normal) Collected: 03/11/23 1607    Specimen: Blood Updated: 03/11/23 1609     Glucose 105 mg/dL      Comment: Meter: [id number] : [id number] Luis Angel BERNARDO              Assessment & Plan       Pneumothorax on right    Type 2 diabetes mellitus with hyperglycemia, without long-term current use of insulin (HCC)    Benign essential HTN    Atrial fibrillation, persistent (CMS/HCC)    Chronic combined systolic and diastolic congestive heart failure (HCC)    Obstructive sleep apnea    Obesity, morbid, BMI 40.0-49.9 (HCC)    Cardiomyopathy, dilated (HCC)    Long term (current) use of anticoagulants    Acute respiratory failure with hypoxia (HCC)       Assessment & Plan     I independently reviewed the patient's radiographic imaging including chest x-ray performed after patient's chest tube placement as well as this morning's imaging.  There appears to be enlargement of pneumothorax on x-ray this morning.  Patient then underwent a CT of the chest which demonstrates persistent pneumothorax with some bullous disease in the right apical region.    Spontaneous pneumothorax: Likely secondary to bullous disease.  Patient's chest tube was placed back to -20 cm suction.  Upon further assessment there was some significant kinking and twisting of his chest tube which was rectified and chest tube was resecured in place.  Discussed with RN to keep a close eye on this small bore tube which has a tendency to twist on itself.  This is likely the etiology behind the patient's increased pneumothorax on imaging this morning.  We will keep chest tube to suction today and repeat an x-ray in the  morning.    Acute respiratory failure with hypoxia: Improved s/p chest tube placement for pneumothorax.  Supplemental oxygen as needed.  He will likely need supplemental oxygen at discharge, particularly with inability to use positive pressure ventilation.    Atrial fibrillation: Patient is anticoagulated with Eliquis.  We will need to hold while he has chest tube in place.  Okay for prophylaxis Lovenox or subcu heparin if needed.    Obstructive sleep apnea: Patient utilizes BiPAP at home and follows with sleep medicine.  He will need to refrain from positive pressure ventilation for at least 8 to 12 weeks after he recovers from pneumothorax.    ANICETO Eng  [facility]  03/12/23  13:26 EDT    Greater than 49 minutes was spent reviewing the patient's chart, radiographic imaging, labs, provider notes, assessing the patient and developing a plan of care.  This was discussed with the patient and RN.

## 2023-03-12 NOTE — PLAN OF CARE
Goal Outcome Evaluation:  Plan of Care Reviewed With: patient        Progress: improving   Vital signs stable. Alert and oriented x 4, pleasant. Up ad eve in room and in hallway. Spouse visiting at bedside. On 2 liters oxygen per nasal cannula. Denies discomfort or shortness of breath. Right chest tube patent to - 20 cm suction. Dressing changed today per thoracic surgeon. Gauze dressing applied. No drainage in atrium. CT scan completed this morning of chest. Voiding without difficulty. Tolerating consistent carbohydrate diet well. Blood sugar monitoring done AC/HS. Had BM this afternoon. Resting quietly in bed. Will continue to monitor.

## 2023-03-13 ENCOUNTER — APPOINTMENT (OUTPATIENT)
Dept: GENERAL RADIOLOGY | Facility: HOSPITAL | Age: 78
DRG: 199 | End: 2023-03-13
Payer: MEDICARE

## 2023-03-13 LAB
GLUCOSE BLDC GLUCOMTR-MCNC: 113 MG/DL (ref 70–130)
GLUCOSE BLDC GLUCOMTR-MCNC: 140 MG/DL (ref 70–130)
GLUCOSE BLDC GLUCOMTR-MCNC: 163 MG/DL (ref 70–130)
GLUCOSE BLDC GLUCOMTR-MCNC: 98 MG/DL (ref 70–130)

## 2023-03-13 PROCEDURE — 63710000001 INSULIN LISPRO (HUMAN) PER 5 UNITS: Performed by: NURSE PRACTITIONER

## 2023-03-13 PROCEDURE — 25010000002 ENOXAPARIN PER 10 MG: Performed by: HOSPITALIST

## 2023-03-13 PROCEDURE — 99232 SBSQ HOSP IP/OBS MODERATE 35: CPT | Performed by: NURSE PRACTITIONER

## 2023-03-13 PROCEDURE — 82962 GLUCOSE BLOOD TEST: CPT

## 2023-03-13 PROCEDURE — 71045 X-RAY EXAM CHEST 1 VIEW: CPT

## 2023-03-13 RX ADMIN — EMPAGLIFLOZIN 25 MG: 25 TABLET, FILM COATED ORAL at 08:03

## 2023-03-13 RX ADMIN — DOFETILIDE 125 MCG: 0.12 CAPSULE ORAL at 08:03

## 2023-03-13 RX ADMIN — POTASSIUM CHLORIDE 20 MEQ: 750 TABLET, EXTENDED RELEASE ORAL at 08:03

## 2023-03-13 RX ADMIN — ATENOLOL 25 MG: 25 TABLET ORAL at 08:03

## 2023-03-13 RX ADMIN — LINAGLIPTIN 5 MG: 5 TABLET, FILM COATED ORAL at 08:03

## 2023-03-13 RX ADMIN — DOFETILIDE 125 MCG: 0.12 CAPSULE ORAL at 21:29

## 2023-03-13 RX ADMIN — ROSUVASTATIN CALCIUM 10 MG: 10 TABLET, FILM COATED ORAL at 08:03

## 2023-03-13 RX ADMIN — INSULIN LISPRO 2 UNITS: 100 INJECTION, SOLUTION INTRAVENOUS; SUBCUTANEOUS at 11:18

## 2023-03-13 RX ADMIN — ENOXAPARIN SODIUM 40 MG: 100 INJECTION SUBCUTANEOUS at 15:44

## 2023-03-13 RX ADMIN — FUROSEMIDE 40 MG: 40 TABLET ORAL at 08:03

## 2023-03-13 NOTE — PLAN OF CARE
Goal Outcome Evaluation:  Plan of Care Reviewed With: patient        Progress: improving  Outcome Evaluation: patient vss. no pain reported. suction increased to -40 cm today.

## 2023-03-13 NOTE — PAYOR COMM NOTE
"Ray Miller W \"Oli\" (77 y.o. Male)     PLEASE SEE ATTACHED FOR INPT AUTH     REF #   [id number]    PLEASE CALL DEMETRI CALDEORN RN/ DEPT @ [phone]  OR FAX  DEPARTMENT @  [phone]    THANK YOU   DEMETRI CALDERON RN  [facility]         Date of Birth   1945    Social Security Number       Address   [address] [place] [address]    Home Phone   [phone]    MRN   [id number]       [place]   [place]    Marital Status                               Admission Date   3/10/23    Admission Type   Emergency    Admitting Provider   Erick Barriga MD    Attending Provider   Jose Granados MD    Department, Room/Bed   [address], E547/1       Discharge Date       Discharge Disposition       Discharge Destination                               Attending Provider: Jose Granados MD    Allergies: No Known Allergies    Isolation: None   Infection: None   Code Status: CPR    Ht: 162.6 cm (64\")   Wt: 105 kg (232 lb)    Admission Cmt: None   Principal Problem: Pneumothorax on right [J93.9]                 Active Insurance as of 3/10/2023     Primary Coverage     Payor Plan Insurance Group Employer/Plan Group    MEDICARE MEDICARE A & B      Payor Plan Address Payor Plan Phone Number Payor Plan Fax Number Effective Dates    [address] [phone]  9/1/2010 - None Entered    [facility] [id number]       Subscriber Name Subscriber Birth Date Member ID       RAY MILLER 1945 [id number]           Secondary Coverage     Payor Plan Insurance Group Employer/Plan Group    AETNA COMMERCIAL AETNA [id number]     Payor Plan Address Payor Plan Phone Number Payor Plan Fax Number Effective Dates    [address] [phone]  1/1/2018 - None Entered    [place] [address]       Subscriber Name Subscriber Birth Date Member ID       RAY MILLER 1945 [id number]                 Emergency Contacts      (Rel.) Home Phone " Work Phone Mobile Phone    Asha Miller (Spouse) [phone] -- --    NIKO MILLER (Sister) [phone] -- --            Lilian: NP [id number]  Tax ID [id number]     History & Physical      Oliver Grullon APRN at 03/10/23 1235     Attestation signed by Erick Barriga MD at 03/10/23 7726      Brief Attending Admission Attestation   Addendum: I have reviewed the history and plan as obtained by ANICETO Gutiérrez and performed my own independent history. I have personally examined the patient and conducted greater than 50% of medical decision making. My exam confirms her physical findings and I agree with the plan as listed above, with the addition of the following:       Brief Summary Statement/HPI  77 y.o. male with DM2, A-fib, dilated CM who was at PCP office today for noted dyspnea on exertion.  Patient has been unable to use BiPAP recently due to recent power outage, though resumed using it a few days ago. He realized he was more dyspneic than usual throughout the day and continued to feel more SOA prompting visit to PCP.  CXR obtained by Dr. Marcial Jackson in-office showed right-sided pneumothorax and he was prompted to come to the ED.    Remainder of detailed HPI is as noted above and has been reviewed by me for completeness.    I have personally reviewed:  [x]  Laboratory  [x]  Old records  [x]  Radiology   [x]  EKG/Telemetry   []  Microbiology    [x]  Cardiology/Vascular   []  Pathology     Attending Physical Exam  Temp:  [97.6 °F (36.4 °C)-97.8 °F (36.6 °C)] 97.6 °F (36.4 °C)  Heart Rate:  [] 85  Resp:  [20] 20  BP: (104-156)/() 120/85  Constitutional: alert, cooperative, and no distress  Neck: no JVD  Respiratory: clear to auscultation, unlabored  Cardiovascular: regular rate and rhythm, no murmur; right-sided chest tube  Abdominal: soft, non-tender, non-distended; BS normal  Musculoskeletal: no edema  Neurological: alert and oriented x 3    Assessment/Plan    Right  pneumothorax  Acute hypoxic respiratory failure  -O2 sat 88% on room air with respiratory distress  -Thoracic following-status post chest tube    DM2  -Resume Jardiance and linagliptin  -SSI, monitor BG HTN    A-fib  -RC: Atenolol 25 mg; dofetilide 125 mcg twice daily  -AC: Apixaban on hold due to above    Dilated CM  -TTE (2/20/2023): 65%  -Resume Lasix 40 mg daily    Elevated troponin  -Troponin 22 OA  -EKG without ischemic changes; known RBBB  -In setting of hypoxia due to above, not ACS    Active Hospital Problems    Diagnosis  POA   • **Pneumothorax on right [J93.9]  Yes   • Acute respiratory failure with hypoxia (HCC) [J96.01]  Yes   • Long term (current) use of anticoagulants [Z79.01]  Not Applicable   • Obesity, morbid, BMI 40.0-49.9 (HCC) [E66.01]  Yes   • Cardiomyopathy, dilated (HCC) [I42.0]  Yes   • A-fib (HCC) [I48.91]  Yes   • Type 2 diabetes mellitus with hyperglycemia (HCC) [E11.65]  Yes   • Benign essential HTN [I10]  Yes      Resolved Hospital Problems   No resolved problems to display.     See above section for further detailed assessment and plan developed with APRN which I have reviewed.      Electronically signed by Erick Barriga MD, 3/10/2023, 15:45 EST.                         Patient Name:  Ray Pratt  YOB: 1945  MRN:  [id number]  Admit Date:  3/10/2023  Patient Care Team:  Marcial Jackson DO as PCP - General (Internal Medicine)  Reginald Vazquez MD as Referring Physician (Cardiology)      Subjective    History Present Illness     Chief Complaint   Patient presents with   • Shortness of Breath       Mr. Pratt is a 77 y.o. former smoker with a history of CHF, AFIB on Eliquis last on 3/9/2023 @ 2130, DM, obesity, hypertension, hyperlipidemia, nonischemic cardiomyopathy that presents to [facility] complaining of shortness of breath.    Lost power for 6 days & unable to wear BIPAP mask for a few days & then able to wear BIPAP mask on 3/8/2023 then changed  masks supplies on 3/9/2023 to attempt to correct SOB symptoms suspecting 'contaminant distilled water'.  Started to feel more VILLA & also developed upper abdominal & substernal discomfort after wearing mask on 3/9/2023 then removed mask to see if pain would resolve & it did not; therefore, managed to obtain appointment with PCP on 3/10/2023 for CXR confirming collapsed lung--could hardly breathe & couldn't catch breath--& drove to ER for further evaluation.    Baseline room air and initially 89% on room air with associated tachycardia heart rate 123 bpm.    Chest tube placed on right in ER after CXR confirmed large pneumothorax.    Recommendation pending hospital course.  Details below.    History of Present Illness  Review of Systems   Constitutional: Negative for chills and fever.   HENT: Negative for congestion and rhinorrhea.    Respiratory: Positive for shortness of breath. Negative for cough.    Cardiovascular: Positive for chest pain. Negative for leg swelling.   Gastrointestinal: Negative for abdominal pain, constipation, diarrhea, nausea and vomiting.   Endocrine: Negative for polydipsia, polyphagia and polyuria.   Genitourinary: Negative for difficulty urinating and dysuria.   Musculoskeletal: Positive for gait problem (due to VILLA). Negative for myalgias.   Skin: Negative for rash and wound.   Neurological: Positive for light-headedness. Negative for syncope.   Psychiatric/Behavioral: Negative for confusion and hallucinations.        Personal History     Past Medical History:   Diagnosis Date   • Acute combined systolic and diastolic congestive heart failure (HCC)    • Atrial fibrillation (HCC)    • Atrial flutter with rapid ventricular response (HCC)    • CHF (congestive heart failure) (HCC)    • Dilated cardiomyopathy (HCC)    • Hyperlipidemia    • Hypertension    • Low-tension glaucoma of right eye    • Nonischemic cardiomyopathy (HCC)    • Obesity    • Ocular hypertension of left eye    • On dofetilide  therapy    • MARCOS (obstructive sleep apnea)     compliant with BiPAP   • Persistent atrial fibrillation (HCC)    • Pneumonia of left lung due to Haemophilus influenzae (HCC) 01/18/2017   • Ptosis of right eyelid    • Sepsis (HCC) 01/18/2017   • Type 2 diabetes mellitus, without long-term current use of insulin (HCC)      Past Surgical History:   Procedure Laterality Date   • CARDIAC CATHETERIZATION N/A 11/05/2019    Procedure: Left Heart Cath;  Surgeon: Sundeep Tsang MD;  Location:  [facility];  Service: Cardiovascular   • CARDIAC CATHETERIZATION N/A 11/05/2019    Procedure: Coronary angiography;  Surgeon: Sundeep Tsang MD;  Location:  [facility];  Service: Cardiovascular   • DENTAL PROCEDURE     • MOLE REMOVAL Right     above right eye, benign per patient report     Family History   Problem Relation Age of Onset   • Other Mother         history of cancer   • Stroke Father    • Epilepsy Sister      Social History     Tobacco Use   • Smoking status: Never   • Smokeless tobacco: Never   Vaping Use   • Vaping Use: Never used   Substance Use Topics   • Alcohol use: Not Currently   • Drug use: Never     No current facility-administered medications on file prior to encounter.     Current Outpatient Medications on File Prior to Encounter   Medication Sig Dispense Refill   • atenolol (TENORMIN) 25 MG tablet Take 1 tablet by mouth Daily. 180 tablet 3   • dofetilide (TIKOSYN) 125 MCG capsule TAKE 1 CAPSULE BY MOUTH EVERY 12 HOURS 180 capsule 2   • Eliquis 5 MG tablet tablet TAKE 1 TABLET BY MOUTH EVERY 12 HOURS 60 tablet 11   • empagliflozin (Jardiance) 25 MG tablet tablet Take 1 tablet by mouth Daily With Breakfast. 90 tablet 2   • furosemide (LASIX) 40 MG tablet TAKE 1 TABLET BY MOUTH EVERY DAY 90 tablet 3   • loratadine (CLARITIN) 10 MG tablet TAKE 1 TABLET BY MOUTH EVERY DAY 90 tablet 1   • metFORMIN ER (GLUCOPHAGE-XR) 500 MG 24 hr tablet Take 2 tablets by mouth Daily With  Breakfast. 180 tablet 1   • potassium chloride (MICRO-K) 10 MEQ CR capsule TAKE 2 CAPSULES BY MOUTH EVERY  capsule 0   • rosuvastatin (CRESTOR) 10 MG tablet Take 1 tablet by mouth Daily. 90 tablet 3   • SITagliptin (Januvia) 100 MG tablet Take 1 tablet by mouth Daily. 90 tablet 3     No Known Allergies    Objective     Objective     Vital Signs  Temp:  [97.6 °F (36.4 °C)-97.8 °F (36.6 °C)] 97.6 °F (36.4 °C)  Heart Rate:  [] 85  Resp:  [20] 20  BP: (104-156)/() 120/85  SpO2:  [88 %-97 %] 96 %  on  Flow (L/min):  [2] 2;   Device (Oxygen Therapy): nasal cannula  Body mass index is 39.82 kg/m².    Physical Exam  Constitutional:       General: He is not in acute distress.     Appearance: He is obese. He is not toxic-appearing.   HENT:      Head: Normocephalic and atraumatic.   Eyes:      Extraocular Movements: Extraocular movements intact.      Conjunctiva/sclera: Conjunctivae normal.   Cardiovascular:      Rate and Rhythm: Normal rate.      Heart sounds: Normal heart sounds.   Pulmonary:      Effort: Pulmonary effort is normal.      Comments: Chest tube, right  Abdominal:      General: Bowel sounds are normal.      Palpations: Abdomen is soft.   Musculoskeletal:         General: No tenderness.      Cervical back: Normal range of motion and neck supple.      Right lower leg: No edema.      Left lower leg: No edema.   Skin:     General: Skin is warm and dry.   Neurological:      Mental Status: He is alert and oriented to person, place, and time.      Cranial Nerves: No cranial nerve deficit.   Psychiatric:         Behavior: Behavior normal.         Thought Content: Thought content normal.         Results Review:  I reviewed the patient's new clinical results.  I reviewed the patient's new imaging results and agree with the interpretation.  I reviewed the patient's other test results and agree with the interpretation  I personally viewed and interpreted the patient's EKG/Telemetry data  Discussed with ED  provider.    Lab Results (last 24 hours)     Procedure Component Value Units Date/Time    CBC & Differential [[id number]]  (Abnormal) Collected: 03/10/23 1007    Specimen: Blood Updated: 03/10/23 1038    Narrative:      The following orders were created for panel order CBC & Differential.  Procedure                               Abnormality         Status                     ---------                               -----------         ------                     CBC Auto Differential[[id number]]        Abnormal            Final result                 Please view results for these tests on the individual orders.    Comprehensive Metabolic Panel [[id number]]  (Abnormal) Collected: 03/10/23 1007    Specimen: Blood Updated: 03/10/23 1113     Glucose 130 mg/dL      BUN 19 mg/dL      Creatinine 0.99 mg/dL      Sodium 137 mmol/L      Potassium 3.9 mmol/L      Chloride 102 mmol/L      CO2 24.0 mmol/L      Calcium 9.4 mg/dL      Total Protein 8.2 g/dL      Albumin 4.2 g/dL      ALT (SGPT) 14 U/L      AST (SGOT) 21 U/L      Alkaline Phosphatase 61 U/L      Total Bilirubin 0.5 mg/dL      Globulin 4.0 gm/dL      A/G Ratio 1.1 g/dL      BUN/Creatinine Ratio 19.2     Anion Gap 11.0 mmol/L      eGFR 78.5 mL/min/1.73     Narrative:      GFR Normal >60  Chronic Kidney Disease <60  Kidney Failure <15    The GFR formula is only valid for adults with stable renal function between ages 18 and 70.    Protime-INR [[id number]]  (Normal) Collected: 03/10/23 1007    Specimen: Blood Updated: 03/10/23 1049     Protime 14.0 Seconds      INR 1.07    CBC Auto Differential [[id number]]  (Abnormal) Collected: 03/10/23 1007    Specimen: Blood Updated: 03/10/23 1038     WBC 8.27 10*3/mm3      RBC 5.47 10*6/mm3      Hemoglobin 16.2 g/dL      Hematocrit 50.0 %      MCV 91.4 fL      MCH 29.6 pg      MCHC 32.4 g/dL      RDW 14.0 %      RDW-SD 46.9 fl      MPV 9.5 fL      Platelets 226 10*3/mm3      Neutrophil % 62.0 %      Lymphocyte % 25.2 %      Monocyte %  10.9 %      Eosinophil % 0.7 %      Basophil % 0.6 %      Immature Grans % 0.6 %      Neutrophils, Absolute 5.13 10*3/mm3      Lymphocytes, Absolute 2.08 10*3/mm3      Monocytes, Absolute 0.90 10*3/mm3      Eosinophils, Absolute 0.06 10*3/mm3      Basophils, Absolute 0.05 10*3/mm3      Immature Grans, Absolute 0.05 10*3/mm3      nRBC 0.0 /100 WBC     BNP [[id number]]  (Normal) Collected: 03/10/23 1007    Specimen: Blood Updated: 03/10/23 1101     proBNP 322.0 pg/mL     Narrative:      Among patients with dyspnea, NT-proBNP is highly sensitive for the detection of acute congestive heart failure. In addition NT-proBNP of <300 pg/ml effectively rules out acute congestive heart failure with 99% negative predictive value.    Results may be falsely decreased if patient taking Biotin.      High Sensitivity Troponin T [[id number]]  (Abnormal) Collected: 03/10/23 1007    Specimen: Blood Updated: 03/10/23 1113     HS Troponin T 22 ng/L     Narrative:      High Sensitive Troponin T Reference Range:  <10.0 ng/L- Negative Female for AMI  <15.0 ng/L- Negative Male for AMI  >=10 - Abnormal Female indicating possible myocardial injury.  >=15 - Abnormal Male indicating possible myocardial injury.   Clinicians would have to utilize clinical acumen, EKG, Troponin, and serial changes to determine if it is an Acute Myocardial Infarction or myocardial injury due to an underlying chronic condition.               Imaging Results (Last 24 Hours)     Procedure Component Value Units Date/Time    XR Chest 1 View [[id number]] Collected: 03/10/23 1324     Updated: 03/10/23 1324    Narrative:      PORTABLE CHEST     HISTORY: Chest tube insertion     NOTE: The study was performed at 1157 hours but not made available for  interpretation until 1302 hours.     FINDINGS: A single view of the chest demonstrates a right-sided chest  tube in place. A large pneumothorax is still present but there is  slightly greater expansion of the right lung. The  mediastinal shift to  the left consistent with a tension pneumothorax has decreased.     The above information was called to and discussed with Dr. Yen.       XR Chest 1 View [[id number]] Collected: 03/10/23 1013     Updated: 03/10/23 1017    Narrative:      ONE VIEW PORTABLE CHEST AT 9:59 AM     HISTORY: Pneumothorax. Shortness of breath.     There is an extremely large right-sided pneumothorax measuring  approximately 85-90% as also noted on the recent outside chest x-ray.  There is minimal mediastinal shift to the left. The left lung is clear  and the heart size is normal.     This report was finalized on 3/10/2023 10:14 AM by Dr. Romel Moreno M.D.             Results for orders placed during the hospital encounter of 02/16/23    Adult Transthoracic Echo Complete W/ Cont if Necessary Per Protocol    Interpretation Summary  •  Left ventricular systolic function is normal. Calculated left ventricular EF = 65.1%  •  Left ventricular wall thickness is consistent with borderline concentric hypertrophy.  •  Left ventricular diastolic function was normal.  •  Estimated right ventricular systolic pressure from tricuspid regurgitation is normal (<35 mmHg).      ECG 12 Lead Dyspnea   Final Result   HEART RATE= 98  bpm   RR Interval= 612  ms   IL Interval= 184  ms   P Horizontal Axis= 61  deg   P Front Axis= 93  deg   QRSD Interval= 150  ms   QT Interval= 371  ms   QRS Axis= 18  deg   T Wave Axis= 28  deg   - ABNORMAL ECG -   Sinus rhythm   Right bundle branch block   When compared with ECG of 12-Feb-2020 12:20:07,   Significant rate increase with new RBBB    Electronically Signed By: Sundeep Tsang ([facility]) 10-Mar-2023 13:51:58   Date and Time of Study: 2023-03-10 09:59:37          Assessment/Plan     Active Hospital Problems    Diagnosis  POA   • **Pneumothorax on right [J93.9]  Yes   • Acute respiratory failure with hypoxia (HCC) [J96.01]  Yes   • Long term (current) use of anticoagulants [Z79.01]  Not Applicable   •  Obesity, morbid, BMI 40.0-49.9 ([facility]) [E66.01]  Yes   • Cardiomyopathy, dilated (HCC) [I42.0]  Yes   • A-fib (HCC) [I48.91]  Yes   • Type 2 diabetes mellitus with hyperglycemia (HCC) [E11.65]  Yes   • Benign essential HTN [I10]  Yes      Resolved Hospital Problems   No resolved problems to display.       Mr. Pratt is a 77 y.o. former smoker with a history of CHF, AFIB on Eliquis last on 3/9/2023 @ 2130, DM, obesity, hypertension, hyperlipidemia, nonischemic cardiomyopathy that presents to [facility] complaining of shortness of breath.      Acute respiratory failure with hypoxia ([facility]): Oxygen saturation 88-89% on room air due to large pneumothorax on right.  Improved following placement of chest tube.  Provide supplemental oxygen as needed maintain O2 saturation 90-95%.      Pneumothorax on right: Confirmed on radiograph--spontaneous pneumothorax.  Status post chest tube placement on right in ER.  Thoracic surgery consult.  Anticoagulation held for now.      Type 2 diabetes mellitus with hyperglycemia ([facility]): A1c 7.0 (2/2023).  Continue Jardiance, Tradjenta, hold metformin, provide moderate dose correctional sliding scale for now.  NCS/cardiac diet.      Benign essential HTN: BP acceptable acutely.  Continue atenolol 25 mg p.o. daily.  Monitor BP for changes.      A-fib ([facility]): EKG sinus rhythm.  Tikosyn and beta-blocker continued per home dose regimen.  Anticoagulation on hold due to above.      Obesity, morbid, BMI 40.0-49.9 ([facility]): BMI 39.  Complicating all problems.      Cardiomyopathy, dilated (HCC): Echocardiogram EF 65% (2/2023).  Continue furosemide 40 mg p.o. daily.  Monitor renal function as well.      Long term (current) use of anticoagulants: In the setting of paroxysmal atrial fibrillation.  See plan above.    · I discussed the patient's findings and my recommendations with patient, RN, & Dr. Barriga.    VTE Prophylaxis - SCDs.  Code Status - Full code.       Oliver Grullon  "APRKISHORE  [facility]  03/10/23  14:29 EST      Electronically signed by Erick Barriga MD at 03/10/23 1553          Emergency Department Notes      Shona Lopez RN at 03/10/23 0948        Patient to ER via car from PCP for SOA x yesterday  PCP reports patient has \"collapsed lung\"    Patient wearing mask this RN in PPE    Electronically signed by Shona Lopez RN at 03/10/23 0949     Bobby Rodas PA at 03/10/23 1017     Attestation signed by Gregory Yen II, MD at 03/10/23 2112        SHARED APC FACE TO FACE: This visit was performed by both the physician and an APC. I personally evaluated and examined the patient. I performed the entirety of the physical exam and I documented this exam in this attestation or in accompanying documentation.    Gregory Yen II, MD 3/10/2023 21:12 EST                         EMERGENCY DEPARTMENT ENCOUNTER    Room Number:  E547/1  Date of encounter:  3/10/2023  PCP: Marcial Jackson DO  Historian: Patient, wife  Chronic or social conditions impacting care (social determinants of health): Nothing      I used full protective equipment while examining this patient.  This includes face mask, gloves and protective eyewear.  I washed my hands before entering the room and immediately upon leaving the room      HPI:  Chief Complaint: Shortness of breath  A complete HPI/ROS/PMH/PSH/SH/FH are unobtainable due to: Nothing    Context: Ray Pratt is a 77 y.o. male who presents to the ED c/o 2-day history of shortness of breath.  Patient states he woke with shortness of breath yesterday morning.  His symptoms are worse with exertion.  Denies any chest pain, fever.  He does have history of A-fib, CHF, diabetes.  He is on Eliquis.  He does complain of chronic pedal edema without any significant worsening.  He did see his primary care physician today who performed a chest x-ray that found a pneumothorax.  He was sent to the ER for further " evaluation.    Review of prior external notes (non-ED):   I reviewed patient's last cardiology office visit from 2/1/2023.  Patient being treated for atrial fibrillation, CHF.    Review of prior external test results outside of this encounter:  I reviewed CMP from 2/1/2023.  This was fairly normal except for glucose of 110.    PAST MEDICAL HISTORY  Active Ambulatory Problems     Diagnosis Date Noted   • Hypoxia 01/18/2017   • Type 2 diabetes mellitus with hyperglycemia ([facility]) 01/18/2017   • Benign essential HTN 01/18/2017   • Atrial fibrillation, persistent (CMS/[facility]) 11/04/2019   • Atrial flutter with rapid ventricular response ([facility]) 11/06/2019   • Acute combined systolic and diastolic congestive heart failure ([facility]) 11/06/2019   • Obstructive sleep apnea 01/17/2020   • A-fib ([facility]) 02/10/2020   • High risk medication use 06/18/2020   • Obesity, morbid, BMI 40.0-49.9 ([facility]) 06/18/2020   • Cardiomyopathy, dilated ([facility]) 06/18/2020   • VILLA (dyspnea on exertion) 07/17/2020   • Long term (current) use of anticoagulants 01/18/2021   • On dofetilide therapy 07/26/2021     Resolved Ambulatory Problems     Diagnosis Date Noted   • Pneumonia of left lung due to Haemophilus influenzae ([facility]) 01/18/2017   • Sepsis ([facility]) 01/18/2017   • A-fib ([facility]) 01/13/2020   • Class 3 severe obesity due to excess calories without serious comorbidity with body mass index (BMI) of 40.0 to 44.9 in adult ([facility]) 01/17/2020     Past Medical History:   Diagnosis Date   • Atrial fibrillation ([facility])    • CHF (congestive heart failure) ([facility])    • Dilated cardiomyopathy ([facility])    • Hyperlipidemia    • Hypertension    • Low-tension glaucoma of right eye    • Nonischemic cardiomyopathy ([facility])    • Obesity    • Ocular hypertension of left eye    • MARCOS (obstructive sleep apnea)    • Persistent atrial fibrillation ([facility])    • Ptosis of right eyelid    • Type 2 diabetes mellitus, without long-term current use of insulin ([facility])          PAST SURGICAL HISTORY  Past  Surgical History:   Procedure Laterality Date   • CARDIAC CATHETERIZATION N/A 11/05/2019    Procedure: Left Heart Cath;  Surgeon: Sundeep Tsang MD;  Location:  [facility];  Service: Cardiovascular   • CARDIAC CATHETERIZATION N/A 11/05/2019    Procedure: Coronary angiography;  Surgeon: Sundeep Tsang MD;  Location:  [facility];  Service: Cardiovascular   • DENTAL PROCEDURE     • MOLE REMOVAL Right     above right eye, benign per patient report         FAMILY HISTORY  Family History   Problem Relation Age of Onset   • Other Mother         history of cancer   • Stroke Father    • Epilepsy Sister          SOCIAL HISTORY  Social History     Socioeconomic History   • Marital status:    • Number of children: 0   Tobacco Use   • Smoking status: Never   • Smokeless tobacco: Never   Vaping Use   • Vaping Use: Never used   Substance and Sexual Activity   • Alcohol use: Not Currently   • Drug use: Never         ALLERGIES  Patient has no known allergies.        REVIEW OF SYSTEMS  All systems reviewed and negative except for those discussed in HPI.       PHYSICAL EXAM    I have reviewed the triage vital signs and nursing notes.    ED Triage Vitals   Temp Heart Rate Resp BP SpO2   03/10/23 0949 03/10/23 0949 03/10/23 0949 03/10/23 0956 03/10/23 0949   97.6 °F (36.4 °C) (!) 123 20 (!) 156/111 (!) 89 %      Temp src Heart Rate Source Patient Position BP Location FiO2 (%)   -- -- -- -- --              Physical Exam  GENERAL: Alert, oriented, no distress  HENT: head atraumatic, no nuchal rigidity  EYES: no scleral icterus, EOMI  CV: regular rhythm, regular rate, no murmur  RESPIRATORY: Mild distress, absent breath sounds on the right  ABDOMEN: soft, nontender  MUSCULOSKELETAL: no deformity, FROM, 2+ pedal edema bilaterally  NEURO: alert, moves all extremities, follows commands  SKIN: warm, dry        LAB RESULTS  Recent Results (from the past 24 hour(s))   ECG 12 Lead Dyspnea     Collection Time: 03/10/23  9:59 AM   Result Value Ref Range    QT Interval 371 ms   Comprehensive Metabolic Panel    Collection Time: 03/10/23 10:07 AM    Specimen: Blood   Result Value Ref Range    Glucose 130 (H) 65 - 99 mg/dL    BUN 19 8 - 23 mg/dL    Creatinine 0.99 0.76 - 1.27 mg/dL    Sodium 137 136 - 145 mmol/L    Potassium 3.9 3.5 - 5.2 mmol/L    Chloride 102 98 - 107 mmol/L    CO2 24.0 22.0 - 29.0 mmol/L    Calcium 9.4 8.6 - 10.5 mg/dL    Total Protein 8.2 6.0 - 8.5 g/dL    Albumin 4.2 3.5 - 5.2 g/dL    ALT (SGPT) 14 1 - 41 U/L    AST (SGOT) 21 1 - 40 U/L    Alkaline Phosphatase 61 39 - 117 U/L    Total Bilirubin 0.5 0.0 - 1.2 mg/dL    Globulin 4.0 gm/dL    A/G Ratio 1.1 g/dL    BUN/Creatinine Ratio 19.2 7.0 - 25.0    Anion Gap 11.0 5.0 - 15.0 mmol/L    eGFR 78.5 >60.0 mL/min/1.73   Protime-INR    Collection Time: 03/10/23 10:07 AM    Specimen: Blood   Result Value Ref Range    Protime 14.0 11.7 - 14.2 Seconds    INR 1.07 0.90 - 1.10   Green Top (Gel)    Collection Time: 03/10/23 10:07 AM   Result Value Ref Range    Extra Tube Hold for add-ons.    Lavender Top    Collection Time: 03/10/23 10:07 AM   Result Value Ref Range    Extra Tube hold for add-on    Gold Top - SST    Collection Time: 03/10/23 10:07 AM   Result Value Ref Range    Extra Tube Hold for add-ons.    Light Blue Top    Collection Time: 03/10/23 10:07 AM   Result Value Ref Range    Extra Tube Hold for add-ons.    CBC Auto Differential    Collection Time: 03/10/23 10:07 AM    Specimen: Blood   Result Value Ref Range    WBC 8.27 3.40 - 10.80 10*3/mm3    RBC 5.47 4.14 - 5.80 10*6/mm3    Hemoglobin 16.2 13.0 - 17.7 g/dL    Hematocrit 50.0 37.5 - 51.0 %    MCV 91.4 79.0 - 97.0 fL    MCH 29.6 26.6 - 33.0 pg    MCHC 32.4 31.5 - 35.7 g/dL    RDW 14.0 12.3 - 15.4 %    RDW-SD 46.9 37.0 - 54.0 fl    MPV 9.5 6.0 - 12.0 fL    Platelets 226 140 - 450 10*3/mm3    Neutrophil % 62.0 42.7 - 76.0 %    Lymphocyte % 25.2 19.6 - 45.3 %    Monocyte % 10.9 5.0 - 12.0  %    Eosinophil % 0.7 0.3 - 6.2 %    Basophil % 0.6 0.0 - 1.5 %    Immature Grans % 0.6 (H) 0.0 - 0.5 %    Neutrophils, Absolute 5.13 1.70 - 7.00 10*3/mm3    Lymphocytes, Absolute 2.08 0.70 - 3.10 10*3/mm3    Monocytes, Absolute 0.90 0.10 - 0.90 10*3/mm3    Eosinophils, Absolute 0.06 0.00 - 0.40 10*3/mm3    Basophils, Absolute 0.05 0.00 - 0.20 10*3/mm3    Immature Grans, Absolute 0.05 0.00 - 0.05 10*3/mm3    nRBC 0.0 0.0 - 0.2 /100 WBC   BNP    Collection Time: 03/10/23 10:07 AM    Specimen: Blood   Result Value Ref Range    proBNP 322.0 0.0 - 1,800.0 pg/mL   High Sensitivity Troponin T    Collection Time: 03/10/23 10:07 AM    Specimen: Blood   Result Value Ref Range    HS Troponin T 22 (H) <15 ng/L   POC Glucose Once    Collection Time: 03/10/23  4:19 PM    Specimen: Blood   Result Value Ref Range    Glucose 97 70 - 130 mg/dL       Ordered the above labs and independently reviewed the results.        RADIOLOGY  XR Chest 1 View    Result Date: 3/10/2023  PORTABLE CHEST  HISTORY: Chest tube insertion  NOTE: The study was performed at 1157 hours but not made available for interpretation until 1302 hours.  FINDINGS: A single view of the chest demonstrates a right-sided chest tube in place. A large pneumothorax is still present but there is slightly greater expansion of the right lung. The mediastinal shift to the left consistent with a tension pneumothorax has decreased.  The above information was called to and discussed with Dr. Yen.      XR Chest 1 View    Result Date: 3/10/2023  ONE VIEW PORTABLE CHEST AT 9:59 AM  HISTORY: Pneumothorax. Shortness of breath.  There is an extremely large right-sided pneumothorax measuring approximately 85-90% as also noted on the recent outside chest x-ray. There is minimal mediastinal shift to the left. The left lung is clear and the heart size is normal.  This report was finalized on 3/10/2023 10:14 AM by Dr. Romel Moreno M.D.        I ordered the above noted radiological  studies. Reviewed by me and discussed with radiologist.  See dictation for official radiology interpretation.      MEDICATIONS GIVEN IN ER    Medications   atenolol (TENORMIN) tablet 25 mg (25 mg Oral Given 3/10/23 1634)   dofetilide (TIKOSYN) capsule 125 mcg (125 mcg Oral Given 3/10/23 1634)   empagliflozin (JARDIANCE) tablet 25 mg (has no administration in time range)   furosemide (LASIX) tablet 40 mg (40 mg Oral Given 3/10/23 1634)   potassium chloride (K-DUR,KLOR-CON) ER tablet 20 mEq (20 mEq Oral Given 3/10/23 1633)   rosuvastatin (CRESTOR) tablet 10 mg (10 mg Oral Given 3/10/23 1634)   linagliptin (TRADJENTA) tablet 5 mg (5 mg Oral Given 3/10/23 1634)   sodium chloride 0.9 % flush 10 mL (has no administration in time range)   sodium chloride 0.9 % flush 10 mL (has no administration in time range)   sodium chloride 0.9 % infusion 40 mL (has no administration in time range)   nitroglycerin (NITROSTAT) SL tablet 0.4 mg (has no administration in time range)   acetaminophen (TYLENOL) tablet 650 mg (has no administration in time range)     Or   acetaminophen (TYLENOL) 160 MG/5ML solution 650 mg (has no administration in time range)     Or   acetaminophen (TYLENOL) suppository 650 mg (has no administration in time range)   sennosides-docusate (PERICOLACE) 8.6-50 MG per tablet 2 tablet (has no administration in time range)     And   polyethylene glycol (MIRALAX) packet 17 g (has no administration in time range)     And   bisacodyl (DULCOLAX) EC tablet 5 mg (has no administration in time range)     And   bisacodyl (DULCOLAX) suppository 10 mg (has no administration in time range)   ondansetron (ZOFRAN) tablet 4 mg (has no administration in time range)     Or   ondansetron (ZOFRAN) injection 4 mg (has no administration in time range)   dextrose (GLUTOSE) oral gel 15 g (has no administration in time range)   dextrose (D50W) (25 g/50 mL) IV injection 25 g (has no administration in time range)   glucagon (GLUCAGEN)  injection 1 mg (has no administration in time range)   insulin lispro (ADMELOG) injection 0-9 Units (0 Units Subcutaneous Not Given 3/10/23 1712)   oxyCODONE-acetaminophen (PERCOCET) 5-325 MG per tablet 1 tablet (has no administration in time range)   morphine injection 4 mg (4 mg Intravenous Given 3/10/23 1104)   ondansetron (ZOFRAN) injection 4 mg (4 mg Intravenous Given 3/10/23 1105)   ceFAZolin in dextrose (ANCEF) IVPB solution 2 g (0 g Intravenous Stopped 3/10/23 1135)   lidocaine (XYLOCAINE) 1 % injection 10 mL (10 mL Injection Given by Other 3/10/23 1233)   midazolam (VERSED) injection 2 mg (2 mg Intravenous Given 3/10/23 1136)         ADDITIONAL ORDERS CONSIDERED BUT NOT ORDERED:  Nothing      PROGRESS, DATA ANALYSIS, CONSULTS, AND MEDICAL DECISION MAKING    All labs have been independently interpreted by myself.  All radiology studies have been independently interpreted by myself and discussed with radiologist dictating the report.   EKG's independently interpreted by myself.  Discussion below represents my analysis of pertinent findings related to patient's condition, differential diagnosis, treatment plan and final disposition.    I have discussed case with Dr. Yen, emergency room physician.  He has performed his own bedside examination and agrees with treatment plan.    ED Course as of 03/10/23 1725   Fri Mar 10, 2023   1005 Patient presents with 2-day history of shortness of breath.  Denies any chest pain.  Differential diagnoses include but not limited to pneumonia, CHF, pneumothorax, COPD. [EE]   1016 Chest x-ray independently interpreted myself shows a near complete pneumothorax on the right. [EE]   1016 EKG independently interpreted myself.  Time 0959.  Sinus rhythm, 90 bpm.  Normal P/DEIDRA.  QRS shows right bundle branch block with normal axis.  Nonspecific T wave changes.  Similar to prior EKG from 2/12/2020. [EE]   1025 Recheck the patient.  He is resting quietly.  He is on oxygen in no  significant distress. [EE]   1043 WBC: 8.27 [EE]   1043 Hemoglobin: 16.2 [EE]   1047 Heart Rate(!): 123 [TD]   1047 SpO2(!): 89 % [TD]   1052 I discussed the case with Altagracia Green, nurse practitioner with thoracic surgery.  She agrees with plan for thoracostomy tube and admission.  They will consult. [EE]   1116 EKG independently interpreted by myself.  Time 9:59 AM.  Sinus rhythm.  Heart rate 98.  Right bundle branch block.  No acute ST abnormality.  Low voltage in the precordial leads. [TD]   1236 I discussed the case with ANICETO Gutiérrez for hospitalist medicine.  We reviewed the patient's labs and imaging.  She will admit for Dr. Barriga. [TD]      ED Course User Index  [EE] Bobby Rodas PA  [TD] Gregory Yen II, MD       AS OF 17:25 EST VITALS:    BP - 112/76  HR - 84  TEMP - 98 °F (36.7 °C) (Oral)  O2 SATS - 97%        DIAGNOSIS  Final diagnoses:   Pneumothorax on right   Anticoagulated   Atrial fibrillation, unspecified type (HCC)   Congestive heart failure, unspecified HF chronicity, unspecified heart failure type (HCC)         DISPOSITION  Admitted      Dictated utilizing Dragon dictation     Bobby Rodas PA  03/10/23 1726      Electronically signed by Gregory Yen II, MD at 03/10/23 2112     Gregory Yen II, MD at 03/10/23 1234      Procedure Orders    1. Chest Tube Insertion [[id number]] ordered by Gregory Yen II, MD    2. Critical Care [[id number]] ordered by Gregory Yen II, MD MD ATTESTATION NOTE    The JEFFREY and I have discussed this patient's history, physical exam, and treatment plan.    I provided a substantive portion of the care of this patient. I personally performed the physical exam, in its entirety. The attached note describes my personal findings.      Ray Pratt is a 77 y.o. male who presents to the ED c/o shortness of breath for the past 2 days.  He is on Eliquis.  No chest pain.  No fever.      On exam:  GENERAL: not  distressed  HENT: nares patent  EYES: no scleral icterus  CV: regular rhythm, regular rate  RESPIRATORY: Speaks in full sentences, diminished breath sounds on the right  ABDOMEN: soft, nontender  MUSCULOSKELETAL: no deformity  NEURO: alert, moves all extremities, follows commands  SKIN: warm, dry    Labs  Recent Results (from the past 24 hour(s))   ECG 12 Lead Dyspnea    Collection Time: 03/10/23  9:59 AM   Result Value Ref Range    QT Interval 371 ms   Comprehensive Metabolic Panel    Collection Time: 03/10/23 10:07 AM    Specimen: Blood   Result Value Ref Range    Glucose 130 (H) 65 - 99 mg/dL    BUN 19 8 - 23 mg/dL    Creatinine 0.99 0.76 - 1.27 mg/dL    Sodium 137 136 - 145 mmol/L    Potassium 3.9 3.5 - 5.2 mmol/L    Chloride 102 98 - 107 mmol/L    CO2 24.0 22.0 - 29.0 mmol/L    Calcium 9.4 8.6 - 10.5 mg/dL    Total Protein 8.2 6.0 - 8.5 g/dL    Albumin 4.2 3.5 - 5.2 g/dL    ALT (SGPT) 14 1 - 41 U/L    AST (SGOT) 21 1 - 40 U/L    Alkaline Phosphatase 61 39 - 117 U/L    Total Bilirubin 0.5 0.0 - 1.2 mg/dL    Globulin 4.0 gm/dL    A/G Ratio 1.1 g/dL    BUN/Creatinine Ratio 19.2 7.0 - 25.0    Anion Gap 11.0 5.0 - 15.0 mmol/L    eGFR 78.5 >60.0 mL/min/1.73   Protime-INR    Collection Time: 03/10/23 10:07 AM    Specimen: Blood   Result Value Ref Range    Protime 14.0 11.7 - 14.2 Seconds    INR 1.07 0.90 - 1.10   Green Top (Gel)    Collection Time: 03/10/23 10:07 AM   Result Value Ref Range    Extra Tube Hold for add-ons.    Lavender Top    Collection Time: 03/10/23 10:07 AM   Result Value Ref Range    Extra Tube hold for add-on    Gold Top - SST    Collection Time: 03/10/23 10:07 AM   Result Value Ref Range    Extra Tube Hold for add-ons.    Light Blue Top    Collection Time: 03/10/23 10:07 AM   Result Value Ref Range    Extra Tube Hold for add-ons.    CBC Auto Differential    Collection Time: 03/10/23 10:07 AM    Specimen: Blood   Result Value Ref Range    WBC 8.27 3.40 - 10.80 10*3/mm3    RBC 5.47 4.14 - 5.80  10*6/mm3    Hemoglobin 16.2 13.0 - 17.7 g/dL    Hematocrit 50.0 37.5 - 51.0 %    MCV 91.4 79.0 - 97.0 fL    MCH 29.6 26.6 - 33.0 pg    MCHC 32.4 31.5 - 35.7 g/dL    RDW 14.0 12.3 - 15.4 %    RDW-SD 46.9 37.0 - 54.0 fl    MPV 9.5 6.0 - 12.0 fL    Platelets 226 140 - 450 10*3/mm3    Neutrophil % 62.0 42.7 - 76.0 %    Lymphocyte % 25.2 19.6 - 45.3 %    Monocyte % 10.9 5.0 - 12.0 %    Eosinophil % 0.7 0.3 - 6.2 %    Basophil % 0.6 0.0 - 1.5 %    Immature Grans % 0.6 (H) 0.0 - 0.5 %    Neutrophils, Absolute 5.13 1.70 - 7.00 10*3/mm3    Lymphocytes, Absolute 2.08 0.70 - 3.10 10*3/mm3    Monocytes, Absolute 0.90 0.10 - 0.90 10*3/mm3    Eosinophils, Absolute 0.06 0.00 - 0.40 10*3/mm3    Basophils, Absolute 0.05 0.00 - 0.20 10*3/mm3    Immature Grans, Absolute 0.05 0.00 - 0.05 10*3/mm3    nRBC 0.0 0.0 - 0.2 /100 WBC   BNP    Collection Time: 03/10/23 10:07 AM    Specimen: Blood   Result Value Ref Range    proBNP 322.0 0.0 - 1,800.0 pg/mL   High Sensitivity Troponin T    Collection Time: 03/10/23 10:07 AM    Specimen: Blood   Result Value Ref Range    HS Troponin T 22 (H) <15 ng/L       Radiology  XR Chest 1 View    Result Date: 3/10/2023  ONE VIEW PORTABLE CHEST AT 9:59 AM  HISTORY: Pneumothorax. Shortness of breath.  There is an extremely large right-sided pneumothorax measuring approximately 85-90% as also noted on the recent outside chest x-ray. There is minimal mediastinal shift to the left. The left lung is clear and the heart size is normal.  This report was finalized on 3/10/2023 10:14 AM by Dr. Romel Moreno M.D.        Medications given in the ED:  Medications   morphine injection 4 mg (4 mg Intravenous Given 3/10/23 1104)   ondansetron (ZOFRAN) injection 4 mg (4 mg Intravenous Given 3/10/23 1105)   ceFAZolin in dextrose (ANCEF) IVPB solution 2 g (0 g Intravenous Stopped 3/10/23 1135)   lidocaine (XYLOCAINE) 1 % injection 10 mL (10 mL Injection Given by Other 3/10/23 1233)   midazolam (VERSED) injection 2 mg (2 mg  Intravenous Given 3/10/23 1136)       Orders placed during this visit:  Orders Placed This Encounter   Procedures   • XR Chest 1 View   • XR Chest 1 View   • [place] Draw   • Comprehensive Metabolic Panel   • Protime-INR   • CBC Auto Differential   • BNP   • High Sensitivity Troponin T   • High Sensitivity Troponin T 2Hr   • Cardiac Monitoring   • Family Medicine Consult   • LHA (on-call MD unless specified) Details   • ECG 12 Lead Dyspnea   • Inpatient Admission   • CBC & Differential   • Green Top (Gel)   • Lavender Top   • Gold Top - SST   • Light Blue Top       Medical Decision Making:  ED Course as of 03/10/23 1236   Fri Mar 10, 2023   1005 Patient presents with 2-day history of shortness of breath.  Denies any chest pain.  Differential diagnoses include but not limited to pneumonia, CHF, pneumothorax, COPD. [EE]   1016 Chest x-ray independently interpreted myself shows a near complete pneumothorax on the right. [EE]   1016 EKG independently interpreted myself.  Time 0959.  Sinus rhythm, 90 bpm.  Normal P/DEIDRA.  QRS shows right bundle branch block with normal axis.  Nonspecific T wave changes.  Similar to prior EKG from 2/12/2020. [EE]   1025 Recheck the patient.  He is resting quietly.  He is on oxygen in no significant distress. [EE]   1043 WBC: 8.27 [EE]   1043 Hemoglobin: 16.2 [EE]   1047 Heart Rate(!): 123 [TD]   1047 SpO2(!): 89 % [TD]   1052 I discussed the case with Altagracia Green, nurse practitioner with thoracic surgery.  She agrees with plan for thoracostomy tube and admission.  They will consult. [EE]   1116 EKG independently interpreted by myself.  Time 9:59 AM.  Sinus rhythm.  Heart rate 98.  Right bundle branch block.  No acute ST abnormality.  Low voltage in the precordial leads. [TD]   1236 I discussed the case with ANICETO Gutiérrez for hospitalist medicine.  We reviewed the patient's labs and imaging.  She will admit for Dr. Barriga. [TD]      ED Course User Index  [EE] Bobby Rodas PA  [TD]  Gregory Yen II, MD       Critical Care  Performed by: Gregory Yen II, MD  Authorized by: Gregory Yen II, MD     Critical care provider statement:     Critical care time (minutes):  40    Critical care was necessary to treat or prevent imminent or life-threatening deterioration of the following conditions:  Respiratory failure    Critical care was time spent personally by me on the following activities:  Ordering and performing treatments and interventions, ordering and review of laboratory studies, ordering and review of radiographic studies, pulse oximetry, re-evaluation of patient's condition, obtaining history from patient or surrogate, discussions with consultants, evaluation of patient's response to treatment, examination of patient and development of treatment plan with patient or surrogate  Chest Tube Insertion    Date/Time: 3/10/2023 12:35 PM  Performed by: Gregory Yen II, MD  Authorized by: Gregory Yen II, MD     Consent:     Consent obtained:  Written    Consent given by:  Patient    Risks discussed:  Bleeding, pain, infection and damage to surrounding structures    Alternatives discussed:  Delayed treatment and no treatment  Anesthesia:     Anesthesia method:  Local infiltration    Local anesthetic:  Lidocaine 1% w/o epi  Procedure details:     Placement location:  L lateral    Scalpel size:  11    Tube size ([place]): 14.    Tension pneumothorax: no      Tube connected to:  Suction    Drainage characteristics:  Air only    Suture material:  2-0 silk    Dressing:  4x4 sterile gauze and petrolatum-impregnated gauze  Post-procedure details:     Post-insertion x-ray findings: tube in good position      Procedure completion:  Tolerated well, no immediate complications          PPE: Both the patient and I wore a surgical mask throughout the entire patient encounter.     Diagnosis  Final diagnoses:   Pneumothorax on right   Anticoagulated   Atrial fibrillation,  unspecified type (HCC)   Congestive heart failure, unspecified HF chronicity, unspecified heart failure type (HCC)       Admit     Gregory Yen II, MD  03/10/23 1236      Electronically signed by Gregory Yen II, MD at 03/10/23 1236     Li Lopez RN at 03/10/23 1236          Nursing report ED to floor  Ray Pratt  77 y.o.  male    HPI :   Chief Complaint   Patient presents with   • Shortness of Breath       Admitting doctor:   Erick Barriga MD    Admitting diagnosis:   The primary encounter diagnosis was Pneumothorax on right. Diagnoses of Anticoagulated, Atrial fibrillation, unspecified type (HCC), and Congestive heart failure, unspecified HF chronicity, unspecified heart failure type (HCC) were also pertinent to this visit.    Code status:   Current Code Status       Date Active Code Status Order ID Comments User Context       Prior            Allergies:   Patient has no known allergies.    Isolation:   No active isolations    Intake and Output    Intake/Output Summary (Last 24 hours) at 3/10/2023 1236  Last data filed at 3/10/2023 1135  Gross per 24 hour   Intake 100 ml   Output --   Net 100 ml       Weight:       03/10/23  0955   Weight: 105 kg (232 lb)       Most recent vitals:   Vitals:    03/10/23 1031 03/10/23 1042 03/10/23 1045 03/10/23 1046   BP: 123/86      Pulse: 98 97 101 98   Resp:       Temp:       SpO2: 95% 95% 96% 95%   Weight:       Height:           Active LDAs/IV Access:   Lines, Drains & Airways       Active LDAs       Name Placement date Placement time Site Days    Peripheral IV 03/10/23 1003 Right Antecubital 03/10/23  1003  Antecubital  less than 1                    Labs (abnormal labs have a star):   Labs Reviewed   COMPREHENSIVE METABOLIC PANEL - Abnormal; Notable for the following components:       Result Value    Glucose 130 (*)     All other components within normal limits    Narrative:     GFR Normal >60  Chronic Kidney Disease <60  Kidney Failure  <15    The GFR formula is only valid for adults with stable renal function between ages 18 and 70.   CBC WITH AUTO DIFFERENTIAL - Abnormal; Notable for the following components:    Immature Grans % 0.6 (*)     All other components within normal limits   TROPONIN - Abnormal; Notable for the following components:    HS Troponin T 22 (*)     All other components within normal limits    Narrative:     High Sensitive Troponin T Reference Range:  <10.0 ng/L- Negative Female for AMI  <15.0 ng/L- Negative Male for AMI  >=10 - Abnormal Female indicating possible myocardial injury.  >=15 - Abnormal Male indicating possible myocardial injury.   Clinicians would have to utilize clinical acumen, EKG, Troponin, and serial changes to determine if it is an Acute Myocardial Infarction or myocardial injury due to an underlying chronic condition.        PROTIME-INR - Normal   BNP (IN-HOUSE) - Normal    Narrative:     Among patients with dyspnea, NT-proBNP is highly sensitive for the detection of acute congestive heart failure. In addition NT-proBNP of <300 pg/ml effectively rules out acute congestive heart failure with 99% negative predictive value.    Results may be falsely decreased if patient taking Biotin.     RAINBOW DRAW    Narrative:     The following orders were created for panel order [place] Draw.  Procedure                               Abnormality         Status                     ---------                               -----------         ------                     Green Top (Gel)[[id number]]                                  Final result               Lavender Top[[id number]]                                     Final result               Gold Top - SST[[id number]]                                   Final result               Light Blue Top[[id number]]                                   Final result                 Please view results for these tests on the individual orders.   CBC AND DIFFERENTIAL    Narrative:     The  following orders were created for panel order CBC & Differential.  Procedure                               Abnormality         Status                     ---------                               -----------         ------                     CBC Auto Differential[[id number]]        Abnormal            Final result                 Please view results for these tests on the individual orders.   GREEN TOP   LAVENDER TOP   GOLD TOP - SST   LIGHT BLUE TOP       EKG:   ECG 12 Lead Dyspnea   Preliminary Result   HEART RATE= 98  bpm   RR Interval= 612  ms   NC Interval= 184  ms   P Horizontal Axis= 61  deg   P Front Axis= 93  deg   QRSD Interval= 150  ms   QT Interval= 371  ms   QRS Axis= 18  deg   T Wave Axis= 28  deg   - ABNORMAL ECG -   Sinus rhythm   Right bundle branch block   Electronically Signed By:    Date and Time of Study: 2023-03-10 09:59:37          Meds given in ED:   Medications   morphine injection 4 mg (4 mg Intravenous Given 3/10/23 1104)   ondansetron (ZOFRAN) injection 4 mg (4 mg Intravenous Given 3/10/23 1105)   ceFAZolin in dextrose (ANCEF) IVPB solution 2 g (0 g Intravenous Stopped 3/10/23 1135)   lidocaine (XYLOCAINE) 1 % injection 10 mL (10 mL Injection Given by Other 3/10/23 1233)   midazolam (VERSED) injection 2 mg (2 mg Intravenous Given 3/10/23 1136)       Imaging results:  No radiology results for the last day    Ambulatory status:   - up with assist     Social issues:   Social History     Socioeconomic History   • Marital status:    • Number of children: 0   Tobacco Use   • Smoking status: Never   • Smokeless tobacco: Never   Vaping Use   • Vaping Use: Never used   Substance and Sexual Activity   • Alcohol use: Not Currently   • Drug use: Never       NIH Stroke Scale:         Li Lopez RN  03/10/23 12:36 EST          Electronically signed by Li Lopez RN at 03/10/23 1236       Lines, Drains & Airways     Active LDAs     Name Placement date Placement time Site Days     Peripheral IV 03/10/23 1003 Right Antecubital 03/10/23  1003  Antecubital  2    Chest Tube Right Midaxillary;Pleural 03/10/23  1320  Midaxillary;Pleural  2          Inactive LDAs     None                     Physician Progress Notes (last 72 hours)      Jose Granados MD at 23 1516              Name: Ray Pratt ADMIT: 3/10/2023   : 1945  PCP: Marcial Jackson DO    MRN: [id number] LOS: 2 days   AGE/SEX: 77 y.o. male  ROOM: [facility]     Subjective   Subjective   Doing rather well today.  Still with some discomfort right side of chest associated with tube.  Otherwise doing okay.    Review of Systems    Objective   Objective   Vital Signs  Temp:  [98.3 °F (36.8 °C)-98.8 °F (37.1 °C)] 98.7 °F (37.1 °C)  Heart Rate:  [59-93] 82  Resp:  [14-16] 16  BP: ()/(65-80) 114/74  SpO2:  [93 %-95 %] 95 %  on  Flow (L/min):  [2] 2;   Device (Oxygen Therapy): nasal cannula  Body mass index is 39.82 kg/m².  Physical Exam  Vitals and nursing note reviewed.   Constitutional:       General: He is not in acute distress.     Appearance: He is obese.   Cardiovascular:      Rate and Rhythm: Normal rate and regular rhythm.   Pulmonary:      Effort: Pulmonary effort is normal.      Breath sounds: Normal breath sounds.   Chest:      Comments: Right-sided chest tube present  Abdominal:      General: Bowel sounds are normal.      Palpations: Abdomen is soft.      Tenderness: There is no abdominal tenderness.   Musculoskeletal:         General: No swelling.   Skin:     General: Skin is warm and dry.   Neurological:      Mental Status: He is alert. Mental status is at baseline.       Results Review     I reviewed the patient's new clinical results.  Results from last 7 days   Lab Units 23  0649 03/10/23  1007   WBC 10*3/mm3 7.36 8.27   HEMOGLOBIN g/dL 14.5 16.2   PLATELETS 10*3/mm3 209 226     Results from last 7 days   Lab Units 23  0649 03/10/23  1007   SODIUM mmol/L 138 137   POTASSIUM mmol/L 3.8 3.9   CHLORIDE  mmol/L 102 102   CO2 mmol/L 26.6 24.0   BUN mg/dL 16 19   CREATININE mg/dL 0.96 0.99   GLUCOSE mg/dL 125* 130*   EGFR mL/min/1.73 81.4 78.5     Results from last 7 days   Lab Units 03/10/23  1007   ALBUMIN g/dL 4.2   BILIRUBIN mg/dL 0.5   ALK PHOS U/L 61   AST (SGOT) U/L 21   ALT (SGPT) U/L 14     Results from last 7 days   Lab Units 03/11/23  0649 03/10/23  1007   CALCIUM mg/dL 9.2 9.4   ALBUMIN g/dL  --  4.2       Hemoglobin A1C   Date/Time Value Ref Range Status   03/11/2023 0649 7.00 (H) 4.80 - 5.60 % Final     Glucose   Date/Time Value Ref Range Status   03/12/2023 1056 157 (H) 70 - 130 mg/dL Final     Comment:     Meter: [id number] : [id number] Navneet Hainesfahad NA   03/12/2023 0534 112 70 - 130 mg/dL Final     Comment:     Meter: [id number] : [id number] [facility]   03/11/2023 2101 163 (H) 70 - 130 mg/dL Final     Comment:     Meter: [id number] : [id number] [facility]   03/11/2023 1607 105 70 - 130 mg/dL Final     Comment:     Meter: [id number] : [id number] Luis Angel Canoly NA   03/11/2023 1115 180 (H) 70 - 130 mg/dL Final     Comment:     Meter: [id number] : [id number] Plasenciakayleigh Canoly NA   03/11/2023 0609 106 70 - 130 mg/dL Final     Comment:     Meter: [id number] : [id number] Wilbur Altagracia NA   03/10/2023 2051 168 (H) 70 - 130 mg/dL Final     Comment:     Meter: [id number] : [id number] Green Altagracia NA       XR Chest 1 View    Result Date: 3/12/2023  Interval increase of right pneumothorax and right sided subcutaneous emphysema.  Discussed by telephone with patient's nurse, Sara, at time of interpretation, 0652, 03/12/2023.  This report was finalized on 3/12/2023 6:56 AM by Dr. Peter Daugherty M.D.      XR Chest 1 View    Result Date: 3/11/2023  Right chest tube placement with interval decrease of right pneumothorax.  This report was finalized on 3/11/2023 6:50 AM by Dr. Peter Daugherty M.D.      I have personally reviewed all medications:  Scheduled  Medications  atenolol, 25 mg, Oral, Daily  dofetilide, 125 mcg, Oral, Q12H  empagliflozin, 25 mg, Oral, Daily With Breakfast  enoxaparin, 40 mg, Subcutaneous, Q24H  furosemide, 40 mg, Oral, Daily  insulin lispro, 0-9 Units, Subcutaneous, 4x Daily With Meals & Nightly  linagliptin, 5 mg, Oral, Daily  potassium chloride, 20 mEq, Oral, Daily  rosuvastatin, 10 mg, Oral, Daily    Infusions   Diet  Diet: Cardiac Diets, Diabetic Diets; Healthy Heart (2-3 Na+); Consistent Carbohydrate; Texture: Regular Texture (IDDSI 7); Fluid Consistency: Thin (IDDSI 0)    I have personally reviewed:  [x]  Laboratory   []  Microbiology   [x]  Radiology   [x]  EKG/Telemetry  [x]  Cardiology/Vascular   []  Pathology    [x]  Records      Assessment/Plan     Active Hospital Problems    Diagnosis  POA   • **Pneumothorax on right [J93.9]  Yes   • 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, J98.19]  Unknown   • Acute respiratory failure with hypoxia (HCC) [J96.01]  Yes   • Long term (current) use of anticoagulants [Z79.01]  Not Applicable   • Obesity, morbid, BMI 40.0-49.9 (HCC) [E66.01]  Yes   • Cardiomyopathy, dilated (HCC) [I42.0]  Yes   • Obstructive sleep apnea [G47.33]  Yes   • Chronic combined systolic and diastolic congestive heart failure (HCC) [I50.42]  Yes   • Atrial fibrillation, persistent (CMS/HCC) [I48.19]  Yes   • Type 2 diabetes mellitus with hyperglycemia, without long-term current use of insulin (HCC) [E11.65]  Yes   • Benign essential HTN [I10]  Yes      Resolved Hospital Problems   No resolved problems to display.       Bullous emphysema with right pneumothorax  -Currently still requiring chest tube per thoracic surgery   -acute hypoxic respiratory failure at time of admission with O2 sat 88% on room air with respiratory distress; improved  -CT chest does indicate bullous disease  -Anticipate will need home O2 at discharge     DM2, non-insulin requiring  -Jardiance and linagliptin  -SSI, monitor BG      Persistent atrial  fibrillation  -RC: Atenolol 25 mg; dofetilide 125 mcg twice daily  -AC: Apixaban on hold due to above; on prophylactic dose Lovenox     Dilated CM/chronic systolic and diastolic CHF  -TTE (2/20/2023): 65%  -Lasix 40 mg daily     Elevated troponin  -Troponin 22 OA  -EKG without ischemic changes; known RBBB  -In setting of hypoxia due to above, not ACS    Obstructive sleep apnea  Per thoracic surgery will need to stop BiPAP for 2 to 3 months after discharge  Will evaluate for home oxygen prior to discharge      · Lovenox 40 mg SC daily for DVT prophylaxis.  · Full code.  · Discussed with patient and family.  · Anticipate discharge home with family next week.      Jose Granados MD  [facility]  03/12/23  16:04 EDT        Electronically signed by Jose Granados MD at 03/12/23 1608     Altagracia Green APRN at 03/12/23 1326     Attestation signed by Ron Bee MD at 03/12/23 2880    I have reviewed this documentation and agree.                      Chief Complaint: Spontaneous pneumothorax, right  S/P: Right thoracostomy tube placement  POD # 2    Subjective:  Symptoms:  Stable.  He reports shortness of breath and chest pain.    Diet:  No nausea or vomiting.    Activity level: Returning to normal.    Pain:  He complains of pain that is mild.  Pain is well controlled.        Vital Signs:  Temp:  [98.3 °F (36.8 °C)-98.8 °F (37.1 °C)] 98.7 °F (37.1 °C)  Heart Rate:  [59-93] 82  Resp:  [14-18] 16  BP: ()/(65-80) 114/74    Intake & Output (last day)       03/11 0701  03/12 0700 03/12 0701  03/13 0700    P.O. 1680 480    IV Piggyback      Total Intake(mL/kg) 1680 (16) 480 (4.6)    Urine (mL/kg/hr) 0 (0)     Chest Tube 25     Total Output 25     Net +1655 +480          Urine Unmeasured Occurrence 8 x 3 x          Objective:  General Appearance:  Comfortable and in no acute distress.    Vital signs: (most recent): Blood pressure 114/74, pulse 82, temperature 98.7 °F (37.1 °C), temperature source  "Oral, resp. rate 16, height 162.6 cm (64\"), weight 105 kg (232 lb), SpO2 95 %.  Vital signs are normal.  No fever.    Lungs:  Normal effort and normal respiratory rate.  There are decreased breath sounds.  No rales, wheezes or rhonchi.    Heart: Normal rate.  Regular rhythm.    Chest: Chest wall tenderness (Right chest wall, chest tube site) present.    Abdomen: Abdomen is soft.  Bowel sounds are normal.   There is no abdominal tenderness.     Neurological: Patient is alert and oriented to person, place and time.    Skin:  Warm and dry.              Chest tube:   Site: Right, Clean, Dry, Intact and Securement device intact  Suction: waterseal  Air Leak: positive  24 Hour Total: 25cc    Results Review:     I reviewed the patient's new clinical results.  I reviewed the patient's new imaging results and agree with the interpretation.  I reviewed the patient's other test results and agree with the interpretation  Discussed with Patient, RN, family at bedside and Dr. Bee.    Imaging Results (Last 24 Hours)     Procedure Component Value Units Date/Time    CT Chest Without Contrast Diagnostic [[id number]] Resulted: 03/12/23 1020     Updated: 03/12/23 1020    XR Chest 1 View [[id number]] Collected: 03/12/23 0652     Updated: 03/12/23 0659    Narrative:      XR CHEST 1 VW-     HISTORY: Male who is 77 years-old,  chest tube, right pneumothorax     TECHNIQUE: Frontal view of the chest     COMPARISON: 03/11/2023     FINDINGS: Right chest tube extends the mid right hemithorax. Heart,  mediastinum and pulmonary vasculature are unremarkable. Likely  atelectasis or infiltrate is seen in the right mid to lower lung, left  midlung, similar to prior exam. Previous right pneumothorax is  increased, measuring about 2.9 cm the apex. Subcutaneous emphysema has  increased along right chest wall, and could be evidence of pulmonary air  leak. No pleural effusion, or left pneumothorax. No acute osseous  process.          Impression:      " Interval increase of right pneumothorax and right sided subcutaneous  emphysema.     Discussed by telephone with patient's nurse, Sara, at time of  interpretation, 0652, 03/12/2023.     This report was finalized on 3/12/2023 6:56 AM by Dr. Peter Daugherty M.D.             Lab Results:     Lab Results (last 24 hours)     Procedure Component Value Units Date/Time    POC Glucose Once [[id number]]  (Abnormal) Collected: 03/12/23 1056    Specimen: Blood Updated: 03/12/23 1116     Glucose 157 mg/dL      Comment: Meter: [id number] : [id number] Navneet Barboza NA       POC Glucose Once [[id number]]  (Normal) Collected: 03/12/23 0534    Specimen: Blood Updated: 03/12/23 0535     Glucose 112 mg/dL      Comment: Meter: [id number] : [id number] Knowta PCA       POC Glucose Once [[id number]]  (Abnormal) Collected: 03/11/23 2101    Specimen: Blood Updated: 03/11/23 2102     Glucose 163 mg/dL      Comment: Meter: [id number] : [id number] [employer] PCA       Hemoglobin A1c [[id number]]  (Abnormal) Collected: 03/11/23 0649    Specimen: Blood Updated: 03/11/23 1614     Hemoglobin A1C 7.00 %     Narrative:      Hemoglobin A1C Ranges:    Increased Risk for Diabetes  5.7% to 6.4%  Diabetes                     >= 6.5%  Diabetic Goal                < 7.0%    POC Glucose Once [[id number]]  (Normal) Collected: 03/11/23 1607    Specimen: Blood Updated: 03/11/23 1609     Glucose 105 mg/dL      Comment: Meter: [id number] : [id number] Luis Angel BERNARDO              Assessment & Plan       Pneumothorax on right    Type 2 diabetes mellitus with hyperglycemia, without long-term current use of insulin (HCC)    Benign essential HTN    Atrial fibrillation, persistent (CMS/HCC)    Chronic combined systolic and diastolic congestive heart failure (HCC)    Obstructive sleep apnea    Obesity, morbid, BMI 40.0-49.9 (HCC)    Cardiomyopathy, dilated (HCC)    Long term (current) use of anticoagulants    Acute respiratory  failure with hypoxia (HCC)       Assessment & Plan     I independently reviewed the patient's radiographic imaging including chest x-ray performed after patient's chest tube placement as well as this morning's imaging.  There appears to be enlargement of pneumothorax on x-ray this morning.  Patient then underwent a CT of the chest which demonstrates persistent pneumothorax with some bullous disease in the right apical region.    Spontaneous pneumothorax: Likely secondary to bullous disease.  Patient's chest tube was placed back to -20 cm suction.  Upon further assessment there was some significant kinking and twisting of his chest tube which was rectified and chest tube was resecured in place.  Discussed with RN to keep a close eye on this small bore tube which has a tendency to twist on itself.  This is likely the etiology behind the patient's increased pneumothorax on imaging this morning.  We will keep chest tube to suction today and repeat an x-ray in the morning.    Acute respiratory failure with hypoxia: Improved s/p chest tube placement for pneumothorax.  Supplemental oxygen as needed.  He will likely need supplemental oxygen at discharge, particularly with inability to use positive pressure ventilation.    Atrial fibrillation: Patient is anticoagulated with Eliquis.  We will need to hold while he has chest tube in place.  Okay for prophylaxis Lovenox or subcu heparin if needed.    Obstructive sleep apnea: Patient utilizes BiPAP at home and follows with sleep medicine.  He will need to refrain from positive pressure ventilation for at least 8 to 12 weeks after he recovers from pneumothorax.    ANICETO Eng  [facility]  03/12/23  13:26 EDT    Greater than 49 minutes was spent reviewing the patient's chart, radiographic imaging, labs, provider notes, assessing the patient and developing a plan of care.  This was discussed with the patient and RN.        Electronically signed by Cristal  MD Ron at 23 1425     Jose Granados MD at 23 1347              Name: Ray Pratt ADMIT: 3/10/2023   : 1945  PCP: Marcial Jackson DO    MRN: [id number] LOS: 1 days   AGE/SEX: 77 y.o. male  ROOM: [facility]     Subjective   Subjective   No new complaints today.  Breathing comfortably.  Expected pain at chest tube site.  Otherwise doing okay.    Review of Systems    Objective   Objective   Vital Signs  Temp:  [97 °F (36.1 °C)-98.3 °F (36.8 °C)] 98 °F (36.7 °C)  Heart Rate:  [58-84] 74  Resp:  [18-20] 18  BP: (100-126)/(70-97) 100/97  SpO2:  [92 %-97 %] 94 %  on  Flow (L/min):  [2] 2;   Device (Oxygen Therapy): nasal cannula  Body mass index is 39.82 kg/m².  Physical Exam  Vitals and nursing note reviewed.   Constitutional:       General: He is not in acute distress.     Appearance: He is obese.   Cardiovascular:      Rate and Rhythm: Normal rate and regular rhythm.   Pulmonary:      Effort: Pulmonary effort is normal.      Breath sounds: Normal breath sounds.   Chest:      Comments: Right-sided chest tube present  Abdominal:      General: Bowel sounds are normal.      Palpations: Abdomen is soft.      Tenderness: There is no abdominal tenderness.   Musculoskeletal:         General: No swelling.   Skin:     General: Skin is warm and dry.   Neurological:      Mental Status: He is alert. Mental status is at baseline.       Results Review     I reviewed the patient's new clinical results.  Results from last 7 days   Lab Units 23  0649 03/10/23  1007   WBC 10*3/mm3 7.36 8.27   HEMOGLOBIN g/dL 14.5 16.2   PLATELETS 10*3/mm3 209 226     Results from last 7 days   Lab Units 23  0649 03/10/23  1007   SODIUM mmol/L 138 137   POTASSIUM mmol/L 3.8 3.9   CHLORIDE mmol/L 102 102   CO2 mmol/L 26.6 24.0   BUN mg/dL 16 19   CREATININE mg/dL 0.96 0.99   GLUCOSE mg/dL 125* 130*   EGFR mL/min/1.73 81.4 78.5     Results from last 7 days   Lab Units 03/10/23  1007   ALBUMIN g/dL 4.2   BILIRUBIN mg/dL 0.5    ALK PHOS U/L 61   AST (SGOT) U/L 21   ALT (SGPT) U/L 14     Results from last 7 days   Lab Units 03/11/23  0649 03/10/23  1007   CALCIUM mg/dL 9.2 9.4   ALBUMIN g/dL  --  4.2       Glucose   Date/Time Value Ref Range Status   03/11/2023 1115 180 (H) 70 - 130 mg/dL Final     Comment:     Meter: [id number] : [id number] Luis Angel Grimaldo NA   03/11/2023 0609 106 70 - 130 mg/dL Final     Comment:     Meter: [id number] : [id number] Wilbur Frias NA   03/10/2023 2051 168 (H) 70 - 130 mg/dL Final     Comment:     Meter: [id number] : [id number] Wilbur Frias NA   03/10/2023 1619 97 70 - 130 mg/dL Final     Comment:     Meter: [id number] : [id number] Rob Thomas NA       XR Chest 1 View    Result Date: 3/11/2023  Right chest tube placement with interval decrease of right pneumothorax.  This report was finalized on 3/11/2023 6:50 AM by Dr. Peter Daugherty M.D.      I have personally reviewed all medications:  Scheduled Medications  atenolol, 25 mg, Oral, Daily  dofetilide, 125 mcg, Oral, Q12H  empagliflozin, 25 mg, Oral, Daily With Breakfast  furosemide, 40 mg, Oral, Daily  insulin lispro, 0-9 Units, Subcutaneous, 4x Daily With Meals & Nightly  linagliptin, 5 mg, Oral, Daily  potassium chloride, 20 mEq, Oral, Daily  rosuvastatin, 10 mg, Oral, Daily  sodium chloride, 10 mL, Intravenous, Q12H    Infusions   Diet  Diet: Cardiac Diets, Diabetic Diets; Healthy Heart (2-3 Na+); Consistent Carbohydrate; Texture: Regular Texture (IDDSI 7); Fluid Consistency: Thin (IDDSI 0)    I have personally reviewed:  [x]  Laboratory   []  Microbiology   [x]  Radiology   [x]  EKG/Telemetry  [x]  Cardiology/Vascular   []  Pathology    [x]  Records      Assessment/Plan     Active Hospital Problems    Diagnosis  POA   • **Pneumothorax on right [J93.9]  Yes   • Acute respiratory failure with hypoxia (HCC) [J96.01]  Yes   • Long term (current) use of anticoagulants [Z79.01]  Not Applicable   • Obesity, morbid, BMI 40.0-49.9 (HCC)  [E66.01]  Yes   • Cardiomyopathy, dilated (HCC) [I42.0]  Yes   • Obstructive sleep apnea [G47.33]  Yes   • Chronic combined systolic and diastolic congestive heart failure (HCC) [I50.42]  Yes   • Atrial fibrillation, persistent (CMS/HCC) [I48.19]  Yes   • Type 2 diabetes mellitus with hyperglycemia, without long-term current use of insulin (HCC) [E11.65]  Yes   • Benign essential HTN [I10]  Yes      Resolved Hospital Problems   No resolved problems to display.       Spontaneous right pneumothorax  -Currently managed with chest tube per thoracic surgery   -acute hypoxic respiratory failure at time of admission with O2 sat 88% on room air with respiratory distress  -CT chest pending evaluating for bullous disease  -Anticipate will need home O2 at discharge     DM2, non-insulin requiring  -Resume Jardiance and linagliptin  -SSI, monitor BG      Persistent atrial fibrillation  -RC: Atenolol 25 mg; dofetilide 125 mcg twice daily  -AC: Apixaban on hold due to above; will order prophylactic dose Lovenox     Dilated CM/chronic systolic and diastolic CHF  -TTE (2/20/2023): 65%  -Resume Lasix 40 mg daily     Elevated troponin  -Troponin 22 OA  -EKG without ischemic changes; known RBBB  -In setting of hypoxia due to above, not ACS    Obstructive sleep apnea  Per thoracic surgery will need to stop BiPAP for 2 to 3 months after discharge  Will evaluate for home oxygen prior to discharge      · Lovenox 40 mg SC daily for DVT prophylaxis.  · Full code.  · Discussed with patient and family.  · Anticipate discharge home with family next week.      Jose Granados MD  [facility]  03/11/23  13:51 EST        Electronically signed by Jose Granados MD at 03/11/23 1405     Altagracia Green APRN at 03/11/23 1239     Attestation signed by Ron Bee MD at 03/11/23 1314    I have reviewed this documentation and agree.                      Chief Complaint: Spontaneous pneumothorax, right  S/P: Right thoracostomy tube  "placement  POD # 1    Subjective:  Symptoms:  Improved.  He reports shortness of breath and chest pain.    Diet:  No nausea or vomiting.    Activity level: Returning to normal.    Pain:  He complains of pain that is mild.  Pain is well controlled.        Vital Signs:  Temp:  [97 °F (36.1 °C)-98.3 °F (36.8 °C)] 98 °F (36.7 °C)  Heart Rate:  [58-87] 74  Resp:  [18-20] 18  BP: (100-126)/(70-97) 100/97    Intake & Output (last day)       03/10 0701  03/11 0700 03/11 0701  03/12 0700    P.O. 540 240    IV Piggyback 100     Total Intake(mL/kg) 640 (6.1) 240 (2.3)    Urine (mL/kg/hr) 1     Chest Tube 8     Total Output 9     Net +631 +240          Urine Unmeasured Occurrence 4 x 2 x          Objective:  General Appearance:  Comfortable and in no acute distress.    Vital signs: (most recent): Blood pressure 100/97, pulse 74, temperature 98 °F (36.7 °C), temperature source Oral, resp. rate 18, height 162.6 cm (64\"), weight 105 kg (232 lb), SpO2 94 %.  Vital signs are normal.  No fever.    Lungs:  Normal effort and normal respiratory rate.  There are decreased breath sounds.  No rales, wheezes or rhonchi.    Heart: Normal rate.  Regular rhythm.    Chest: Chest wall tenderness (Right chest wall, chest tube site) present.    Abdomen: Abdomen is soft.  Bowel sounds are normal.   There is no abdominal tenderness.     Neurological: Patient is alert and oriented to person, place and time.    Skin:  Warm and dry.              Chest tube:   Site: Right, Clean, Dry, Intact and Securement device intact  Suction: -20 cm  Air Leak: positive  24 Hour Total: 8cc    Results Review:     I reviewed the patient's new clinical results.  I reviewed the patient's new imaging results and agree with the interpretation.  I reviewed the patient's other test results and agree with the interpretation  Discussed with Patient, RN, family at bedside and Dr. Bee.    Imaging Results (Last 24 Hours)     Procedure Component Value Units Date/Time    XR Chest 1 " View [[id number]] Collected: 03/11/23 0647     Updated: 03/11/23 0653    Narrative:      XR CHEST 1 VW-     HISTORY: Male who is 77 years-old,  chest tube, right pneumothorax     TECHNIQUE: Frontal view the chest     COMPARISON: 03/10/2023     FINDINGS: Right chest tube extends the mid right hemithorax. Heart,  mediastinum and pulmonary vasculature are unremarkable. Likely  atelectasis or infiltrate is seen in the right mid to lower lung, left  midlung. Previous right pneumothorax is markedly decreased, measuring as  much as about 6 mm peripherally. Subcutaneous emphysema seen along right  chest wall. No pleural effusion, or left pneumothorax. No acute osseous  process.       Impression:      Right chest tube placement with interval decrease of right  pneumothorax.     This report was finalized on 3/11/2023 6:50 AM by Dr. Peter Daugherty M.D.       XR Chest 1 View [[id number]] Collected: 03/10/23 1324     Updated: 03/10/23 2015    Narrative:      PORTABLE CHEST     HISTORY: Chest tube insertion     NOTE: The study was performed at 1157 hours but not made available for  interpretation until 1302 hours.     FINDINGS: A single view of the chest demonstrates a right-sided chest  tube in place. A large pneumothorax is still present but there is  slightly greater expansion of the right lung. The mediastinal shift to  the left consistent with a tension pneumothorax has decreased.     The above information was called to and discussed with Dr. Yen.     This report was finalized on 3/10/2023 8:12 PM by Dr. Rigo Merino M.D.             Lab Results:     Lab Results (last 24 hours)     Procedure Component Value Units Date/Time    POC Glucose Once [[id number]]  (Abnormal) Collected: 03/11/23 1115    Specimen: Blood Updated: 03/11/23 1117     Glucose 180 mg/dL      Comment: Meter: [id number] : [id number] Luis Angel BERNARDO       Basic Metabolic Panel [[id number]]  (Abnormal) Collected: 03/11/23 0649    Specimen: Blood  Updated: 03/11/23 0749     Glucose 125 mg/dL      BUN 16 mg/dL      Creatinine 0.96 mg/dL      Sodium 138 mmol/L      Potassium 3.8 mmol/L      Chloride 102 mmol/L      CO2 26.6 mmol/L      Calcium 9.2 mg/dL      BUN/Creatinine Ratio 16.7     Anion Gap 9.4 mmol/L      eGFR 81.4 mL/min/1.73     Narrative:      GFR Normal >60  Chronic Kidney Disease <60  Kidney Failure <15    The GFR formula is only valid for adults with stable renal function between ages 18 and 70.    CBC (No Diff) [[id number]]  (Normal) Collected: 03/11/23 0649    Specimen: Blood Updated: 03/11/23 0720     WBC 7.36 10*3/mm3      RBC 4.97 10*6/mm3      Hemoglobin 14.5 g/dL      Hematocrit 44.5 %      MCV 89.5 fL      MCH 29.2 pg      MCHC 32.6 g/dL      RDW 13.8 %      RDW-SD 44.5 fl      MPV 9.9 fL      Platelets 209 10*3/mm3     Hemoglobin A1c [[id number]] Collected: 03/11/23 0649    Specimen: Blood Updated: 03/11/23 0713    POC Glucose Once [[id number]]  (Normal) Collected: 03/11/23 0609    Specimen: Blood Updated: 03/11/23 0611     Glucose 106 mg/dL      Comment: Meter: [id number] : [id number] [email or web address]       POC Glucose Once [[id number]]  (Abnormal) Collected: 03/10/23 2051    Specimen: Blood Updated: 03/10/23 2054     Glucose 168 mg/dL      Comment: Meter: [id number] : [id number] [employer] NA       POC Glucose Once [[id number]]  (Normal) Collected: 03/10/23 1619    Specimen: Blood Updated: 03/10/23 1621     Glucose 97 mg/dL      Comment: Meter: [id number] : [id number] Rob BERNARDO              Assessment & Plan       Pneumothorax on right    Type 2 diabetes mellitus with hyperglycemia (HCC)    Benign essential HTN    A-fib (HCC)    Obesity, morbid, BMI 40.0-49.9 (HCC)    Cardiomyopathy, dilated (HCC)    Long term (current) use of anticoagulants    Acute respiratory failure with hypoxia (HCC)       Assessment & Plan     I dependently reviewed the patient's radiographic imaging including chest x-ray performed after patient's  chest tube placement as well as this morning's imaging.  Good reexpansion of right lung with chest tube insertion.    Spontaneous pneumothorax: Etiology is unclear.  Patient did recently return to using BiPAP after not using it for 6-day secondary to power outage.  I have requested a CT of the chest for assessment of bullous disease.  Chest tube does have air leak on assessment today.  We will wean to waterseal and repeat a morning x-ray.    Acute respiratory failure with hypoxia: Improved s/p chest tube placement for pneumothorax.  Supplemental oxygen as needed.  He will likely need supplemental oxygen at discharge, particularly with inability to use positive pressure ventilation.    Atrial fibrillation: Patient is anticoagulated with Eliquis.  We will need to hold while he has chest tube in place.  Okay for prophylaxis Lovenox or subcu heparin if needed.    Obstructive sleep apnea: Patient utilizes BiPAP at home and follows with sleep medicine.  He will need to refrain from positive pressure ventilation for at least 8 to 12 weeks after he recovers from pneumothorax.    ANICETO Eng  [facility]  03/11/23  12:40 EST    Greater than 43 minutes was spent reviewing the patient's chart, radiographic imaging, labs, provider notes, assessing the patient and developing a plan of care.  This was discussed with the patient and RN.        Electronically signed by Ron Bee MD at 03/11/23 1314          Consult Notes (last 72 hours)      Beth Ludwig DNP, APRN at 03/10/23 1353      Consult Orders    1. Family Medicine Consult [[id number]] ordered by Bobby Rodas PA          Attestation signed by Ron Bee MD at 03/10/23 0876    I have reviewed this documentation and agree.                      Inpatient Family Practice Consult  Consult performed by: Beth Ludwig DNP, APRN  Consult ordered by: Bobby Rodas PA          Patient Care Team:  Marcial Jackson DO as PCP - General (Internal  Medicine)  Reginald Vazquez MD as Referring Physician (Cardiology)    Chief Complaint   Patient presents with   • Shortness of Breath       Subjective     History of Present Illness    Mr. Trevino is a 77-year-old gentleman with a past medical history of type 2 diabetes, hypertension, atrial fibrillation anticoagulated on Eliquis, and obstructive sleep apnea on BiPAP.  He presented to [facility] earlier today complaining of increased shortness of breath over the past day after using his BiPAP after not using it for Bre[place] days secondary to power outage.  He reported increased shortness of breath and had trouble walking around [employer] yesterday.  He was seen by his PCP earlier today where chest x-ray demonstrated near complete collapse of the right lung and patient was referred to the ER for further evaluation.  A right chest tube will be placed and thoracic surgery has been consulted for further management    On exam today patient reports continued shortness of breath but is somewhat improved with the use of supplemental oxygen.  He is a never smoker.  He has never had a pneumothorax.  His last dose of Eliquis was on 3/9/2023 at 9 PM.      Review of Systems   Constitutional: Negative.    HENT: Negative.    Eyes: Negative.    Respiratory: Positive for shortness of breath.    Cardiovascular: Positive for chest pain.   Gastrointestinal: Negative.    Endocrine: Negative.    Genitourinary: Negative.    Musculoskeletal: Negative.    Skin: Negative.    Allergic/Immunologic: Negative.    Neurological: Negative.    Hematological: Negative.    Psychiatric/Behavioral: Negative.         Patient Active Problem List   Diagnosis   • Hypoxia   • Type 2 diabetes mellitus with hyperglycemia (HCC)   • Benign essential HTN   • Atrial fibrillation, persistent (CMS/HCC)   • Atrial flutter with rapid ventricular response (HCC)   • Acute combined systolic and diastolic congestive heart failure (HCC)   • Obstructive sleep apnea   •  A-fib ([facility])   • High risk medication use   • Obesity, morbid, BMI 40.0-49.9 ([facility])   • Cardiomyopathy, dilated ([facility])   • VILLA (dyspnea on exertion)   • Long term (current) use of anticoagulants   • On dofetilide therapy   • Pneumothorax on right   • Acute respiratory failure with hypoxia ([facility])     Past Medical History:   Diagnosis Date   • Acute combined systolic and diastolic congestive heart failure ([facility])    • Atrial fibrillation ([facility])    • Atrial flutter with rapid ventricular response ([facility])    • CHF (congestive heart failure) ([facility])    • Dilated cardiomyopathy ([facility])    • Hyperlipidemia    • Hypertension    • Low-tension glaucoma of right eye    • Nonischemic cardiomyopathy ([facility])    • Obesity    • Ocular hypertension of left eye    • On dofetilide therapy    • MARCOS (obstructive sleep apnea)     compliant with BiPAP   • Persistent atrial fibrillation ([facility])    • Pneumonia of left lung due to Haemophilus influenzae ([facility]) 01/18/2017   • Ptosis of right eyelid    • Sepsis ([facility]) 01/18/2017   • Type 2 diabetes mellitus, without long-term current use of insulin ([facility])      Past Surgical History:   Procedure Laterality Date   • CARDIAC CATHETERIZATION N/A 11/05/2019    Procedure: Left Heart Cath;  Surgeon: Sundeep Tsang MD;  Location:  [facility];  Service: Cardiovascular   • CARDIAC CATHETERIZATION N/A 11/05/2019    Procedure: Coronary angiography;  Surgeon: Sundeep Tsang MD;  Location:  [facility];  Service: Cardiovascular   • DENTAL PROCEDURE     • MOLE REMOVAL Right     above right eye, benign per patient report     Family History   Problem Relation Age of Onset   • Other Mother         history of cancer   • Stroke Father    • Epilepsy Sister      Social History     Socioeconomic History   • Marital status:    • Number of children: 0   Tobacco Use   • Smoking status: Never   • Smokeless tobacco: Never   Vaping Use   • Vaping Use: Never used   Substance and Sexual  Activity   • Alcohol use: Not Currently   • Drug use: Never     Medications Prior to Admission   Medication Sig Dispense Refill Last Dose   • atenolol (TENORMIN) 25 MG tablet Take 1 tablet by mouth Daily. 180 tablet 3 3/9/2023   • dofetilide (TIKOSYN) 125 MCG capsule TAKE 1 CAPSULE BY MOUTH EVERY 12 HOURS 180 capsule 2 3/9/2023   • Eliquis 5 MG tablet tablet TAKE 1 TABLET BY MOUTH EVERY 12 HOURS 60 tablet 11 3/9/2023   • empagliflozin (Jardiance) 25 MG tablet tablet Take 1 tablet by mouth Daily With Breakfast. 90 tablet 2 3/9/2023   • furosemide (LASIX) 40 MG tablet TAKE 1 TABLET BY MOUTH EVERY DAY 90 tablet 3 3/9/2023   • loratadine (CLARITIN) 10 MG tablet TAKE 1 TABLET BY MOUTH EVERY DAY 90 tablet 1 3/9/2023   • metFORMIN ER (GLUCOPHAGE-XR) 500 MG 24 hr tablet Take 2 tablets by mouth Daily With Breakfast. 180 tablet 1 3/9/2023   • potassium chloride (MICRO-K) 10 MEQ CR capsule TAKE 2 CAPSULES BY MOUTH EVERY  capsule 0 3/9/2023   • rosuvastatin (CRESTOR) 10 MG tablet Take 1 tablet by mouth Daily. 90 tablet 3 3/9/2023   • SITagliptin (Januvia) 100 MG tablet Take 1 tablet by mouth Daily. 90 tablet 3 3/9/2023     No Known Allergies    Objective      Vital Signs  Temp:  [97.6 °F (36.4 °C)-97.8 °F (36.6 °C)] 97.6 °F (36.4 °C)  Heart Rate:  [] 85  Resp:  [20] 20  BP: (104-156)/() 120/85    Intake & Output (last day)       03/09 0701  03/10 0700 03/10 0701  03/11 0700    IV Piggyback  100    Total Intake(mL/kg)  100 (1)    Net  +100                Physical Exam  Constitutional:       General: He is in acute distress.      Appearance: Normal appearance.      Interventions: Nasal cannula in place.   HENT:      Head: Normocephalic and atraumatic.   Cardiovascular:      Rate and Rhythm: Normal rate.   Pulmonary:      Effort: Tachypnea present.      Breath sounds: Examination of the right-lower field reveals decreased breath sounds. Decreased breath sounds present. No wheezing or rhonchi.      Comments:  Increased effort  Musculoskeletal:         General: Normal range of motion.      Cervical back: Normal range of motion.   Skin:     General: Skin is warm.   Neurological:      General: No focal deficit present.      Mental Status: He is alert.   Psychiatric:         Mood and Affect: Mood normal.         Results Review:    I reviewed the patient's new clinical results.  I reviewed the patient's new imaging results and agree with the interpretation.  I reviewed the patient's other test results and agree with the interpretation  Discussed with patient, RN and Dr. Bee    Imaging Results (Last 24 Hours)     Procedure Component Value Units Date/Time    XR Chest 1 View [[id number]] Collected: 03/10/23 1324     Updated: 03/10/23 1324    Narrative:      PORTABLE CHEST     HISTORY: Chest tube insertion     NOTE: The study was performed at 1157 hours but not made available for  interpretation until 1302 hours.     FINDINGS: A single view of the chest demonstrates a right-sided chest  tube in place. A large pneumothorax is still present but there is  slightly greater expansion of the right lung. The mediastinal shift to  the left consistent with a tension pneumothorax has decreased.     The above information was called to and discussed with Dr. Yen.       XR Chest 1 View [[id number]] Collected: 03/10/23 1013     Updated: 03/10/23 1017    Narrative:      ONE VIEW PORTABLE CHEST AT 9:59 AM     HISTORY: Pneumothorax. Shortness of breath.     There is an extremely large right-sided pneumothorax measuring  approximately 85-90% as also noted on the recent outside chest x-ray.  There is minimal mediastinal shift to the left. The left lung is clear  and the heart size is normal.     This report was finalized on 3/10/2023 10:14 AM by Dr. Romel Moreno M.D.             Lab Results:  Lab Results (last 24 hours)     Procedure Component Value Units Date/Time    [place] Draw [[id number]] Collected: 03/10/23 1007    Specimen: Blood Updated:  03/10/23 1115    Narrative:      The following orders were created for panel order [place] Draw.  Procedure                               Abnormality         Status                     ---------                               -----------         ------                     Green Top (Gel)[[id number]]                                  Final result               Lavender Top[[id number]]                                     Final result               Gold Top - SST[[id number]]                                   Final result               Light Blue Top[[id number]]                                   Final result                 Please view results for these tests on the individual orders.    Green Top (Gel) [[id number]] Collected: 03/10/23 1007    Specimen: Blood Updated: 03/10/23 1115     Extra Tube Hold for add-ons.     Comment: Auto resulted.       Lavender Top [[id number]] Collected: 03/10/23 1007    Specimen: Blood Updated: 03/10/23 1115     Extra Tube hold for add-on     Comment: Auto resulted       Light Blue Top [[id number]] Collected: 03/10/23 1007    Specimen: Blood Updated: 03/10/23 1115     Extra Tube Hold for add-ons.     Comment: Auto resulted       Gold Top - SST [[id number]] Collected: 03/10/23 1007    Specimen: Blood Updated: 03/10/23 1115     Extra Tube Hold for add-ons.     Comment: Auto resulted.       Comprehensive Metabolic Panel [[id number]]  (Abnormal) Collected: 03/10/23 1007    Specimen: Blood Updated: 03/10/23 1113     Glucose 130 mg/dL      BUN 19 mg/dL      Creatinine 0.99 mg/dL      Sodium 137 mmol/L      Potassium 3.9 mmol/L      Chloride 102 mmol/L      CO2 24.0 mmol/L      Calcium 9.4 mg/dL      Total Protein 8.2 g/dL      Albumin 4.2 g/dL      ALT (SGPT) 14 U/L      AST (SGOT) 21 U/L      Alkaline Phosphatase 61 U/L      Total Bilirubin 0.5 mg/dL      Globulin 4.0 gm/dL      A/G Ratio 1.1 g/dL      BUN/Creatinine Ratio 19.2     Anion Gap 11.0 mmol/L      eGFR 78.5 mL/min/1.73     Narrative:      GFR  Normal >60  Chronic Kidney Disease <60  Kidney Failure <15    The GFR formula is only valid for adults with stable renal function between ages 18 and 70.    High Sensitivity Troponin T [[id number]]  (Abnormal) Collected: 03/10/23 1007    Specimen: Blood Updated: 03/10/23 1113     HS Troponin T 22 ng/L     Narrative:      High Sensitive Troponin T Reference Range:  <10.0 ng/L- Negative Female for AMI  <15.0 ng/L- Negative Male for AMI  >=10 - Abnormal Female indicating possible myocardial injury.  >=15 - Abnormal Male indicating possible myocardial injury.   Clinicians would have to utilize clinical acumen, EKG, Troponin, and serial changes to determine if it is an Acute Myocardial Infarction or myocardial injury due to an underlying chronic condition.         BNP [[id number]]  (Normal) Collected: 03/10/23 1007    Specimen: Blood Updated: 03/10/23 1101     proBNP 322.0 pg/mL     Narrative:      Among patients with dyspnea, NT-proBNP is highly sensitive for the detection of acute congestive heart failure. In addition NT-proBNP of <300 pg/ml effectively rules out acute congestive heart failure with 99% negative predictive value.    Results may be falsely decreased if patient taking Biotin.      Protime-INR [[id number]]  (Normal) Collected: 03/10/23 1007    Specimen: Blood Updated: 03/10/23 1049     Protime 14.0 Seconds      INR 1.07    CBC & Differential [[id number]]  (Abnormal) Collected: 03/10/23 1007    Specimen: Blood Updated: 03/10/23 1038    Narrative:      The following orders were created for panel order CBC & Differential.  Procedure                               Abnormality         Status                     ---------                               -----------         ------                     CBC Auto Differential[[id number]]        Abnormal            Final result                 Please view results for these tests on the individual orders.    CBC Auto Differential [[id number]]  (Abnormal) Collected: 03/10/23  1007    Specimen: Blood Updated: 03/10/23 1038     WBC 8.27 10*3/mm3      RBC 5.47 10*6/mm3      Hemoglobin 16.2 g/dL      Hematocrit 50.0 %      MCV 91.4 fL      MCH 29.6 pg      MCHC 32.4 g/dL      RDW 14.0 %      RDW-SD 46.9 fl      MPV 9.5 fL      Platelets 226 10*3/mm3      Neutrophil % 62.0 %      Lymphocyte % 25.2 %      Monocyte % 10.9 %      Eosinophil % 0.7 %      Basophil % 0.6 %      Immature Grans % 0.6 %      Neutrophils, Absolute 5.13 10*3/mm3      Lymphocytes, Absolute 2.08 10*3/mm3      Monocytes, Absolute 0.90 10*3/mm3      Eosinophils, Absolute 0.06 10*3/mm3      Basophils, Absolute 0.05 10*3/mm3      Immature Grans, Absolute 0.05 10*3/mm3      nRBC 0.0 /100 WBC               Assessment & Plan       Pneumothorax on right    Type 2 diabetes mellitus with hyperglycemia (HCC)    Benign essential HTN    A-fib (HCC)    Obesity, morbid, BMI 40.0-49.9 (HCC)    Cardiomyopathy, dilated (HCC)    Long term (current) use of anticoagulants    Acute respiratory failure with hypoxia (HCC)      Assessment & Plan    I have independently reviewed the patient's radiographic imaging which demonstrates a significant right-sided pneumothorax.    Right pneumothorax: Etiology uncertain, but possibly related to recent BiPAP use.  Follow-up imaging status post chest tube placement demonstrates interval improvement to pleural separation although significant space remains.  Leave chest tube to -20 cm of suction and recheck an x-ray in the morning.  Patient to perform incentive spirometry 10 times per hour.  Avoid oral anticoagulation with chest tube in place.  Transfer to  E. for chest tube management.       I discussed the patients findings and our recommendations with patient, nursing staff and Dr. Bee    Thank you for this consult and allowing us to participate in the care of your patient.  We will follow along with you during this hospitalization.       Beth Ludwig, DNP, APRN  [facility]  03/10/23  14:33 EST    I spent >65 minutes reviewing the patient's chart including medical history, notes, radiographic imaging, labs and performing an assessment and development of a plan and discussion with the patient/family at bedside.        Electronically signed by Ron Bee MD at 03/10/23 6199

## 2023-03-13 NOTE — PLAN OF CARE
Goal Outcome Evaluation:  VSS. No c/o pain. CT -20 suction, no air leak noted. Pulmonary hygiene encouraged. Chest xray in am. Will continue to monitor.

## 2023-03-13 NOTE — CASE MANAGEMENT/SOCIAL WORK
Discharge Planning Assessment  [facility]     Patient Name: Ray Pratt  MRN: [id number]  Today's Date: 3/13/2023    Admit Date: 3/10/2023    Plan: Home   Discharge Needs Assessment     Row Name 03/13/23 1658       Living Environment    People in Home spouse    Current Living Arrangements home    Primary Care Provided by self    Provides Primary Care For no one    Family Caregiver if Needed spouse    Quality of Family Relationships supportive       Resource/Environmental Concerns    Resource/Environmental Concerns none       Transition Planning    Patient/Family Anticipates Transition to home with family    Transportation Anticipated family or friend will provide       Discharge Needs Assessment    Equipment Currently Used at Home bipap    Concerns to be Addressed no discharge needs identified    Equipment Needed After Discharge none               Discharge Plan     Row Name 03/13/23 1700       Plan    Plan Home    Patient/Family in Agreement with Plan yes    Plan Comments Met with pt at bedside. Introduced self, explained CCP role, facesheet verified. Pt states he lives with wife and is independent with ADLs.  Uses Bipap from [place] at home, no other DME.  No history of HH or SNF.  Plans to return home at discharge.  If home O2 needed, will use [facility].  CCP will follow. MARISOL Grigsby RN              Continued Care and Services - Admitted Since 3/10/2023    Coordination has not been started for this encounter.       Expected Discharge Date and Time     Expected Discharge Date Expected Discharge Time    Mar 16, 2023          Demographic Summary     Row Name 03/13/23 1658       General Information    Admission Type inpatient    Arrived From home    Referral Source admission list    Reason for Consult discharge planning    Preferred Language English       Contact Information    Permission Granted to Share Info With family/designee  Asha Pratt (spouse) [phone]               Functional Status    No  documentation.                Psychosocial    No documentation.                Abuse/Neglect    No documentation.                Legal    No documentation.                Substance Abuse    No documentation.                Patient Forms    No documentation.                   Abigail Grigsby RN

## 2023-03-13 NOTE — PROGRESS NOTES
Name: Ray Pratt ADMIT: 3/10/2023   : 1945  PCP: Marcial Jackson DO    MRN: [id number] LOS: 3 days   AGE/SEX: 77 y.o. male  ROOM: [facility] ([place])     Subjective   Subjective   No new issues overnight.  Up ambulating in the room.  Pain controlled.      Review of Systems     Objective   Objective   Vital Signs  Temp:  [97.7 °F (36.5 °C)-98.3 °F (36.8 °C)] 97.8 °F (36.6 °C)  Heart Rate:  [62-76] 62  Resp:  [18] 18  BP: (108-125)/(67-92) 108/67  SpO2:  [94 %-98 %] 95 %  on  Flow (L/min):  [2] 2;   Device (Oxygen Therapy): nasal cannula  Body mass index is 39.82 kg/m².  Physical Exam  Vitals and nursing note reviewed.   Constitutional:       General: He is not in acute distress.     Appearance: He is obese.   Cardiovascular:      Rate and Rhythm: Normal rate and regular rhythm.   Pulmonary:      Effort: Pulmonary effort is normal.      Breath sounds: Normal breath sounds.   Chest:      Comments: Right-sided chest tube present  Abdominal:      General: Bowel sounds are normal.      Palpations: Abdomen is soft.      Tenderness: There is no abdominal tenderness.   Musculoskeletal:         General: No swelling.   Skin:     General: Skin is warm and dry.   Neurological:      Mental Status: He is alert. Mental status is at baseline.       Results Review     I reviewed the patient's new clinical results.  Results from last 7 days   Lab Units 23  0649 03/10/23  1007   WBC 10*3/mm3 7.36 8.27   HEMOGLOBIN g/dL 14.5 16.2   PLATELETS 10*3/mm3 209 226     Results from last 7 days   Lab Units 23  0649 03/10/23  1007   SODIUM mmol/L 138 137   POTASSIUM mmol/L 3.8 3.9   CHLORIDE mmol/L 102 102   CO2 mmol/L 26.6 24.0   BUN mg/dL 16 19   CREATININE mg/dL 0.96 0.99   GLUCOSE mg/dL 125* 130*   EGFR mL/min/1.73 81.4 78.5     Results from last 7 days   Lab Units 03/10/23  1007   ALBUMIN g/dL 4.2   BILIRUBIN mg/dL 0.5   ALK PHOS U/L 61   AST (SGOT) U/L 21   ALT (SGPT) U/L 14     Results from last 7 days   Lab Units  03/11/23  0649 03/10/23  1007   CALCIUM mg/dL 9.2 9.4   ALBUMIN g/dL  --  4.2       Hemoglobin A1C   Date/Time Value Ref Range Status   03/11/2023 0649 7.00 (H) 4.80 - 5.60 % Final     Glucose   Date/Time Value Ref Range Status   03/13/2023 1058 163 (H) 70 - 130 mg/dL Final     Comment:     Meter: [id number] : [employer]   03/13/2023 0643 98 70 - 130 mg/dL Final     Comment:     Meter: [id number] : [id number] Raaravind Merlos NA   03/12/2023 2138 113 70 - 130 mg/dL Final     Comment:     Meter: [id number] : [id number] Raaravind Merlos NA   03/12/2023 1644 114 70 - 130 mg/dL Final     Comment:     Meter: [id number] : [id number] Fanny Bergeron NA   03/12/2023 1056 157 (H) 70 - 130 mg/dL Final     Comment:     Meter: [id number] : [id number] Navneet Goyale NA   03/12/2023 0534 112 70 - 130 mg/dL Final     Comment:     Meter: [id number] : [id number] Biry Denny PCA   03/11/2023 2101 163 (H) 70 - 130 mg/dL Final     Comment:     Meter: [id number] : [id number] Bielefield Denny PCA       XR Chest 1 View    Result Date: 3/12/2023  Interval increase of right pneumothorax and right sided subcutaneous emphysema.  Discussed by telephone with patient's nurse, Sara, at time of interpretation, 0652, 03/12/2023.  This report was finalized on 3/12/2023 6:56 AM by Dr. Peter Daugherty M.D.      I have personally reviewed all medications:  Scheduled Medications  atenolol, 25 mg, Oral, Daily  dofetilide, 125 mcg, Oral, Q12H  empagliflozin, 25 mg, Oral, Daily With Breakfast  enoxaparin, 40 mg, Subcutaneous, Q24H  furosemide, 40 mg, Oral, Daily  insulin lispro, 0-9 Units, Subcutaneous, 4x Daily With Meals & Nightly  linagliptin, 5 mg, Oral, Daily  potassium chloride, 20 mEq, Oral, Daily  rosuvastatin, 10 mg, Oral, Daily    Infusions   Diet  Diet: Cardiac Diets, Diabetic Diets; Healthy Heart (2-3 Na+); Consistent Carbohydrate; Texture: Regular Texture (IDDSI 7); Fluid Consistency: Thin (IDDSI  0)    I have personally reviewed:  []  Laboratory   []  Microbiology   [x]  Radiology   []  EKG/Telemetry  []  Cardiology/Vascular   []  Pathology    []  Records       Assessment/Plan     Active Hospital Problems    Diagnosis  POA   • **Pneumothorax on right [J93.9]  Yes   • 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, J98.19]  Unknown   • Acute respiratory failure with hypoxia (HCC) [J96.01]  Yes   • Long term (current) use of anticoagulants [Z79.01]  Not Applicable   • Obesity, morbid, BMI 40.0-49.9 (HCC) [E66.01]  Yes   • Cardiomyopathy, dilated (HCC) [I42.0]  Yes   • Obstructive sleep apnea [G47.33]  Yes   • Chronic combined systolic and diastolic congestive heart failure (HCC) [I50.42]  Yes   • Atrial fibrillation, persistent (CMS/HCC) [I48.19]  Yes   • Type 2 diabetes mellitus with hyperglycemia, without long-term current use of insulin (HCC) [E11.65]  Yes   • Benign essential HTN [I10]  Yes      Resolved Hospital Problems   No resolved problems to display.       Bullous emphysema with right pneumothorax  -Chest x-ray reviewed with patient shows slight improvement in size of pneumothorax but still requiring chest tube per thoracic surgery   -acute hypoxic respiratory failure at time of admission with O2 sat 88% on room air with respiratory distress; improved  -CT chest indicates bullous emphysema  -Anticipate will need home O2 at discharge     DM2, non-insulin requiring  -Jardiance and linagliptin  -BG acceptable, continue current SSI     Persistent atrial fibrillation, rate controlled  -RC: Atenolol 25 mg; dofetilide 125 mcg twice daily  -AC: Apixaban on hold due to above; on prophylactic dose Lovenox     Dilated CM/chronic systolic and diastolic CHF  -TTE (2/20/2023): 65%  -Lasix 40 mg daily     Elevated troponin  -Troponin 22 OA  -EKG without ischemic changes; known RBBB  -In setting of hypoxia due to above, not ACS    Obstructive sleep apnea  Per thoracic surgery will need to stop BiPAP for 2 to 3 months  after discharge  Will evaluate for home oxygen prior to discharge      · Lovenox 40 mg SC daily for DVT prophylaxis.  · Full code.  · Discussed with patient and family.  · Anticipate discharge home with family when cleared by consultants.      Jose Granados MD  [facility]  03/13/23  13:18 EDT

## 2023-03-13 NOTE — PROGRESS NOTES
"    Chief Complaint: Spontaneous pneumothorax, right  S/P: Right thoracostomy tube placement  POD # 3    Subjective:  Symptoms:  Stable.  He reports shortness of breath and chest pain.    Diet:  No nausea or vomiting.    Activity level: Returning to normal.    Pain:  He complains of pain that is mild.  Pain is well controlled.        Vital Signs:  Temp:  [97.7 °F (36.5 °C)-98.3 °F (36.8 °C)] 97.8 °F (36.6 °C)  Heart Rate:  [62-76] 62  Resp:  [18] 18  BP: (108-125)/(67-92) 108/67    Intake & Output (last day)       03/12 0701  03/13 0700 03/13 0701  03/14 0700    P.O. 1320 240    Total Intake(mL/kg) 1320 (12.6) 240 (2.3)    Urine (mL/kg/hr) 0 (0)     Stool 0     Chest Tube 0     Total Output 0     Net +1320 +240          Urine Unmeasured Occurrence 8 x     Stool Unmeasured Occurrence 2 x           Objective:  General Appearance:  Comfortable and in no acute distress.    Vital signs: (most recent): Blood pressure 108/67, pulse 62, temperature 97.8 °F (36.6 °C), temperature source Oral, resp. rate 18, height 162.6 cm (64\"), weight 105 kg (232 lb), SpO2 95 %.  Vital signs are normal.  No fever.    Lungs:  Normal effort and normal respiratory rate.  There are decreased breath sounds.  No rales, wheezes or rhonchi.    Heart: Normal rate.  Regular rhythm.    Chest: Chest wall tenderness (Right chest wall, chest tube site) present.    Abdomen: Abdomen is soft.  Bowel sounds are normal.   There is no abdominal tenderness.     Neurological: Patient is alert and oriented to person, place and time.    Skin:  Warm and dry.              Chest tube:   Site: Right, Clean, Dry, Intact and Securement device intact  Suction: -20 cm  Air Leak: positive  24 Hour Total: 25cc    Results Review:     I reviewed the patient's new clinical results.  I reviewed the patient's new imaging results and agree with the interpretation.  I reviewed the patient's other test results and agree with the interpretation  Discussed with Patient, RN, family " at bedside and Dr. Cordova    Imaging Results (Last 24 Hours)     Procedure Component Value Units Date/Time    XR Chest 1 View [[id number]] Collected: 03/13/23 0736     Updated: 03/13/23 0741    Narrative:      XR CHEST 1 VW-clinical: Chest tube management     COMPARISON CT chest and chest radiograph 3/12/2023     FINDINGS: Right-sided pneumothorax is slightly smaller compared to the  previous chest radiograph and CT. Subcutaneous emphysema right chest  wall again seen. There are areas of atelectasis and infiltrate involving  the mid and lower lung zones bilaterally as before. The  cardiomediastinal silhouette is stable. No vascular congestion seen. The  remainder is unremarkable.     This report was finalized on 3/13/2023 7:38 AM by Dr. Pipe Yepez M.D.       CT Chest Without Contrast Diagnostic [[id number]] Collected: 03/12/23 1524     Updated: 03/13/23 0736    Narrative:      CT CHEST WITHOUT CONTRAST     HISTORY: 77-year-old male with a large spontaneous right pneumothorax  and hypoxia. Evaluate for bullous emphysema.     TECHNIQUE: Radiation dose reduction techniques were utilized, including  automated exposure control and exposure modulation based on body size.   3 mm images were obtained through the chest without the administration  of IV contrast. Compared with recent chest radiographs.     FINDINGS:      There is very mild emphysematous change at the left upper lobe. There is  bandlike atelectasis/scarring at the lingula and left lower lobe. There  is a prominent calcified granuloma at the medial left lung base and  there is a noncalcified sub-6 mm left lower lobe pulmonary nodule,  series 3 image 65.     On the right, there are several bulla. There is an approximately 6 cm  bulla at the right middle lobe and a smaller subpleural bulla laterally  within the right middle. There is also a cluster of subpleural blebs at  the lateral aspect of the right lower lobe with the largest measuring  2.2 cm. The  right-sided chest tube is within the major fissure. The  remaining pneumothorax is predominantly at the anterior aspect of the  chest with the largest volume anterior to the right middle lobe. There  is a minimal right pleural effusion. There is very significant  atelectatic change at the right upper lobe and to lesser degree at the  right lower and middle lobes.     This report was finalized on 3/13/2023 7:33 AM by Dr. Irma Sharma M.D.             Lab Results:     Lab Results (last 24 hours)     Procedure Component Value Units Date/Time    POC Glucose Once [[id number]]  (Abnormal) Collected: 03/13/23 1058    Specimen: Blood Updated: 03/13/23 1059     Glucose 163 mg/dL      Comment: Meter: [id number] : METERS [employer]       POC Glucose Once [[id number]]  (Normal) Collected: 03/13/23 0643    Specimen: Blood Updated: 03/13/23 0644     Glucose 98 mg/dL      Comment: Meter: [id number] : [id number] Ra BERNARDO       POC Glucose Once [[id number]]  (Normal) Collected: 03/12/23 2138    Specimen: Blood Updated: 03/12/23 2140     Glucose 113 mg/dL      Comment: Meter: [id number] : [id number] Ra BERNARDO       POC Glucose Once [[id number]]  (Normal) Collected: 03/12/23 1644    Specimen: Blood Updated: 03/12/23 1645     Glucose 114 mg/dL      Comment: Meter: [id number] : [id number] Fanny BERNARDO              Assessment & Plan       Pneumothorax on right    Type 2 diabetes mellitus with hyperglycemia, without long-term current use of insulin (HCC)    Benign essential HTN    Atrial fibrillation, persistent (CMS/HCC)    Chronic combined systolic and diastolic congestive heart failure (HCC)    Obstructive sleep apnea    Obesity, morbid, BMI 40.0-49.9 (HCC)    Cardiomyopathy, dilated (HCC)    Long term (current) use of anticoagulants    Acute respiratory failure with hypoxia (HCC)    Bullous emphysema with collapse (HCC)       Assessment & Plan     I independently reviewed the patient's chest x-ray  performed earlier this morning which demonstrates improvement in right apical pleural separation.    Spontaneous right pneumothorax: Likely secondary to bullous disease.  Status post right chest tube placement with recurrent pneumo thorax over the weekend when the chest tube became kinked.  Pleural separation appears improved on today's imaging.  Continues to have a airleak to chest tube.  We will increase the suction to -40 and recheck a chest x-ray in the morning.    Acute respiratory failure with hypoxia: Improved s/p chest tube placement for pneumothorax.  Supplemental oxygen as needed, currently on 2 L.  He will likely need supplemental oxygen at discharge, particularly with inability to use positive pressure ventilation.    Atrial fibrillation: Patient is anticoagulated with Eliquis.  We will need to hold while he has chest tube in place.  Okay for prophylaxis Lovenox or subcu heparin if needed.    Obstructive sleep apnea: Patient utilizes BiPAP at home and follows with sleep medicine.  He will need to refrain from positive pressure ventilation for at least 8 to 12 weeks after he recovers from pneumothorax.    Beth Ludwig DNP, APRN  [facility]  03/13/23  11:39 EDT

## 2023-03-14 ENCOUNTER — APPOINTMENT (OUTPATIENT)
Dept: GENERAL RADIOLOGY | Facility: HOSPITAL | Age: 78
DRG: 199 | End: 2023-03-14
Payer: MEDICARE

## 2023-03-14 LAB
GLUCOSE BLDC GLUCOMTR-MCNC: 107 MG/DL (ref 70–130)
GLUCOSE BLDC GLUCOMTR-MCNC: 109 MG/DL (ref 70–130)
GLUCOSE BLDC GLUCOMTR-MCNC: 114 MG/DL (ref 70–130)
GLUCOSE BLDC GLUCOMTR-MCNC: 132 MG/DL (ref 70–130)
GLUCOSE BLDC GLUCOMTR-MCNC: 189 MG/DL (ref 70–130)

## 2023-03-14 PROCEDURE — 25010000002 ENOXAPARIN PER 10 MG: Performed by: HOSPITALIST

## 2023-03-14 PROCEDURE — 71045 X-RAY EXAM CHEST 1 VIEW: CPT

## 2023-03-14 PROCEDURE — 99232 SBSQ HOSP IP/OBS MODERATE 35: CPT | Performed by: NURSE PRACTITIONER

## 2023-03-14 PROCEDURE — 82962 GLUCOSE BLOOD TEST: CPT

## 2023-03-14 RX ADMIN — LINAGLIPTIN 5 MG: 5 TABLET, FILM COATED ORAL at 08:05

## 2023-03-14 RX ADMIN — DOFETILIDE 125 MCG: 0.12 CAPSULE ORAL at 20:41

## 2023-03-14 RX ADMIN — DOFETILIDE 125 MCG: 0.12 CAPSULE ORAL at 08:05

## 2023-03-14 RX ADMIN — FUROSEMIDE 40 MG: 40 TABLET ORAL at 08:05

## 2023-03-14 RX ADMIN — ENOXAPARIN SODIUM 40 MG: 100 INJECTION SUBCUTANEOUS at 15:20

## 2023-03-14 RX ADMIN — POTASSIUM CHLORIDE 20 MEQ: 750 TABLET, EXTENDED RELEASE ORAL at 08:05

## 2023-03-14 RX ADMIN — ROSUVASTATIN CALCIUM 10 MG: 10 TABLET, FILM COATED ORAL at 08:05

## 2023-03-14 RX ADMIN — ATENOLOL 25 MG: 25 TABLET ORAL at 08:05

## 2023-03-14 RX ADMIN — EMPAGLIFLOZIN 25 MG: 25 TABLET, FILM COATED ORAL at 08:05

## 2023-03-14 NOTE — PROGRESS NOTES
"    Chief Complaint: Spontaneous pneumothorax, right  S/P: Right thoracostomy tube placement  POD # 4    Subjective:  Symptoms:  Stable.  He reports shortness of breath and chest pain.    Diet:  No nausea or vomiting.    Activity level: Returning to normal.    Pain:  He complains of pain that is mild.  Pain is well controlled.        Vital Signs:  Temp:  [97.7 °F (36.5 °C)-98.3 °F (36.8 °C)] 97.7 °F (36.5 °C)  Heart Rate:  [62-67] 67  Resp:  [18] 18  BP: (106-123)/(65-79) 106/72    Intake & Output (last day)       03/13 0701  03/14 0700 03/14 0701  03/15 0700    P.O. 820     Total Intake(mL/kg) 820 (7.8)     Urine (mL/kg/hr) 0 (0)     Stool      Chest Tube 10     Total Output 10     Net +810           Urine Unmeasured Occurrence 2 x 1 x          Objective:  General Appearance:  Comfortable and in no acute distress.    Vital signs: (most recent): Blood pressure 106/72, pulse 67, temperature 97.7 °F (36.5 °C), temperature source Oral, resp. rate 18, height 162.6 cm (64\"), weight 105 kg (232 lb), SpO2 97 %.  Vital signs are normal.  No fever.    Lungs:  Normal effort and normal respiratory rate.  There are decreased breath sounds.  No rales, wheezes or rhonchi.    Heart: Normal rate.  Regular rhythm.    Chest: Chest wall tenderness (Right chest wall, chest tube site) present.    Abdomen: Abdomen is soft.  Bowel sounds are normal.   There is no abdominal tenderness.     Neurological: Patient is alert and oriented to person, place and time.    Skin:  Warm and dry.              Chest tube:   Site: Right, Clean, Dry, Intact and Securement device intact  Suction: -40 cm  Air Leak: positive  24 Hour Total: 10cc    Results Review:     I reviewed the patient's new clinical results.  I reviewed the patient's new imaging results and agree with the interpretation.  I reviewed the patient's other test results and agree with the interpretation  Discussed with Patient, RN, family at bedside and Dr. Laird.    Imaging Results (Last 24 " Hours)     Procedure Component Value Units Date/Time    XR Chest 1 View [[id number]] Resulted: 03/14/23 1102     Updated: 03/14/23 1102    XR Chest 1 View [[id number]] Collected: 03/14/23 0818     Updated: 03/14/23 0837    Narrative:      EXAMINATION: SINGLE VIEW CHEST RADIOGRAPH     HISTORY: 77-year-old male undergoing chest tube management.     FINDINGS: An upright AP portable chest radiograph was obtained.  Comparison is made to a chest radiograph dated 03/13/2023. There is mild  bibasilar atelectasis. Improved but persistent subcutaneous gas in the  right hemithorax is noted. There is no evidence for pneumothorax. The  cardiac silhouette is partially obscured due to reduced lung volumes.  The visualized osseous structures appear normal.       Impression:      Interval reduction in right-sided subcutaneous emphysema.  Persistent but mildly improved bibasilar atelectasis is noted. A chest  tube is not visualized. There is no evidence for a pneumothorax.     This report was finalized on 3/14/2023 8:34 AM by Dr. Jermaine Hickman M.D.       XR Chest 1 View [[id number]] Collected: 03/13/23 1422     Updated: 03/13/23 1427    Narrative:      XR CHEST 1 VW-     HISTORY: Male who is 77 years-old,  chest tube, pneumothorax     TECHNIQUE: Frontal view of the chest     COMPARISON: 03/13/2023 0509 hours     FINDINGS: Right chest tube appears stable, with persistent subcutaneous  emphysema on the right, similar to prior exam. The heart size is normal.  Aorta is tortuous. Pulmonary vasculature is unremarkable. Previous right  pneumothorax is decreased, measuring about 8 mm thickness, previously  about 2.1 cm. No left pneumothorax. Mild likely atelectasis in the right  lower lung. No large pleural effusion. No acute osseous process.       Impression:      Persistent, decreased right pneumothorax.     This report was finalized on 3/13/2023 2:24 PM by Dr. Peter Daugherty M.D.             Lab Results:     Lab Results (last 24  hours)     Procedure Component Value Units Date/Time    POC Glucose Once [[id number]]  (Abnormal) Collected: 03/14/23 1059    Specimen: Blood Updated: 03/14/23 1101     Glucose 132 mg/dL      Comment: Meter: [id number] : [id number] Navneet Barboza NA       POC Glucose Once [[id number]]  (Normal) Collected: 03/14/23 0606    Specimen: Blood Updated: 03/14/23 0608     Glucose 109 mg/dL      Comment: Meter: [id number] : [id number] Ashutosh Nagel NA       POC Glucose Once [[id number]]  (Abnormal) Collected: 03/13/23 2025    Specimen: Blood Updated: 03/13/23 2027     Glucose 140 mg/dL      Comment: Meter: [id number] : [id number] Javad Meena LAZARA       POC Glucose Once [[id number]]  (Normal) Collected: 03/13/23 1600    Specimen: Blood Updated: 03/13/23 1602     Glucose 113 mg/dL      Comment: Meter: [id number] : [employer]              Assessment & Plan       Pneumothorax on right    Type 2 diabetes mellitus with hyperglycemia, without long-term current use of insulin (HCC)    Benign essential HTN    Atrial fibrillation, persistent (CMS/HCC)    Chronic combined systolic and diastolic congestive heart failure (HCC)    Obstructive sleep apnea    Obesity, morbid, BMI 40.0-49.9 (HCC)    Cardiomyopathy, dilated (HCC)    Long term (current) use of anticoagulants    Acute respiratory failure with hypoxia (HCC)    Bullous emphysema with collapse (HCC)       Assessment & Plan     I independently reviewed the patient's chest x-ray performed earlier this morning which demonstrates continued improvement in right apical pleural separation.  Chest tube in stable position.  Some decrease in right chest wall subcutaneous emphysema is also noted.    Spontaneous right pneumothorax: Likely secondary to bullous disease.  Pneumothorax is improved on this morning's chest x-ray.  We will wean chest tube to -20 cm suction today and repeat a chest film in 2 hours.  As long as the imaging is stable we will leave to -20 overnight and  recheck another x-ray in the morning.    Acute respiratory failure with hypoxia: Improved s/p chest tube placement for pneumothorax.  Supplemental oxygen as needed, currently on 2 L.  He will likely need supplemental oxygen at discharge, particularly with inability to use positive pressure ventilation.    Atrial fibrillation: Patient is anticoagulated with Eliquis.  We will need to hold while he has chest tube in place.  Okay for prophylaxis Lovenox or subcu heparin if needed.    Obstructive sleep apnea: Patient utilizes BiPAP at home and follows with sleep medicine.  He will need to refrain from positive pressure ventilation for at least 8 to 12 weeks after he recovers from pneumothorax.    ANICETO Eng  [facility]  03/14/23  11:34 EDT    Patient was seen and assessed while wearing personal protective equipment including facemask, protective eyewear and gloves.  Hand hygiene performed prior to entering the room and upon exiting with doffing of gloves.    Greater than 35 minutes was spent reviewing the patient's chart, radiographic imaging, labs, evaluating the patient and developing a plan of care which was discussed with the patient, his spouse at bedside, RN, and Dr. Laird.

## 2023-03-14 NOTE — PLAN OF CARE
Problem: Adult Inpatient Plan of Care  Goal: Plan of Care Review  Flowsheets (Taken 3/14/2023 4980)  Progress: improving  Plan of Care Reviewed With: patient  Outcome Evaluation: denies pain chest tube to - 40cm of suction minimal serosanguinous drainage noted no air leak no fluctuation oxygen at 2 liters no sob or resp distress   Goal Outcome Evaluation:  Plan of Care Reviewed With: patient        Progress: improving  Outcome Evaluation: denies pain chest tube to - 40cm of suction minimal serosanguinous drainage noted no air leak no fluctuation oxygen at 2 liters no sob or resp distress

## 2023-03-14 NOTE — PROGRESS NOTES
Name: Ray Pratt ADMIT: 3/10/2023   : 1945  PCP: Manuel Marcial     MRN: [id number] LOS: 4 days   AGE/SEX: 77 y.o. male  ROOM: [facility]     Subjective   Subjective   Chest tube still present.  No new complaints.    Review of Systems     Objective   Objective   Vital Signs  Temp:  [97.7 °F (36.5 °C)-98.3 °F (36.8 °C)] 97.7 °F (36.5 °C)  Heart Rate:  [62-67] 67  Resp:  [18] 18  BP: (106-123)/(65-79) 106/72  SpO2:  [92 %-97 %] 97 %  on  Flow (L/min):  [2] 2;   Device (Oxygen Therapy): nasal cannula  Body mass index is 39.82 kg/m².  Physical Exam  Vitals and nursing note reviewed.   Constitutional:       General: He is not in acute distress.     Appearance: He is obese.   Cardiovascular:      Rate and Rhythm: Normal rate and regular rhythm.   Pulmonary:      Effort: Pulmonary effort is normal.      Breath sounds: Normal breath sounds.   Chest:      Comments: Right-sided chest tube present  Abdominal:      General: Bowel sounds are normal.      Palpations: Abdomen is soft.      Tenderness: There is no abdominal tenderness.   Musculoskeletal:         General: No swelling.   Skin:     General: Skin is warm and dry.   Neurological:      Mental Status: He is alert. Mental status is at baseline.       Results Review     I reviewed the patient's new clinical results.  Results from last 7 days   Lab Units 23  0649 03/10/23  1007   WBC 10*3/mm3 7.36 8.27   HEMOGLOBIN g/dL 14.5 16.2   PLATELETS 10*3/mm3 209 226     Results from last 7 days   Lab Units 23  0649 03/10/23  1007   SODIUM mmol/L 138 137   POTASSIUM mmol/L 3.8 3.9   CHLORIDE mmol/L 102 102   CO2 mmol/L 26.6 24.0   BUN mg/dL 16 19   CREATININE mg/dL 0.96 0.99   GLUCOSE mg/dL 125* 130*   EGFR mL/min/1.73 81.4 78.5     Results from last 7 days   Lab Units 03/10/23  1007   ALBUMIN g/dL 4.2   BILIRUBIN mg/dL 0.5   ALK PHOS U/L 61   AST (SGOT) U/L 21   ALT (SGPT) U/L 14     Results from last 7 days   Lab Units 23  0649 03/10/23  1007    CALCIUM mg/dL 9.2 9.4   ALBUMIN g/dL  --  4.2       Glucose   Date/Time Value Ref Range Status   03/14/2023 1059 132 (H) 70 - 130 mg/dL Final     Comment:     Meter: [id number] : [id number] Navneet Barboza NA   03/14/2023 0606 109 70 - 130 mg/dL Final     Comment:     Meter: [id number] : [id number] Ashutosh Nagel NA   03/13/2023 2025 140 (H) 70 - 130 mg/dL Final     Comment:     Meter: [id number] : [id number] Javad Robledo RN   03/13/2023 1600 113 70 - 130 mg/dL Final     Comment:     Meter: [id number] : METERS [facility]   03/13/2023 1058 163 (H) 70 - 130 mg/dL Final     Comment:     Meter: [id number] : METERS [employer]   03/13/2023 0643 98 70 - 130 mg/dL Final     Comment:     Meter: [id number] : [id number] Ra Merlos    03/12/2023 2138 113 70 - 130 mg/dL Final     Comment:     Meter: [id number] : [id number] Ra BERNARDO       XR Chest 1 View    Result Date: 3/14/2023  Interval reduction in right-sided subcutaneous emphysema. Persistent but mildly improved bibasilar atelectasis is noted. A chest tube is not visualized. There is no evidence for a pneumothorax.  This report was finalized on 3/14/2023 8:34 AM by Dr. Jermaine Hickman M.D.      XR Chest 1 View    Result Date: 3/13/2023  Persistent, decreased right pneumothorax.  This report was finalized on 3/13/2023 2:24 PM by Dr. Peter Daugherty M.D.      I have personally reviewed all medications:  Scheduled Medications  atenolol, 25 mg, Oral, Daily  dofetilide, 125 mcg, Oral, Q12H  empagliflozin, 25 mg, Oral, Daily With Breakfast  enoxaparin, 40 mg, Subcutaneous, Q24H  furosemide, 40 mg, Oral, Daily  insulin lispro, 0-9 Units, Subcutaneous, 4x Daily With Meals & Nightly  linagliptin, 5 mg, Oral, Daily  potassium chloride, 20 mEq, Oral, Daily  rosuvastatin, 10 mg, Oral, Daily    Infusions   Diet  Diet: Cardiac Diets, Diabetic Diets; Healthy Heart (2-3 Na+); Consistent Carbohydrate; Texture: Regular Texture (IDDSI 7); Fluid  Consistency: Thin (IDDSI 0)    I have personally reviewed:  []  Laboratory   []  Microbiology   [x]  Radiology   []  EKG/Telemetry  []  Cardiology/Vascular   []  Pathology    []  Records       Assessment/Plan     Active Hospital Problems    Diagnosis  POA   • **Pneumothorax on right [J93.9]  Yes   • 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, J98.19]  Unknown   • Acute respiratory failure with hypoxia (HCC) [J96.01]  Yes   • Long term (current) use of anticoagulants [Z79.01]  Not Applicable   • Obesity, morbid, BMI 40.0-49.9 (HCC) [E66.01]  Yes   • Cardiomyopathy, dilated (HCC) [I42.0]  Yes   • Obstructive sleep apnea [G47.33]  Yes   • Chronic combined systolic and diastolic congestive heart failure (HCC) [I50.42]  Yes   • Atrial fibrillation, persistent (CMS/HCC) [I48.19]  Yes   • Type 2 diabetes mellitus with hyperglycemia, without long-term current use of insulin (HCC) [E11.65]  Yes   • Benign essential HTN [I10]  Yes      Resolved Hospital Problems   No resolved problems to display.       Bullous emphysema with right pneumothorax  -Chest x-ray shows no further pneumothorax.  Await thoracic surgery follow-up recommendations.  Possible chest tube removal soon.  -acute hypoxic respiratory failure at time of admission with O2 sat 88% on room air with respiratory distress; improved  -CT chest indicates bullous emphysema  -Anticipate will need home O2 at discharge     DM2, non-insulin requiring  -Jardiance and linagliptin  -BG acceptable, continue current SSI     Persistent atrial fibrillation, rate controlled  -RC: Atenolol 25 mg; dofetilide 125 mcg twice daily  -AC: Apixaban on hold due to above; on prophylactic dose Lovenox     Dilated CM/chronic systolic and diastolic CHF  -TTE (2/20/2023): 65%  -Lasix 40 mg daily     Elevated troponin  -Troponin 22 OA  -EKG without ischemic changes; known RBBB  -In setting of hypoxia due to above, not ACS    Obstructive sleep apnea  Per thoracic surgery will need to stop BiPAP  for 2 to 3 months after discharge  Will evaluate for home oxygen on day of discharge      · Lovenox 40 mg SC daily for DVT prophylaxis.  · Full code.  · Discussed with patient.  · Anticipate discharge home with family when cleared by consultants.      Jose Granados MD  [facility]  03/14/23  11:33 EDT

## 2023-03-14 NOTE — PLAN OF CARE
Goal Outcome Evaluation:  Plan of Care Reviewed With: patient        Progress: improving  Outcome Evaluation: VSS, no complaints. Chest tube to -20sx. O2 at 2LNC.

## 2023-03-15 ENCOUNTER — APPOINTMENT (OUTPATIENT)
Dept: GENERAL RADIOLOGY | Facility: HOSPITAL | Age: 78
DRG: 199 | End: 2023-03-15
Payer: MEDICARE

## 2023-03-15 LAB
GLUCOSE BLDC GLUCOMTR-MCNC: 101 MG/DL (ref 70–130)
GLUCOSE BLDC GLUCOMTR-MCNC: 108 MG/DL (ref 70–130)
GLUCOSE BLDC GLUCOMTR-MCNC: 126 MG/DL (ref 70–130)
GLUCOSE BLDC GLUCOMTR-MCNC: 153 MG/DL (ref 70–130)

## 2023-03-15 PROCEDURE — 71045 X-RAY EXAM CHEST 1 VIEW: CPT

## 2023-03-15 PROCEDURE — 99232 SBSQ HOSP IP/OBS MODERATE 35: CPT

## 2023-03-15 PROCEDURE — 63710000001 INSULIN LISPRO (HUMAN) PER 5 UNITS: Performed by: NURSE PRACTITIONER

## 2023-03-15 PROCEDURE — 25010000002 ENOXAPARIN PER 10 MG: Performed by: HOSPITALIST

## 2023-03-15 PROCEDURE — 82962 GLUCOSE BLOOD TEST: CPT

## 2023-03-15 RX ADMIN — ROSUVASTATIN CALCIUM 10 MG: 10 TABLET, FILM COATED ORAL at 08:03

## 2023-03-15 RX ADMIN — ATENOLOL 25 MG: 25 TABLET ORAL at 08:03

## 2023-03-15 RX ADMIN — EMPAGLIFLOZIN 25 MG: 25 TABLET, FILM COATED ORAL at 08:03

## 2023-03-15 RX ADMIN — INSULIN LISPRO 2 UNITS: 100 INJECTION, SOLUTION INTRAVENOUS; SUBCUTANEOUS at 11:31

## 2023-03-15 RX ADMIN — ENOXAPARIN SODIUM 40 MG: 100 INJECTION SUBCUTANEOUS at 14:06

## 2023-03-15 RX ADMIN — LINAGLIPTIN 5 MG: 5 TABLET, FILM COATED ORAL at 08:03

## 2023-03-15 RX ADMIN — FUROSEMIDE 40 MG: 40 TABLET ORAL at 08:03

## 2023-03-15 RX ADMIN — DOFETILIDE 125 MCG: 0.12 CAPSULE ORAL at 11:31

## 2023-03-15 RX ADMIN — POTASSIUM CHLORIDE 20 MEQ: 750 TABLET, EXTENDED RELEASE ORAL at 08:03

## 2023-03-15 RX ADMIN — DOFETILIDE 125 MCG: 0.12 CAPSULE ORAL at 20:28

## 2023-03-15 NOTE — PLAN OF CARE
Goal Outcome Evaluation:  Plan of Care Reviewed With: patient        Progress: improving  Outcome Evaluation: patient vss. no pain reported. chest tube placed to waterseal. overnight oximetry tonight. hopefully home tomorrow

## 2023-03-15 NOTE — PROGRESS NOTES
"    Chief Complaint: Spontaneous pneumothorax, right  S/P: Right thoracostomy tube placement  POD # 5    Subjective:  Symptoms:  Stable.  He reports chest pain (round chest tube ).  No shortness of breath.    Diet:  Adequate intake.  No nausea or vomiting.    Activity level: Returning to normal.    Pain:  He complains of pain that is mild.  Pain is well controlled.     He is up to the recliner with no new complaints.      Vital Signs:  Temp:  [97.4 °F (36.3 °C)-98.7 °F (37.1 °C)] 97.4 °F (36.3 °C)  Heart Rate:  [60-69] 60  Resp:  [18] 18  BP: ()/(60-75) 102/60    Intake & Output (last day)       03/14 0701  03/15 0700 03/15 0701  03/16 0700    P.O. 600 240    Total Intake(mL/kg) 600 (5.7) 240 (2.3)    Urine (mL/kg/hr)      Chest Tube 5     Total Output 5     Net +595 +240          Urine Unmeasured Occurrence 1 x           Objective:  General Appearance:  Comfortable, in no acute distress, in pain and well-appearing.    Vital signs: (most recent): Blood pressure 102/60, pulse 60, temperature 97.4 °F (36.3 °C), temperature source Oral, resp. rate 18, height 162.6 cm (64\"), weight 105 kg (232 lb), SpO2 96 %.  Vital signs are normal.  No fever.    Lungs:  Normal effort and normal respiratory rate.  There are decreased breath sounds.  No rales, wheezes or rhonchi.    Heart: Normal rate.  Regular rhythm.    Chest: Symmetric chest wall expansion. Chest wall tenderness (Right chest wall, chest tube site. ) present.    Abdomen: Abdomen is soft.  Bowel sounds are normal.   There is no abdominal tenderness.     Neurological: Patient is alert and oriented to person, place and time.    Skin:  Warm and dry.              Chest tube:   Site: Right, Clean, Dry, Intact and Securement device intact  Suction: -20 cm  Air Leak: Negative.   24 Hour Total: 5cc    Results Review:     I reviewed the patient's new clinical results.  I reviewed the patient's new imaging results and agree with the interpretation.  I reviewed the " patient's other test results and agree with the interpretation  Discussed with Patient, RN, family at bedside and Dr. Bee     Imaging Results (Last 24 Hours)     Procedure Component Value Units Date/Time    XR Chest 1 View [[id number]] Collected: 03/15/23 0621     Updated: 03/15/23 0627    Narrative:      ONE VIEW PORTABLE CHEST AT 5:24 AM     HISTORY: Follow-up of right-sided pneumothorax. Previous chest tube.     A right-sided chest tube is not visualized on the current exam. There is  some minimal subcutaneous emphysema along the right lateral chest wall  and base of neck and this is unchanged from yesterday. There is no  evidence of pneumothorax. There is some minimal atelectasis at the bases  and the lungs are otherwise clear and the heart size is normal.     This report was finalized on 3/15/2023 6:23 AM by Dr. Romel Moreno M.D.       XR Chest 1 View [[id number]] Collected: 03/14/23 1319     Updated: 03/14/23 1334    Narrative:      EXAMINATION: SINGLE VIEW CHEST RADIOGRAPH     HISTORY: 77-year-old male undergoing chest tube management.     FINDINGS: An upright AP portable chest radiograph was obtained.  Comparison is made to a chest radiograph dated 03/14/2023. There is mild  atelectasis at the base of the right lung. A chest tube is not  visualized. There is no evidence for a pneumothorax or pleural effusion.  Persistent stable subcutaneous gas along the right hemithorax and at the  base of the neck on the right is noted. The cardiomediastinal silhouette  is within normal limits. The left lung is clear.       Impression:      There is mild atelectasis at the base of the right lung. A  pneumothorax is not visualized. Persistent right-sided subcutaneous gas  is noted.     This report was finalized on 3/14/2023 1:31 PM by Dr. Jermaine Hickman M.D.             Lab Results:     Lab Results (last 24 hours)     Procedure Component Value Units Date/Time    POC Glucose Once [[id number]]  (Abnormal) Collected:  03/15/23 1046    Specimen: Blood Updated: 03/15/23 1046     Glucose 153 mg/dL      Comment: Meter: [id number] : [id number] Moemarie Martha [place]       POC Glucose Once [[id number]]  (Normal) Collected: 03/15/23 0538    Specimen: Blood Updated: 03/15/23 0539     Glucose 108 mg/dL      Comment: Meter: [id number] : [id number] Ashutosh Shona NA       POC Glucose Once [[id number]]  (Abnormal) Collected: 03/14/23 2133    Specimen: Blood Updated: 03/14/23 2135     Glucose 189 mg/dL      Comment: Meter: [id number] : [id number] Ashutosh Shona NA       POC Glucose Once [[id number]]  (Normal) Collected: 03/14/23 2032    Specimen: Blood Updated: 03/14/23 2033     Glucose 114 mg/dL      Comment: Meter: [id number] : [id number] Javad Meena LAZARA       POC Glucose Once [[id number]]  (Normal) Collected: 03/14/23 1613    Specimen: Blood Updated: 03/14/23 1615     Glucose 107 mg/dL      Comment: Meter: [id number] : [id number] Garcia BERNARDO              Assessment & Plan       Pneumothorax on right    Type 2 diabetes mellitus with hyperglycemia, without long-term current use of insulin (HCC)    Benign essential HTN    Atrial fibrillation, persistent (CMS/HCC)    Chronic combined systolic and diastolic congestive heart failure (HCC)    Obstructive sleep apnea    Obesity, morbid, BMI 40.0-49.9 ([facility])    Cardiomyopathy, dilated ([facility])    Long term (current) use of anticoagulants    Acute respiratory failure with hypoxia ([facility])    Bullous emphysema with collapse ([facility])       Assessment & Plan     I have independently reviewed this morning chest x-ray which demonstrates continued resolution of the right pneumothorax. There is trace bibasilar atelectasis. The chest tube is in position.  He endorses a mild amount of right chest wall pain around the chest tube.  Continue oral analgesic medications. He has been weaned to 2 L of oxygen via nasal cannula, continue to wean as able.     Spontaneous right pneumothorax: Likely secondary to bullous  disease.  His chest tube remained to -20 cm of suction overnight and the pneumothorax remains resolved on a.m. chest x-ray.  We will transition the chest tube to waterseal and repeat a chest x-ray in the morning.  No air leak appreciated on exam.  Continue encourage good pulmonary hygiene increase activity as tolerated.    Obstructive sleep apnea: Patient recently converted to BiPAP from CPAP settings and is followed by sleep medicine.  Reiterated that he will need to refrain from use of CPAP/BiPAP for several weeks while he continues to recover from the spontaneous pneumothorax.     ANICETO Narayanan  [facility]  03/15/23  11:13 EDT    Patient was seen and assessed while wearing personal protective equipment including facemask, protective eyewear and gloves.  Hand hygiene performed prior to entering the room and upon exiting with doffing of gloves.

## 2023-03-15 NOTE — PROGRESS NOTES
Name: Ray Pratt ADMIT: 3/10/2023   : 1945  PCP: Marcial Jackson DO    MRN: [id number] LOS: 5 days   AGE/SEX: 77 y.o. male  ROOM: [facility] [place]     Subjective   Subjective   Chest tube still present.  Eating okay. Pain well controlled. Eager to discharge when he is allowed.        Review of Systems   Constitutional: Negative for chills and fever.   Respiratory: Negative for cough and shortness of breath.    Cardiovascular: Negative for chest pain and palpitations.   Gastrointestinal: Negative for abdominal pain, diarrhea, nausea and vomiting.   Neurological: Negative for headaches.        Objective   Objective   Vital Signs  Temp:  [97.4 °F (36.3 °C)-98.7 °F (37.1 °C)] 97.4 °F (36.3 °C)  Heart Rate:  [60-69] 62  Resp:  [18] 18  BP: ()/(60-75) 102/60  SpO2:  [93 %-97 %] 93 %  on  Flow (L/min):  [2] 2;   Device (Oxygen Therapy): room air  Body mass index is 39.82 kg/m².  Physical Exam  Vitals and nursing note reviewed.   Constitutional:       General: He is not in acute distress.     Appearance: He is obese.   Eyes:      Comments: Right chronic lazy eye.   Cardiovascular:      Rate and Rhythm: Normal rate and regular rhythm.   Pulmonary:      Effort: Pulmonary effort is normal.      Breath sounds: Normal breath sounds.   Chest:      Comments: Right-sided chest tube present  Abdominal:      General: Bowel sounds are normal.      Palpations: Abdomen is soft.      Tenderness: There is no abdominal tenderness.   Musculoskeletal:         General: No swelling.   Skin:     General: Skin is warm and dry.   Neurological:      Mental Status: He is alert. Mental status is at baseline.       Results Review     I reviewed the patient's new clinical results.  Results from last 7 days   Lab Units 23  0649 03/10/23  1007   WBC 10*3/mm3 7.36 8.27   HEMOGLOBIN g/dL 14.5 16.2   PLATELETS 10*3/mm3 209 226     Results from last 7 days   Lab Units 23  0649 03/10/23  1007   SODIUM mmol/L 138 137   POTASSIUM  mmol/L 3.8 3.9   CHLORIDE mmol/L 102 102   CO2 mmol/L 26.6 24.0   BUN mg/dL 16 19   CREATININE mg/dL 0.96 0.99   GLUCOSE mg/dL 125* 130*   EGFR mL/min/1.73 81.4 78.5     Results from last 7 days   Lab Units 03/10/23  1007   ALBUMIN g/dL 4.2   BILIRUBIN mg/dL 0.5   ALK PHOS U/L 61   AST (SGOT) U/L 21   ALT (SGPT) U/L 14     Results from last 7 days   Lab Units 03/11/23  0649 03/10/23  1007   CALCIUM mg/dL 9.2 9.4   ALBUMIN g/dL  --  4.2       Glucose   Date/Time Value Ref Range Status   03/15/2023 1046 153 (H) 70 - 130 mg/dL Final     Comment:     Meter: [id number] : [id number] Rob Thomas NA   03/15/2023 0538 108 70 - 130 mg/dL Final     Comment:     Meter: [id number] : [id number] Ashutosh Nagel NA   03/14/2023 2133 189 (H) 70 - 130 mg/dL Final     Comment:     Meter: [id number] : [id number] Ashutosh Nagel NA   03/14/2023 2032 114 70 - 130 mg/dL Final     Comment:     Meter: [id number] : [id number] Javad Robledo RN   03/14/2023 1613 107 70 - 130 mg/dL Final     Comment:     Meter: [id number] : [id number] Garcia Andino NA   03/14/2023 1059 132 (H) 70 - 130 mg/dL Final     Comment:     Meter: [id number] : [id number] Navneet Barboza NA   03/14/2023 0606 109 70 - 130 mg/dL Final     Comment:     Meter: [id number] : [id number] Ashutosh BERNARDO       XR Chest 1 View    Result Date: 3/14/2023  There is mild atelectasis at the base of the right lung. A pneumothorax is not visualized. Persistent right-sided subcutaneous gas is noted.  This report was finalized on 3/14/2023 1:31 PM by Dr. Jermaine Hickman M.D.      XR Chest 1 View    Result Date: 3/14/2023  Interval reduction in right-sided subcutaneous emphysema. Persistent but mildly improved bibasilar atelectasis is noted. A chest tube is not visualized. There is no evidence for a pneumothorax.  This report was finalized on 3/14/2023 8:34 AM by Dr. Jermaine Hickman M.D.      XR Chest 1 View    Result Date: 3/13/2023  Persistent, decreased right  pneumothorax.  This report was finalized on 3/13/2023 2:24 PM by Dr. Peter Daugherty M.D.      I have personally reviewed all medications:  Scheduled Medications  atenolol, 25 mg, Oral, Daily  dofetilide, 125 mcg, Oral, Q12H  empagliflozin, 25 mg, Oral, Daily With Breakfast  enoxaparin, 40 mg, Subcutaneous, Q24H  furosemide, 40 mg, Oral, Daily  insulin lispro, 0-9 Units, Subcutaneous, 4x Daily With Meals & Nightly  linagliptin, 5 mg, Oral, Daily  potassium chloride, 20 mEq, Oral, Daily  rosuvastatin, 10 mg, Oral, Daily    Infusions   Diet  Diet: Cardiac Diets, Diabetic Diets; Healthy Heart (2-3 Na+); Consistent Carbohydrate; Texture: Regular Texture (IDDSI 7); Fluid Consistency: Thin (IDDSI 0)    I have personally reviewed:  [x]  Laboratory   []  Microbiology   [x]  Radiology   []  EKG/Telemetry  []  Cardiology/Vascular   []  Pathology    [x]  Records       Assessment/Plan     Active Hospital Problems    Diagnosis  POA   • **Pneumothorax on right [J93.9]  Yes   • 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, J98.19]  Unknown   • Acute respiratory failure with hypoxia (HCC) [J96.01]  Yes   • Long term (current) use of anticoagulants [Z79.01]  Not Applicable   • Obesity, morbid, BMI 40.0-49.9 (HCC) [E66.01]  Yes   • Cardiomyopathy, dilated (HCC) [I42.0]  Yes   • Obstructive sleep apnea [G47.33]  Yes   • Chronic combined systolic and diastolic congestive heart failure (HCC) [I50.42]  Yes   • Atrial fibrillation, persistent (CMS/HCC) [I48.19]  Yes   • Type 2 diabetes mellitus with hyperglycemia, without long-term current use of insulin (HCC) [E11.65]  Yes   • Benign essential HTN [I10]  Yes      Resolved Hospital Problems   No resolved problems to display.       Bullous emphysema with right pneumothorax  -Chest x-ray shows no further pneumothorax.  Await thoracic surgery follow-up recommendations.  Chest tube to waterseal, plan for repeat chest x-ray in the morning.  -acute hypoxic respiratory failure at time of  admission with O2 sat 88% on room air with respiratory distress; improved  -CT chest indicates bullous emphysema  -Patient not to use his CPAP/BiPAP until he recovers from  Pneumothorax, 2-3 months.  -Anticipate will need home O2 at discharge, overnight pulse oximetry ordered     DM2, non-insulin requiring  -Jardiance and linagliptin  -BG acceptable, continue current SSI     Persistent atrial fibrillation, rate controlled  -RC: Atenolol 25 mg; dofetilide 125 mcg twice daily  -AC: Apixaban on hold due to above; on prophylactic dose Lovenox     Dilated CM/chronic systolic and diastolic CHF  -TTE (2/20/2023): 65%  -Lasix 40 mg daily     Elevated troponin  -Troponin 22 OA  -EKG without ischemic changes; known RBBB  -In setting of hypoxia due to above, not ACS    Obstructive sleep apnea  Per thoracic surgery will need to stop BiPAP for 2 to 3 months after discharge  Will evaluate for home oxygen on day of discharge      · Lovenox 40 mg SC daily for DVT prophylaxis.  · Full code.  · Discussed with patient.  · Anticipate discharge home with family when cleared by consultants.      Rusty Plunkett MD  [facility]  03/15/23  11:45 EDT

## 2023-03-15 NOTE — CASE MANAGEMENT/SOCIAL WORK
Continued Stay Note  [facility]     Patient Name: Ray rPatt  MRN: [id number]  Today's Date: 3/15/2023    Admit Date: 3/10/2023    Plan: Home   Discharge Plan     Row Name 03/15/23 1500       Plan    Plan Home    Patient/Family in Agreement with Plan yes    Plan Comments Met with pt at bedside who confirms plan to return home at discharge.  Pt uses bipap normally but will ne restricted from use at this time.  Await overnight oximetry to determine if pt will need night time O2 at discharge.  Explained that Medicare likely to not pay since pt does have bipap even though is is medically not able to use at this time. Pt understands that he will likely need ot pay out of pocket and is agreeable.  Will use [place] if needed.  CCP continues to follow.  carine Grigsby RN               Discharge Codes    No documentation.               Expected Discharge Date and Time     Expected Discharge Date Expected Discharge Time    Mar 16, 2023             Abigail Grigsby RN

## 2023-03-15 NOTE — DISCHARGE PLACEMENT REQUEST
"Ray Miller W \"Oli\" (77 y.o. Male)     Date of Birth   1945    Social Security Number       Address   [address] [place] [address]    Home Phone   [phone]    MRN   [id number]       Catholic   [employer]    Marital Status                               Admission Date   3/10/23    Admission Type   Emergency    Admitting Provider   Erick Barriga MD    Attending Provider   Rusty Plunkett MD    Department, Room/Bed   [address], [address]       Discharge Date       Discharge Disposition       Discharge Destination                               Attending Provider: Rusty Plunkett MD    Allergies: No Known Allergies    Isolation: None   Infection: None   Code Status: CPR    Ht: 162.6 cm (64\")   Wt: 105 kg (232 lb)    Admission Cmt: None   Principal Problem: Pneumothorax on right [J93.9]                 Active Insurance as of 3/10/2023     Primary Coverage     Payor Plan Insurance Group Employer/Plan Group    MEDICARE MEDICARE A & B      Payor Plan Address Payor Plan Phone Number Payor Plan Fax Number Effective Dates    [address] [phone]  9/1/2010 - None Entered    [facility] [id number]       Subscriber Name Subscriber Birth Date Member ID       RAY MILLER W 1945 [id number]           Secondary Coverage     Payor Plan Insurance Group Employer/Plan Group    [employer]ET[employer] COMMERCIAL AETNA [id number]     Payor Plan Address Payor Plan Phone Number Payor Plan Fax Number Effective Dates    [address] [phone]  1/1/2018 - None Entered    [facility] [phone]       Subscriber Name Subscriber Birth Date Member ID       RAY MILLER 1945 [id number]                 Emergency Contacts      (Rel.) Home Phone Work Phone Mobile Phone    Asha Miller (Spouse) [phone] -- --    OMARNIKO BAILEY (Sister) [phone] -- --              "

## 2023-03-15 NOTE — PLAN OF CARE
Problem: Adult Inpatient Plan of Care  Goal: Plan of Care Review  Flowsheets  Taken 3/15/2023 0402 by Asha Farnsworth RN  Plan of Care Reviewed With: patient  Outcome Evaluation: chest tube to -20 suction denies pain no air leak up to chair ad eve toerates well doing incentive spirometer  Taken 3/14/2023 1402 by Hali Wilhelm, RN  Progress: improving   Goal Outcome Evaluation:  Plan of Care Reviewed With: patient        Progress: improving  Outcome Evaluation: chest tube to -20 suction denies pain no air leak up to chair ad eve toerates well doing incentive spirometer

## 2023-03-16 ENCOUNTER — READMISSION MANAGEMENT (OUTPATIENT)
Dept: CALL CENTER | Facility: HOSPITAL | Age: 78
End: 2023-03-16
Payer: MEDICARE

## 2023-03-16 ENCOUNTER — APPOINTMENT (OUTPATIENT)
Dept: GENERAL RADIOLOGY | Facility: HOSPITAL | Age: 78
DRG: 199 | End: 2023-03-16
Payer: MEDICARE

## 2023-03-16 VITALS
DIASTOLIC BLOOD PRESSURE: 83 MMHG | HEART RATE: 63 BPM | RESPIRATION RATE: 18 BRPM | HEIGHT: 64 IN | WEIGHT: 232 LBS | TEMPERATURE: 98.6 F | BODY MASS INDEX: 39.61 KG/M2 | OXYGEN SATURATION: 95 % | SYSTOLIC BLOOD PRESSURE: 114 MMHG

## 2023-03-16 LAB — GLUCOSE BLDC GLUCOMTR-MCNC: 107 MG/DL (ref 70–130)

## 2023-03-16 PROCEDURE — 71045 X-RAY EXAM CHEST 1 VIEW: CPT

## 2023-03-16 PROCEDURE — 82962 GLUCOSE BLOOD TEST: CPT

## 2023-03-16 PROCEDURE — 99232 SBSQ HOSP IP/OBS MODERATE 35: CPT

## 2023-03-16 RX ADMIN — OXYCODONE AND ACETAMINOPHEN 1 TABLET: 5; 325 TABLET ORAL at 01:49

## 2023-03-16 RX ADMIN — ROSUVASTATIN CALCIUM 10 MG: 10 TABLET, FILM COATED ORAL at 08:06

## 2023-03-16 RX ADMIN — FUROSEMIDE 40 MG: 40 TABLET ORAL at 08:06

## 2023-03-16 RX ADMIN — DOFETILIDE 125 MCG: 0.12 CAPSULE ORAL at 08:05

## 2023-03-16 RX ADMIN — EMPAGLIFLOZIN 25 MG: 25 TABLET, FILM COATED ORAL at 08:06

## 2023-03-16 RX ADMIN — POTASSIUM CHLORIDE 20 MEQ: 750 TABLET, EXTENDED RELEASE ORAL at 08:06

## 2023-03-16 RX ADMIN — LINAGLIPTIN 5 MG: 5 TABLET, FILM COATED ORAL at 08:06

## 2023-03-16 RX ADMIN — ATENOLOL 25 MG: 25 TABLET ORAL at 08:06

## 2023-03-16 NOTE — OUTREACH NOTE
Prep Survey    Flowsheet Row Responses   [facility] patient discharged from? [facility]   Is LACE score < 7 ? No   Eligibility [facility]   Date of Admission 03/10/23   Date of Discharge 03/16/23   Discharge Disposition Home or Self Care   Discharge diagnosis Pneumothorax on right, Bullous emphysema with collapse, acute hypoxic resp failure   Does the patient have one of the following disease processes/diagnoses(primary or secondary)? Other   Does the patient have Home health ordered? No   Is there a DME ordered? Yes   What DME was ordered? night time O2 ordered from [place]   Prep survey completed? Yes          Alisha RAMON - Registered Nurse

## 2023-03-16 NOTE — PROGRESS NOTES
Chest tube removed without difficulty.  Discharge instructions provided to the patient.  He is scheduled to follow-up with us in the office on 3/29/2023.  Okay to discharge from thoracic surgery standpoint.  Discussed with primary team and RN.    ANICETO Narayanan

## 2023-03-16 NOTE — PLAN OF CARE
Goal Outcome Evaluation:  Plan of Care Reviewed With: patient        Progress: improving  Outcome Evaluation: patient vss. no pain reported. chest tube removed by thoracic. dc home today.

## 2023-03-16 NOTE — DISCHARGE SUMMARY
Patient Name: Ray Pratt  : 1945  MRN: [id number]    Date of Admission: 3/10/2023  Date of Discharge:  3/16/2023  Primary Care Physician: Marcial Jackson DO      Chief Complaint:   Shortness of Breath      Discharge Diagnoses     Active Hospital Problems    Diagnosis  POA   • **Pneumothorax on right [J93.9]  Yes   • 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, J98.19]  Unknown   • Acute respiratory failure with hypoxia (HCC) [J96.01]  Yes   • Long term (current) use of anticoagulants [Z79.01]  Not Applicable   • Obesity, morbid, BMI 40.0-49.9 (HCC) [E66.01]  Yes   • Cardiomyopathy, dilated (HCC) [I42.0]  Yes   • Obstructive sleep apnea [G47.33]  Yes   • Chronic combined systolic and diastolic congestive heart failure (HCC) [I50.42]  Yes   • Atrial fibrillation, persistent (CMS/HCC) [I48.19]  Yes   • Type 2 diabetes mellitus with hyperglycemia, without long-term current use of insulin (HCC) [E11.65]  Yes   • Benign essential HTN [I10]  Yes      Resolved Hospital Problems   No resolved problems to display.        Hospital Course     Mr. Pratt is a 77 y.o. male with a history of bullous emphysema, diabetes type 2, persistent atrial fibrillation, chronic systolic and diastolic heart failure, sleep apnea presenting with shortness of breath in the middle the night while using his CPAP machine after he had used it for couple days because of the power being out.  Patient presented to his primary care doctor for an acute care visit and had a chest x-ray done showing pneumothorax and patient came to the emergency room.  Thoracic surgery chest tube was placed.  Day of discharge chest x-ray without any evidence of pneumothorax.  Patient did not use CPAP/BiPAP until he recovers from pneumothorax.  Patient has follow-up with sleep medicine on . Overnight oximetry showing patient would benefit from nocturnal oxygen at 2 L.  Order placed.    To follow-up with cardiothoracic surgery on 3/29/2023    At the  time of discharge patient was told to take all medications as prescribed, keep all follow-up appointments, and call their doctor or return to the hospital with any worsening or concerning symptoms.    Day of Discharge     Subjective:  No acute events overnight.  Patient breathing well.  Patient eating well.  Patient excited to go home today.    Review of Systems  Constitutional: Negative for chills and fever.   Respiratory: Negative for cough and shortness of breath.    Cardiovascular: Negative for chest pain and palpitations.   Gastrointestinal: Negative for abdominal pain, diarrhea, nausea and vomiting.   Physical Exam:  Temp:  [97.6 °F (36.4 °C)-98.6 °F (37 °C)] 98.6 °F (37 °C)  Heart Rate:  [60-71] 63  Resp:  [16-18] 18  BP: ()/(65-83) 114/83  Body mass index is 39.82 kg/m².  Physical Exam  Constitutional:       General: He is not in acute distress.     Appearance: He is obese.   Eyes:      Comments: Right chronic lazy eye.   Cardiovascular:      Rate and Rhythm: Normal rate and regular rhythm.   Pulmonary:      Effort: Pulmonary effort is normal.      Breath sounds: Normal breath sounds.   Chest:      Comments: Right-sided chest tube present  Abdominal:      General: Bowel sounds are normal.      Palpations: Abdomen is soft.      Tenderness: There is no abdominal tenderness.   Musculoskeletal:         General: No swelling.   Skin:     General: Skin is warm and dry.   Neurological:      Mental Status: He is alert. Mental status is at baseline.    Consultants     Consult Orders (all) (From admission, onward)     Start     Ordered    03/10/23 1217  [facility] (on-call MD unless specified) Details  Once,   Status:  Canceled        Specialty:  Hospitalist  Provider:  (Not yet assigned)    03/10/23 1216    03/10/23 1016  Family Medicine Consult  Once        Specialty:  Family Medicine  Provider:  (Not yet assigned)    03/10/23 1015              Procedures     Imaging Results (All)     Procedure Component Value Units  Date/Time    XR Chest 1 View [[id number]] Collected: 03/16/23 1212     Updated: 03/16/23 1219    Narrative:      XR CHEST 1 VW-     HISTORY: Male who is 77 years-old,  chest tube management     TECHNIQUE: Frontal view of the chest     COMPARISON: 03/16/2023 at 0603 hours     FINDINGS: Right chest tube appears stable. Heart size normal. Aorta is  tortuous. Pulmonary vasculature appears mildly congested. Minimal likely  atelectasis at the bases. No focal pulmonary consolidation, pleural  effusion, or definite pneumothorax. Persistent subcutaneous emphysema on  the right, similar to prior exam. No acute osseous process.       Impression:      No significant change.     This report was finalized on 3/16/2023 12:16 PM by Dr. Peter Daugherty M.D.       XR Chest 1 View [[id number]] Collected: 03/16/23 0808     Updated: 03/16/23 0814    Narrative:      XR CHEST 1 VW-     HISTORY: Male who is 77 years-old,  chest tube     TECHNIQUE: Frontal view of the chest      COMPARISON: 03/15/2023     FINDINGS: Right chest tube appears stable. Heart size normal. Aorta is  tortuous. Pulmonary vasculature appears mildly congested. Minimal likely  atelectasis at the bases. No focal pulmonary consolidation, pleural  effusion, or pneumothorax. Persistent subcutaneous emphysema on the  right, similar to prior exam. No acute osseous process.       Impression:      No significant change.     This report was finalized on 3/16/2023 8:11 AM by Dr. Peter Daugherty M.D.       XR Chest 1 View [[id number]] Collected: 03/15/23 0621     Updated: 03/15/23 0627    Narrative:      ONE VIEW PORTABLE CHEST AT 5:24 AM     HISTORY: Follow-up of right-sided pneumothorax. Previous chest tube.     A right-sided chest tube is not visualized on the current exam. There is  some minimal subcutaneous emphysema along the right lateral chest wall  and base of neck and this is unchanged from yesterday. There is no  evidence of pneumothorax. There is some minimal  atelectasis at the bases  and the lungs are otherwise clear and the heart size is normal.     This report was finalized on 3/15/2023 6:23 AM by Dr. Romel Moreno M.D.       XR Chest 1 View [[id number]] Collected: 03/14/23 1319     Updated: 03/14/23 1334    Narrative:      EXAMINATION: SINGLE VIEW CHEST RADIOGRAPH     HISTORY: 77-year-old male undergoing chest tube management.     FINDINGS: An upright AP portable chest radiograph was obtained.  Comparison is made to a chest radiograph dated 03/14/2023. There is mild  atelectasis at the base of the right lung. A chest tube is not  visualized. There is no evidence for a pneumothorax or pleural effusion.  Persistent stable subcutaneous gas along the right hemithorax and at the  base of the neck on the right is noted. The cardiomediastinal silhouette  is within normal limits. The left lung is clear.       Impression:      There is mild atelectasis at the base of the right lung. A  pneumothorax is not visualized. Persistent right-sided subcutaneous gas  is noted.     This report was finalized on 3/14/2023 1:31 PM by Dr. Jermaine Hickman M.D.       XR Chest 1 View [[id number]] Collected: 03/14/23 0818     Updated: 03/14/23 0837    Narrative:      EXAMINATION: SINGLE VIEW CHEST RADIOGRAPH     HISTORY: 77-year-old male undergoing chest tube management.     FINDINGS: An upright AP portable chest radiograph was obtained.  Comparison is made to a chest radiograph dated 03/13/2023. There is mild  bibasilar atelectasis. Improved but persistent subcutaneous gas in the  right hemithorax is noted. There is no evidence for pneumothorax. The  cardiac silhouette is partially obscured due to reduced lung volumes.  The visualized osseous structures appear normal.       Impression:      Interval reduction in right-sided subcutaneous emphysema.  Persistent but mildly improved bibasilar atelectasis is noted. A chest  tube is not visualized. There is no evidence for a pneumothorax.     This  report was finalized on 3/14/2023 8:34 AM by Dr. Jermaine Hickman M.D.       XR Chest 1 View [[id number]] Collected: 03/13/23 1422     Updated: 03/13/23 1427    Narrative:      XR CHEST 1 VW-     HISTORY: Male who is 77 years-old,  chest tube, pneumothorax     TECHNIQUE: Frontal view of the chest     COMPARISON: 03/13/2023 0509 hours     FINDINGS: Right chest tube appears stable, with persistent subcutaneous  emphysema on the right, similar to prior exam. The heart size is normal.  Aorta is tortuous. Pulmonary vasculature is unremarkable. Previous right  pneumothorax is decreased, measuring about 8 mm thickness, previously  about 2.1 cm. No left pneumothorax. Mild likely atelectasis in the right  lower lung. No large pleural effusion. No acute osseous process.       Impression:      Persistent, decreased right pneumothorax.     This report was finalized on 3/13/2023 2:24 PM by Dr. Peter Daugherty M.D.       XR Chest 1 View [[id number]] Collected: 03/13/23 0736     Updated: 03/13/23 0741    Narrative:      XR CHEST 1 VW-clinical: Chest tube management     COMPARISON CT chest and chest radiograph 3/12/2023     FINDINGS: Right-sided pneumothorax is slightly smaller compared to the  previous chest radiograph and CT. Subcutaneous emphysema right chest  wall again seen. There are areas of atelectasis and infiltrate involving  the mid and lower lung zones bilaterally as before. The  cardiomediastinal silhouette is stable. No vascular congestion seen. The  remainder is unremarkable.     This report was finalized on 3/13/2023 7:38 AM by Dr. Pipe Yepez M.D.       CT Chest Without Contrast Diagnostic [[id number]] Collected: 03/12/23 1524     Updated: 03/13/23 0736    Narrative:      CT CHEST WITHOUT CONTRAST     HISTORY: 77-year-old male with a large spontaneous right pneumothorax  and hypoxia. Evaluate for bullous emphysema.     TECHNIQUE: Radiation dose reduction techniques were utilized, including  automated exposure  control and exposure modulation based on body size.   3 mm images were obtained through the chest without the administration  of IV contrast. Compared with recent chest radiographs.     FINDINGS:      There is very mild emphysematous change at the left upper lobe. There is  bandlike atelectasis/scarring at the lingula and left lower lobe. There  is a prominent calcified granuloma at the medial left lung base and  there is a noncalcified sub-6 mm left lower lobe pulmonary nodule,  series 3 image 65.     On the right, there are several bulla. There is an approximately 6 cm  bulla at the right middle lobe and a smaller subpleural bulla laterally  within the right middle. There is also a cluster of subpleural blebs at  the lateral aspect of the right lower lobe with the largest measuring  2.2 cm. The right-sided chest tube is within the major fissure. The  remaining pneumothorax is predominantly at the anterior aspect of the  chest with the largest volume anterior to the right middle lobe. There  is a minimal right pleural effusion. There is very significant  atelectatic change at the right upper lobe and to lesser degree at the  right lower and middle lobes.     This report was finalized on 3/13/2023 7:33 AM by Dr. Irma Sharma M.D.       XR Chest 1 View [[id number]] Collected: 03/12/23 0652     Updated: 03/12/23 0659    Narrative:      XR CHEST 1 VW-     HISTORY: Male who is 77 years-old,  chest tube, right pneumothorax     TECHNIQUE: Frontal view of the chest     COMPARISON: 03/11/2023     FINDINGS: Right chest tube extends the mid right hemithorax. Heart,  mediastinum and pulmonary vasculature are unremarkable. Likely  atelectasis or infiltrate is seen in the right mid to lower lung, left  midlung, similar to prior exam. Previous right pneumothorax is  increased, measuring about 2.9 cm the apex. Subcutaneous emphysema has  increased along right chest wall, and could be evidence of pulmonary air  leak. No pleural  effusion, or left pneumothorax. No acute osseous  process.          Impression:      Interval increase of right pneumothorax and right sided subcutaneous  emphysema.     Discussed by telephone with patient's nurse, Sara, at time of  interpretation, 0652, 03/12/2023.     This report was finalized on 3/12/2023 6:56 AM by Dr. Peter Daugherty M.D.       XR Chest 1 View [[id number]] Collected: 03/11/23 0647     Updated: 03/11/23 0653    Narrative:      XR CHEST 1 VW-     HISTORY: Male who is 77 years-old,  chest tube, right pneumothorax     TECHNIQUE: Frontal view the chest     COMPARISON: 03/10/2023     FINDINGS: Right chest tube extends the mid right hemithorax. Heart,  mediastinum and pulmonary vasculature are unremarkable. Likely  atelectasis or infiltrate is seen in the right mid to lower lung, left  midlung. Previous right pneumothorax is markedly decreased, measuring as  much as about 6 mm peripherally. Subcutaneous emphysema seen along right  chest wall. No pleural effusion, or left pneumothorax. No acute osseous  process.       Impression:      Right chest tube placement with interval decrease of right  pneumothorax.     This report was finalized on 3/11/2023 6:50 AM by Dr. Peter Daugherty M.D.       XR Chest 1 View [[id number]] Collected: 03/10/23 1324     Updated: 03/10/23 2015    Narrative:      PORTABLE CHEST     HISTORY: Chest tube insertion     NOTE: The study was performed at 1157 hours but not made available for  interpretation until 1302 hours.     FINDINGS: A single view of the chest demonstrates a right-sided chest  tube in place. A large pneumothorax is still present but there is  slightly greater expansion of the right lung. The mediastinal shift to  the left consistent with a tension pneumothorax has decreased.     The above information was called to and discussed with Dr. Yen.     This report was finalized on 3/10/2023 8:12 PM by Dr. Rigo Merino M.D.       XR Chest 1 View [[id number]]  Collected: 03/10/23 1013     Updated: 03/10/23 1017    Narrative:      ONE VIEW PORTABLE CHEST AT 9:59 AM     HISTORY: Pneumothorax. Shortness of breath.     There is an extremely large right-sided pneumothorax measuring  approximately 85-90% as also noted on the recent outside chest x-ray.  There is minimal mediastinal shift to the left. The left lung is clear  and the heart size is normal.     This report was finalized on 3/10/2023 10:14 AM by Dr. Romel Moreno M.D.             Pertinent Labs     Results from last 7 days   Lab Units 03/11/23  0649 03/10/23  1007   WBC 10*3/mm3 7.36 8.27   HEMOGLOBIN g/dL 14.5 16.2   PLATELETS 10*3/mm3 209 226     Results from last 7 days   Lab Units 03/11/23  0649 03/10/23  1007   SODIUM mmol/L 138 137   POTASSIUM mmol/L 3.8 3.9   CHLORIDE mmol/L 102 102   CO2 mmol/L 26.6 24.0   BUN mg/dL 16 19   CREATININE mg/dL 0.96 0.99   GLUCOSE mg/dL 125* 130*   Estimated Creatinine Clearance: 70.6 mL/min (by C-G formula based on SCr of 0.96 mg/dL).  Results from last 7 days   Lab Units 03/10/23  1007   ALBUMIN g/dL 4.2   BILIRUBIN mg/dL 0.5   ALK PHOS U/L 61   AST (SGOT) U/L 21   ALT (SGPT) U/L 14     Results from last 7 days   Lab Units 03/11/23  0649 03/10/23  1007   CALCIUM mg/dL 9.2 9.4   ALBUMIN g/dL  --  4.2       Results from last 7 days   Lab Units 03/10/23  1007   HSTROP T ng/L 22*   PROBNP pg/mL 322.0           Invalid input(s): LDLCALC        Test Results Pending at Discharge       Discharge Details        Discharge Medications      Continue These Medications      Instructions Start Date   atenolol 25 MG tablet  Commonly known as: TENORMIN   25 mg, Oral, Daily      dofetilide 125 MCG capsule  Commonly known as: TIKOSYN   TAKE 1 CAPSULE BY MOUTH EVERY 12 HOURS      Eliquis 5 MG tablet tablet  Generic drug: apixaban   TAKE 1 TABLET BY MOUTH EVERY 12 HOURS      empagliflozin 25 MG tablet tablet  Commonly known as: Jardiance   25 mg, Oral, Daily With Breakfast      furosemide 40 MG  tablet  Commonly known as: LASIX   TAKE 1 TABLET BY MOUTH EVERY DAY      loratadine 10 MG tablet  Commonly known as: CLARITIN   TAKE 1 TABLET BY MOUTH EVERY DAY      metFORMIN  MG 24 hr tablet  Commonly known as: GLUCOPHAGE-XR   1,000 mg, Oral, Daily With Breakfast      potassium chloride 10 MEQ CR capsule  Commonly known as: MICRO-K   TAKE 2 CAPSULES BY MOUTH EVERY DAY      rosuvastatin 10 MG tablet  Commonly known as: CRESTOR   10 mg, Oral, Daily      SITagliptin 100 MG tablet  Commonly known as: Januvia   100 mg, Oral, Daily             No Known Allergies      Discharge Disposition:  Home or Self Care    Discharge Diet:  Diet Order   Procedures   • Diet: Cardiac Diets, Diabetic Diets; Healthy Heart (2-3 Na+); Consistent Carbohydrate; Texture: Regular Texture (IDDSI 7); Fluid Consistency: Thin (IDDSI 0)       Discharge Activity:   As tolerated    CODE STATUS:    Code Status and Medical Interventions:   Ordered at: 03/10/23 1421     Code Status (Patient has no pulse and is not breathing):    CPR (Attempt to Resuscitate)     Medical Interventions (Patient has pulse or is breathing):    Full Support       Future Appointments   Date Time Provider Department Center   3/22/2023  1:20 PM Reginald Vazquez MD [facility]   3/29/2023 11:00 AM Altagracia Green APRN [facility]   5/1/2023  8:45 AM Marcial Jackson DO [facility]   6/7/2023  9:00 AM Galen Meng MD [facility] None   2/2/2024 11:00 AM Ritu Lugo APRN [facility]     Additional Instructions for the Follow-ups that You Need to Schedule     XR Chest 2 View    Mar 30, 2023 (Approximate)      Exam reason: ptx follow-up            Follow-up Information     Marcial Jackson DO. Schedule an appointment as soon as possible for a visit in 1 week(s).    Specialty: Internal Medicine  Contact information:  [address] [address] [phone]             Altagracia Green APRN. Go on 3/29/2023.    Specialties: Nurse  Practitioner, Thoracic Surgery  Why: [facility] to the [facility] for a chest x-ray at 10:15 AM on 3/29/2023, then come to our office for follow-up appointment.  Contact information:  [facility] [id number] [phone]                         Additional Instructions for the Follow-ups that You Need to Schedule     XR Chest 2 View    Mar 30, 2023 (Approximate)      Exam reason: ptx follow-up           Time Spent on Discharge:  Greater than 30 minutes      Rusty Plunkett MD  [facility]  03/16/23  14:10 EDT

## 2023-03-16 NOTE — PLAN OF CARE
Goal Outcome Evaluation:  Plan of Care Reviewed With: patient        Progress: improving  Outcome Evaluation: Difficulty falling asleep, overnight oximetry in progress. Medicated x 1 for slight pain. VSS. CT with minimal output. Possible discharge today.

## 2023-03-16 NOTE — PROGRESS NOTES
"    Chief Complaint: Spontaneous pneumothorax, right  S/P: Right thoracostomy tube placement  POD # 6    Subjective:  Symptoms:  Improved.  He reports chest pain (round chest tube ).  No shortness of breath.    Diet:  Adequate intake.  No nausea or vomiting.    Activity level: Returning to normal.    Pain:  He complains of pain that is mild.  Pain is well controlled.    Awake, alert in bed.  No complaints.    Vital Signs:  Temp:  [97.6 °F (36.4 °C)-98.6 °F (37 °C)] 98.6 °F (37 °C)  Heart Rate:  [60-71] 63  Resp:  [16-18] 18  BP: ()/(65-83) 114/83    Intake & Output (last day)       03/15 0701  03/16 0700 03/16 0701  03/17 0700    P.O. 840 240    Total Intake(mL/kg) 840 (8) 240 (2.3)    Urine (mL/kg/hr) 0 (0)     Chest Tube 8     Total Output 8     Net +832 +240          Urine Unmeasured Occurrence 5 x           Objective:  General Appearance:  Comfortable, in no acute distress, in pain and well-appearing.    Vital signs: (most recent): Blood pressure 114/83, pulse 63, temperature 98.6 °F (37 °C), temperature source Oral, resp. rate 18, height 162.6 cm (64\"), weight 105 kg (232 lb), SpO2 95 %.  Vital signs are normal.  No fever.    Output: Producing urine.    Lungs:  Normal effort and normal respiratory rate.  He is not in respiratory distress.  There are decreased breath sounds.  No rales, wheezes or rhonchi.    Heart: Normal rate.  Regular rhythm.    Chest: Symmetric chest wall expansion. Chest wall tenderness (Right chest wall, chest tube site. ) present.    Abdomen: Abdomen is soft.  Bowel sounds are normal.   There is no abdominal tenderness.     Neurological: Patient is alert and oriented to person, place and time.    Skin:  Warm and dry.          Chest tube:   Site: Right, Clean, Dry, Intact and Securement device intact  Suction: waterseal  Air Leak: Negative.   24 Hour Total: 8cc    Results Review:     I reviewed the patient's new clinical results.  I reviewed the patient's new imaging results and agree " with the interpretation.  I reviewed the patient's other test results and agree with the interpretation  Discussed with Patient, RN, family at bedside and Dr. Laird    Imaging Results (Last 24 Hours)     Procedure Component Value Units Date/Time    XR Chest 1 View [[id number]] Collected: 03/16/23 0808     Updated: 03/16/23 0814    Narrative:      XR CHEST 1 VW-     HISTORY: Male who is 77 years-old,  chest tube     TECHNIQUE: Frontal view of the chest      COMPARISON: 03/15/2023     FINDINGS: Right chest tube appears stable. Heart size normal. Aorta is  tortuous. Pulmonary vasculature appears mildly congested. Minimal likely  atelectasis at the bases. No focal pulmonary consolidation, pleural  effusion, or pneumothorax. Persistent subcutaneous emphysema on the  right, similar to prior exam. No acute osseous process.       Impression:      No significant change.     This report was finalized on 3/16/2023 8:11 AM by Dr. Peter Daugherty M.D.             Lab Results:     Lab Results (last 24 hours)     Procedure Component Value Units Date/Time    POC Glucose Once [[id number]]  (Normal) Collected: 03/15/23 2049    Specimen: Blood Updated: 03/15/23 2050     Glucose 126 mg/dL      Comment: Meter: [id number] : [id number] Mario BERNARDO       POC Glucose Once [[id number]]  (Normal) Collected: 03/15/23 1631    Specimen: Blood Updated: 03/15/23 1632     Glucose 101 mg/dL      Comment: Meter: [id number] : [id number] Rob BERNARDO              Assessment & Plan       Pneumothorax on right    Type 2 diabetes mellitus with hyperglycemia, without long-term current use of insulin (HCC)    Benign essential HTN    Atrial fibrillation, persistent (CMS/HCC)    Chronic combined systolic and diastolic congestive heart failure (HCC)    Obstructive sleep apnea    Obesity, morbid, BMI 40.0-49.9 (HCC)    Cardiomyopathy, dilated (HCC)    Long term (current) use of anticoagulants    Acute respiratory failure with hypoxia  (HCC)    Bullous emphysema with collapse (HCC)       Assessment & Plan     Independent reviewed this morning's chest x-ray which is without significant change compared to yesterday's imaging.  The pneumothorax remains resolved.    Spontaneous right pneumothorax: Likely secondary to bullous disease.  Pneumothorax remains resolved on CXR and no air leak is appreciated on exam.  We plan to clamp the chest tube with a repeat chest x-ray scheduled for this afternoon.  If x-ray is unremarkable we will plan to remove the chest tube and clear him for discharge.     Obstructive sleep apnea: Patient recently converted to BiPAP from CPAP settings and is followed by sleep medicine.  Reiterated that he will need to refrain from use of CPAP/BiPAP for several weeks while he continues to recover from the spontaneous pneumothorax.  There is nocturnal oxygen at discharge per primary team.    ANICETO Narayanan  [facility]  03/16/23  11:25 EDT    Patient was seen and assessed while wearing personal protective equipment including facemask, protective eyewear and gloves.  Hand hygiene performed prior to entering the room and upon exiting with doffing of gloves.

## 2023-03-16 NOTE — CASE MANAGEMENT/SOCIAL WORK
Case Management Discharge Note      Final Note: Pt discharged home with night time O2 from [place]         Selected Continued Care - Discharged on 3/16/2023 Admission date: 3/10/2023 - Discharge disposition: Home or Self Care    Destination    No services have been selected for the patient.              Durable Medical Equipment    No services have been selected for the patient.              Dialysis/Infusion    No services have been selected for the patient.              Home Medical Care    No services have been selected for the patient.              Therapy    No services have been selected for the patient.              Community Resources    No services have been selected for the patient.              Community & DME    No services have been selected for the patient.                  Transportation Services  Private: Car    Final Discharge Disposition Code: 01 - home or self-care

## 2023-03-17 ENCOUNTER — TRANSITIONAL CARE MANAGEMENT TELEPHONE ENCOUNTER (OUTPATIENT)
Dept: CALL CENTER | Facility: HOSPITAL | Age: 78
End: 2023-03-17
Payer: MEDICARE

## 2023-03-17 NOTE — OUTREACH NOTE
Call Center TCM Note    Flowsheet Row Responses   [facility] patient discharged from? [place]   Does the patient have one of the following disease processes/diagnoses(primary or secondary)? Other   TCM attempt successful? Yes   Call start time 1132   Call end time 1136   Discharge diagnosis Pneumothorax on right, Bullous emphysema with collapse, acute hypoxic resp failure   Person spoke with today (if not patient) and relationship Patient   Meds reviewed with patient/caregiver? Yes   Prescription comments no changes to medication.   Is the patient taking all medications as directed (includes completed medication regime)? Yes   Comments No appt available with pcp- please advise.   Does the patient have an appointment with their PCP within 7 days of discharge? No appointments available   Nursing Interventions Routed TCM call to PCP office   Has home health visited the patient within 72 hours of discharge? N/A   What DME was ordered? night time O2 ordered from [place]   Has all DME been delivered? Yes   Psychosocial issues? No   Comments Educated patient on pulse ox and ideal level to achieve with 02% patient understands.   Did the patient receive a copy of their discharge instructions? Yes   Nursing interventions Educated on MyChart, Reviewed instructions with patient   What is the patient's perception of their health status since discharge? Improving   Is the patient/caregiver able to teach back signs and symptoms related to disease process for when to call PCP? Yes   Is the patient/caregiver able to teach back signs and symptoms related to disease process for when to call 911? Yes   Is the patient/caregiver able to teach back the hierarchy of who to call/visit for symptoms/problems? PCP, Specialist, Home health nurse, Urgent Care, ED, 911 Yes   TCM call completed? Yes   Wrap up additional comments Patient states overall he is doing well, has more energy and breathing has improved no other concerns or  questions noted.   Call end time 1136   Would this patient benefit from a Referral to [facility] Social Work? No   Is the patient interested in additional calls from an ambulatory ?  NOTE:  applies to high risk patients requiring additional follow-up. No          Nelsy Rivera RN    3/17/2023, 11:36 EDT

## 2023-03-20 RX ORDER — EMPAGLIFLOZIN 25 MG/1
TABLET, FILM COATED ORAL
Qty: 90 TABLET | Refills: 2 | Status: SHIPPED | OUTPATIENT
Start: 2023-03-20

## 2023-03-22 ENCOUNTER — OFFICE VISIT (OUTPATIENT)
Dept: CARDIOLOGY | Facility: CLINIC | Age: 78
End: 2023-03-22
Payer: MEDICARE

## 2023-03-22 VITALS
SYSTOLIC BLOOD PRESSURE: 122 MMHG | WEIGHT: 231 LBS | HEART RATE: 70 BPM | DIASTOLIC BLOOD PRESSURE: 74 MMHG | HEIGHT: 64 IN | BODY MASS INDEX: 39.44 KG/M2

## 2023-03-22 DIAGNOSIS — I48.19 ATRIAL FIBRILLATION, PERSISTENT: Primary | ICD-10-CM

## 2023-03-22 DIAGNOSIS — Z79.899 ON DOFETILIDE THERAPY: ICD-10-CM

## 2023-03-22 PROCEDURE — 3074F SYST BP LT 130 MM HG: CPT | Performed by: INTERNAL MEDICINE

## 2023-03-22 PROCEDURE — 3078F DIAST BP <80 MM HG: CPT | Performed by: INTERNAL MEDICINE

## 2023-03-22 PROCEDURE — 93000 ELECTROCARDIOGRAM COMPLETE: CPT | Performed by: INTERNAL MEDICINE

## 2023-03-22 PROCEDURE — 99214 OFFICE O/P EST MOD 30 MIN: CPT | Performed by: INTERNAL MEDICINE

## 2023-03-22 NOTE — PROGRESS NOTES
Date of Office Visit: 2023  Encounter Provider: Reginald Vazquez MD  Place of Service: [facility] CARDIOLOGY  Patient Name: Ray Pratt  : 1945    Subjective:     Encounter Date:2023      Patient ID: Ray Pratt is a 77 y.o. male who has a cc of  pers AF and is here for dofetilide fu     Echo last month -- EF good.     Just had admission for a pneumothorax and chest tube.     No AF during any of this.       No anginal chest pain,   Moderate  martin,   No soa,   No fainting,  No orthostasis.   No edema.   Exercise tolerance: low     There have been no hospital admission since the last visit.     There have been no bleeding events.       Past Medical History:   Diagnosis Date   • Acute combined systolic and diastolic congestive heart failure (HCC)    • Atrial fibrillation (HCC)    • Atrial flutter with rapid ventricular response (HCC)    • CHF (congestive heart failure) (HCC)    • Dilated cardiomyopathy (HCC)    • Hyperlipidemia    • Hypertension    • Low-tension glaucoma of right eye    • Nonischemic cardiomyopathy (HCC)    • Obesity    • Ocular hypertension of left eye    • On dofetilide therapy    • MARCOS (obstructive sleep apnea)     compliant with BiPAP   • Persistent atrial fibrillation (HCC)    • Pneumonia of left lung due to Haemophilus influenzae (HCC) 2017   • Ptosis of right eyelid    • Sepsis ([facility]) 2017   • Type 2 diabetes mellitus, without long-term current use of insulin (HCC)        Social History     Socioeconomic History   • Marital status:    • Number of children: 0   Tobacco Use   • Smoking status: Never   • Smokeless tobacco: Never   Vaping Use   • Vaping Use: Never used   Substance and Sexual Activity   • Alcohol use: Never   • Drug use: Never   • Sexual activity: Yes     Partners: Female     Birth control/protection: Other       Family History   Problem Relation Age of Onset   • Other Mother         history of cancer   • Stroke  "Father    • Epilepsy Sister    • Fainting Sister        Review of Systems   Constitutional: Negative for fever and night sweats.   HENT: Negative for ear pain and stridor.    Eyes: Negative for discharge and visual halos.   Cardiovascular: Negative for cyanosis.   Respiratory: Negative for hemoptysis and sputum production.    Hematologic/Lymphatic: Negative for adenopathy.   Skin: Negative for nail changes and unusual hair distribution.   Musculoskeletal: Positive for arthritis and joint pain. Negative for gout and joint swelling.   Gastrointestinal: Negative for bowel incontinence and flatus.   Genitourinary: Negative for dysuria and flank pain.   Neurological: Negative for seizures and tremors.   Psychiatric/Behavioral: Negative for altered mental status. The patient is not nervous/anxious.             Objective:     Vitals:    03/22/23 1355   BP: 122/74   Pulse: 70   Weight: 105 kg (231 lb)   Height: 162.6 cm (64\")         Eyes:      General:         Right eye: No discharge.         Left eye: No discharge.   HENT:      Head: Normocephalic and atraumatic.   Neck:      Thyroid: No thyromegaly.      Vascular: No JVD.   Pulmonary:      Effort: Pulmonary effort is normal.      Breath sounds: Normal breath sounds. No rales.   Cardiovascular:      Normal rate. Regular rhythm.      No gallop.   Edema:     Peripheral edema absent.   Abdominal:      General: Bowel sounds are normal.      Palpations: Abdomen is soft.      Tenderness: There is no abdominal tenderness.   Musculoskeletal: Normal range of motion.         General: No deformity. Skin:     General: Skin is warm and dry.      Findings: No erythema.   Neurological:      Mental Status: Alert and oriented to person, place, and time.      Motor: Normal muscle tone.   Psychiatric:         Behavior: Behavior normal.         Thought Content: Thought content normal.           ECG 12 Lead    Date/Time: 3/22/2023 2:25 PM  Performed by: Reginald Vazquez MD  Authorized by: " Reginald Vazquez MD   Comparison: compared with previous ECG   Similar to previous ECG  Rhythm: sinus rhythm            Lab Review:       Assessment:          Diagnosis Plan   1. Atrial fibrillation, persistent (CMS/HCC)        2. On dofetilide therapy               Plan:     AF --- doing great on dofetilide. On a-ban.     Echo -- good function.

## 2023-03-24 ENCOUNTER — TELEPHONE (OUTPATIENT)
Dept: SURGERY | Facility: CLINIC | Age: 78
End: 2023-03-24
Payer: MEDICARE

## 2023-03-27 NOTE — PROGRESS NOTES
"Chief Complaint  Lito is pneumothorax, follow-up    Subjective        Ray Pratt presents to [facility] for follow-up after recent hospitalization for spontaneous pneumothorax.    History of Present Illness     Mr. Pratt is a pleasant 77-year-old gentleman who presented to the emergency department at [facility] earlier this month complaining of sudden onset shortness of breath and pleuritic type chest pain.  Radiographic imaging demonstrated a large right pneumothorax and the patient underwent chest tube insertion.  Pneumothorax improved with chest tube placement, however the patient did have a persistent airleak for several days.  CT imaging demonstrated some emphysematous changes with pulmonary bullae.  There is also a noncalcified sub-6 mm left lower lobe pulmonary nodule.  Ultimately, his airleak resolved and his chest tube was safely removed.  Patient discharged home and presents today for further follow-up with a chest x-ray.  Mr. Pratt reports that he has been doing well.  He reports significant improvement in his shortness of breath and no new pulmonary complaints today.  He continues to use supplemental oxygen at night.    Objective   Vital Signs:  /72 (BP Location: Left arm, Patient Position: Sitting, Cuff Size: Adult)   Pulse 61   Ht 162.6 cm (64\")   Wt 104 kg (230 lb)   SpO2 98%   BMI 39.48 kg/m²   Estimated body mass index is 39.48 kg/m² as calculated from the following:    Height as of this encounter: 162.6 cm (64\").    Weight as of this encounter: 104 kg (230 lb).             Physical Exam  Vitals and nursing note reviewed.   Constitutional:       Appearance: Normal appearance.   Cardiovascular:      Rate and Rhythm: Normal rate and regular rhythm.      Pulses: Normal pulses.      Heart sounds: Normal heart sounds.   Pulmonary:      Effort: Pulmonary effort is normal.      Breath sounds: Normal breath sounds.   Abdominal: "      General: Bowel sounds are normal.      Palpations: Abdomen is soft.   Musculoskeletal:      Cervical back: Neck supple.   Skin:     General: Skin is warm and dry.   Neurological:      General: No focal deficit present.      Mental Status: He is alert. Mental status is at baseline.        Result Review :  The following data was reviewed by: ANICETO Eng on 03/29/2023:    Data reviewed: Radiologic studies I reviewed the patient's radiographic imaging including CT of the chest without contrast on 3/12/2023 which demonstrates several pulmonary bullae and a noncalcified left lower lobe pulmonary nodule.  Patient also had a chest x-ray today which demonstrates no recurrence of pneumothorax.  No acute pulmonary process.             Assessment and Plan   Diagnoses and all orders for this visit:    1. Solitary pulmonary nodule (Primary)  -     CT Chest Without Contrast Diagnostic; Future    2. Pulmonary nodules/lesions, multiple    Spontaneous pneumothorax: Likely secondary to bullous disease in addition to positive pressure ventilation per patient's BiPAP.  Would recommend he hold off on utilization of BiPAP for at least 4 weeks.    Pulmonary nodule: Repeat CT of the chest without contrast and have patient follow-up in 3 months.    MARCOS: Recommend he reach out to his sleep medicine physician and notify them of the pneumothorax that is suspected to be secondary to BiPAP in case his settings need to be changed prior to reinitiating utilizing his PAP machine.    Q for allowing us to participate in the care of Oli [place].  We will continue to keep you informed of his progress.         I spent 34 minutes caring for Ray on this date of service. This time includes time spent by me in the following activities:preparing for the visit, reviewing tests, obtaining and/or reviewing a separately obtained history, performing a medically appropriate examination and/or evaluation , counseling and educating the  patient/family/caregiver, ordering medications, tests, or procedures, referring and communicating with other health care professionals , documenting information in the medical record, independently interpreting results and communicating that information with the patient/family/caregiver and care coordination  Follow Up   Return in about 3 months (around 6/29/2023) for Next scheduled follow up with CT chest.  Patient was given instructions and counseling regarding his condition or for health maintenance advice. Please see specific information pulled into the AVS if appropriate.

## 2023-03-29 ENCOUNTER — HOSPITAL ENCOUNTER (OUTPATIENT)
Dept: GENERAL RADIOLOGY | Facility: HOSPITAL | Age: 78
Discharge: HOME | End: 2023-03-29
Admitting: NURSE PRACTITIONER
Payer: MEDICARE

## 2023-03-29 ENCOUNTER — OFFICE VISIT (OUTPATIENT)
Dept: SURGERY | Facility: CLINIC | Age: 78
End: 2023-03-29
Payer: MEDICARE

## 2023-03-29 VITALS
BODY MASS INDEX: 39.27 KG/M2 | WEIGHT: 230 LBS | DIASTOLIC BLOOD PRESSURE: 72 MMHG | SYSTOLIC BLOOD PRESSURE: 122 MMHG | OXYGEN SATURATION: 98 % | HEIGHT: 64 IN | HEART RATE: 61 BPM

## 2023-03-29 DIAGNOSIS — J93.9 PNEUMOTHORAX ON RIGHT: ICD-10-CM

## 2023-03-29 DIAGNOSIS — R91.1 SOLITARY PULMONARY NODULE: Primary | ICD-10-CM

## 2023-03-29 DIAGNOSIS — R91.8 PULMONARY NODULES/LESIONS, MULTIPLE: ICD-10-CM

## 2023-03-29 PROCEDURE — 71046 X-RAY EXAM CHEST 2 VIEWS: CPT

## 2023-03-29 PROCEDURE — 1160F RVW MEDS BY RX/DR IN RCRD: CPT | Performed by: NURSE PRACTITIONER

## 2023-03-29 PROCEDURE — 99214 OFFICE O/P EST MOD 30 MIN: CPT | Performed by: NURSE PRACTITIONER

## 2023-03-29 PROCEDURE — 1159F MED LIST DOCD IN RCRD: CPT | Performed by: NURSE PRACTITIONER

## 2023-03-29 PROCEDURE — 3074F SYST BP LT 130 MM HG: CPT | Performed by: NURSE PRACTITIONER

## 2023-03-29 PROCEDURE — 3078F DIAST BP <80 MM HG: CPT | Performed by: NURSE PRACTITIONER

## 2023-04-06 ENCOUNTER — OFFICE VISIT (OUTPATIENT)
Dept: SLEEP MEDICINE | Facility: HOSPITAL | Age: 78
End: 2023-04-06
Payer: MEDICARE

## 2023-04-06 VITALS
WEIGHT: 233 LBS | BODY MASS INDEX: 39.78 KG/M2 | HEART RATE: 68 BPM | OXYGEN SATURATION: 96 % | HEIGHT: 64 IN | DIASTOLIC BLOOD PRESSURE: 72 MMHG | SYSTOLIC BLOOD PRESSURE: 114 MMHG

## 2023-04-06 DIAGNOSIS — G47.33 OBSTRUCTIVE SLEEP APNEA: ICD-10-CM

## 2023-04-06 DIAGNOSIS — J93.9 PNEUMOTHORAX ON RIGHT: ICD-10-CM

## 2023-04-06 DIAGNOSIS — G47.31 PRIMARY CENTRAL SLEEP APNEA: Primary | ICD-10-CM

## 2023-04-06 PROCEDURE — 1159F MED LIST DOCD IN RCRD: CPT | Performed by: INTERNAL MEDICINE

## 2023-04-06 PROCEDURE — 1160F RVW MEDS BY RX/DR IN RCRD: CPT | Performed by: INTERNAL MEDICINE

## 2023-04-06 PROCEDURE — 3078F DIAST BP <80 MM HG: CPT | Performed by: INTERNAL MEDICINE

## 2023-04-06 PROCEDURE — G0463 HOSPITAL OUTPT CLINIC VISIT: HCPCS

## 2023-04-06 PROCEDURE — 3074F SYST BP LT 130 MM HG: CPT | Performed by: INTERNAL MEDICINE

## 2023-04-06 PROCEDURE — 99214 OFFICE O/P EST MOD 30 MIN: CPT | Performed by: INTERNAL MEDICINE

## 2023-04-06 NOTE — PROGRESS NOTES
"  [facility]  [address]  [address]  [place] [address]  Phone   Fax       SLEEP CLINIC FOLLOW UP PROGRESS NOTE.    Ray Pratt  [id number]   1945  77 y.o.  male      PCP: Marcial Jackson DO      Date of visit: 4/6/2023    Chief Complaint   Patient presents with   • Sleep Apnea   • Obesity       HPI:  This is a 77 y.o. years old patient is here for the management of complex sleep apnea..  She is a patient of Dr. Meng but I have reviewed the sleep study.  Patient has a complex sleep apnea with a total AHI of 72/h out of it central sleep index is 54/h.  He is on BiPAP ST with a BiPAP pressure of 20/16 with a backup rate of 10.  Unfortunately he had a right-sided pneumothorax which was treated with a chest tube.  Also the chest x-ray and CT shows that patient has blebs.  He is here to discuss the management.    After reviewing his medical records and also discussed with Dr. Graham I feel that because of his central sleep apnea and it needs to be treated.  I am going to reduce the BiPAP-ST pressure to 15/11 with a backup rate of 10      Medications and allergies are reviewed by me and documented in the encounter.     SOCIAL (habits pertaining to sleep medicine)  • History tobacco use:No   • History of alcohol use: 0 per week  • Caffeine use: 2     REVIEW OF SYSTEMS:   Pertaining positive symptoms are:  • [place] Sleepiness Scale :Total score: 9         PHYSICAL EXAMINATION:  CONSTITUTIONAL:  Vitals:    04/06/23 0900   BP: 114/72   Pulse: 68   SpO2: 96%   Weight: 106 kg (233 lb)   Height: 162.6 cm (64.02\")    Body mass index is 39.97 kg/m².   NOSE: nasal passages are clear, No deformities noted   RESP SYSTEM: Not in any respiratory distress, no chest deformities noted,   CARDIOVASULAR: No edema noted  NEURO: Oriented x 3, gait normal,  Mood and affect appeared appropriate      Data reviewed:  The Smart card downloaded on 4/6/2023 has been reviewed " independently by me for compliance and discussed the data with the patient.  Date of the download is February 8, 2023 to March 9, 2023 and he has not used after the pneumothorax was discovered  Compliance; 87%  More than 4 hr use, 80%  Average use of the device 6 hours and 56 minutes per night  Residual AHI: 9.7 /hr (goal < 5.0 /hr) central sleep apnea index 0.3  Device: DreamStation BiPAP ST  DME: [facility]      ASSESSMENT AND PLAN:  · Complex sleep apnea with a predominantly central sleep apnea.  He is a very complicated situation.  Patient had pneumothorax which was treated.  Because of his central sleep apnea the central sleep apnea has to be treated.  After discussion with the Dr. Graham and also patient it is agreed that patient reduce the pressure on the BiPAP ST to 15/11 with a backup rate of 10 and see him in 2 weeks for close follow-up.  If he develops any chest pain and is to stop using the BiPAP ST and call the sleep center as soon as possible or get to the emergency room to get a x-ray  · Obesity  2 with BMI is Body mass index is 39.97 kg/m².. I have discuss the relationship between the weight and sleep apnea. The benefit of weight loss in reducing severity of sleep apnea was discussed. Discussed diet and exercise with the patient to achieve ideal BMI.   · Return in about 2 weeks (around 4/20/2023) for with smart card down load. . Patient's questions were answered.    4/6/2023  Lulu Meléndez MD  Sleep Medicine.  Medical Director,   [facility], [facility] sleep centers.

## 2023-04-20 ENCOUNTER — OFFICE VISIT (OUTPATIENT)
Dept: SLEEP MEDICINE | Facility: HOSPITAL | Age: 78
End: 2023-04-20
Payer: MEDICARE

## 2023-04-20 VITALS
SYSTOLIC BLOOD PRESSURE: 110 MMHG | HEIGHT: 64 IN | BODY MASS INDEX: 39.95 KG/M2 | HEART RATE: 74 BPM | WEIGHT: 234 LBS | DIASTOLIC BLOOD PRESSURE: 71 MMHG | OXYGEN SATURATION: 94 %

## 2023-04-20 DIAGNOSIS — J93.9 PNEUMOTHORAX ON RIGHT: ICD-10-CM

## 2023-04-20 DIAGNOSIS — G47.31 PRIMARY CENTRAL SLEEP APNEA: Primary | ICD-10-CM

## 2023-04-20 PROBLEM — R09.02 HYPOXIA: Status: RESOLVED | Noted: 2017-01-18 | Resolved: 2023-04-20

## 2023-04-20 PROCEDURE — G0463 HOSPITAL OUTPT CLINIC VISIT: HCPCS

## 2023-04-20 NOTE — PROGRESS NOTES
Ray Pratt   [id number]   1945   male        Merged with [facility]:    This letter is sent for changes on his BiPAP ST and to discontinue supplemental oxygen    Patient had developed pneumothorax and which was treated with a chest tube.  Because of his central sleep apnea it was determined that using the BiPAP ST is necessary.  The BiPAP pressure has been reduced to 15/11 with a backup rate of 10.  And a follow-up visit shows good compliance and no complications.    In addition I am requesting you to discontinue his oxygen which was delivered to him after his discharge from the hospital.  He does not require oxygen because he is back on his BiPAP ST.  Moreover supplemental oxygen in patients with central sleep apnea can aggravate or make his central sleep apnea worse      Lulu Meléndez MD  Sleep Medicine.  4/20/2023 ,

## 2023-04-20 NOTE — PROGRESS NOTES
"  [facility]  [address]  [address]  [place] [address]  Phone   Fax       SLEEP CLINIC FOLLOW UP PROGRESS NOTE.    Ray Pratt  [id number]   1945  77 y.o.  male      PCP: Marcial Jackson DO      Date of visit: 4/20/2023    Chief Complaint   Patient presents with   • Sleep Apnea       HPI:  This is a 77 y.o. years old patient is here for the management of complex sleep apnea..  She is a patient of Dr. Meng but I have reviewed the sleep study.  Patient has a complex sleep apnea with a total AHI of 72/h out of it central sleep index is 54/h.  He is on BiPAP ST with a BiPAP pressure of 20/16 with a backup rate of 10.  Unfortunately he had a right-sided pneumothorax which was treated with a chest tube.  Also the chest x-ray and CT shows that patient has blebs.  He is here to discuss the management.    After reviewing his medical records and also discussed with Dr. Graham I feel that because of his central sleep apnea and it needs to be treated.  I am going to reduce the BiPAP-ST pressure to 15/11 with a backup rate of 10    4/20/2023  Patient is here for follow-up in 2 weeks after reducing the pressure.  He is using the BiPAP ST for 4 hours nightly and has no problems.  The download shows good compliance and the AHI is 10/h mainly hypopneas.  And the central apneas are 0.4/h.    Medications and allergies are reviewed by me and documented in the encounter.     SOCIAL (habits pertaining to sleep medicine)  • History tobacco use:No   • History of alcohol use: 0 per week  • Caffeine use: 2     REVIEW OF SYSTEMS:   Pertaining positive symptoms are:  • [place] Sleepiness Scale :Total score: 8         PHYSICAL EXAMINATION:  CONSTITUTIONAL:  Vitals:    04/20/23 0957   BP: 110/71   Pulse: 74   SpO2: 94%   Weight: 106 kg (234 lb)   Height: 162.6 cm (64.02\")    Body mass index is 40.14 kg/m².   NOSE: nasal passages are clear, No deformities noted   RESP SYSTEM: Not in " any respiratory distress, no chest deformities noted,   CARDIOVASULAR: No edema noted  NEURO: Oriented x 3, gait normal,  Mood and affect appeared appropriate      Data reviewed:  The Smart card downloaded on 4/20/2023 has been reviewed independently by me for compliance and discussed the data with the patient.  Date of the download is February 8, 2023 to March 9, 2023 and he has not used after the pneumothorax was discovered  Compliance; 87%  More than 4 hr use, 80%  Average use of the device 4 hours  per night  Residual AHI: 10 /hr (goal < 5.0 /hr) central sleep apnea index 0.3  Device: DreamStation BiPAP ST  DME: [facility]      ASSESSMENT AND PLAN:  · Complex sleep apnea with a predominantly central sleep apnea.  He is a very complicated situation.  Patient had pneumothorax which was treated.  Because of his central sleep apnea the central sleep apnea has to be treated.  After discussion with the Dr. Graham and also patient it is agreed that patient reduce the pressure on the BiPAP ST to 15/11 with a backup rate of 10 and see him in 2 weeks for close follow-up.  If he develops any chest pain and is to stop using the BiPAP ST and call the sleep center as soon as possible or get to the emergency room to get a x-ray  I have asked him to continue BiPAP ST at the lower pressure and increase slowly the duration of use at least to 6 hours per night.    Oxygen can be discontinued as the patient has no oxygen desaturation with the BiPAP ST    · Obesity  2 with BMI is Body mass index is 40.14 kg/m².. I have discuss the relationship between the weight and sleep apnea. The benefit of weight loss in reducing severity of sleep apnea was discussed. Discussed diet and exercise with the patient to achieve ideal BMI.   · Return in about 3 months (around 7/20/2023) for Next scheduled follow-up. . Patient's questions were answered.    4/20/2023  Lulu Meléndez MD  Sleep Medicine.  Medical Director,   [facility],  [facility].

## 2023-04-24 RX ORDER — POTASSIUM CHLORIDE 750 MG/1
CAPSULE, EXTENDED RELEASE ORAL
Qty: 180 CAPSULE | Refills: 0 | Status: SHIPPED | OUTPATIENT
Start: 2023-04-24

## 2023-05-01 ENCOUNTER — OFFICE VISIT (OUTPATIENT)
Dept: INTERNAL MEDICINE | Facility: CLINIC | Age: 78
End: 2023-05-01
Payer: MEDICARE

## 2023-05-01 VITALS
WEIGHT: 236 LBS | BODY MASS INDEX: 40.29 KG/M2 | DIASTOLIC BLOOD PRESSURE: 72 MMHG | HEART RATE: 80 BPM | SYSTOLIC BLOOD PRESSURE: 126 MMHG | OXYGEN SATURATION: 97 % | HEIGHT: 64 IN

## 2023-05-01 DIAGNOSIS — I10 BENIGN ESSENTIAL HTN: ICD-10-CM

## 2023-05-01 DIAGNOSIS — E66.01 CLASS 3 SEVERE OBESITY DUE TO EXCESS CALORIES WITH SERIOUS COMORBIDITY AND BODY MASS INDEX (BMI) OF 40.0 TO 44.9 IN ADULT: ICD-10-CM

## 2023-05-01 DIAGNOSIS — E11.65 TYPE 2 DIABETES MELLITUS WITH HYPERGLYCEMIA, WITHOUT LONG-TERM CURRENT USE OF INSULIN: Primary | ICD-10-CM

## 2023-05-01 DIAGNOSIS — I48.91 ATRIAL FIBRILLATION, UNSPECIFIED TYPE: ICD-10-CM

## 2023-05-01 PROCEDURE — 3078F DIAST BP <80 MM HG: CPT | Performed by: STUDENT IN AN ORGANIZED HEALTH CARE EDUCATION/TRAINING PROGRAM

## 2023-05-01 PROCEDURE — 99214 OFFICE O/P EST MOD 30 MIN: CPT | Performed by: STUDENT IN AN ORGANIZED HEALTH CARE EDUCATION/TRAINING PROGRAM

## 2023-05-01 PROCEDURE — 3074F SYST BP LT 130 MM HG: CPT | Performed by: STUDENT IN AN ORGANIZED HEALTH CARE EDUCATION/TRAINING PROGRAM

## 2023-05-01 NOTE — PROGRESS NOTES
Marcial Jackson D.O.  Internal Medicine  [facility]  [address], [address]  [place] [address]  [phone]      Chief Complaint  Diabetes    SUBJECTIVE    History of Present Illness    Ray Pratt is a 77 y.o. male who presents to the office today as an established patient that last saw me on 3/10/2023.     Type 2 diabetes: takes metformin ER 1000 mg daily, empagliflozin 25 mg daily and sitagliptin 100 mg daily. Does not check his sugar at home.   Last A1c 7.0 3/2023. States he is concerned about weight. He is not currently exercising. States he has decreased doughnuts and is eating frozen fruit instead.     Atrial fib/ HTN/CHF/NICM: follows with [facility]. Takes atenolol 25 mg once daily, dofetilide 125 mcg twice daily and anticoagulated with eliquis 5 mg twice daily. Also takes lasix 40 mg daily and takes potassium supplementation 20 meq daily. Doesn't check BP at home.     No Known Allergies     Outpatient Medications Marked as Taking for the 5/1/23 encounter (Office Visit) with Marcial Jackson,    Medication Sig Dispense Refill   • atenolol (TENORMIN) 25 MG tablet Take 1 tablet by mouth Daily. 180 tablet 3   • dofetilide (TIKOSYN) 125 MCG capsule TAKE 1 CAPSULE BY MOUTH EVERY 12 HOURS 180 capsule 2   • Eliquis 5 MG tablet tablet TAKE 1 TABLET BY MOUTH EVERY 12 HOURS 60 tablet 11   • furosemide (LASIX) 40 MG tablet TAKE 1 TABLET BY MOUTH EVERY DAY 90 tablet 3   • Jardiance 25 MG tablet tablet TAKE 1 TABLET BY MOUTH EVERY DAY WITH BREAKFAST 90 tablet 2   • loratadine (CLARITIN) 10 MG tablet TAKE 1 TABLET BY MOUTH EVERY DAY 90 tablet 1   • metFORMIN ER (GLUCOPHAGE-XR) 500 MG 24 hr tablet Take 2 tablets by mouth Daily With Breakfast. 180 tablet 1   • potassium chloride (MICRO-K) 10 MEQ CR capsule TAKE 2 CAPSULES BY MOUTH EVERY  capsule 0   • rosuvastatin (CRESTOR) 10 MG tablet Take 1 tablet by mouth Daily. 90 tablet 3   • SITagliptin (Januvia) 100 MG tablet Take 1  "tablet by mouth Daily. 90 tablet 3        Past Medical History:   Diagnosis Date   • Acute combined systolic and diastolic congestive heart failure    • Atrial fibrillation    • Atrial flutter with rapid ventricular response    • CHF (congestive heart failure)    • Dilated cardiomyopathy    • Hyperlipidemia    • Hypertension    • Low-tension glaucoma of right eye    • Nonischemic cardiomyopathy    • Obesity    • Ocular hypertension of left eye    • On dofetilide therapy    • MARCOS (obstructive sleep apnea)     compliant with BiPAP   • Persistent atrial fibrillation    • Pneumonia of left lung due to Haemophilus influenzae 01/18/2017   • Ptosis of right eyelid    • Sepsis 01/18/2017   • Type 2 diabetes mellitus, without long-term current use of insulin        OBJECTIVE    Vital Signs:   /72   Pulse 80   Ht 162.6 cm (64.02\")   Wt 107 kg (236 lb)   SpO2 97%   BMI 40.48 kg/m²     Physical Exam  Vitals reviewed.   Constitutional:       General: He is not in acute distress.     Appearance: He is obese. He is not ill-appearing.   HENT:      Head: Atraumatic.   Eyes:      General: No scleral icterus.  Cardiovascular:      Rate and Rhythm: Normal rate and regular rhythm.      Heart sounds: Normal heart sounds. No murmur heard.  Pulmonary:      Effort: Pulmonary effort is normal. No respiratory distress.      Breath sounds: Normal breath sounds. No stridor. No wheezing or rhonchi.   Musculoskeletal:      Right lower leg: Edema (trace pretibial) present.      Left lower leg: Edema (trace pretibial) present.   Skin:     Coloration: Skin is not jaundiced.   Neurological:      Mental Status: He is alert.   Psychiatric:         Mood and Affect: Mood normal.         Behavior: Behavior normal.         Thought Content: Thought content normal.                             ASSESSMENT & PLAN     Diagnoses and all orders for this visit:    1. Type 2 diabetes mellitus with hyperglycemia, without long-term current use of insulin " (Primary)  -A1c trends on file for this patient:   A1C Last 3 Results        11/3/2022    10:25 2/1/2023    10:03 3/11/2023    06:49   HGBA1C Last 3 Results   Hemoglobin A1C 7.40   7.00   7.00       -Goal A1c for this patient is less than 7.0%  -Current diabetes regimen:  takes metformin ER 1000 mg daily, empagliflozin 25 mg daily and sitagliptin 100 mg daily. Does not check his sugar at home.   Last A1c 7.0 3/2023. States he is concerned about weight. He is not currently exercising. States he has decreased doughnuts and is eating frozen fruit instead.   -Changes made to diabetes regimen today: continue current regimen given acceptable A1c . Recommended nutritionist consult to discuss his overall nutrition and he was agreeable. Recommended he begin exercising as well.   -Microalbuminuria screen: up to date    2. Benign essential HTN  3. Atrial fibrillation, unspecified type  - Takes atenolol 25 mg once daily, dofetilide 125 mcg twice daily and anticoagulated with eliquis 5 mg twice daily. Also takes lasix 40 mg daily and takes potassium supplementation 20 meq daily. Doesn't check BP at home.  -BP well controlled 126/72 in office today, HR 80  -continue current regimen   -reviewed most recent cardiology progress note as well as TTE report           The following social determinates of health impact the patient's medical decision making: No social determinates of health were factored in to today's visit.     Follow Up  Return in about 3 months (around 8/1/2023) for Medicare Wellness.    Patient/family had no further questions at this time and verbalized understanding of the plan discussed today.

## 2023-05-07 DIAGNOSIS — E11.65 TYPE 2 DIABETES MELLITUS WITH HYPERGLYCEMIA, WITHOUT LONG-TERM CURRENT USE OF INSULIN: ICD-10-CM

## 2023-05-08 RX ORDER — ROSUVASTATIN CALCIUM 10 MG/1
TABLET, COATED ORAL
Qty: 90 TABLET | Refills: 3 | Status: SHIPPED | OUTPATIENT
Start: 2023-05-08

## 2023-05-08 RX ORDER — METFORMIN HYDROCHLORIDE 500 MG/1
1000 TABLET, EXTENDED RELEASE ORAL
Qty: 180 TABLET | Refills: 1 | Status: SHIPPED | OUTPATIENT
Start: 2023-05-08

## 2023-05-23 ENCOUNTER — HOSPITAL ENCOUNTER (OUTPATIENT)
Dept: DIABETES SERVICES | Facility: HOSPITAL | Age: 78
Discharge: HOME | End: 2023-05-23
Admitting: STUDENT IN AN ORGANIZED HEALTH CARE EDUCATION/TRAINING PROGRAM
Payer: MEDICARE

## 2023-05-23 PROCEDURE — 97802 MEDICAL NUTRITION INDIV IN: CPT

## 2023-05-23 NOTE — CONSULTS
Mr. Pratt was seen today by Registered Dietitian for Diabetes diet education. Consistent with the ADA’s standards of care, a comprehensive assessment/training record has been sent to medical records (see media tab).    Has lost 9# over the last couple months. Carbohydrate counting/consistent carb eating pattern discussed. Encouraged limiting saturated fat, salt, sodium. Reading labels reviewed and encouraged using nutrition information at chain restaurants.     Mr. Pratt has been encouraged to call our office with questions or additional education needs. Please place referral for additional services or follow-up should need arise.    Thank you for the referral.

## 2023-06-12 RX ORDER — ATENOLOL 25 MG/1
25 TABLET ORAL DAILY
Qty: 180 TABLET | Refills: 3 | Status: SHIPPED | OUTPATIENT
Start: 2023-06-12

## 2023-06-15 ENCOUNTER — OFFICE VISIT (OUTPATIENT)
Dept: SLEEP MEDICINE | Facility: HOSPITAL | Age: 78
End: 2023-06-15
Payer: MEDICARE

## 2023-06-15 VITALS
SYSTOLIC BLOOD PRESSURE: 105 MMHG | WEIGHT: 236 LBS | HEIGHT: 64 IN | HEART RATE: 61 BPM | BODY MASS INDEX: 40.29 KG/M2 | DIASTOLIC BLOOD PRESSURE: 60 MMHG | OXYGEN SATURATION: 97 %

## 2023-06-15 DIAGNOSIS — J93.9 PNEUMOTHORAX ON RIGHT: ICD-10-CM

## 2023-06-15 DIAGNOSIS — G47.31 PRIMARY CENTRAL SLEEP APNEA: Primary | ICD-10-CM

## 2023-06-15 PROCEDURE — G0463 HOSPITAL OUTPT CLINIC VISIT: HCPCS

## 2023-06-15 NOTE — PROGRESS NOTES
"  [facility]  [address]  [address]  [place] [address]  Phone   Fax       SLEEP CLINIC FOLLOW UP PROGRESS NOTE.    Ray Pratt  [id number]   1945  77 y.o.  male      PCP: Marcial Jackson DO      Date of visit: 6/15/2023    Chief Complaint   Patient presents with    Sleep Apnea    Obesity       HPI:  This is a 77 y.o. years old patient is here for the management of complex sleep apnea..  She is a patient of Dr. Meng but I have reviewed the sleep study.  Patient has a complex sleep apnea with a total AHI of 72/h out of it central sleep index is 54/h.  He was on BiPAP ST with a BiPAP pressure of 20/16 with a backup rate of 10.  Unfortunately he had a right-sided pneumothorax which was treated with a chest tube.  Also the chest x-ray and CT shows that patient has blebs.  He is here to discuss the management.    Now patient is on a BiPAP ST with a reduced pressure.  BiPAP-ST pressure to 15/11 with a backup rate of 10.  He is doing very well and has no problems and tolerating the device without any problems.  He says that he has a follow-up with thoracic surgery after chest x-ray in few weeks      Medications and allergies are reviewed by me and documented in the encounter.     SOCIAL (habits pertaining to sleep medicine)  History tobacco use:No   History of alcohol use: 0 per week  Caffeine use: 2     REVIEW OF SYSTEMS:   Pertaining positive symptoms are:  [place] Sleepiness Scale :Total score: 5         PHYSICAL EXAMINATION:  CONSTITUTIONAL:  Vitals:    06/15/23 0900   BP: 105/60   Pulse: 61   SpO2: 97%   Weight: 107 kg (236 lb)   Height: 162.6 cm (64.02\")    Body mass index is 40.49 kg/m².   NOSE: nasal passages are clear, No deformities noted   RESP SYSTEM: Not in any respiratory distress, no chest deformities noted,   CARDIOVASULAR: No edema noted  NEURO: Oriented x 3, gait normal,  Mood and affect appeared appropriate      Data reviewed:  The Smart card " downloaded on 6/15/2023 has been reviewed independently by me for compliance and discussed the data with the patient.  Date of the download is February 8, 2023 to March 9, 2023 and he has not used after the pneumothorax was discovered  Compliance; 100%  More than 4 hr use, 100%  Average use of the device shows an 11 minutes per night  Residual AHI: 10 /hr (goal < 5.0 /hr) central sleep apnea index 0.3  Device: DreamStation BiPAP ST  DME: [facility]      ASSESSMENT AND PLAN:  Complex sleep apnea with a predominantly central sleep apnea.  He is a very complicated situation.  Patient had pneumothorax which was treated.  He is tolerating the reduced BiPAP ST pressure.  He is AHI is acceptable and he is using an average of 6 hours and is benefiting from the device.  The device is medically necessary.  I will see him in about 1 year for follow-up with the he will call the sleep center if he has any problems  History of right-sided pneumothorax  Obesity  2 with BMI is Body mass index is 40.49 kg/m².. I have discuss the relationship between the weight and sleep apnea. The benefit of weight loss in reducing severity of sleep apnea was discussed. Discussed diet and exercise with the patient to achieve ideal BMI.   Return in about 1 year (around 6/15/2024) for with smart card down load. . Patient's questions were answered.    6/15/2023  Lulu Meléndez MD  Sleep Medicine.  Medical Director,   [facility] [place] [facility] [place] [facility].

## 2023-07-28 RX ORDER — FUROSEMIDE 40 MG/1
TABLET ORAL
Qty: 90 TABLET | Refills: 3 | Status: SHIPPED | OUTPATIENT
Start: 2023-07-28

## 2023-08-03 ENCOUNTER — OFFICE VISIT (OUTPATIENT)
Dept: INTERNAL MEDICINE | Facility: CLINIC | Age: 78
End: 2023-08-03
Payer: MEDICARE

## 2023-08-03 VITALS
DIASTOLIC BLOOD PRESSURE: 76 MMHG | SYSTOLIC BLOOD PRESSURE: 130 MMHG | OXYGEN SATURATION: 94 % | WEIGHT: 237 LBS | HEART RATE: 64 BPM | BODY MASS INDEX: 40.46 KG/M2 | HEIGHT: 64 IN

## 2023-08-03 DIAGNOSIS — R53.1 SUBJECTIVE WEAKNESS: ICD-10-CM

## 2023-08-03 DIAGNOSIS — E11.65 TYPE 2 DIABETES MELLITUS WITH HYPERGLYCEMIA, WITHOUT LONG-TERM CURRENT USE OF INSULIN: ICD-10-CM

## 2023-08-03 DIAGNOSIS — Z00.00 MEDICARE ANNUAL WELLNESS VISIT, SUBSEQUENT: Primary | ICD-10-CM

## 2023-08-03 PROCEDURE — 99214 OFFICE O/P EST MOD 30 MIN: CPT | Performed by: STUDENT IN AN ORGANIZED HEALTH CARE EDUCATION/TRAINING PROGRAM

## 2023-08-03 PROCEDURE — 1170F FXNL STATUS ASSESSED: CPT | Performed by: STUDENT IN AN ORGANIZED HEALTH CARE EDUCATION/TRAINING PROGRAM

## 2023-08-03 PROCEDURE — G0439 PPPS, SUBSEQ VISIT: HCPCS | Performed by: STUDENT IN AN ORGANIZED HEALTH CARE EDUCATION/TRAINING PROGRAM

## 2023-08-03 PROCEDURE — 3078F DIAST BP <80 MM HG: CPT | Performed by: STUDENT IN AN ORGANIZED HEALTH CARE EDUCATION/TRAINING PROGRAM

## 2023-08-03 PROCEDURE — 3075F SYST BP GE 130 - 139MM HG: CPT | Performed by: STUDENT IN AN ORGANIZED HEALTH CARE EDUCATION/TRAINING PROGRAM

## 2023-08-04 LAB
ALBUMIN SERPL-MCNC: 4.3 G/DL (ref 3.5–5.2)
ALBUMIN/CREAT UR: <28 MG/G CREAT (ref 0–29)
ALBUMIN/GLOB SERPL: 1.5 G/DL
ALP SERPL-CCNC: 67 U/L (ref 39–117)
ALT SERPL-CCNC: 9 U/L (ref 1–41)
AST SERPL-CCNC: 16 U/L (ref 1–40)
BILIRUB SERPL-MCNC: 0.6 MG/DL (ref 0–1.2)
BUN SERPL-MCNC: 13 MG/DL (ref 8–23)
BUN/CREAT SERPL: 12.1 (ref 7–25)
CALCIUM SERPL-MCNC: 9.1 MG/DL (ref 8.6–10.5)
CHLORIDE SERPL-SCNC: 103 MMOL/L (ref 98–107)
CO2 SERPL-SCNC: 26.5 MMOL/L (ref 22–29)
CREAT SERPL-MCNC: 1.07 MG/DL (ref 0.76–1.27)
CREAT UR-MCNC: 10.7 MG/DL
EGFRCR SERPLBLD CKD-EPI 2021: 71.5 ML/MIN/1.73
GLOBULIN SER CALC-MCNC: 2.8 GM/DL
GLUCOSE SERPL-MCNC: 96 MG/DL (ref 65–99)
HBA1C MFR BLD: 7 % (ref 4.8–5.6)
MICROALBUMIN UR-MCNC: <3 UG/ML
POTASSIUM SERPL-SCNC: 3.9 MMOL/L (ref 3.5–5.2)
PROT SERPL-MCNC: 7.1 G/DL (ref 6–8.5)
SODIUM SERPL-SCNC: 139 MMOL/L (ref 136–145)

## 2023-08-11 NOTE — TELEPHONE ENCOUNTER
Patient aware   Patient states she was discharged from the hospital on 8/08/23 due to cardiac concerns. Patient states she was exposed to Covid-19 SARS from her daughter that provided care until the day before her discharge. Patient states she is asymptomatic for symptoms of Covid-19.    Care Advice given per Coronavirus (Covid-19) Exposure - Adult. Patient states understanding of care advice and advised to contact [facility]OC Triage for symptoms of Covid-19. Patient also advised to test for Covid-19 on tomorrow, 8/12/23. Patient states understanding of care advice and cites no additional concerns at this time.     Reason for Disposition   COVID-19 EXPOSURE within last 14 days and NO symptoms    Additional Information   Negative: COVID-19 lab test positive   Negative: Lives with someone known to have influenza (flu test positive) and flu-like symptoms (e.g., cough, runny nose, sore throat, SOB; with or without fever)   Negative: Symptoms of COVID-19 (e.g., cough, fever, SOB, or others) and doctor (or NP/PA) diagnosed COVID-19 based on symptoms   Negative: Symptoms of COVID-19 (e.g., cough, fever, SOB, or others) and within 14 days of COVID-19 EXPOSURE   Negative: Symptoms of COVID-19 (e.g., cough, fever, SOB, or others) and within 14 days of being in a high-risk area for COVID-19 community spread (identified by CDC)   Negative: Difficulty breathing (shortness of breath) occurs and onset > 14 days after COVID-19 EXPOSURE   Negative: Cough occurs and onset > 14 days after COVID-19 EXPOSURE   Negative: Common cold symptoms and onset > 14 days after COVID-19 EXPOSURE   Negative: COVID-19 vaccine reaction suspected (e.g., fever, headache, muscle aches) occurring during days 1-3 after getting vaccine   Negative: COVID-19 vaccine, questions about   Negative: COVID-19 EXPOSURE within last 14 days and requests COVID-19 lab test to return to work and NO symptoms   Negative: COVID-19 EXPOSURE within last 14 days and weak immune system (e.g., HIV  positive, cancer chemo, splenectomy, organ transplant, chronic steroids) and NO symptoms   Negative: Living or working in a correctional facility, long-term care facility, or shelter (i.e., group setting; densely populated) where an outbreak has occurred and NO symptoms    Protocols used: Coronavirus (COVID-19) Exposure-A-[place]

## 2023-08-14 ENCOUNTER — TREATMENT (OUTPATIENT)
Age: 78
End: 2023-08-14
Payer: MEDICARE

## 2023-08-14 DIAGNOSIS — R53.1 WEAKNESS: ICD-10-CM

## 2023-08-14 DIAGNOSIS — Z74.09 DECREASED FUNCTIONAL MOBILITY AND ENDURANCE: ICD-10-CM

## 2023-08-14 DIAGNOSIS — M25.552 PAIN IN LEFT HIP: Primary | ICD-10-CM

## 2023-08-14 NOTE — PATIENT INSTRUCTIONS
Access Code: [email or web address]  URL: [email or web address]  Date: 08/14/2023  Prepared by: Ramona Real    Exercises  - Supine Bridge  - 1 x daily - 4 x weekly - 2 sets - 10 reps  - Supine Active Straight Leg Raise  - 1 x daily - 4 x weekly - 2 sets - 10 reps  - Supine Posterior Pelvic Tilt  - 1 x daily - 4 x weekly - 2 sets - 10 reps  - Clamshell  - 1 x daily - 4 x weekly - 2 sets - 10 reps  - Hooklying Clamshell with Resistance  - 1 x daily - 4 x weekly - 2 sets - 10 reps

## 2023-08-14 NOTE — PROGRESS NOTES
Physical Therapy Initial Evaluation and Plan of Care  [address]  [facility] [address]  P: [phone]  F: [phone]    Patient: Ray Pratt   : 1945  Diagnosis/ICD-10 Code:  Pain in left hip [M25.552]  Referring practitioner: Marcial Jackson DO  Date of Initial Visit: 2023  Today's Date: 2023  Patient seen for 1 session         Visit Diagnoses:    ICD-10-CM ICD-9-CM   1. Pain in left hip  M25.552 719.45   2. Weakness  R53.1 780.79   3. Decreased functional mobility and endurance  Z74.09 780.99         Subjective Questionnaire: LEFS: 58/80      Subjective Evaluation    History of Present Illness  Mechanism of injury: Pt is a 78 y/o male who presents to physical therapy with complaints of left hip pain when he lays on it at night. He also reports weakness when going up the stairs with his left leg. Pt reports he noticed this 3-4 weeks ago when he was on a trip to [place]. Pt denies numbness and tingling in LLE and he reports that since returning from his trip, he has noticed some improvement. Pt reports that six months ago he decided he wanted to lose some weight and started walking up and down his stairs at home a couple of times a day. Pt denies giving out of left lower extremity. Pt does not notice any difficulty with other activities other than stairs and laying on his left side.       Patient Occupation: Retired  Pain  Current pain ratin  At worst pain ratin  Quality: dull ache  Relieving factors: change in position and rest  Aggravating factors: stairs  Symptom course: improving.    Social Support  Lives in: multiple-level home    Diagnostic Tests  No diagnostic tests performed    Treatments  Current treatment: physical therapy  Patient Goals  Patient goals for therapy: increased strength, independence with ADLs/IADLs and decreased pain           Objective          Palpation     Additional Palpation Details  No TTP along L hip    Lumbar  Screen  Lumbar range of motion within normal limits.    Neurological Testing     Sensation     Hip   Left Hip   Intact: light touch    Right Hip   Intact: light touch    Additional Neurological Details  No neurological symptoms reported    Active Range of Motion   Left Hip   Normal active range of motion    Right Hip   Normal active range of motion    Passive Range of Motion   Left Hip   Normal passive range of motion    Right Hip   Normal passive range of motion    Strength/Myotome Testing     Left Hip   Planes of Motion   Flexion: 5  Extension: 5  Abduction: 3+  Adduction: 5  External rotation: 5  Internal rotation: 5    Right Hip   Planes of Motion   Flexion: 5  Extension: 5  Abduction: 4  Adduction: 5  External rotation: 5  Internal rotation: 5    Tests     Left Hip   Negative KALIN and FADIR.   SLR: Negative.     Ambulation   Weight-Bearing Status   Weight-Bearing Status (Left): full weight bearing   Weight-Bearing Status (Right): full weight-bearing    Assistive device used: none    Ambulation: Level Surfaces   Ambulation without assistive device: independent    Ambulation: Stairs   Ascend stairs: independent  Pattern: non-reciprocal  Railings: one rail  Descend stairs: independent  Pattern: non-reciprocal  Railings: one rail    Observational Gait   Gait: antalgic     Additional Observational Gait Details  Lateral trunk lean over LLE        Assessment & Plan     Assessment  Impairments: abnormal gait, abnormal muscle firing, activity intolerance, impaired physical strength, lacks appropriate home exercise program, pain with function and weight-bearing intolerance  Functional Limitations: sleeping, uncomfortable because of pain and moving in bed  Assessment details: Pt is a 76 y/o male who presents to physical therapy with signs and symptoms consistent acute L hip pain and B hip abductor weakness. Pt has full and pain free hip range of motion, and fairly good LE strength during manual muscle testing. Pt seems  to only subjectively report pain when laying on his left side at night. He does walk with a slight left lateral trunk lean in stance, most likely due to hip abductor weakness.  Pt has difficulty performing stair negotiation without pulling himself up the stairs with a hand rail and going one step at a time. Pt would benefit from skilled physical therapy in order to address the above impairments and activity limitations outlined in this initial evaluation, in order to decrease pain, improve functional mobility, and return to PLOF.     Barriers to therapy: diabetes (II), CHF, central apnea, gluacoma (vision)  Prognosis: good    Goals  Plan Goals: STG 2-4 weeks    1. Pt will be independent in home exercise program  2. 5TSTS time will be <14.5 seconds without hands to demonstrate improvement in LE strength  3. Pt will increase LEFS score by 5 points to demonstrate an improvement in functional mobility    LTG 6-8 weeks    1. Pt will report no pain in his left hip when sleeping on his left side at night  2. Pt will be able to ascend/descend stairs using one hand rail and alternating lower extremities pain free and without difficulty    Plan  Therapy options: will be seen for skilled therapy services  Planned modality interventions: cryotherapy  Planned therapy interventions: abdominal trunk stabilization, ADL retraining, balance/weight-bearing training, body mechanics training, flexibility, functional ROM exercises, gait training, home exercise program, IADL retraining, manual therapy, neuromuscular re-education, motor coordination training, postural training, strengthening, stretching, therapeutic activities and transfer training  Frequency: 2x week  Duration in weeks: 8  Treatment plan discussed with: patient          Timed:         Manual Therapy:    0     mins  68775;     Therapeutic Exercise:    15     mins  19233;     Neuromuscular Albertina:    0    mins  71353;    Therapeutic Activity:     10     mins  18363;     Gait  Trainin     mins  96466;     Ultrasound:     0     mins  33506;    Ionto                               0    mins   92680  Self Care                       0     mins   34934  Canalith Repos    0     mins 46027      Un-Timed:  Electrical Stimulation:    0     mins  19730 ( );  Dry Needling     0     mins self-pay  Traction     0     mins [id number]        Timed Treatment:   25   mins   Total Treatment:     50   mins          PT: Ramona Real PT     License Number: [id number]  Electronically signed by Ramona Real PT, 23, 10:48 AM EDT    Certification Period: 2023 thru 2023  I certify that the therapy services are furnished while this patient is under my care.  The services outlined above are required by this patient, and will be reviewed every 90 days.         Physician Signature:__________________________________________________    PHYSICIAN: Marcial Jackson DO  NPI: [id number]                                      DATE:      Please sign and return via fax to [email or web address] . Thank you, [facility].

## 2023-08-16 ENCOUNTER — TREATMENT (OUTPATIENT)
Age: 78
End: 2023-08-16
Payer: MEDICARE

## 2023-08-16 DIAGNOSIS — Z74.09 DECREASED FUNCTIONAL MOBILITY AND ENDURANCE: ICD-10-CM

## 2023-08-16 DIAGNOSIS — M25.552 PAIN IN LEFT HIP: ICD-10-CM

## 2023-08-16 DIAGNOSIS — R53.1 WEAKNESS: Primary | ICD-10-CM

## 2023-08-16 NOTE — PROGRESS NOTES
Physical Therapy Daily Treatment Note  [address]  [facility] [address]  P: [phone]  F: [phone]    Patient: Ray Pratt   : 1945  Referring practitioner: Marcial Jackson DO  Date of Initial Visit: Type: THERAPY  Noted: 2023  Today's Date: 2023  Patient seen for 2 sessions       Visit Diagnoses:    ICD-10-CM ICD-9-CM   1. Weakness  R53.1 780.79   2. Decreased functional mobility and endurance  Z74.09 780.99   3. Pain in left hip  M25.552 719.45       Ray Pratt reports:     Subjective   Pt has no news to report today since previous session.   Objective   See Exercise, Manual, and Modality Logs for complete treatment.   No changes in HEP    Decreased eccentric control during sit<>stand transfer    Assessment/Plan  Pt tolerated therapy interventions well without adverse reactions. Pt benefits from verbal cueing during sit<>stand transfer with small weight to improve eccentric control when descending to a seat. Pt would benefit from more strengthening to help improve his eccentric control during stair negotiation and transfers. Pt had no reports of pain throughout therapy interventions. Continue plan of care.    Timed:         Manual Therapy:    0     mins  36116;     Therapeutic Exercise:    30     mins  60039;     Neuromuscular Albertina:    0    mins  44009;    Therapeutic Activity:     10     mins  57542;     Gait Trainin     mins  05257;     Ultrasound:     0     mins  13765;    Ionto                               0    mins   69741  Self Care                       0     mins   78839  Canalith Repos    0     mins 57669      Un-Timed:  Electrical Stimulation:    0     mins  67339 ( );  Dry Needling     0     mins self-pay  Traction     0     mins 92679      Timed Treatment:   40   mins   Total Treatment:     40   mins    Ramona Real, PT  [place] License: [id number]                  
70.3

## 2023-08-21 ENCOUNTER — TREATMENT (OUTPATIENT)
Age: 78
End: 2023-08-21
Payer: MEDICARE

## 2023-08-21 DIAGNOSIS — Z74.09 DECREASED FUNCTIONAL MOBILITY AND ENDURANCE: ICD-10-CM

## 2023-08-21 DIAGNOSIS — R53.1 WEAKNESS: Primary | ICD-10-CM

## 2023-08-21 DIAGNOSIS — M25.552 PAIN IN LEFT HIP: ICD-10-CM

## 2023-08-21 NOTE — PROGRESS NOTES
Physical Therapy Daily Treatment Note    [facility] PT - [facility]  [address]  [address]  [place] [address]     Patient: Ray Pratt   : 1945  Referring practitioner: Marcial Jackson DO  Date of Initial Visit: Type: THERAPY  Noted: 2023  Today's Date: 2023  Patient seen for 3 sessions         Ray Pratt reports: legs were tired from exercises last session.          Subjective     Objective   See Exercise, Manual, and Modality Logs for complete treatment.   Exercise rationale/ pain free exercise performance  Anatomy and structure of affected musculature  Sleeping positions with pillows  Alternate exercise positions  Verbal/Tactile cues to ensure correct exercise performance/technique    Added:  6 inch stair taps on stair module      Assessment/Plan  Compliant/Cooperative with rehab efforts this session.  Subjectively reports no increased symptoms or discomfort with therapeutic exercise today.  Able to perform increased exercise/functional activity without increased leg pain only early fatigue.  Benefits from verbal/tactile cues to ensure proper exercise technique, performance and postioning.          Progress strengthening /stabilization /functional activity           Timed:  Manual Therapy:         mins  84312;  Therapeutic Exercise:     28    mins  12473;     Neuromuscular Albertina:        mins  56632;    Therapeutic Activity:    12    mins  39181;     Gait Training:           mins  33218;     Ultrasound:          mins  11557;    Self Care                    _15__  mins 04967  Untimed:  Electrical Stimulation:         mins  90418 ( );  Mechanical Traction:         mins  79143;     Timed Treatment:    55  mins   Total Treatment:     55   mins  Walter Jones PTA  Physical Therapist  Assistant  [id number]

## 2023-08-23 ENCOUNTER — TREATMENT (OUTPATIENT)
Age: 78
End: 2023-08-23
Payer: MEDICARE

## 2023-08-23 DIAGNOSIS — R53.1 WEAKNESS: Primary | ICD-10-CM

## 2023-08-23 DIAGNOSIS — Z74.09 DECREASED FUNCTIONAL MOBILITY AND ENDURANCE: ICD-10-CM

## 2023-08-23 DIAGNOSIS — M25.552 PAIN IN LEFT HIP: ICD-10-CM

## 2023-08-23 NOTE — PROGRESS NOTES
Physical Therapy Daily Treatment Note    [facility] PT - [facility]  [address]  [address]  [place] [address]     Patient: Ray Pratt   : 1945  Referring practitioner: Marcial Jackson DO  Date of Initial Visit: Type: THERAPY  Noted: 2023  Today's Date: 2023  Patient seen for 4 sessions         Ray Pratt reports: some mm soreness after last session.  Found step at home that works for him to be able to step up and down.        Subjective     Objective   See Exercise, Manual, and Modality Logs for complete treatment.   Added:  unilateral leg press 22 lbs  2 x 10 each LE, tandem stance 2 x each foot placement x 20 sec each; balance beam lateral stepping x 5 laps    Assessment/Plan  Progressing well with current exercise regimen and activities.  Benefits from burst therapy in which rest break is given after certain number of activities and then positioning is switched(I.e. standing exercise x 3, then rest then supine exercises performed) as pt demonstrates increased capability for exercise performance this session.        Progress per Plan of Care and Progress strengthening /stabilization /functional activity for affected musculature.           Timed:  Manual Therapy:         mins  48268;  Therapeutic Exercise:   25      mins  29393;     Neuromuscular Albertina:   10    mins  52650;    Therapeutic Activity:     10     mins  46192;     Gait Training:           mins  25270;     Ultrasound:          mins  97493;      Untimed:  Electrical Stimulation:         mins  97818 ( );  Mechanical Traction:         mins  24211;     Timed Treatment:   45   mins   Total Treatment:   45     mins  Walter Jones, PTA  Physical Therapist  Assistant  [id number]

## 2023-08-25 RX ORDER — DOFETILIDE 0.12 MG/1
125 CAPSULE ORAL EVERY 12 HOURS
Qty: 180 CAPSULE | Refills: 2 | Status: SHIPPED | OUTPATIENT
Start: 2023-08-25

## 2023-08-25 NOTE — TELEPHONE ENCOUNTER
Rx Refill Note  Requested Prescriptions     Pending Prescriptions Disp Refills    dofetilide (TIKOSYN) 125 MCG capsule 180 capsule 2     Sig: Take 1 capsule by mouth Every 12 (Twelve) Hours.      Last office visit with prescribing clinician: 2/1/2022   Last telemedicine visit with prescribing clinician: 3/22/2023 (George)  Next office visit with prescribing clinician: 2/2/2024                         Would you like a call back once the refill request has been completed: [] Yes [] No    If the office needs to give you a call back, can they leave a voicemail: [] Yes [] No    Tory Torrez MA  08/25/23, 15:03 EDT

## 2023-08-28 ENCOUNTER — TREATMENT (OUTPATIENT)
Age: 78
End: 2023-08-28
Payer: MEDICARE

## 2023-08-28 DIAGNOSIS — R53.1 WEAKNESS: Primary | ICD-10-CM

## 2023-08-28 DIAGNOSIS — M25.552 PAIN IN LEFT HIP: ICD-10-CM

## 2023-08-28 DIAGNOSIS — Z74.09 DECREASED FUNCTIONAL MOBILITY AND ENDURANCE: ICD-10-CM

## 2023-08-28 NOTE — PROGRESS NOTES
Physical Therapy Daily Treatment Note    [facility] PT - [facility]  [address]  [address]  [place] [address]     Patient: Ray Pratt   : 1945  Referring practitioner: Marcial Jackson DO  Date of Initial Visit: Type: THERAPY  Noted: 2023  Today's Date: 2023  Patient seen for 5 sessions         Ray Pratt reports: legs are tired after PT but feel that is what I need.        Subjective     Objective   See Exercise, Manual, and Modality Logs for complete treatment.       Assessment/Plan  Progressing well with current exercise regimen/activities but fatigues easily with isotonic and multiple repetitions of isolated musculature(ie hips and gluts).  Should continue to improve with further PT efforts.        Progress strengthening /stabilization /functional activity           Timed:  Manual Therapy:         mins  51365;  Therapeutic Exercise:    25     mins  14823;     Neuromuscular Albertina:  5      mins  41080;    Therapeutic Activity:   10       mins  68769;     Gait Training:           mins  05072;     Ultrasound:          mins  21713;      Untimed:  Electrical Stimulation:         mins  83956 ( );  Mechanical Traction:         mins  25106;     Timed Treatment:  40    mins   Total Treatment:    40    mins  Walter Jones, MANJINDER  Physical Therapist  Assistant  [id number]

## 2023-08-30 ENCOUNTER — TREATMENT (OUTPATIENT)
Age: 78
End: 2023-08-30
Payer: MEDICARE

## 2023-08-30 DIAGNOSIS — M25.552 PAIN IN LEFT HIP: ICD-10-CM

## 2023-08-30 DIAGNOSIS — Z74.09 DECREASED FUNCTIONAL MOBILITY AND ENDURANCE: ICD-10-CM

## 2023-08-30 DIAGNOSIS — R53.1 WEAKNESS: Primary | ICD-10-CM

## 2023-08-30 NOTE — PROGRESS NOTES
Physical Therapy Daily Treatment Note    [facility] PT - [facility]  [address]  [address]  [place] [address]     Patient: Ray Pratt   : 1945  Referring practitioner: Marcial Jackson DO  Date of Initial Visit: Type: THERAPY  Noted: 2023  Today's Date: 2023  Patient seen for 6 sessions         Ray Pratt reports:         Subjective     Objective   See Exercise, Manual, and Modality Logs for complete treatment.   Fwd and lateral step ups with hand rail assist/support - 6 inch height    Assessment/Plan  Benefits from frequent rest breaks and change in activity/positioning/resistance due to mm fatigue of hips and quads with HEP and in clinic activities.  Still difficulty with lateral step ups at 6 inch height.        Progress per Plan of Care and Progress strengthening /stabilization /functional activity for affected musculature. Attempt lateral step ups with aerobic stepper at lower height.           Timed:  Manual Therapy:         mins  47919;  Therapeutic Exercise:   28      mins  53269;     Neuromuscular Albertina: 5       mins  49057;    Therapeutic Activity:    5     mins  95041;     Gait Training:           mins  44581;     Ultrasound:          mins  22253;      Untimed:  Electrical Stimulation:         mins  46579 ( );  Mechanical Traction:         mins  66449;     Timed Treatment:   38   mins   Total Treatment:     38   mins  Walter Jones PTA  Physical Therapist  Assistant  [id number]

## 2023-09-07 ENCOUNTER — TELEPHONE (OUTPATIENT)
Dept: PHYSICAL THERAPY | Facility: OTHER | Age: 78
End: 2023-09-07
Payer: MEDICARE

## 2023-09-07 NOTE — TELEPHONE ENCOUNTER
"  Caller: Ray Pratt \"Oli\"    Relationship: Self    What was the call regarding: PATIENT CANCELLED APPT TODAY BECAUSE THEY CANT MAKE IT       "

## 2023-09-11 ENCOUNTER — TREATMENT (OUTPATIENT)
Age: 78
End: 2023-09-11
Payer: MEDICARE

## 2023-09-11 DIAGNOSIS — Z74.09 DECREASED FUNCTIONAL MOBILITY AND ENDURANCE: ICD-10-CM

## 2023-09-11 DIAGNOSIS — R53.1 WEAKNESS: Primary | ICD-10-CM

## 2023-09-11 DIAGNOSIS — M25.552 PAIN IN LEFT HIP: ICD-10-CM

## 2023-09-11 NOTE — PROGRESS NOTES
Physical Therapy Daily Treatment Note    [facility] PT - [facility]  [address]  [address]  [place] [address]     Patient: Ray Pratt   : 1945  Referring practitioner: Marcial Jackson DO  Date of Initial Visit: Type: THERAPY  Noted: 2023  Today's Date: 2023  Patient seen for 7 sessions         Ray Pratt reports:   Had to miss a session or so as wife fell and was in the hospital.  Said he had been working on stairs and that going up was easier now for him than before.      Subjective     Objective   See Exercise, Manual, and Modality Logs for complete treatment.   -modified lateral step up to 4 inch height to allow for more reps with less hand pulling  -discussed continued HEP performance at home  -increased reps with hip abd and clamshells.    Assessment/Plan  Able to progress exercises without increased LE symptoms, only isolated mm fatigue reported post multiple repetition and task performance.  Benefits from modification of lateral step up height to allow for improved performance.  Would benefit from continued PT efforts to improve hip strength for improved function and mobility.        Other  Re assess per primary PT next visit.  Check goals, ROM, mm strength, etc.  Update Outcome measure.           Timed:  Manual Therapy:         mins  53652;  Therapeutic Exercise:   20      mins  17452;     Neuromuscular Albertina:  10      mins  96516;    Therapeutic Activity:     10     mins  99758;     Gait Training:           mins  34267;     Ultrasound:          mins  98947;      Untimed:  Electrical Stimulation:         mins  49601 ( );  Mechanical Traction:         mins  10090;     Timed Treatment:  40    mins   Total Treatment:     40   mins  Walter Jones PTA  Physical Therapist  Assistant  [id number]

## 2023-09-13 ENCOUNTER — TREATMENT (OUTPATIENT)
Age: 78
End: 2023-09-13
Payer: MEDICARE

## 2023-09-13 DIAGNOSIS — Z74.09 DECREASED FUNCTIONAL MOBILITY AND ENDURANCE: ICD-10-CM

## 2023-09-13 DIAGNOSIS — M25.552 PAIN IN LEFT HIP: ICD-10-CM

## 2023-09-13 DIAGNOSIS — R53.1 WEAKNESS: Primary | ICD-10-CM

## 2023-09-13 NOTE — PROGRESS NOTES
"Re-Assessment / Progress Note  [address]  [facility] [address]  P: [phone]  F: [phone]  Patient: Ray Pratt   : 1945  Diagnosis/ICD-10 Code:  Weakness [R53.1]  Referring practitioner: Marcial Jackson DO  Date of Initial Visit: Episode Type: THERAPY  Noted: 2023    Today's Date: 2023  Patient seen for 8 sessions.    Visit Diagnoses:    ICD-10-CM ICD-9-CM   1. Weakness  R53.1 780.79   2. Decreased functional mobility and endurance  Z74.09 780.99   3. Pain in left hip  M25.552 719.45       Subjective:   Ray Pratt reports:     Subjective Questionnaire: LEFS: 66/80  Clinical Progress: improved  Home Program Compliance: Yes  Treatment has included: therapeutic exercise, neuromuscular re-education, manual therapy, therapeutic activity, and gait training    Subjective   Pt reports he feels about 33% improvement. He states he thinks it is a little easier to go up the stairs and he doesn't feel like he has to \"push\" himself up as much. Pt does report that he still has difficulty with this activity.  Current pain ratin  At worst pain rating: 3  Objective   Strength/Myotome Testing      Left Hip   Planes of Motion   Flexion: 5  Extension: 5  Abduction: 4-  Adduction: 5  External rotation: 5  Internal rotation: 5     Right Hip   Planes of Motion   Flexion: 5  Extension: 5  Abduction: 4  Adduction: 5  External rotation: 5  Internal rotation: 5    Ambulation: Stairs   Ascend stairs: independent  Pattern: non-reciprocal  Railings: one rail  Descend stairs: independent  Pattern: non-reciprocal  Railings: one rail    5TSTS: 12.8 seconds without hands  Assessment/Plan  Pt is a 78 y/o male who has been attending physical therapy for left leg weakness and left hip pain. Pt has demonstrated some improvement in his left hip abductor strength since initial evaluation, but needs continued improvement in this area. Pt can ascend and descend stairs with less upper extremity " assistance, but subjectively reports difficulty still with this task, specifically ascending stairs. Pt would benefit from additional skilled physical therapy in order to continue to address the above impairments and activity limitations outlined in this re-evaluation, in order to continue to decrease pain, improve functional mobility, and return to PLOF.   Progress toward previous goals: Partially Met    Goals  Plan Goals: STG 2-4 weeks     1. Pt will be independent in home exercise program MET  2. 5TSTS time will be <14.5 seconds without hands to demonstrate improvement in LE strength MET  3. Pt will increase LEFS score by 5 points to demonstrate an improvement in functional mobility MET     LTG 6-8 weeks     1. Pt will report no pain in his left hip when sleeping on his left side at night PROGRESSING  2. Pt will be able to ascend/descend stairs using one hand rail and alternating lower extremities pain free and without difficulty PROGRESSING        Recommendations: Continue as planned  Timeframe: 6 weeks  Prognosis to achieve goals: good    PT Signature: Ramona Real, PT  [place] Lic. # [id number]      Based upon review of the patient's progress and continued therapy plan, it is my medical opinion that Ray Pratt should continue physical therapy treatment at [facility] PHYSICAL THERAPY  [address]  [facility] [address] [phone] ; Fax Number [phone]    Signature: __________________________________  Marcial Jackson DO    Manual Therapy:     0     mins  22890;  Therapeutic Exercise:     40     mins  04341;     Neuromuscular Albertina:     0    mins  78896;    Therapeutic Activity:      15     mins  33990;     Gait Trainin     mins  65083;     Ultrasound:      0     mins  74601;    Electrical Stimulation:     0     mins  99095 ( );  Dry Needling      0     mins self-pay  Traction      0     mins 31835  Canalith Repositioning    0     mins  97248      Timed Treatment:   55   mins   Total Treatment:     55   mins

## 2023-09-18 ENCOUNTER — TREATMENT (OUTPATIENT)
Age: 78
End: 2023-09-18
Payer: MEDICARE

## 2023-09-18 DIAGNOSIS — R53.1 WEAKNESS: Primary | ICD-10-CM

## 2023-09-18 DIAGNOSIS — M25.552 PAIN IN LEFT HIP: ICD-10-CM

## 2023-09-18 DIAGNOSIS — Z74.09 DECREASED FUNCTIONAL MOBILITY AND ENDURANCE: ICD-10-CM

## 2023-09-18 NOTE — PROGRESS NOTES
Physical Therapy Daily Treatment Note    [facility] PT - [facility]  [address]  [address]  [place] [address]     Patient: Ray Pratt   : 1945  Referring practitioner: Marcial Jackson DO  Date of Initial Visit: Type: THERAPY  Noted: 2023  Today's Date: 2023  Patient seen for 9 sessions         Ray Pratt reports: seems to be steadily improving.  Have been having to take care of my wife who fell recently so not doing all the exercises but really trying to practice the ones with on the stairs.        Subjective     Objective   See Exercise, Manual, and Modality Logs for complete treatment.   -increased leg press weight to 88 lbs B LE's  Added unilateral LE leg press 22 lbs 3 x 10 each LE alternating    -strategies to improve bed mobility as well as efforts to improve /resume community walking .    Assessment/Plan  Compliant/Cooperative with rehab efforts this session without increased symptoms/discomfort noted per subjective reports.   Able to perform increased exercise/functional activity on leg press without increased LE symptoms, only isolated mm fatigue.  Benefits from verbal/tactile cues to ensure proper foot positioning, exercise technique and performance.          Progress per Plan of Care and Progress strengthening /stabilization /functional activity           Timed:  Manual Therapy:         mins  69816;  Therapeutic Exercise:   28    mins  89248;     Neuromuscular Albertina: 10       mins  50133;    Therapeutic Activity:  12        mins  75060;     Gait Training:           mins  64935;     Ultrasound:          mins  83604;    Self care                     _5__  mins 69887  Untimed:  Electrical Stimulation:         mins  02365 ( );  Mechanical Traction:        mins  55393;     Timed Treatment:   55   mins   Total Treatment:   55     mins  Walter Jones PTA  Physical Therapist  Assistant  [id number]

## 2023-09-20 ENCOUNTER — TREATMENT (OUTPATIENT)
Age: 78
End: 2023-09-20
Payer: MEDICARE

## 2023-09-20 DIAGNOSIS — Z74.09 DECREASED FUNCTIONAL MOBILITY AND ENDURANCE: ICD-10-CM

## 2023-09-20 DIAGNOSIS — M25.552 PAIN IN LEFT HIP: ICD-10-CM

## 2023-09-20 DIAGNOSIS — R53.1 WEAKNESS: Primary | ICD-10-CM

## 2023-09-20 NOTE — PROGRESS NOTES
"Physical Therapy Daily Treatment Note  [address]  [facility] [address]  P: [phone]  F: [phone]    Patient: Ray Pratt   : 1945  Referring practitioner: Marcial Jackson DO  Date of Initial Visit: Type: THERAPY  Noted: 2023  Today's Date: 2023  Patient seen for 10 sessions       Visit Diagnoses:    ICD-10-CM ICD-9-CM   1. Weakness  R53.1 780.79   2. Decreased functional mobility and endurance  Z74.09 780.99   3. Pain in left hip  M25.552 719.45       Ray Pratt reports:     Subjective   Pt reports he was able to alternate legs going up the steps from the basement holding on to one hand rail and putting his other hand on the wall. He reports he felt good doing it, but reported that he felt a little tired afterwards. He reports that he was \"very pleased\".  Objective   See Exercise, Manual, and Modality Logs for complete treatment.   Aerobic lv 3: able to ascend and descend using one hand rail    Clinic stairs: alternating using both hand rails ascending/descending    Assessment/Plan  Pt was able to step up on to a higher step today using one hand rail without knee buckling or complaints of pain in his left hip. Pt is progressing well in therapy as evidenced by his ability to alternate up/down his basement steps using a hand rail and the wall. He benefits from continued therapy in order to continue to progress towards goals to return to [facility].    Timed:         Manual Therapy:    0     mins  96886;     Therapeutic Exercise:    25     mins  85232;     Neuromuscular Albertina:    0    mins  40301;    Therapeutic Activity:     13     mins  20483;     Gait Trainin     mins  29590;     Ultrasound:     0     mins  07155;    Ionto                               0    mins   92022  Self Care                       0     mins   23379  Canalith Repos    0     mins 82084      Un-Timed:  Electrical Stimulation:    0     mins  04314 ( );  Dry Needling     0     " mins self-pay  Traction     0     mins 75528      Timed Treatment:   38   mins   Total Treatment:     38   mins    Ramona Real, PT  [place] License: [id number]

## 2023-09-25 ENCOUNTER — TREATMENT (OUTPATIENT)
Age: 78
End: 2023-09-25
Payer: MEDICARE

## 2023-09-25 DIAGNOSIS — R53.1 WEAKNESS: Primary | ICD-10-CM

## 2023-09-25 DIAGNOSIS — M25.552 PAIN IN LEFT HIP: ICD-10-CM

## 2023-09-25 DIAGNOSIS — Z74.09 DECREASED FUNCTIONAL MOBILITY AND ENDURANCE: ICD-10-CM

## 2023-09-25 NOTE — PROGRESS NOTES
Physical Therapy Daily Treatment Note    [facility] PT - [facility]  [address]  [address]  [place] [address]     Patient: Ray Pratt   : 1945  Referring practitioner: Marcial Jackson DO  Date of Initial Visit: Type: THERAPY  Noted: 2023  Today's Date: 2023  Patient seen for 11 sessions         Ray Pratt reports: went to birthday party over the weekend and was able to climb the stairs(one flight) multiple times(step over step) without issue.        Subjective     Objective   See Exercise, Manual, and Modality Logs for complete treatment.   Unilateral Leg press 22 lbs x 10 each LE; low maura step overs    Assessment/Plan  Subjectively reports improved stair climbing with community based activity.  Demonstrates improved ability, in regards to exercise/activity tolerance for in clinic activities(isotonic, resistive, reps) with overall lessened fatigue.  Progressing well with current PT interventions.        Progress strengthening /stabilization /functional activity           Timed:  Manual Therapy:         mins  88886;  Therapeutic Exercise:   28   mins  45495;     Neuromuscular Albertina: 8    mins  18636;    Therapeutic Activity:    12      mins  58456;     Gait Training:          mins  24226;     Ultrasound:          mins  69481;    Untimed:  Electrical Stimulation:         mins  28814 ( );  Mechanical Traction:         mins  03165;     Timed Treatment:   48   mins   Total Treatment:   48     mins  Walter Jones PTA  Physical Therapist  Assistant  [id number]

## 2023-09-27 ENCOUNTER — TREATMENT (OUTPATIENT)
Age: 78
End: 2023-09-27
Payer: MEDICARE

## 2023-09-27 DIAGNOSIS — R53.1 WEAKNESS: Primary | ICD-10-CM

## 2023-09-27 DIAGNOSIS — M25.552 PAIN IN LEFT HIP: ICD-10-CM

## 2023-09-27 DIAGNOSIS — Z74.09 DECREASED FUNCTIONAL MOBILITY AND ENDURANCE: ICD-10-CM

## 2023-09-27 NOTE — PROGRESS NOTES
Physical Therapy Daily Treatment Note    [facility] PT - [place]  [address]  [address]  [place] [address]     Patient: Ray Pratt   : 1945  Referring practitioner: Marcial Jackson DO  Date of Initial Visit: Type: THERAPY  Noted: 2023  Today's Date: 2023  Patient seen for 12 sessions         Ray Pratt reports:  did well after last session without noted increased LE discomfort.  Strength in the legs is getting better and making a more conscious efforts to step up with either leg first when he gets to the stairs vs using just the right.  Doing more things around the house before fatiguing.  Can tell that he is having to take less breaks to rest during the day.          Subjective     Objective   See Exercise, Manual, and Modality Logs for complete treatment.    Gait training on treadmill x 7 min working to normalize step/stride length and foot clearance.    Stair climbing x 4 min ascending/descending stairs with progression to reciprocal gait and 1 rail use.     Added:  3 way unilateral stance balance activity each LE x 10 sec each/each position; step up knee  onto foam x 10 each LE with 1 hand rail use;  balance board(ROM and balance) fwd/bwd and side/side x 2 min each dir ROM and balance    -strategies to resume stair climbing activities at home as previously doing for exercises before start of PT.  -strategies to resume walking in community to increase strength, endurance and function.     Assessment/Plan  Overall demonstrating increased exercise and functional activity tolerance/capability with lessened fatigue and LOB episodes.  Gait/stairs improve with prolonged performance and verbal cues in regards to normalization of step/stride length and foot clearance as well as reciprocal capability with ascending/descending stairs.        Progress per Plan of Care toward all goals.            Timed:  Manual Therapy:       mins  24426;  Therapeutic Exercise:   20       mins  98004;     Neuromuscular Albertina:   10     mins  46743;    Therapeutic Activity:          mins  99984;     Gait Trainin      mins  06942;     Ultrasound:          mins  33489;    Self Care                      4    min 40024  Untimed:  Electrical Stimulation:         mins  31533 ( );  Mechanical Traction:         mins  60677;     Timed Treatment:    45 mins   Total Treatment:    45    mins  Walter Jones [facility]  Physical Therapist  Assistant  [id number]

## 2023-09-28 RX ORDER — POTASSIUM CHLORIDE 750 MG/1
20 CAPSULE, EXTENDED RELEASE ORAL DAILY
Qty: 180 CAPSULE | Refills: 1 | Status: SHIPPED | OUTPATIENT
Start: 2023-09-28

## 2023-10-02 ENCOUNTER — TREATMENT (OUTPATIENT)
Age: 78
End: 2023-10-02
Payer: MEDICARE

## 2023-10-02 DIAGNOSIS — Z74.09 DECREASED FUNCTIONAL MOBILITY AND ENDURANCE: ICD-10-CM

## 2023-10-02 DIAGNOSIS — R53.1 WEAKNESS: Primary | ICD-10-CM

## 2023-10-02 PROCEDURE — 97112 NEUROMUSCULAR REEDUCATION: CPT | Performed by: PHYSICAL THERAPIST

## 2023-10-02 PROCEDURE — 97530 THERAPEUTIC ACTIVITIES: CPT | Performed by: PHYSICAL THERAPIST

## 2023-10-02 PROCEDURE — 97110 THERAPEUTIC EXERCISES: CPT | Performed by: PHYSICAL THERAPIST

## 2023-10-02 NOTE — PROGRESS NOTES
Physical Therapy Daily Treatment Note    [facility] PT - [facility]  [address]  [address]  [place] [address]     Patient: Ray Pratt   : 1945  Referring practitioner: Marcial Jackson DO  Date of Initial Visit: Type: THERAPY  Noted: 2023  Today's Date: 10/2/2023  Patient seen for 13 sessions         Ray Pratt reports: did a lot of cleaning at the house over the weekend vs exercises.  Was able to squat better and longer to perform activities.  Still notes difficulty getting up from the floor.        Subjective     Objective   See Exercise, Manual, and Modality Logs for complete treatment.   Step up/knee(fwd) drive regimen consisting of 4, 6 and 8 inch height/level  Added:  Fwd/retro monster walks with yellow mini band; tandem stance balance on foam beam 2 x 30 sec each LE position    Assessment/Plan  Continues to demonstrate increased exercise capability for isolated LE musculature  with functional activity simulation(stair ascending) with noted improved capability and balance with decreased hand hold noted.  Demonstrating overall capability for exercise without need for multiple/prolonged rest breaks. Subjectively, he still can tell that left leg is weaker when compared to the right.        Progress strengthening /stabilization /functional activity.  Work on strategies to assist with returning to upright from floor position.           Timed:  Manual Therapy:         mins  60303;  Therapeutic Exercise:   24     mins  71384;     Neuromuscular Albertina:     8   mins  00308;    Therapeutic Activity:    10      mins  80530;     Gait Training:           mins  36324;     Ultrasound:          mins  78364;      Untimed:  Electrical Stimulation:         mins  51844 ( );  Mechanical Traction:         mins  71990;     Timed Treatment:  42    mins   Total Treatment:   42     mins  Walter Jones PTA  Physical Therapist  Assistant  [id number]

## 2023-10-04 ENCOUNTER — OFFICE VISIT (OUTPATIENT)
Age: 78
End: 2023-10-04
Payer: MEDICARE

## 2023-10-04 VITALS
DIASTOLIC BLOOD PRESSURE: 64 MMHG | BODY MASS INDEX: 40.12 KG/M2 | SYSTOLIC BLOOD PRESSURE: 110 MMHG | HEIGHT: 64 IN | HEART RATE: 66 BPM | WEIGHT: 235 LBS

## 2023-10-04 DIAGNOSIS — I10 BENIGN ESSENTIAL HTN: ICD-10-CM

## 2023-10-04 DIAGNOSIS — E66.01 OBESITY, MORBID, BMI 40.0-49.9: ICD-10-CM

## 2023-10-04 DIAGNOSIS — I42.0 CARDIOMYOPATHY, DILATED: ICD-10-CM

## 2023-10-04 DIAGNOSIS — I48.19 ATRIAL FIBRILLATION, PERSISTENT: Primary | ICD-10-CM

## 2023-10-04 DIAGNOSIS — Z79.899 ON DOFETILIDE THERAPY: ICD-10-CM

## 2023-10-04 NOTE — PROGRESS NOTES
Date of Office Visit: 10/04/2023  Encounter Provider: ANICETO Stone  Place of Service: [facility] CARDIOLOGY  Patient Name: Ray Pratt  :1945    Chief Complaint   Patient presents with    persistent AFIB    dofetilide therapy   :     HPI: Ray Pratt is a 78 y.o. male who follows with Dr. Escalante and Dr. Vazquez--- persistent AF, admitted 2020 and started on dofetilide and has done well since that time.  He did have nonischemic cardiomyopathy at that time which has resolved.    He also has hypertension, hyperlipidemia, diabetes, MARCOS and morbid obesity.    Presents today for routine follow-up.    He reports that he is doing well.  He has not had any AF that he is aware of however he did not really feel it in the past.    No chest pain, significant dyspnea, PND, orthopnea or edema.    Remains on apixaban for anticoagulation.    He had an echocardiogram in February, his EF was 65%.     Past Medical History:   Diagnosis Date    Acute combined systolic and diastolic congestive heart failure     Atrial fibrillation     Atrial flutter with rapid ventricular response     CHF (congestive heart failure)     Dilated cardiomyopathy     Hyperlipidemia     Hypertension     Low-tension glaucoma of right eye     Nonischemic cardiomyopathy     Obesity     Ocular hypertension of left eye     On dofetilide therapy     MARCOS (obstructive sleep apnea)     compliant with BiPAP    Persistent atrial fibrillation     Pneumonia of left lung due to Haemophilus influenzae 2017    Ptosis of right eyelid     Sepsis 2017    Type 2 diabetes mellitus, without long-term current use of insulin        Past Surgical History:   Procedure Laterality Date    CARDIAC CATHETERIZATION N/A 2019    Procedure: Left Heart Cath;  Surgeon: Sundeep Tsang MD;  Location: [facility] CATH INVASIVE LOCATION;  Service: Cardiovascular    CARDIAC CATHETERIZATION N/A 2019    Procedure:  Coronary angiography;  Surgeon: Sundeep Tsang MD;  Location: [facility] INVASIVE LOCATION;  Service: Cardiovascular    DENTAL PROCEDURE      MOLE REMOVAL Right     above right eye, benign per patient report       Social History     Socioeconomic History    Marital status:    Tobacco Use    Smoking status: Never    Smokeless tobacco: Never   Vaping Use    Vaping Use: Never used   Substance and Sexual Activity    Alcohol use: Never    Drug use: Never    Sexual activity: Yes     Partners: Female     Birth control/protection: Other       Family History   Problem Relation Age of Onset    Other Mother         history of cancer    Stroke Father     Epilepsy Sister     Fainting Sister        Review of Systems   Constitutional: Negative for chills, fever and malaise/fatigue.   Cardiovascular:  Negative for chest pain, dyspnea on exertion, leg swelling, near-syncope, orthopnea, palpitations, paroxysmal nocturnal dyspnea and syncope.   Respiratory:  Negative for cough and shortness of breath.    Hematologic/Lymphatic: Negative.    Musculoskeletal:  Negative for joint pain, joint swelling and myalgias.   Gastrointestinal:  Negative for abdominal pain, diarrhea, melena, nausea and vomiting.   Genitourinary:  Negative for frequency and hematuria.   Neurological:  Negative for light-headedness, numbness, paresthesias and seizures.   Allergic/Immunologic: Negative.    All other systems reviewed and are negative.    No Known Allergies      Current Outpatient Medications:     atenolol (TENORMIN) 25 MG tablet, Take 1 tablet by mouth Daily., Disp: 180 tablet, Rfl: 3    dofetilide (TIKOSYN) 125 MCG capsule, Take 1 capsule by mouth Every 12 (Twelve) Hours., Disp: 180 capsule, Rfl: 2    Eliquis 5 MG tablet tablet, TAKE 1 TABLET BY MOUTH EVERY 12 HOURS, Disp: 60 tablet, Rfl: 11    furosemide (LASIX) 40 MG tablet, TAKE 1 TABLET BY MOUTH EVERY DAY, Disp: 90 tablet, Rfl: 3    Jardiance 25 MG tablet tablet, TAKE 1 TABLET BY MOUTH  "EVERY DAY WITH BREAKFAST, Disp: 90 tablet, Rfl: 2    loratadine (CLARITIN) 10 MG tablet, TAKE 1 TABLET BY MOUTH EVERY DAY, Disp: 90 tablet, Rfl: 1    metFORMIN ER (GLUCOPHAGE-XR) 500 MG 24 hr tablet, TAKE 2 TABLETS BY MOUTH DAILY WITH BREAKFAST., Disp: 180 tablet, Rfl: 1    potassium chloride (MICRO-K) 10 MEQ CR capsule, Take 2 capsules by mouth Daily., Disp: 180 capsule, Rfl: 1    rosuvastatin (CRESTOR) 10 MG tablet, TAKE 1 TABLET BY MOUTH EVERY DAY, Disp: 90 tablet, Rfl: 3    SITagliptin (Januvia) 100 MG tablet, Take 1 tablet by mouth Daily., Disp: 90 tablet, Rfl: 3      Objective:     Vitals:    10/04/23 0827   BP: 110/64   Pulse: 66   Weight: 107 kg (235 lb)   Height: 162.6 cm (64\")     Body mass index is 40.34 kg/m².    PHYSICAL EXAM:    Vitals Reviewed.   General Appearance: No acute distress.  Eyes: Conjunctiva and lids: No erythema, swelling, or discharge. Sclera non-icteric.   HENT: Atraumatic, normocephalic. External eyes, ears, and nose normal.   Respiratory: No signs of respiratory distress. Respiration rhythm and depth normal.   Clear to auscultation. No rales, crackles, rhonchi, or wheezing auscultated.   Cardiovascular:  Heart Rate and Rhythm: Normal, Heart Sounds: Normal S1 and S2.   Murmurs: No murmurs noted. No rubs, thrills, or gallops.  Lower Extremities: No edema noted.  Gastrointestinal:  Abdomen obese.   Musculoskeletal: Normal movement of extremities  Skin: Warm and dry.   Psychiatric: Patient alert and oriented to person, place, and time. Speech and behavior appropriate. Normal mood and affect.       ECG 12 Lead    Date/Time: 10/4/2023 8:51 AM  Performed by: Kandice Wylie APRN  Authorized by: Kandice Wylie APRN   Comparison: compared with previous ECG   Similar to previous ECG  Rhythm: sinus rhythm  BPM: 66  Conduction: right bundle branch block          Assessment:       Diagnosis Plan   1. Atrial fibrillation, persistent        2. On dofetilide therapy        3. Benign essential HTN   "      4. Cardiomyopathy, dilated        5. Obesity, morbid, BMI 40.0-49.9               Plan:       1-2.  Persistent A-fib, admitted in February 2020 and put on dofetilide, QTc was too long on the higher dose he has done well on 125 twice daily, maintaining sinus rhythm as far as he knows.  He has a baseline right bundle branch block which is unchanged and his QTc is okay.  He is on apixaban for anticoagulation as well as low-dose beta-blocker.  He had labs with his primary care in August and his renal function and electrolytes were okay.    3.  Hypertension, controlled.    4. NICM, resolved by echocardiogram in February. Follows with hansa Griffith by exam.     5. For the problem of overweight/obesity, we discussed the importance of lifestyle measures and strategies for weight loss, such as improved nutrition, regular exercise and sleep hygiene.      Follow-up with Dr. Vazquez in 6 months and follow-up with ANICETO Gomez in February as scheduled.    As always, it has been a pleasure to participate in your patient's care.      Sincerely,         ANICETO Newell

## 2023-10-11 ENCOUNTER — TREATMENT (OUTPATIENT)
Age: 78
End: 2023-10-11
Payer: MEDICARE

## 2023-10-11 DIAGNOSIS — R53.1 WEAKNESS: Primary | ICD-10-CM

## 2023-10-11 DIAGNOSIS — Z74.09 DECREASED FUNCTIONAL MOBILITY AND ENDURANCE: ICD-10-CM

## 2023-10-11 DIAGNOSIS — M25.552 PAIN IN LEFT HIP: ICD-10-CM

## 2023-10-11 NOTE — PROGRESS NOTES
Re-Assessment / Progress Note  [address]  [address]  [facility] [address]  P: [phone]  F: [phone]  Patient: Ray Pratt   : 1945  Diagnosis/ICD-10 Code:  Weakness [R53.1]  Referring practitioner: Marcial Jackson DO  Date of Initial Visit: Episode Type: THERAPY  Noted: 2023    Today's Date: 10/11/2023  Patient seen for 14 sessions.    Visit Diagnoses:    ICD-10-CM ICD-9-CM   1. Weakness  R53.1 780.79   2. Decreased functional mobility and endurance  Z74.09 780.99   3. Pain in left hip  M25.552 719.45       Subjective:   Ray Pratt reports:     Subjective Questionnaire: LEFS: 54/80  Clinical Progress: improved  Home Program Compliance: Yes  Treatment has included: therapeutic exercise, neuromuscular re-education, manual therapy, therapeutic activity, and gait training    Subjective   Pt reports that they had a second hand rail put in at his home and that helps a lot. Pt also reports he is doing much better at alternating his legs on the stairs. Pt also states he feels about 70% better. Pt reports that it's going better, but sometimes he still has some pain laying on his left side at night.   Current pain ratin  At worst pain ratin  Objective   Strength/Myotome Testing      Left Hip   Planes of Motion   Flexion: 5  Extension: 5  Abduction: 4  Adduction: 5  External rotation: 5  Internal rotation: 5     Right Hip   Planes of Motion   Flexion: 5  Extension: 5  Abduction: 4  Adduction: 5  External rotation: 5  Internal rotation: 5     Ambulation: Stairs   Ascend stairs: independent  Pattern: non-reciprocal  Railings: one rail  Descend stairs: independent  Pattern: reciprocal  Railings: one rail    Assessment/Plan  Pt is a 78 y/o male who has been attending physical therapy for left leg weakness and left hip pain. Pt has made great progress towards his goals in strength and functional mobility. Pt continues to lack full hip abductor strength bilaterally, but is  progressing well in this area as evidenced by his improved ability to ascend stairs alternating legs. Pt is able to do this in the clinic with v/c, but he subjectively states he has some difficulty being more fluid and automatic with this activity going up the stairs. He also intermittently reports some discomfort in his left hip when laying on his left side, but reports this is improving as well. Pt would benefit from continued skilled physical therapy in order to continue to address strength and functional activity limitations in order to return to [facility]. Pt is progressing well and continue to educate on comprehensive home exercise program to perform s/p discharge. Anticipate d/c at the end of the month if patient continues to progress well in his lower extremity strength and stability with ascending/descending stairs.   Progress toward previous goals: Partially Met    Goals  Plan Goals: STG 2-4 weeks     1. Pt will be independent in home exercise program MET  2. 5TSTS time will be <14.5 seconds without hands to demonstrate improvement in LE strength MET  3. Pt will increase LEFS score by 5 points to demonstrate an improvement in functional mobility MET     LTG 6-8 weeks     1. Pt will report no pain in his left hip when sleeping on his left side at night PROGRESSING  2. Pt will be able to ascend/descend stairs using one hand rail and alternating lower extremities pain free and without difficulty PROGRESSING    LTG-8-12 weeks    Pt will be independent in long term HEP to maintain progress made in therapy    Recommendations: Continue as planned  Timeframe: 1 month  Prognosis to achieve goals: good    PT Signature: Ramona Real, PT  [place] Lic. # [id number]      Based upon review of the patient's progress and continued therapy plan, it is my medical opinion that Ray Pratt should continue physical therapy treatment at [facility] PHYSICAL THERAPY  [address]  [place]  [place] [address]  [phone] ; Fax Number [phone]    Signature: __________________________________  Marcial Jackson DO    Manual Therapy:     0     mins  63208;  Therapeutic Exercise:     25     mins  60959;     Neuromuscular Albertina:     0    mins  02169;    Therapeutic Activity:      14     mins  62524;     Gait Trainin     mins  65768;     Ultrasound:      0     mins  03604;    Electrical Stimulation:     0     mins  59368 ( );  Dry Needling      0     mins self-pay  Traction      0     mins 16248  Canalith Repositioning    0     mins 01369      Timed Treatment:   39   mins   Total Treatment:     39   mins

## 2023-10-13 ENCOUNTER — TREATMENT (OUTPATIENT)
Age: 78
End: 2023-10-13
Payer: MEDICARE

## 2023-10-13 DIAGNOSIS — M25.552 PAIN IN LEFT HIP: ICD-10-CM

## 2023-10-13 DIAGNOSIS — R53.1 WEAKNESS: Primary | ICD-10-CM

## 2023-10-13 DIAGNOSIS — Z74.09 DECREASED FUNCTIONAL MOBILITY AND ENDURANCE: ICD-10-CM

## 2023-10-13 PROCEDURE — 97112 NEUROMUSCULAR REEDUCATION: CPT | Performed by: PHYSICAL THERAPIST

## 2023-10-13 PROCEDURE — 97116 GAIT TRAINING THERAPY: CPT | Performed by: PHYSICAL THERAPIST

## 2023-10-13 PROCEDURE — 97110 THERAPEUTIC EXERCISES: CPT | Performed by: PHYSICAL THERAPIST

## 2023-10-13 NOTE — PROGRESS NOTES
Physical Therapy Daily Treatment Note    [facility] PT - [facility]  [address]  [address]  [place] [address]     Patient: Ray Pratt   : 1945  Referring practitioner: Marcial Jackson DO  Date of Initial Visit: Type: THERAPY  Noted: 2023  Today's Date: 10/13/2023  Patient seen for 15 sessions         Ray Pratt reports: continuing to find it is easier getting around the house and outside on sidewalks in regards to my walking.  I hesitate to walk across grass in certain spots of my yard states pt.         Subjective     Objective   See Exercise, Manual, and Modality Logs for complete treatment.   Gait training x 10 min:  Ambulated up/ down and around length of clinic(150 feet) Independently; Ambulated up inner hallway and out sliding side door traversing thresholds and transition from tile to carpet to concrete.  Ambulates up/down sidewalk with step down and up curb to/from parking lot. Ambulates in grass and paz area in a zig zag pattern to force minute foot/ankle, knee and hip adjustments to different surfaces.    Assessment/Plan  Exhibits improved capability for prolonged unassisted ambulation that encompasses transition of surfaces as well as altered level/texture and support without evident LOB while exhibiting good foot clearance and improved positional hip and trunk maintenance.        Progress per Plan of Care toward all goals to improve LE strength           Timed:  Manual Therapy:         mins  66935;  Therapeutic Exercise:    24    mins  41222;     Neuromuscular Albertina:   8     mins  60693;    Therapeutic Activity:          mins  70962;     Gait Training:     10      mins  20848;     Ultrasound:          mins  96398;      Untimed:  Electrical Stimulation:         mins  09478 ( );  Mechanical Traction:         mins  51981;     Timed Treatment:  42    mins   Total Treatment:    42    mins  Walter Jones PTA  Physical Therapist  Assistant  [id number]

## 2023-10-16 ENCOUNTER — TREATMENT (OUTPATIENT)
Age: 78
End: 2023-10-16
Payer: MEDICARE

## 2023-10-16 DIAGNOSIS — M25.552 PAIN IN LEFT HIP: ICD-10-CM

## 2023-10-16 DIAGNOSIS — E11.65 TYPE 2 DIABETES MELLITUS WITH HYPERGLYCEMIA, WITHOUT LONG-TERM CURRENT USE OF INSULIN: ICD-10-CM

## 2023-10-16 DIAGNOSIS — R53.1 WEAKNESS: Primary | ICD-10-CM

## 2023-10-16 DIAGNOSIS — Z74.09 DECREASED FUNCTIONAL MOBILITY AND ENDURANCE: ICD-10-CM

## 2023-10-16 PROCEDURE — 97530 THERAPEUTIC ACTIVITIES: CPT | Performed by: PHYSICAL THERAPIST

## 2023-10-16 PROCEDURE — 97110 THERAPEUTIC EXERCISES: CPT | Performed by: PHYSICAL THERAPIST

## 2023-10-16 PROCEDURE — 97112 NEUROMUSCULAR REEDUCATION: CPT | Performed by: PHYSICAL THERAPIST

## 2023-10-16 RX ORDER — METFORMIN HYDROCHLORIDE 500 MG/1
1000 TABLET, EXTENDED RELEASE ORAL
Qty: 180 TABLET | Refills: 1 | Status: SHIPPED | OUTPATIENT
Start: 2023-10-16

## 2023-10-16 NOTE — PROGRESS NOTES
Physical Therapy Daily Treatment Note    [facility] PT - [facility]  [address]  [address]  [place] [address]     Patient: Ray Pratt   : 1945  Referring practitioner: Marcial Jackson DO  Date of Initial Visit: Type: THERAPY  Noted: 2023  Today's Date: 10/16/2023  Patient seen for 16 sessions         Ray Pratt reports: did well with walking outside in the grass and up/down curb the other day.  Feels I am getting stronger and walking better.        Subjective     Objective   See Exercise, Manual, and Modality Logs for complete treatment.   Standing 3 way SLR each LE with opp leg on foam square 2 x 10 each LE/each position; standing 3 way SLR with yellow mini band 2 x 10 each direction each LE    Assessment/Plan  Improved steadiness of gait noted with in clinic walking between exercise positions.  Demonstrates improved capability(rep/duration) with mini band resistive hip and LE strengthening activities.         Progress per Plan of Care toward all goals           Timed:  Manual Therapy:         mins  63946;  Therapeutic Exercise:    20     mins  27999;     Neuromuscular Albertina:    8    mins  10987;    Therapeutic Activity:     10    mins  98321;     Gait Training:           mins  73587;     Ultrasound:          mins  23481;      Untimed:  Electrical Stimulation:         mins  18175 ( );  Mechanical Traction:        mins  97469;     Timed Treatment:   38   mins   Total Treatment:     38   mins  Walter Jones PTA  Physical Therapist  Assistant  [id number]

## 2023-10-18 ENCOUNTER — TREATMENT (OUTPATIENT)
Age: 78
End: 2023-10-18
Payer: MEDICARE

## 2023-10-18 DIAGNOSIS — R53.1 WEAKNESS: Primary | ICD-10-CM

## 2023-10-18 DIAGNOSIS — Z74.09 DECREASED FUNCTIONAL MOBILITY AND ENDURANCE: ICD-10-CM

## 2023-10-18 DIAGNOSIS — M25.552 PAIN IN LEFT HIP: ICD-10-CM

## 2023-10-18 RX ORDER — APIXABAN 5 MG/1
TABLET, FILM COATED ORAL
Qty: 60 TABLET | Refills: 11 | Status: SHIPPED | OUTPATIENT
Start: 2023-10-18

## 2023-10-18 NOTE — PROGRESS NOTES
Physical Therapy Daily Treatment Note    [facility] PT - [facility]  [address]  [address]  [place] [address]     Patient: Ray Pratt   : 1945  Referring practitioner: Marcial Jackson DO  Date of Initial Visit: Type: THERAPY  Noted: 2023  Today's Date: 10/18/2023  Patient seen for 17 sessions         Ray Pratt reports: feeling ok today.  No new complaints left hip pain.  Did well with errands yesterday without being overly tired or experiencing LOB episodes.        Subjective     Objective   See Exercise, Manual, and Modality Logs for complete treatment.   Added:  tandem balance on foam beam 2 x 30 sec each foot position, standing 3 way SLR 2 x each position/each LE 10 sec minimal touch as necessary    Assessment/Plan  Remains complaint/cooperative with exercise regimen and able to progress reps/resistive band without increased symptoms/discomfort.  Still exhibits decreased confidence and capability with in clinic single leg balance activities and low maura step overs.  Progressing well toward all goals and return to ADL's/functional activity.        Progress per Plan of Care towards all goals           Timed:  Manual Therapy:         mins  88307;  Therapeutic Exercise:   30     mins  12583;     Neuromuscular Albertina:   12    mins  36203;    Therapeutic Activity:     12   mins  08176;     Gait Training:           mins  94492;     Ultrasound:          mins  82736;      Untimed:  Electrical Stimulation:         mins  05009 ( );  Mechanical Traction:         mins  34667;     Timed Treatment:   54   mins   Total Treatment:    54    mins  Walter Jones PTA  Physical Therapist  Assistant  [id number]

## 2023-10-25 ENCOUNTER — TREATMENT (OUTPATIENT)
Age: 78
End: 2023-10-25
Payer: MEDICARE

## 2023-10-25 DIAGNOSIS — R53.1 WEAKNESS: Primary | ICD-10-CM

## 2023-10-25 DIAGNOSIS — Z74.09 DECREASED FUNCTIONAL MOBILITY AND ENDURANCE: ICD-10-CM

## 2023-10-25 DIAGNOSIS — M25.552 PAIN IN LEFT HIP: ICD-10-CM

## 2023-10-25 NOTE — PROGRESS NOTES
Physical Therapy Daily Treatment Note  [address]  [facility] [address]  P: [phone]  F: [phone]    Patient: Ray Pratt   : 1945  Referring practitioner: Marcial Jackson DO  Date of Initial Visit: Type: THERAPY  Noted: 2023  Today's Date: 10/25/2023  Patient seen for 18 sessions       Visit Diagnoses:    ICD-10-CM ICD-9-CM   1. Weakness  R53.1 780.79   2. Decreased functional mobility and endurance  Z74.09 780.99   3. Pain in left hip  M25.552 719.45       Ray Pratt reports:     Subjective   Pt reports that he feels like going up the stairs is getting better.   Objective   See Exercise, Manual, and Modality Logs for complete treatment.   V/c to increase PPT during supine marching with 5# weight      Assessment/Plan  No c/o low back or hip pain throughout therapeutic interventions Intermittent v/c required during exercises to ensure proper exercise performance. Progressions made in seated long arc quad to five pound ankle weights and pt reported fatigue, but able to perform in full range and good form. Pt has one visit remaining and is ready for discharge on that visit with education on comprehensive home exercise program.     Timed:         Manual Therapy:    0     mins  61514;     Therapeutic Exercise:    25     mins  62589;     Neuromuscular Albertina:    0    mins  86831;    Therapeutic Activity:     15     mins  33971;     Gait Trainin     mins  56960;     Ultrasound:     0     mins  65422;    Ionto                               0    mins   48130  Self Care                       0     mins   12828  Canalith Repos    0     mins 27944      Un-Timed:  Electrical Stimulation:    0     mins  56188 ( );  Dry Needling     0     mins self-pay  Traction     0     mins 88245      Timed Treatment:   40   mins   Total Treatment:     40   mins    Ramona Real PT  [place] License: [id number]

## 2023-11-03 ENCOUNTER — OFFICE VISIT (OUTPATIENT)
Dept: INTERNAL MEDICINE | Facility: CLINIC | Age: 78
End: 2023-11-03
Payer: MEDICARE

## 2023-11-03 VITALS
HEART RATE: 69 BPM | HEIGHT: 64 IN | SYSTOLIC BLOOD PRESSURE: 100 MMHG | DIASTOLIC BLOOD PRESSURE: 62 MMHG | OXYGEN SATURATION: 96 % | WEIGHT: 236 LBS | BODY MASS INDEX: 40.29 KG/M2

## 2023-11-03 DIAGNOSIS — I10 BENIGN ESSENTIAL HTN: ICD-10-CM

## 2023-11-03 DIAGNOSIS — I48.91 ATRIAL FIBRILLATION, UNSPECIFIED TYPE: ICD-10-CM

## 2023-11-03 DIAGNOSIS — E11.65 TYPE 2 DIABETES MELLITUS WITH HYPERGLYCEMIA, WITHOUT LONG-TERM CURRENT USE OF INSULIN: Primary | ICD-10-CM

## 2023-11-03 LAB — HBA1C MFR BLD: 7 % (ref 4.8–5.6)

## 2023-11-03 PROCEDURE — 3074F SYST BP LT 130 MM HG: CPT | Performed by: STUDENT IN AN ORGANIZED HEALTH CARE EDUCATION/TRAINING PROGRAM

## 2023-11-03 PROCEDURE — 99214 OFFICE O/P EST MOD 30 MIN: CPT | Performed by: STUDENT IN AN ORGANIZED HEALTH CARE EDUCATION/TRAINING PROGRAM

## 2023-11-03 PROCEDURE — 3078F DIAST BP <80 MM HG: CPT | Performed by: STUDENT IN AN ORGANIZED HEALTH CARE EDUCATION/TRAINING PROGRAM

## 2023-11-03 NOTE — PROGRESS NOTES
Marcial Jackson D.O.  Internal Medicine  [facility]  [address], [address]  [place] [address]  [phone]      Chief Complaint  Diabetes    SUBJECTIVE    History of Present Illness    Ray Pratt is a 78 y.o. male who presents to the office today as an established patient that last saw me on 8/3/2023.     Atrial fib/ HTN/CHF/NICM: follows with [facility]. Takes atenolol 25 mg once daily, dofetilide 125 mcg twice daily and anticoagulated with eliquis 5 mg twice daily. Also takes lasix 40 mg daily and takes potassium supplementation 20 meq daily. Doesn't check BP at home.      Type 2 diabetes: takes metformin ER 1000 mg daily, empagliflozin 25 mg daily and sitagliptin 100 mg daily. Does not check his sugar regularly at home.     States he has had a dramatic improvement with physical therapy with his leg strength.     No Known Allergies     Outpatient Medications Marked as Taking for the 11/3/23 encounter (Office Visit) with Marcial Jackson,    Medication Sig Dispense Refill    atenolol (TENORMIN) 25 MG tablet Take 1 tablet by mouth Daily. 180 tablet 3    dofetilide (TIKOSYN) 125 MCG capsule Take 1 capsule by mouth Every 12 (Twelve) Hours. 180 capsule 2    Eliquis 5 MG tablet tablet TAKE 1 TABLET BY MOUTH EVERY 12 HOURS 60 tablet 11    furosemide (LASIX) 40 MG tablet TAKE 1 TABLET BY MOUTH EVERY DAY 90 tablet 3    Jardiance 25 MG tablet tablet TAKE 1 TABLET BY MOUTH EVERY DAY WITH BREAKFAST 90 tablet 2    loratadine (CLARITIN) 10 MG tablet TAKE 1 TABLET BY MOUTH EVERY DAY 90 tablet 1    metFORMIN ER (GLUCOPHAGE-XR) 500 MG 24 hr tablet TAKE 2 TABLETS BY MOUTH DAILY WITH BREAKFAST. 180 tablet 1    potassium chloride (MICRO-K) 10 MEQ CR capsule Take 2 capsules by mouth Daily. 180 capsule 1    rosuvastatin (CRESTOR) 10 MG tablet TAKE 1 TABLET BY MOUTH EVERY DAY 90 tablet 3    SITagliptin (Januvia) 100 MG tablet Take 1 tablet by mouth Daily. 90 tablet 3        Past Medical  "History:   Diagnosis Date    Acute combined systolic and diastolic congestive heart failure     Atrial fibrillation     Atrial flutter with rapid ventricular response     CHF (congestive heart failure)     Dilated cardiomyopathy     Hyperlipidemia     Hypertension     Low-tension glaucoma of right eye     Nonischemic cardiomyopathy     Obesity     Ocular hypertension of left eye     On dofetilide therapy     MARCOS (obstructive sleep apnea)     compliant with BiPAP    Persistent atrial fibrillation     Pneumonia of left lung due to Haemophilus influenzae 01/18/2017    Ptosis of right eyelid     Sepsis 01/18/2017    Type 2 diabetes mellitus, without long-term current use of insulin        OBJECTIVE    Vital Signs:   /62   Pulse 69   Ht 162.6 cm (64\")   Wt 107 kg (236 lb)   SpO2 96%   BMI 40.51 kg/m²     Physical Exam  Vitals reviewed.   Constitutional:       General: He is not in acute distress.     Appearance: He is obese. He is not ill-appearing.   HENT:      Head: Atraumatic.   Eyes:      General: No scleral icterus.  Cardiovascular:      Rate and Rhythm: Normal rate and regular rhythm.      Heart sounds: Normal heart sounds. No murmur heard.  Pulmonary:      Effort: Pulmonary effort is normal. No respiratory distress.      Breath sounds: Normal breath sounds. No stridor. No wheezing or rhonchi.   Musculoskeletal:      Right lower leg: Edema (trace pretibial) present.      Left lower leg: Edema (trace pretibial) present.   Skin:     Coloration: Skin is not jaundiced.   Neurological:      Mental Status: He is alert.   Psychiatric:         Mood and Affect: Mood normal.         Behavior: Behavior normal.         Thought Content: Thought content normal.                             ASSESSMENT & PLAN     Diagnoses and all orders for this visit:    1. Type 2 diabetes mellitus with hyperglycemia, without long-term current use of insulin (Primary)  -A1c trends on file for this patient:   A1C Last 3 Results          " 2/1/2023    10:03 3/11/2023    06:49 8/3/2023    13:43   HGBA1C Last 3 Results   Hemoglobin A1C 7.00  7.00  7.00      -Goal A1c for this patient is less than 7.0%  -Current diabetes regimen:takes metformin ER 1000 mg daily, empagliflozin 25 mg daily and sitagliptin 100 mg daily. Does not check his sugar regularly at home.   -Changes made to diabetes regimen today: recheck A1c, previously has had good control   -Diabetic kidney disease screening: up to date and negative, will repeat in 1 year    2. Benign essential HTN  3. Atrial fibrillation, unspecified type  - follows with [facility]. Takes atenolol 25 mg once daily, dofetilide 125 mcg twice daily and anticoagulated with eliquis 5 mg twice daily. Also takes lasix 40 mg daily and takes potassium supplementation 20 meq daily. Doesn't check BP at home.   -I reviewed most recent cardiology note  -rate and BP controlled today at 100/62, HR 69  -continue current regimen           The following social determinates of health impact the patient's medical decision making: No social determinates of health were factored in to today's visit.     Follow Up  Return in about 3 months (around 2/3/2024) for Recheck.    Patient/family had no further questions at this time and verbalized understanding of the plan discussed today.

## 2023-11-07 ENCOUNTER — TREATMENT (OUTPATIENT)
Age: 78
End: 2023-11-07
Payer: MEDICARE

## 2023-11-07 DIAGNOSIS — R53.1 WEAKNESS: Primary | ICD-10-CM

## 2023-11-07 DIAGNOSIS — Z74.09 DECREASED FUNCTIONAL MOBILITY AND ENDURANCE: ICD-10-CM

## 2023-11-07 DIAGNOSIS — M25.552 PAIN IN LEFT HIP: ICD-10-CM

## 2023-11-07 PROCEDURE — 97110 THERAPEUTIC EXERCISES: CPT | Performed by: PHYSICAL THERAPIST

## 2023-11-07 PROCEDURE — 97530 THERAPEUTIC ACTIVITIES: CPT | Performed by: PHYSICAL THERAPIST

## 2023-11-07 PROCEDURE — 97112 NEUROMUSCULAR REEDUCATION: CPT | Performed by: PHYSICAL THERAPIST

## 2023-11-07 NOTE — PROGRESS NOTES
Physical Therapy Daily Treatment Note    [facility] - [facility]  [address]  [address]  [place] [address]     Patient: Ray Pratt   : 1945  Referring practitioner: Marcial Jackson DO  Date of Initial Visit: Type: THERAPY  Noted: 2023  Today's Date: 2023  Patient seen for 19 sessions         Ray Pratt reports: that he has come a long way in regards to his walking, exercise capability and feels more confident with stair climbing.        Subjective     Objective   See Exercise, Manual, and Modality Logs for complete treatment.   -importance of continued exercise performance to maximize strengthening.    Assessment/Plan  Overall improvement of left hip and LE's since start of PT in regards to decreased pain, improved mobility/strength and balance.  Doing better with reciporical stair climbing with one rail use(Met LTG 2) and exhibits good recall and performance of HEP with only minimal verbal/tactile cues required to ensure correct technique.        Other  Discharge to independent HEP.  Follow up with patient in 10 days to see if still doing well.  If issues arise patient to contact us sooner.           Timed:  Manual Therapy:         mins  34287;  Therapeutic Exercise:    20     mins  41980;     Neuromuscular Albertina:  10      mins  86086;    Therapeutic Activity:   12       mins  88933;     Gait Training:           mins  69079;     Ultrasound:          mins  25736;      Untimed:  Electrical Stimulation:         mins  13846 ( );  Mechanical Traction:         mins  80078;     Timed Treatment:  42    mins   Total Treatment:     42   mins  Walter Jones PTA  Physical Therapist  Assistant  [id number]

## 2023-11-29 ENCOUNTER — TELEPHONE (OUTPATIENT)
Age: 78
End: 2023-11-29
Payer: MEDICARE

## 2023-11-29 RX ORDER — DOFETILIDE 0.12 MG/1
125 CAPSULE ORAL EVERY 12 HOURS
Qty: 180 CAPSULE | Refills: 3 | Status: SHIPPED | OUTPATIENT
Start: 2023-11-29

## 2023-11-29 NOTE — TELEPHONE ENCOUNTER
Faxed PA request received from [employer].    Form completed and faxed along with a new script to [employer] Prior Auth Dept at [phone].    Confirmation received.

## 2024-01-03 ENCOUNTER — TELEPHONE (OUTPATIENT)
Age: 79
End: 2024-01-03
Payer: MEDICARE

## 2024-01-03 NOTE — TELEPHONE ENCOUNTER
Patient stopped by the office today to get cardiac clearance for cataract surgery.    Ok to send clearance letter?

## 2024-01-11 ENCOUNTER — HOSPITAL ENCOUNTER (OUTPATIENT)
Facility: HOSPITAL | Age: 79
Discharge: HOME | End: 2024-01-11
Admitting: NURSE PRACTITIONER
Payer: MEDICARE

## 2024-01-11 DIAGNOSIS — J43.9 BULLA OF LUNG: ICD-10-CM

## 2024-01-11 PROCEDURE — 71250 CT THORAX DX C-: CPT

## 2024-01-18 ENCOUNTER — OFFICE VISIT (OUTPATIENT)
Dept: SURGERY | Facility: CLINIC | Age: 79
End: 2024-01-18
Payer: MEDICARE

## 2024-01-18 VITALS
OXYGEN SATURATION: 92 % | SYSTOLIC BLOOD PRESSURE: 112 MMHG | HEIGHT: 64 IN | WEIGHT: 234 LBS | HEART RATE: 67 BPM | BODY MASS INDEX: 39.95 KG/M2 | DIASTOLIC BLOOD PRESSURE: 80 MMHG

## 2024-01-18 DIAGNOSIS — J43.9 BULLA OF LUNG: Primary | ICD-10-CM

## 2024-01-18 DIAGNOSIS — J43.9 BULLOUS EMPHYSEMA: ICD-10-CM

## 2024-01-18 PROCEDURE — 99214 OFFICE O/P EST MOD 30 MIN: CPT | Performed by: NURSE PRACTITIONER

## 2024-01-18 PROCEDURE — 1159F MED LIST DOCD IN RCRD: CPT | Performed by: NURSE PRACTITIONER

## 2024-01-18 PROCEDURE — 3074F SYST BP LT 130 MM HG: CPT | Performed by: NURSE PRACTITIONER

## 2024-01-18 PROCEDURE — 3079F DIAST BP 80-89 MM HG: CPT | Performed by: NURSE PRACTITIONER

## 2024-01-18 PROCEDURE — 1160F RVW MEDS BY RX/DR IN RCRD: CPT | Performed by: NURSE PRACTITIONER

## 2024-01-18 NOTE — PROGRESS NOTES
"Chief Complaint  Follow up, right pneumothorax s/p chest tube March 2023    Subjective        Ray Pratt presents to [facility] TH[facility]AC[facility] SUR[facility]ERY  History of Present Illness    Mr. Trevino is a very pleasant 78-year-old gentleman who presents today in follow-up for a right sided pneumothorax that resolved with chest tube placement while hospitalized March 2023.  Etiology of pneumothorax thought to be secondary to bullous disease. He is a never smoker.  He is sees Dr. Meléndez for management of his sleep apnea.  His BiPAP settings were previously decreased and he has been tolerating this well.     He has some persistent shortness of breath with exertion but is not dyspneic at rest. He does not require supplemental oxygen during the day.   He presents today feeling well with CT chest.  He has no significant changes to his health history since he was last seen in our office in June 2023.      Objective   Vital Signs:  /80 (BP Location: Left arm, Patient Position: Sitting, Cuff Size: Adult)   Pulse 67   Ht 162.6 cm (64\")   Wt 106 kg (234 lb)   SpO2 92%   BMI 40.17 kg/m²   Estimated body mass index is 40.17 kg/m² as calculated from the following:    Height as of this encounter: 162.6 cm (64\").    Weight as of this encounter: 106 kg (234 lb).             Physical Exam  Constitutional:       Appearance: Normal appearance. He is well-groomed. He is obese. He is not ill-appearing.   HENT:      Head: Normocephalic and atraumatic.   Cardiovascular:      Rate and Rhythm: Normal rate.   Pulmonary:      Effort: Pulmonary effort is normal. No respiratory distress.   Musculoskeletal:         General: Normal range of motion.      Cervical back: Normal range of motion and neck supple.   Skin:     General: Skin is warm and dry.   Neurological:      General: No focal deficit present.      Mental Status: He is alert and oriented to person, place, and time. Mental status is at baseline. "   Psychiatric:         Mood and Affect: Mood normal.        Result Review :    CT chest 1/11/2024: There is a stable 8 mm right paratracheal lymph node.  No significant mediastinal or hilar lymphadenopathy.  Bulla to right middle lobe as before, slightly smaller.  Right lung atelectasis.  No suspicious pulmonary nodules or pleural effusion.  Imaging reviewed independently.      CT chest 6/20/2023: resolution of previously seen right pneumothorax with associated subcutaneous air to the right chest wall.  Subpleural bulla in the right middle and lower lobe without change.  Basilar scarring/atelectasis is stable.  No pleural or pericardial effusion.  There are calcified mediastinal and left hilar lymph nodes.  No new or enlarging pulmonary nodules.  No pleural or pericardial effusion.  There is a small hiatal hernia.           Assessment and Plan   Diagnoses and all orders for this visit:    1. Bulla of lung (Primary)  -     CT Chest Without Contrast; Future    2. Bullous emphysema  -     CT Chest Without Contrast; Future    Mr. Trevino's most recent CT chest demonstrates persistent bulla to the right midlung as before. No  new or enlarging pulmonary nodules. We will continue surveillance of bullous emphysema with CT chest in 1 year.  He will follow-up with pulmonary medicine as directed.  He is in agreement with this plan.         I spent 33 minutes caring for Ray on this date of service. This time includes time spent by me in the following activities:preparing for the visit, reviewing tests, obtaining and/or reviewing a separately obtained history, ordering medications, tests, or procedures, referring and communicating with other health care professionals , documenting information in the medical record, and independently interpreting results and communicating that information with the patient/family/caregiver    Follow Up   Return in about 1 year (around 1/18/2025) for Next scheduled follow up.  Patient was  given instructions and counseling regarding his condition or for health maintenance advice. Please see specific information pulled into the AVS if appropriate.

## 2024-02-02 ENCOUNTER — OFFICE VISIT (OUTPATIENT)
Age: 79
End: 2024-02-02
Payer: MEDICARE

## 2024-02-02 VITALS
WEIGHT: 239 LBS | SYSTOLIC BLOOD PRESSURE: 140 MMHG | OXYGEN SATURATION: 98 % | HEIGHT: 64 IN | HEART RATE: 72 BPM | BODY MASS INDEX: 40.8 KG/M2 | DIASTOLIC BLOOD PRESSURE: 80 MMHG

## 2024-02-02 DIAGNOSIS — I10 BENIGN ESSENTIAL HTN: ICD-10-CM

## 2024-02-02 DIAGNOSIS — Z79.899 ON DOFETILIDE THERAPY: ICD-10-CM

## 2024-02-02 DIAGNOSIS — G47.33 OBSTRUCTIVE SLEEP APNEA: ICD-10-CM

## 2024-02-02 DIAGNOSIS — I48.19 ATRIAL FIBRILLATION, PERSISTENT: Primary | ICD-10-CM

## 2024-02-02 NOTE — PROGRESS NOTES
"Date of Office Visit: 24  Encounter Provider: ANICETO Gomez  Place of Service: [facility]  Patient Name: Ray Pratt  :1945    Chief Complaint   Patient presents with    Atrial fibrillation, persistent    On dofetilide therapy    Hypertension    Sleep Apnea    Congestive Heart Failure    Follow-up   :     HPI: Ray Pratt is a 78 y.o. male  with  hypertension, obstructive sleep apnea, atrial fibrillation, dilated cardiomyopathy, diabetes mellitus, obstructive sleep apnea and dofetilide therapy.        He is followed by Dr. Chelita Escalante and Dr. Vazquez. I will visit with him for the first time and have reviewed his medical record.      He has history of nonischemic cardiomyopathy and symptomatic, persistent atrial fibrillation.  He was admitted in 2020 and started on dofetilide.  His QTC was prolonged on higher doses but acceptable on 125 MCG twice daily.       His last echo was 2023 showing ejection fraction of 65.1%    He presents today for annual reassessment.  He is up-to-date with his electrophysiology follow-up.  His renal function was normal in 2023.  Hemoglobin A1c was 7.00 in 2023.  He has no chest pain tightness pressure or edema.  He gets short winded with climbing steps.  He does not perform structured exercise.  He states most time his blood pressure is below 120/80.  No blood in the urine or stool.  He reports compliance with BiPAP      No Known Allergies        Family and social history reviewed.     ROS  All other systems were reviewed and are negative          Objective:     Vitals:    24 1109   BP: 140/80   BP Location: Left arm   Patient Position: Sitting   Pulse: 72   SpO2: 98%   Weight: 108 kg (239 lb)   Height: 162.6 cm (64\")     Body mass index is 41.02 kg/m².    PHYSICAL EXAM:  Pulmonary:      Effort: Pulmonary effort is normal.      Breath sounds: Normal breath sounds. "   Cardiovascular:      Normal rate. Regular rhythm.         Procedures      Current Outpatient Medications   Medication Sig Dispense Refill    atenolol (TENORMIN) 25 MG tablet Take 1 tablet by mouth Daily. 180 tablet 3    dofetilide (TIKOSYN) 125 MCG capsule Take 1 capsule by mouth Every 12 (Twelve) Hours. 180 capsule 3    Eliquis 5 MG tablet tablet TAKE 1 TABLET BY MOUTH EVERY 12 HOURS 60 tablet 11    empagliflozin (Jardiance) 25 MG tablet tablet Take 1 tablet by mouth Daily With Breakfast. 90 tablet 2    furosemide (LASIX) 40 MG tablet TAKE 1 TABLET BY MOUTH EVERY DAY 90 tablet 3    loratadine (CLARITIN) 10 MG tablet TAKE 1 TABLET BY MOUTH EVERY DAY 90 tablet 1    metFORMIN ER (GLUCOPHAGE-XR) 500 MG 24 hr tablet TAKE 2 TABLETS BY MOUTH DAILY WITH BREAKFAST. 180 tablet 1    potassium chloride (MICRO-K) 10 MEQ CR capsule Take 2 capsules by mouth Daily. 180 capsule 1    rosuvastatin (CRESTOR) 10 MG tablet TAKE 1 TABLET BY MOUTH EVERY DAY 90 tablet 3    SITagliptin (Januvia) 100 MG tablet Take 1 tablet by mouth Daily. 90 tablet 2     No current facility-administered medications for this visit.     Assessment:       Diagnosis Plan   1. Atrial fibrillation, persistent        2. On dofetilide therapy        3. Benign essential HTN        4. Obstructive sleep apnea             No orders of the defined types were placed in this encounter.        Plan:     1.  76-year-old gentleman with history of persistent symptomatic atrial fibrillation treated with dofetilide started in 2020.  CHADS2 Vascor of 5 he is in normal sinus rhythm continue Eliquis and atenolol and Tikosyn  2.  History of nonischemic cardiomyopathy, likely tachycardia mediated. EF of 22% in 11/2019 and normalized 06/2020.  His last echo was February 2023 showing ejection fraction of 65.1%.  He appears euvolemic.   3.  No significant coronary artery disease on cardiac catheterization November 2019.  Here evidence of luminal irregularities-no angina  4.  Diabetes  mellitus on therapy and controlled with hemoglobin A1c of 7.0 November 5.  Obstructive sleep apnea on BiPAP follows with Dr. Meléndez  6.  Hyperlipidemia continue rosuvastatin       Call with questions or concerns.   Follow-up in 1 year with Dr. Chelita Escalante and keep electrophysiology follow-up as scheduled in May with Dr. Vazquez          It has been a pleasure to participate in this patient's care.      Thank you,  ANICETO Gomez      **I used Dragon to dictate this note:**

## 2024-02-09 ENCOUNTER — OFFICE VISIT (OUTPATIENT)
Dept: INTERNAL MEDICINE | Facility: CLINIC | Age: 79
End: 2024-02-09
Payer: MEDICARE

## 2024-02-09 VITALS
SYSTOLIC BLOOD PRESSURE: 120 MMHG | TEMPERATURE: 97.5 F | HEART RATE: 81 BPM | DIASTOLIC BLOOD PRESSURE: 78 MMHG | OXYGEN SATURATION: 99 % | WEIGHT: 235 LBS | BODY MASS INDEX: 40.12 KG/M2 | HEIGHT: 64 IN

## 2024-02-09 DIAGNOSIS — U07.1 COVID-19 VIRUS INFECTION: Primary | ICD-10-CM

## 2024-02-09 DIAGNOSIS — E11.65 TYPE 2 DIABETES MELLITUS WITH HYPERGLYCEMIA, WITHOUT LONG-TERM CURRENT USE OF INSULIN: ICD-10-CM

## 2024-02-09 DIAGNOSIS — I48.91 ATRIAL FIBRILLATION, UNSPECIFIED TYPE: ICD-10-CM

## 2024-02-09 DIAGNOSIS — I10 BENIGN ESSENTIAL HTN: ICD-10-CM

## 2024-02-09 LAB
EXPIRATION DATE: ABNORMAL
FLUAV AG UPPER RESP QL IA.RAPID: NOT DETECTED
FLUBV AG UPPER RESP QL IA.RAPID: NOT DETECTED
INTERNAL CONTROL: ABNORMAL
Lab: ABNORMAL
SARS-COV-2 AG UPPER RESP QL IA.RAPID: DETECTED

## 2024-02-09 PROCEDURE — 87428 SARSCOV & INF VIR A&B AG IA: CPT | Performed by: STUDENT IN AN ORGANIZED HEALTH CARE EDUCATION/TRAINING PROGRAM

## 2024-02-09 PROCEDURE — 3074F SYST BP LT 130 MM HG: CPT | Performed by: STUDENT IN AN ORGANIZED HEALTH CARE EDUCATION/TRAINING PROGRAM

## 2024-02-09 PROCEDURE — 99214 OFFICE O/P EST MOD 30 MIN: CPT | Performed by: STUDENT IN AN ORGANIZED HEALTH CARE EDUCATION/TRAINING PROGRAM

## 2024-02-09 PROCEDURE — G2211 COMPLEX E/M VISIT ADD ON: HCPCS | Performed by: STUDENT IN AN ORGANIZED HEALTH CARE EDUCATION/TRAINING PROGRAM

## 2024-02-09 PROCEDURE — 3078F DIAST BP <80 MM HG: CPT | Performed by: STUDENT IN AN ORGANIZED HEALTH CARE EDUCATION/TRAINING PROGRAM

## 2024-02-09 NOTE — PROGRESS NOTES
Marcial Jackson D.O.  Internal Medicine  [facility]  [address], [address]  [place] [address]  [phone]      Chief Complaint  Nasal Congestion    SUBJECTIVE    History of Present Illness    Ray Pratt is a 78 y.o. male who presents to the office today as an established patient that last saw me on 11/3/2023.     Sore throat started 2 days ago then the next day had runny nose and nasal congestion. Right now he feels tired, simple activities such as taking the trash is fatiguing to him.   Sore throat is resolved. He has had a very minor cough but is not coughing anything out. No fever or chills. No sick contacts.     No Known Allergies     Outpatient Medications Marked as Taking for the 2/9/24 encounter (Office Visit) with Marcial Jackson,    Medication Sig Dispense Refill    atenolol (TENORMIN) 25 MG tablet Take 1 tablet by mouth Daily. 180 tablet 3    dofetilide (TIKOSYN) 125 MCG capsule Take 1 capsule by mouth Every 12 (Twelve) Hours. 180 capsule 3    Eliquis 5 MG tablet tablet TAKE 1 TABLET BY MOUTH EVERY 12 HOURS 60 tablet 11    empagliflozin (Jardiance) 25 MG tablet tablet Take 1 tablet by mouth Daily With Breakfast. 90 tablet 2    furosemide (LASIX) 40 MG tablet TAKE 1 TABLET BY MOUTH EVERY DAY 90 tablet 3    loratadine (CLARITIN) 10 MG tablet TAKE 1 TABLET BY MOUTH EVERY DAY 90 tablet 1    metFORMIN ER (GLUCOPHAGE-XR) 500 MG 24 hr tablet TAKE 2 TABLETS BY MOUTH DAILY WITH BREAKFAST. 180 tablet 1    potassium chloride (MICRO-K) 10 MEQ CR capsule Take 2 capsules by mouth Daily. 180 capsule 1    rosuvastatin (CRESTOR) 10 MG tablet TAKE 1 TABLET BY MOUTH EVERY DAY 90 tablet 3    SITagliptin (Januvia) 100 MG tablet Take 1 tablet by mouth Daily. 90 tablet 2        Past Medical History:   Diagnosis Date    Acute combined systolic and diastolic congestive heart failure     Atrial fibrillation     Atrial flutter with rapid ventricular response     CHF (congestive heart failure)   "   Dilated cardiomyopathy     Hyperlipidemia     Hypertension     Low-tension glaucoma of right eye     Nonischemic cardiomyopathy     Obesity     Ocular hypertension of left eye     On dofetilide therapy     MARCOS (obstructive sleep apnea)     compliant with BiPAP    Persistent atrial fibrillation     Pneumonia of left lung due to Haemophilus influenzae 01/18/2017    Ptosis of right eyelid     Sepsis 01/18/2017    Type 2 diabetes mellitus, without long-term current use of insulin        OBJECTIVE    Vital Signs:   /78   Pulse 81   Temp 97.5 °F (36.4 °C) (Infrared)   Ht 162.6 cm (64\")   Wt 107 kg (235 lb)   SpO2 99%   BMI 40.34 kg/m²     Physical Exam  Vitals reviewed.   Constitutional:       General: He is not in acute distress.     Appearance: He is obese. He is not ill-appearing.   HENT:      Head: Atraumatic.      Right Ear: Tympanic membrane, ear canal and external ear normal. There is no impacted cerumen.      Left Ear: Tympanic membrane, ear canal and external ear normal. There is no impacted cerumen.      Mouth/Throat:      Mouth: Mucous membranes are moist.      Pharynx: Oropharynx is clear. Posterior oropharyngeal erythema (posterior pharynx) present. No oropharyngeal exudate.   Eyes:      General: No scleral icterus.  Cardiovascular:      Rate and Rhythm: Normal rate and regular rhythm.      Heart sounds: Normal heart sounds. No murmur heard.  Pulmonary:      Effort: Pulmonary effort is normal. No respiratory distress.      Breath sounds: Normal breath sounds. No stridor. No wheezing or rhonchi.   Lymphadenopathy:      Cervical: Cervical adenopathy (b/l submandibular) present.   Skin:     Coloration: Skin is not jaundiced.   Neurological:      Mental Status: He is alert.   Psychiatric:         Mood and Affect: Mood normal.         Behavior: Behavior normal.         Thought Content: Thought content normal.                             ASSESSMENT & PLAN     Diagnoses and all orders for this " visit:    1. COVID-19 virus infection (Primary)  2. Diabetes Type 2  3. HTN  4. Atrial fibrillation  -Pt seen today  for symptoms consistent with COVID 19 infection with a positive office test today. Patient symptoms are appropriate for continued outpatient care.   -advised pt that COVID 19 in general is treated like other viral URI: cough/cold medications over the counter, tylenol and ibuprofen, ample fluid intake  -current medications contraindicate Paxlovid Use.   -patient's risk factors for severe COVID 19 include: advanced age, atrial fibrillation, CHF, diabetes , HTN  -offered patient an alternative to Paxlovid---- Lagevrio. Overall less effective than Paxlovid but no known medication interactions.   -Discussed with pt that the long term side of Lagevrio are not known. Short term side effects that have been seen are allergy, diarrhea, dizziness, headache.   -Pt was agreeable to initiate treatment with Lagevrio after hearing risk and benefits. Rx sent today.  -informed pt to report to ER for worsening symptoms, feeling of inability to catch breath, chest pain, worsening fever or chills  -     Molnupiravir (LAGEVRIO) 200 MG capsule; Take 4 capsules by mouth Every 12 (Twelve) Hours for 5 days.  Dispense: 40 capsule; Refill: 0                  The following social determinates of health impact the patient's medical decision making: No social determinates of health were factored in to today's visit.     Follow Up  No follow-ups on file.    Patient/family had no further questions at this time and verbalized understanding of the plan discussed today.

## 2024-03-27 RX ORDER — POTASSIUM CHLORIDE 750 MG/1
20 CAPSULE, EXTENDED RELEASE ORAL DAILY
Qty: 180 CAPSULE | Refills: 2 | Status: SHIPPED | OUTPATIENT
Start: 2024-03-27

## 2024-04-07 DIAGNOSIS — E11.65 TYPE 2 DIABETES MELLITUS WITH HYPERGLYCEMIA, WITHOUT LONG-TERM CURRENT USE OF INSULIN: ICD-10-CM

## 2024-04-08 RX ORDER — ROSUVASTATIN CALCIUM 10 MG/1
TABLET, COATED ORAL
Qty: 90 TABLET | Refills: 2 | Status: SHIPPED | OUTPATIENT
Start: 2024-04-08

## 2024-04-08 RX ORDER — METFORMIN HYDROCHLORIDE 500 MG/1
1000 TABLET, EXTENDED RELEASE ORAL
Qty: 180 TABLET | Refills: 1 | Status: SHIPPED | OUTPATIENT
Start: 2024-04-08

## 2024-05-03 ENCOUNTER — OFFICE VISIT (OUTPATIENT)
Age: 79
End: 2024-05-03
Payer: MEDICARE

## 2024-05-03 VITALS
DIASTOLIC BLOOD PRESSURE: 74 MMHG | BODY MASS INDEX: 40.12 KG/M2 | WEIGHT: 235 LBS | HEART RATE: 63 BPM | HEIGHT: 64 IN | SYSTOLIC BLOOD PRESSURE: 130 MMHG

## 2024-05-03 DIAGNOSIS — R06.09 DOE (DYSPNEA ON EXERTION): ICD-10-CM

## 2024-05-03 DIAGNOSIS — I48.19 ATRIAL FIBRILLATION, PERSISTENT: Primary | ICD-10-CM

## 2024-05-03 DIAGNOSIS — Z79.899 ON DOFETILIDE THERAPY: ICD-10-CM

## 2024-05-03 PROCEDURE — 93000 ELECTROCARDIOGRAM COMPLETE: CPT | Performed by: INTERNAL MEDICINE

## 2024-05-03 PROCEDURE — 3075F SYST BP GE 130 - 139MM HG: CPT | Performed by: INTERNAL MEDICINE

## 2024-05-03 PROCEDURE — 99214 OFFICE O/P EST MOD 30 MIN: CPT | Performed by: INTERNAL MEDICINE

## 2024-05-03 PROCEDURE — 3078F DIAST BP <80 MM HG: CPT | Performed by: INTERNAL MEDICINE

## 2024-05-03 NOTE — PROGRESS NOTES
Date of Office Visit: 2024  Encounter Provider: Reginald Vazquez MD  Place of Service: [facility] CARDIOLOGY  Patient Name: Ray Pratt  : 1945    Subjective:     Encounter Date:2024      Patient ID: Ray Pratt is a 78 y.o. male who has a cc of  pers AF and dofetilide -=- He sees Dr DOAN     Here for dofetilide check     Reports some decrease in stamina but does not exercise as much as he used to.     No anginal chest pain,   No sig martin,   No soa,   No fainting,  No orthostasis.   No edema.   Exercise tolerance: decreased     There have been no hospital admission since the last visit.     There have been no bleeding events.       Past Medical History:   Diagnosis Date    Acute combined systolic and diastolic congestive heart failure     Atrial fibrillation     Atrial flutter with rapid ventricular response     CHF (congestive heart failure)     Dilated cardiomyopathy     Hyperlipidemia     Hypertension     Low-tension glaucoma of right eye     Nonischemic cardiomyopathy     Obesity     Ocular hypertension of left eye     On dofetilide therapy     MARCOS (obstructive sleep apnea)     compliant with BiPAP    Persistent atrial fibrillation     Pneumonia of left lung due to Haemophilus influenzae 2017    Ptosis of right eyelid     Sepsis 2017    Type 2 diabetes mellitus, without long-term current use of insulin        Social History     Socioeconomic History    Marital status:    Tobacco Use    Smoking status: Never     Passive exposure: Never    Smokeless tobacco: Never    Tobacco comments:     Caffeine - coke zero    Vaping Use    Vaping status: Never Used   Substance and Sexual Activity    Alcohol use: Never    Drug use: Never    Sexual activity: Yes     Partners: Female     Birth control/protection: Other       Family History   Problem Relation Age of Onset    Other Mother         history of cancer    Stroke Father     Epilepsy Sister     Fainting  "Sister        Review of Systems   Constitutional: Negative for fever and night sweats.   HENT:  Negative for ear pain and stridor.    Eyes:  Negative for discharge and visual halos.   Cardiovascular:  Negative for cyanosis.   Respiratory:  Negative for hemoptysis and sputum production.    Hematologic/Lymphatic: Negative for adenopathy.   Skin:  Negative for nail changes and unusual hair distribution.   Musculoskeletal:  Positive for arthritis and joint pain. Negative for gout and joint swelling.   Gastrointestinal:  Negative for bowel incontinence and flatus.   Genitourinary:  Negative for dysuria and flank pain.   Neurological:  Negative for seizures and tremors.   Psychiatric/Behavioral:  Negative for altered mental status. The patient is not nervous/anxious.             Objective:     Vitals:    05/03/24 1049   BP: 130/74   Pulse: 63   Weight: 107 kg (235 lb)   Height: 162.6 cm (64\")         Eyes:      General:         Right eye: No discharge.         Left eye: No discharge.   HENT:      Head: Normocephalic and atraumatic.   Neck:      Thyroid: No thyromegaly.      Vascular: No JVD.   Pulmonary:      Effort: Pulmonary effort is normal.      Breath sounds: Normal breath sounds. No rales.   Cardiovascular:      Normal rate. Regular rhythm.      No gallop.    Edema:     Peripheral edema absent.   Abdominal:      General: Bowel sounds are normal.      Palpations: Abdomen is soft.      Tenderness: There is no abdominal tenderness.   Musculoskeletal: Normal range of motion.         General: No deformity. Skin:     General: Skin is warm and dry.      Findings: No erythema.   Neurological:      Mental Status: Alert and oriented to person, place, and time.      Motor: Normal muscle tone.   Psychiatric:         Behavior: Behavior normal.         Thought Content: Thought content normal.           ECG 12 Lead    Date/Time: 5/3/2024 11:35 AM  Performed by: Reginald Vazquez MD    Authorized by: Reginald Vazquez MD  Comparison: " compared with previous ECG   Similar to previous ECG  Rhythm: sinus rhythm  Conduction: right bundle branch block          Lab Review:       Assessment:          Diagnosis Plan   1. Atrial fibrillation, persistent        2. On dofetilide therapy        3. VILLA (dyspnea on exertion)               Plan:     His QT is ok. No AF to speak of. On a-ban     He needs to walk more

## 2024-05-14 RX ORDER — DOFETILIDE 0.12 MG/1
125 CAPSULE ORAL EVERY 12 HOURS
Qty: 180 CAPSULE | Refills: 3 | Status: SHIPPED | OUTPATIENT
Start: 2024-05-14

## 2024-06-20 ENCOUNTER — OFFICE VISIT (OUTPATIENT)
Dept: SLEEP MEDICINE | Facility: HOSPITAL | Age: 79
End: 2024-06-20
Payer: MEDICARE

## 2024-06-20 VITALS
BODY MASS INDEX: 40.46 KG/M2 | HEART RATE: 66 BPM | OXYGEN SATURATION: 97 % | DIASTOLIC BLOOD PRESSURE: 64 MMHG | HEIGHT: 64 IN | SYSTOLIC BLOOD PRESSURE: 114 MMHG | WEIGHT: 237 LBS

## 2024-06-20 DIAGNOSIS — G47.31 PRIMARY CENTRAL SLEEP APNEA: Primary | ICD-10-CM

## 2024-06-20 DIAGNOSIS — E66.01 OBESITY, MORBID, BMI 40.0-49.9: ICD-10-CM

## 2024-06-20 DIAGNOSIS — G47.33 OBSTRUCTIVE SLEEP APNEA: ICD-10-CM

## 2024-06-20 PROCEDURE — G0463 HOSPITAL OUTPT CLINIC VISIT: HCPCS

## 2024-06-20 NOTE — PROGRESS NOTES
"  [facility]  [address]  [address]  [place] [address]  Phone   Fax       SLEEP CLINIC FOLLOW UP PROGRESS NOTE.    Ray Pratt  [id number]   1945  78 y.o.  male      PCP: Marcial Jackson DO      Date of visit: 6/20/2024    Chief Complaint   Patient presents with    Sleep Apnea    Complex sleep apnea       HPI:  This is a 78 y.o. years old patient is here for the management of complex sleep apnea..  She is a patient of Dr. Meng but I have reviewed the sleep study.  Patient has a complex sleep apnea with a total AHI of 72/h out of it central sleep index is 54/h.  He was on BiPAP ST with a BiPAP pressure of 20/16 with a backup rate of 10.  Unfortunately he had a right-sided pneumothorax which was treated with a chest tube.  Also the chest x-ray and CT shows that patient has blebs.  He is here to discuss the management.    Now patient is on a BiPAP ST with a reduced pressure.  BiPAP-ST pressure to 15/11 with a backup rate of 10.  He is doing very well and has no problems and tolerating the device without any problems.        Normal bedtime 11 PM  Wake time 8:30 AM    Medications and allergies are reviewed by me and documented in the encounter.     SOCIAL (habits pertaining to sleep medicine)  History tobacco use:No   History of alcohol use: 0 per week  Caffeine use: 2     REVIEW OF SYSTEMS:   Pertaining positive symptoms are:  [place] Sleepiness Scale :Total score: 6         PHYSICAL EXAMINATION:  CONSTITUTIONAL:  Vitals:    06/20/24 1034   BP: 114/64   Pulse: 66   SpO2: 97%   Weight: 108 kg (237 lb)   Height: 162.6 cm (64\")    Body mass index is 40.68 kg/m².   NOSE: nasal passages are clear, No deformities noted   RESP SYSTEM: Not in any respiratory distress, no chest deformities noted,   CARDIOVASULAR: No edema noted  NEURO: Oriented x 3, gait normal,  Mood and affect appeared appropriate      Data reviewed:  The Smart card downloaded on 6/20/2024 has been " reviewed independently by me for compliance and discussed the data with the patient.  Date of the download is February 8, 2023 to March 9, 2023 and he has not used after the pneumothorax was discovered  Compliance; 93%  More than 4 hr use, 83%  Average use of the device 4 hours and 37 minutes night  Residual AHI: 10 /hr acceptable  Device: DreamStation BiPAP ST  DME: [facility] [facility]      ASSESSMENT AND PLAN:  Complex sleep apnea with a predominantly central sleep apnea.  He is a very complicated situation.  Patient had pneumothorax which was treated.  He is tolerating the reduced BiPAP ST pressure.  He is AHI is acceptable and he is using an average of 4 hours and 37 minutes and is benefiting from the device.  The device is medically necessary.  I will see him in about 1 year for follow-up with the he will call the sleep center if he has any problems  History of right-sided pneumothorax  Obesity  3 with BMI is Body mass index is 40.68 kg/m².. I have discuss the relationship between the weight and sleep apnea. The benefit of weight loss in reducing severity of sleep apnea was discussed. Discussed diet and exercise with the patient to achieve ideal BMI.   Return in about 1 year (around 6/20/2025) for with smart card down load. . Patient's questions were answered.    6/20/2024  Lulu Meléndez MD  Sleep Medicine.  Medical Director,   [facility], [facility].

## 2024-07-05 RX ORDER — DOFETILIDE 0.12 MG/1
125 CAPSULE ORAL EVERY 12 HOURS
Qty: 180 CAPSULE | Refills: 1 | Status: SHIPPED | OUTPATIENT
Start: 2024-07-05

## 2024-07-19 RX ORDER — ATENOLOL 25 MG/1
25 TABLET ORAL DAILY
Qty: 90 TABLET | Refills: 7 | Status: SHIPPED | OUTPATIENT
Start: 2024-07-19

## 2024-08-19 ENCOUNTER — OFFICE VISIT (OUTPATIENT)
Dept: INTERNAL MEDICINE | Facility: CLINIC | Age: 79
End: 2024-08-19
Payer: MEDICARE

## 2024-08-19 VITALS
WEIGHT: 231 LBS | DIASTOLIC BLOOD PRESSURE: 76 MMHG | HEIGHT: 64 IN | OXYGEN SATURATION: 93 % | BODY MASS INDEX: 39.44 KG/M2 | HEART RATE: 63 BPM | TEMPERATURE: 98.6 F | SYSTOLIC BLOOD PRESSURE: 118 MMHG

## 2024-08-19 DIAGNOSIS — E11.65 TYPE 2 DIABETES MELLITUS WITH HYPERGLYCEMIA, WITHOUT LONG-TERM CURRENT USE OF INSULIN: ICD-10-CM

## 2024-08-19 DIAGNOSIS — E66.01 MORBID (SEVERE) OBESITY DUE TO EXCESS CALORIES: ICD-10-CM

## 2024-08-19 DIAGNOSIS — E78.5 HYPERLIPIDEMIA, UNSPECIFIED HYPERLIPIDEMIA TYPE: ICD-10-CM

## 2024-08-19 DIAGNOSIS — Z00.00 MEDICARE ANNUAL WELLNESS VISIT, SUBSEQUENT: Primary | ICD-10-CM

## 2024-08-19 DIAGNOSIS — I10 BENIGN ESSENTIAL HTN: ICD-10-CM

## 2024-08-19 PROCEDURE — G0439 PPPS, SUBSEQ VISIT: HCPCS | Performed by: STUDENT IN AN ORGANIZED HEALTH CARE EDUCATION/TRAINING PROGRAM

## 2024-08-19 PROCEDURE — 99214 OFFICE O/P EST MOD 30 MIN: CPT | Performed by: STUDENT IN AN ORGANIZED HEALTH CARE EDUCATION/TRAINING PROGRAM

## 2024-08-19 PROCEDURE — 1170F FXNL STATUS ASSESSED: CPT | Performed by: STUDENT IN AN ORGANIZED HEALTH CARE EDUCATION/TRAINING PROGRAM

## 2024-08-19 PROCEDURE — 1126F AMNT PAIN NOTED NONE PRSNT: CPT | Performed by: STUDENT IN AN ORGANIZED HEALTH CARE EDUCATION/TRAINING PROGRAM

## 2024-08-19 PROCEDURE — 3074F SYST BP LT 130 MM HG: CPT | Performed by: STUDENT IN AN ORGANIZED HEALTH CARE EDUCATION/TRAINING PROGRAM

## 2024-08-19 PROCEDURE — 3078F DIAST BP <80 MM HG: CPT | Performed by: STUDENT IN AN ORGANIZED HEALTH CARE EDUCATION/TRAINING PROGRAM

## 2024-08-19 NOTE — PROGRESS NOTES
Subjective   The ABCs of the Annual Wellness Visit  Medicare Wellness Visit      Ray Pratt is a 78 y.o. patient who presents for a Medicare Wellness Visit.    The following portions of the patient's history were reviewed and   updated as appropriate: allergies, current medications, past family history, past medical history, past social history, past surgical history, and problem list.    HLD: takes rosuvastatin 10 mg daily     Lab Results   Component Value Date    CHLPL 146 02/01/2023    TRIG 66 02/01/2023    HDL 50 02/01/2023    LDL 83 02/01/2023       Allergies: takes loratadine      Complex sleep apnea with a predominantly central sleep apnea: follows with [facility] [place] [facility].  Patient had pneumothorax which was treated.  He is tolerating the reduced BiPAP ST pressure. He follows with [facility] [place] thoracic surgery Greg Ludwig NP  for bullous disease and has follow up CT planned yearly.     Atrial fib/ HTN/CHF/NICM: follows with [facility] [place] [facility]. Takes atenolol 25 mg once daily, dofetilide 125 mcg twice daily and anticoagulated with eliquis 5 mg twice daily. Also takes lasix 40 mg daily and takes potassium supplementation 20 meq daily. Doesn't check BP at home.      Type 2 diabetes: takes metformin ER 1000 mg daily, empagliflozin 25 mg daily and sitagliptin 100 mg daily. Does not check his sugar regularly at home. Pt states his sugar was 119 recently before eating, only checks his sugar periodically. States he eats a smaller amount of ice cream but no success with weight loss.   Lab Results   Component Value Date    HGBA1C 7.00 (H) 11/03/2023       RUL ptosis : now following with [facility] and having surgical planning , Dr Félix Martin.     Compared to one year ago, the patient's physical   health is the same.  Compared to one year ago, the patient's mental   health is the same.    Recent Hospitalizations:  He was not admitted to the hospital during the last year.     Current Medical  Providers:  Patient Care Team:  Marcial Jackson DO as PCP - General (Internal Medicine)  Reginald Vazquez MD as Referring Physician (Cardiology)  Lulu Meléndez MD as Consulting Physician (Sleep Medicine)  Beth Ludwig DNP, APRN as Nurse Practitioner (Thoracic Surgery)  Félix Martin MD as Consulting Physician (Ophthalmology)    Outpatient Medications Prior to Visit   Medication Sig Dispense Refill    atenolol (TENORMIN) 25 MG tablet TAKE 1 TABLET BY MOUTH EVERY DAY 90 tablet 7    dofetilide (TIKOSYN) 125 MCG capsule TAKE 1 CAPSULE BY MOUTH EVERY 12 HOURS. 180 capsule 1    Eliquis 5 MG tablet tablet TAKE 1 TABLET BY MOUTH EVERY 12 HOURS 60 tablet 11    empagliflozin (Jardiance) 25 MG tablet tablet Take 1 tablet by mouth Daily With Breakfast. 90 tablet 2    furosemide (LASIX) 40 MG tablet TAKE 1 TABLET BY MOUTH EVERY DAY 90 tablet 3    loratadine (CLARITIN) 10 MG tablet TAKE 1 TABLET BY MOUTH EVERY DAY 90 tablet 1    metFORMIN ER (GLUCOPHAGE-XR) 500 MG 24 hr tablet TAKE 2 TABLETS BY MOUTH DAILY WITH BREAKFAST. 180 tablet 1    potassium chloride (MICRO-K) 10 MEQ CR capsule TAKE 2 CAPSULES BY MOUTH EVERY  capsule 2    rosuvastatin (CRESTOR) 10 MG tablet TAKE 1 TABLET BY MOUTH EVERY DAY 90 tablet 2    SITagliptin (Januvia) 100 MG tablet Take 1 tablet by mouth Daily. 90 tablet 2     No facility-administered medications prior to visit.     No opioid medication identified on active medication list. I have reviewed chart for other potential  high risk medication/s and harmful drug interactions in the elderly.      Aspirin is not on active medication list.  Aspirin use is contraindicated for this patient due to: current use of Eliquis.  .    Patient Active Problem List   Diagnosis    Type 2 diabetes mellitus with hyperglycemia, without long-term current use of insulin    Benign essential HTN    Atrial fibrillation, persistent    Atrial flutter with rapid ventricular response    Chronic combined  "systolic and diastolic congestive heart failure    Obstructive sleep apnea    High risk medication use    Obesity, morbid, BMI 40.0-49.9    Cardiomyopathy, dilated    VILLA (dyspnea on exertion)    Long term (current) use of anticoagulants    On dofetilide therapy    Pneumothorax on right    Acute respiratory failure with hypoxia    Bullous emphysema with collapse    Primary central sleep apnea     Advance Care Planning Advance Directive is not on file.  ACP discussion was held with the patient during this visit. Patient has an advance directive (not in EMR), copy requested.            Objective   Vitals:    08/19/24 0734   BP: 118/76   Pulse: 63   Temp: 98.6 °F (37 °C)   TempSrc: Oral   SpO2: 93%   Weight: 105 kg (231 lb)   Height: 162.6 cm (64\")   Physical Exam  Vitals reviewed.   Constitutional:       General: He is not in acute distress.     Appearance: Normal appearance. He is obese. He is not ill-appearing.   Eyes:      General: No scleral icterus.  Cardiovascular:      Rate and Rhythm: Normal rate and regular rhythm.      Heart sounds: Normal heart sounds. No murmur heard.  Pulmonary:      Effort: Pulmonary effort is normal. No respiratory distress.      Breath sounds: Normal breath sounds. No wheezing.   Abdominal:      General: Bowel sounds are normal. There is no distension.      Palpations: Abdomen is soft.      Tenderness: There is no abdominal tenderness. There is no guarding.   Musculoskeletal:      Right lower leg: Edema (trace ankle) present.      Left lower leg: Edema (trace ankle) present.   Skin:     Coloration: Skin is not jaundiced.   Neurological:      Mental Status: He is alert.   Psychiatric:         Mood and Affect: Mood normal.         Behavior: Behavior normal.         Thought Content: Thought content normal.           Estimated body mass index is 39.65 kg/m² as calculated from the following:    Height as of this encounter: 162.6 cm (64\").    Weight as of this encounter: 105 kg (231 " lb).    Class 3 Severe Obesity (BMI >=40). Obesity-related health conditions include the following: obstructive sleep apnea, hypertension, diabetes mellitus, and dyslipidemias. Obesity is newly identified. BMI is is above average; BMI management plan is completed. We discussed portion control and increasing exercise.       Does the patient have evidence of cognitive impairment? No                                                                                               Health  Risk Assessment    Smoking Status:  Social History     Tobacco Use   Smoking Status Never    Passive exposure: Never   Smokeless Tobacco Never     Alcohol Consumption:  Social History     Substance and Sexual Activity   Alcohol Use Never       Fall Risk Screen  STEADI Fall Risk Assessment was completed, and patient is at LOW risk for falls.Assessment completed on:2024    Depression Screenin/19/2024     7:37 AM   PHQ-2/PHQ-9 Depression Screening   Little Interest or Pleasure in Doing Things 0-->not at all   Feeling Down, Depressed or Hopeless 0-->not at all   PHQ-9: Brief Depression Severity Measure Score 0     Health Habits and Functional and Cognitive Screenin/19/2024     7:35 AM   Functional & Cognitive Status   Do you have difficulty preparing food and eating? No   Do you have difficulty bathing yourself, getting dressed or grooming yourself? No   Do you have difficulty using the toilet? No   Do you have difficulty moving around from place to place? No   Do you have trouble with steps or getting out of a bed or a chair? No   Current Diet Limited Junk Food   Dental Exam Up to date   Eye Exam Up to date   Exercise (times per week) 2 times per week   Current Exercises Include Walking   Do you need help using the phone?  No   Are you deaf or do you have serious difficulty hearing?  No   Do you need help to go to places out of walking distance? No   Do you need help shopping? No   Do you need help preparing meals?  No    Do you need help with housework?  No   Do you need help with laundry? No   Do you need help taking your medications? No   Do you need help managing money? No   Do you ever drive or ride in a car without wearing a seat belt? No   Have you felt unusual stress, anger or loneliness in the last month? No   Who do you live with? Spouse   If you need help, do you have trouble finding someone available to you? No   Do you have difficulty concentrating, remembering or making decisions? No           Age-appropriate Screening Schedule:  Refer to the list below for future screening recommendations based on patient's age, sex and/or medical conditions. Orders for these recommended tests are listed in the plan section. The patient has been provided with a written plan.    Health Maintenance List  Health Maintenance   Topic Date Due    LIPID PANEL  02/01/2024    COVID-19 Vaccine (7 - 2023-24 season) 02/20/2024    HEMOGLOBIN A1C  05/03/2024    URINE MICROALBUMIN  08/03/2024    BMI FOLLOWUP  08/03/2024    INFLUENZA VACCINE  08/01/2024    DIABETIC EYE EXAM  11/13/2024    ANNUAL WELLNESS VISIT  08/19/2025    TDAP/TD VACCINES (2 - Tdap) 01/19/2033    HEPATITIS C SCREENING  Completed    RSV Vaccine - Adults  Completed    Pneumococcal Vaccine 65+  Completed    ZOSTER VACCINE  Completed    COLORECTAL CANCER SCREENING  Discontinued                                                                                                                                                CMS Preventative Services Quick Reference  Risk Factors Identified During Encounter  Immunizations Discussed/Encouraged: Influenza and COVID19  Inactivity/Sedentary: Patient was advised to exercise at least 150 minutes a week per CDC recommendations.  Polypharmacy: Medication List reviewed  Dental Screening Recommended  Vision Screening Recommended    The above risks/problems have been discussed with the patient.  Pertinent information has been shared with the patient  in the After Visit Summary.  An After Visit Summary and PPPS were made available to the patient.    Follow Up:   Next Medicare Wellness visit to be scheduled in 1 year.         A problem-based visit was also conducted on the same day, see below for assessment and plan    Diagnoses and all orders for this visit:    1. Type 2 diabetes mellitus with hyperglycemia, without long-term current use of insulin (Primary)  2. Morbid (severe) obesity due to excess calories  -A1c trends on file for this patient:   A1C Last 3 Results          11/3/2023    09:42   HGBA1C Last 3 Results   Hemoglobin A1C 7.00      -Goal A1c for this patient is less than 7.0%  -Current diabetes regimen:takes metformin ER 1000 mg daily, empagliflozin 25 mg daily and sitagliptin 100 mg daily. Does not check his sugar regularly at home. Pt states his sugar was 119 recently before eating, only checks his sugar periodically. States he eats a smaller amount of ice cream but no success with weight loss.   -Changes made to diabetes regimen today: recheck A1c today, previously acceptable control. Unfortunately BMI remains at 39.6 , unable to lose weight. Weight loss has been recommended by his cardiology and sleep specialist.  We discussed changing his sitagliptin to semaglutide injection once weekly and attempt to decrease appetite and also further cardiovascular risk reduction benefit.  We discussed the correct use of the Ozempic pen and the titration schedule over the first 6 weeks of the sample pen.  We discussed side effects which in general are similar to the sitagliptin he is already on but could be more noticeable with the semaglutide injection.  These include nausea, diarrhea, constipation, mild abdominal pain, bloating sensation.  These generally are self-limiting and mild at the beginning of treatment.  If any severe pain report this to the office immediately.  He was agreeable.  Advised him to stop his sitagliptin and begin semaglutide injection  once weekly.   -Diabetic kidney disease screening: due for annual screening, will place order today  -check CMP    3. Benign essential HTN  -in setting of atrial fib  -follows with [facility]. Takes atenolol 25 mg once daily, dofetilide 125 mcg twice daily and anticoagulated with eliquis 5 mg twice daily. Also takes lasix 40 mg daily and takes potassium supplementation 20 meq daily. Doesn't check BP at home.   -HR 63, /76 , overall great control  -check renal function today  -check CBC    4. Hyperlipidemia, unspecified hyperlipidemia type  -takes rosuvastatin 10 mg daily     Lab Results   Component Value Date    CHLPL 146 02/01/2023    TRIG 66 02/01/2023    HDL 50 02/01/2023    LDL 83 02/01/2023   -goal LDL less than 70  -recheck lipid  profile today, consider increasing rosuvastatin if not at goal   -     Lipid Panel            The following social determinates of health impact the patient's medical decision making: No social determinates of health were factored in to today's visit.     Follow Up  Return in about 3 months (around 11/19/2024) for Recheck.

## 2024-08-20 LAB
ALBUMIN SERPL-MCNC: 4.2 G/DL (ref 3.8–4.8)
ALBUMIN/CREAT UR: 7 MG/G CREAT (ref 0–29)
ALP SERPL-CCNC: 66 IU/L (ref 44–121)
ALT SERPL-CCNC: 13 IU/L (ref 0–44)
AST SERPL-CCNC: 16 IU/L (ref 0–40)
BASOPHILS # BLD AUTO: 0.1 X10E3/UL (ref 0–0.2)
BASOPHILS NFR BLD AUTO: 1 %
BILIRUB SERPL-MCNC: 0.6 MG/DL (ref 0–1.2)
BUN SERPL-MCNC: 19 MG/DL (ref 8–27)
BUN/CREAT SERPL: 16 (ref 10–24)
CALCIUM SERPL-MCNC: 9.6 MG/DL (ref 8.6–10.2)
CHLORIDE SERPL-SCNC: 100 MMOL/L (ref 96–106)
CHOLEST SERPL-MCNC: 154 MG/DL (ref 100–199)
CO2 SERPL-SCNC: 25 MMOL/L (ref 20–29)
CREAT SERPL-MCNC: 1.16 MG/DL (ref 0.76–1.27)
CREAT UR-MCNC: 98.3 MG/DL
EGFRCR SERPLBLD CKD-EPI 2021: 64 ML/MIN/1.73
EOSINOPHIL # BLD AUTO: 0.2 X10E3/UL (ref 0–0.4)
EOSINOPHIL NFR BLD AUTO: 2 %
ERYTHROCYTE [DISTWIDTH] IN BLOOD BY AUTOMATED COUNT: 14.1 % (ref 11.6–15.4)
GLOBULIN SER CALC-MCNC: 2.9 G/DL (ref 1.5–4.5)
GLUCOSE SERPL-MCNC: 109 MG/DL (ref 70–99)
HBA1C MFR BLD: 7.2 % (ref 4.8–5.6)
HCT VFR BLD AUTO: 50.1 % (ref 37.5–51)
HDLC SERPL-MCNC: 50 MG/DL
HGB BLD-MCNC: 16 G/DL (ref 13–17.7)
IMM GRANULOCYTES # BLD AUTO: 0 X10E3/UL (ref 0–0.1)
IMM GRANULOCYTES NFR BLD AUTO: 1 %
LDLC SERPL CALC-MCNC: 88 MG/DL (ref 0–99)
LYMPHOCYTES # BLD AUTO: 1.8 X10E3/UL (ref 0.7–3.1)
LYMPHOCYTES NFR BLD AUTO: 24 %
MCH RBC QN AUTO: 29.6 PG (ref 26.6–33)
MCHC RBC AUTO-ENTMCNC: 31.9 G/DL (ref 31.5–35.7)
MCV RBC AUTO: 93 FL (ref 79–97)
MICROALBUMIN UR-MCNC: 6.7 UG/ML
MONOCYTES # BLD AUTO: 0.8 X10E3/UL (ref 0.1–0.9)
MONOCYTES NFR BLD AUTO: 11 %
NEUTROPHILS # BLD AUTO: 4.6 X10E3/UL (ref 1.4–7)
NEUTROPHILS NFR BLD AUTO: 61 %
PLATELET # BLD AUTO: 222 X10E3/UL (ref 150–450)
POTASSIUM SERPL-SCNC: 4.3 MMOL/L (ref 3.5–5.2)
PROT SERPL-MCNC: 7.1 G/DL (ref 6–8.5)
RBC # BLD AUTO: 5.41 X10E6/UL (ref 4.14–5.8)
SODIUM SERPL-SCNC: 139 MMOL/L (ref 134–144)
TRIGL SERPL-MCNC: 85 MG/DL (ref 0–149)
VLDLC SERPL CALC-MCNC: 16 MG/DL (ref 5–40)
WBC # BLD AUTO: 7.4 X10E3/UL (ref 3.4–10.8)

## 2024-08-22 RX ORDER — ROSUVASTATIN CALCIUM 20 MG/1
20 TABLET, COATED ORAL DAILY
Qty: 90 TABLET | Refills: 1 | Status: SHIPPED | OUTPATIENT
Start: 2024-08-22

## 2024-09-20 RX ORDER — APIXABAN 5 MG/1
TABLET, FILM COATED ORAL
Qty: 60 TABLET | Refills: 10 | Status: SHIPPED | OUTPATIENT
Start: 2024-09-20

## 2024-10-02 ENCOUNTER — TELEPHONE (OUTPATIENT)
Dept: INTERNAL MEDICINE | Facility: CLINIC | Age: 79
End: 2024-10-02

## 2024-10-02 DIAGNOSIS — E11.65 TYPE 2 DIABETES MELLITUS WITH HYPERGLYCEMIA, WITHOUT LONG-TERM CURRENT USE OF INSULIN: ICD-10-CM

## 2024-10-02 RX ORDER — METFORMIN HCL 500 MG
1000 TABLET, EXTENDED RELEASE 24 HR ORAL
Qty: 180 TABLET | Refills: 1 | Status: SHIPPED | OUTPATIENT
Start: 2024-10-02

## 2024-10-02 NOTE — TELEPHONE ENCOUNTER
"  Caller: Ray Pratt \"Oli\"    Relationship: Self    Best call back number: [phone]    What was the call regarding: PATIENT STATES THAT OZEMPIC IS WORKING FOR HIM.     "

## 2024-10-07 RX ORDER — EMPAGLIFLOZIN 25 MG/1
25 TABLET, FILM COATED ORAL
Qty: 90 TABLET | Refills: 1 | Status: SHIPPED | OUTPATIENT
Start: 2024-10-07

## 2024-10-07 RX ORDER — FUROSEMIDE 40 MG
40 TABLET ORAL DAILY
Qty: 90 TABLET | Refills: 3 | Status: SHIPPED | OUTPATIENT
Start: 2024-10-07

## 2024-11-05 ENCOUNTER — OFFICE VISIT (OUTPATIENT)
Age: 79
End: 2024-11-05
Payer: MEDICARE

## 2024-11-05 VITALS
SYSTOLIC BLOOD PRESSURE: 120 MMHG | WEIGHT: 229.4 LBS | BODY MASS INDEX: 39.16 KG/M2 | HEART RATE: 88 BPM | HEIGHT: 64 IN | DIASTOLIC BLOOD PRESSURE: 70 MMHG

## 2024-11-05 DIAGNOSIS — Z79.899 ON DOFETILIDE THERAPY: ICD-10-CM

## 2024-11-05 DIAGNOSIS — I48.19 ATRIAL FIBRILLATION, PERSISTENT: Primary | ICD-10-CM

## 2024-11-05 DIAGNOSIS — I10 BENIGN ESSENTIAL HTN: ICD-10-CM

## 2024-11-05 DIAGNOSIS — R06.09 DOE (DYSPNEA ON EXERTION): ICD-10-CM

## 2024-11-05 DIAGNOSIS — E66.01 OBESITY, MORBID, BMI 40.0-49.9: ICD-10-CM

## 2024-11-05 PROCEDURE — 99213 OFFICE O/P EST LOW 20 MIN: CPT | Performed by: NURSE PRACTITIONER

## 2024-11-05 PROCEDURE — 3078F DIAST BP <80 MM HG: CPT | Performed by: NURSE PRACTITIONER

## 2024-11-05 PROCEDURE — 1159F MED LIST DOCD IN RCRD: CPT | Performed by: NURSE PRACTITIONER

## 2024-11-05 PROCEDURE — 3074F SYST BP LT 130 MM HG: CPT | Performed by: NURSE PRACTITIONER

## 2024-11-05 PROCEDURE — 1160F RVW MEDS BY RX/DR IN RCRD: CPT | Performed by: NURSE PRACTITIONER

## 2024-11-05 PROCEDURE — 93000 ELECTROCARDIOGRAM COMPLETE: CPT | Performed by: NURSE PRACTITIONER

## 2024-11-05 NOTE — PROGRESS NOTES
Date of Office Visit: 2024  Encounter Provider: ANICETO Stone  Place of Service: [facility] CARDIOLOGY  Patient Name: Ray Pratt  :1945    Chief Complaint   Patient presents with   • Atrial Fibrillation     6 month f/u   :     HPI: Ray Pratt is a 79 y.o. male who follows with Dr. Escalante and Dr. Vazquez--- persistent AF, admitted 2020 and started on dofetilide and has done well since that time.  He did have nonischemic cardiomyopathy at that time which has resolved.    2023 Echo--EF 65%.     He also has hypertension, hyperlipidemia, diabetes, MARCOS and morbid obesity.     Presents today for routine follow-up.    Doing okay. No complaints of chest pain, PND, orthopnea or edema.    No episodes of AF that he is aware of.     He does have some VILLA but also not active--he has been trying to be more active---doing some yard work.     He has lost 5 lbs since last OV.     Apixaban for OAC--tolerating well.      Past Medical History:   Diagnosis Date   • Acute combined systolic and diastolic congestive heart failure    • Atrial fibrillation    • Atrial flutter with rapid ventricular response    • CHF (congestive heart failure)    • Dilated cardiomyopathy    • Hyperlipidemia    • Hypertension    • Low-tension glaucoma of right eye    • Nonischemic cardiomyopathy    • Obesity    • Ocular hypertension of left eye    • On dofetilide therapy    • MARCOS (obstructive sleep apnea)     compliant with BiPAP   • Persistent atrial fibrillation    • Pneumonia of left lung due to Haemophilus influenzae 2017   • Ptosis of right eyelid    • Sepsis 2017   • Type 2 diabetes mellitus, without long-term current use of insulin        Past Surgical History:   Procedure Laterality Date   • CARDIAC CATHETERIZATION N/A 2019    Procedure: Left Heart Cath;  Surgeon: Sundeep Tsang MD;  Location: [facility] CATH INVASIVE LOCATION;  Service: Cardiovascular   •  CARDIAC CATHETERIZATION N/A 11/05/2019    Procedure: Coronary angiography;  Surgeon: Sundeep Tsang MD;  Location: [facility] INVASIVE LOCATION;  Service: Cardiovascular   • DENTAL PROCEDURE     • MOLE REMOVAL Right     above right eye, benign per patient report       Social History     Socioeconomic History   • Marital status:    Tobacco Use   • Smoking status: Never     Passive exposure: Never   • Smokeless tobacco: Never   Vaping Use   • Vaping status: Never Used   Substance and Sexual Activity   • Alcohol use: Never   • Drug use: Never   • Sexual activity: Yes     Partners: Female     Birth control/protection: Other       Family History   Problem Relation Age of Onset   • Stroke Father    • Epilepsy Sister    • Fainting Sister    • Arthritis Sister         back problems       Review of Systems   Constitutional: Negative for chills, fever and malaise/fatigue.   Cardiovascular:  Negative for chest pain, dyspnea on exertion, leg swelling, near-syncope, orthopnea, palpitations, paroxysmal nocturnal dyspnea and syncope.   Respiratory:  Negative for cough and shortness of breath.    Hematologic/Lymphatic: Negative.    Musculoskeletal:  Negative for joint pain, joint swelling and myalgias.   Gastrointestinal:  Negative for abdominal pain, diarrhea, melena, nausea and vomiting.   Genitourinary:  Negative for frequency and hematuria.   Neurological:  Negative for light-headedness, numbness, paresthesias and seizures.   Allergic/Immunologic: Negative.    All other systems reviewed and are negative.      No Known Allergies      Current Outpatient Medications:   •  atenolol (TENORMIN) 25 MG tablet, TAKE 1 TABLET BY MOUTH EVERY DAY, Disp: 90 tablet, Rfl: 7  •  dofetilide (TIKOSYN) 125 MCG capsule, TAKE 1 CAPSULE BY MOUTH EVERY 12 HOURS., Disp: 180 capsule, Rfl: 1  •  Eliquis 5 MG tablet tablet, TAKE 1 TABLET BY MOUTH EVERY 12 HOURS, Disp: 60 tablet, Rfl: 10  •  furosemide (LASIX) 40 MG tablet, Take 1 tablet by  "mouth Daily., Disp: 90 tablet, Rfl: 3  •  Jardiance 25 MG tablet tablet, TAKE 1 TABLET BY MOUTH EVERY DAY WITH BREAKFAST, Disp: 90 tablet, Rfl: 1  •  loratadine (CLARITIN) 10 MG tablet, TAKE 1 TABLET BY MOUTH EVERY DAY, Disp: 90 tablet, Rfl: 1  •  metFORMIN ER (GLUCOPHAGE-XR) 500 MG 24 hr tablet, TAKE 2 TABLETS BY MOUTH DAILY WITH BREAKFAST., Disp: 180 tablet, Rfl: 1  •  potassium chloride (MICRO-K) 10 MEQ CR capsule, TAKE 2 CAPSULES BY MOUTH EVERY DAY, Disp: 180 capsule, Rfl: 2  •  rosuvastatin (CRESTOR) 20 MG tablet, Take 1 tablet by mouth Daily., Disp: 90 tablet, Rfl: 1  •  Semaglutide,0.25 or 0.5MG/DOS, (OZEMPIC) 2 MG/3ML solution pen-injector, Inject 0.5 mg under the skin into the appropriate area as directed 1 (One) Time Per Week., Disp: 3 mL, Rfl: 1      Objective:     Vitals:    11/05/24 0937   BP: 120/70   BP Location: Left arm   Patient Position: Sitting   Pulse: 88   Weight: 104 kg (229 lb 6.4 oz)   Height: 162.6 cm (64\")     Body mass index is 39.38 kg/m².    PHYSICAL EXAM:    Vitals Reviewed.   General Appearance: No acute distress, obese.   HENT: Atraumatic, normocephalic. External eyes, ears, and nose normal.   Respiratory: No signs of respiratory distress. Respiration rhythm and depth normal.   Clear to auscultation. No rales, crackles, rhonchi, or wheezing auscultated.   Cardiovascular:  Heart Rate and Rhythm: Normal, Heart Sounds: S1 and S2.   Murmurs: No murmurs noted. No rubs, thrills, or gallops.   Lower Extremities: No edema noted.  Musculoskeletal: Normal movement of extremities  Skin: Warm and dry.   Psychiatric: Patient alert and oriented to person, place, and time. Speech and behavior appropriate. Normal mood and affect.       ECG 12 Lead    Date/Time: 11/5/2024 9:42 AM  Performed by: Kandice Wylie APRN    Authorized by: Kandice Wylie APRN  Comparison: compared with previous ECG   Similar to previous ECG  Rhythm: sinus rhythm  BPM: 88  Conduction: right bundle branch block  Comments: " QTc okay          Assessment:       Diagnosis Plan   1. Atrial fibrillation, persistent        2. On dofetilide therapy        3. Benign essential HTN        4. VILLA (dyspnea on exertion)        5. Obesity, morbid, BMI 40.0-49.9               Plan:       1-2. Persistent AF that has been well controlled on dofetilide, no episodes, QTc is okay on current dose. Apixaban for OAC.     3. HTN, controlled.     4. VILLA--intermittent, mild, unchanged, most likely due to deconditioning, talked about regular walking/exercise.     5. For the problem of obesity, we discussed the importance of lifestyle measures and strategies for weight loss, such as improved nutrition, regular exercise and sleep hygiene.      Follow up with Dr. Vazquez in 6 months and Dr. Escalante in Feb as scheduled.     As always, it has been a pleasure to participate in your patient's care.      Sincerely,         ANICETO Newell

## 2024-11-20 ENCOUNTER — OFFICE VISIT (OUTPATIENT)
Dept: INTERNAL MEDICINE | Facility: CLINIC | Age: 79
End: 2024-11-20
Payer: MEDICARE

## 2024-11-20 VITALS
SYSTOLIC BLOOD PRESSURE: 118 MMHG | TEMPERATURE: 97.5 F | DIASTOLIC BLOOD PRESSURE: 70 MMHG | BODY MASS INDEX: 38.24 KG/M2 | WEIGHT: 224 LBS | OXYGEN SATURATION: 96 % | HEIGHT: 64 IN | HEART RATE: 99 BPM

## 2024-11-20 DIAGNOSIS — I50.42 CHRONIC COMBINED SYSTOLIC AND DIASTOLIC CONGESTIVE HEART FAILURE: ICD-10-CM

## 2024-11-20 DIAGNOSIS — E66.01 MORBID (SEVERE) OBESITY DUE TO EXCESS CALORIES: ICD-10-CM

## 2024-11-20 DIAGNOSIS — E11.65 TYPE 2 DIABETES MELLITUS WITH HYPERGLYCEMIA, WITHOUT LONG-TERM CURRENT USE OF INSULIN: Primary | ICD-10-CM

## 2024-11-20 DIAGNOSIS — I10 BENIGN ESSENTIAL HTN: ICD-10-CM

## 2024-11-20 DIAGNOSIS — I48.19 ATRIAL FIBRILLATION, PERSISTENT: ICD-10-CM

## 2024-11-20 LAB — HBA1C MFR BLD: 6.8 % (ref 4.8–5.6)

## 2024-11-20 PROCEDURE — 1126F AMNT PAIN NOTED NONE PRSNT: CPT | Performed by: STUDENT IN AN ORGANIZED HEALTH CARE EDUCATION/TRAINING PROGRAM

## 2024-11-20 PROCEDURE — 3074F SYST BP LT 130 MM HG: CPT | Performed by: STUDENT IN AN ORGANIZED HEALTH CARE EDUCATION/TRAINING PROGRAM

## 2024-11-20 PROCEDURE — G2211 COMPLEX E/M VISIT ADD ON: HCPCS | Performed by: STUDENT IN AN ORGANIZED HEALTH CARE EDUCATION/TRAINING PROGRAM

## 2024-11-20 PROCEDURE — 99214 OFFICE O/P EST MOD 30 MIN: CPT | Performed by: STUDENT IN AN ORGANIZED HEALTH CARE EDUCATION/TRAINING PROGRAM

## 2024-11-20 PROCEDURE — 3078F DIAST BP <80 MM HG: CPT | Performed by: STUDENT IN AN ORGANIZED HEALTH CARE EDUCATION/TRAINING PROGRAM

## 2024-11-20 RX ORDER — SEMAGLUTIDE 1.34 MG/ML
1 INJECTION, SOLUTION SUBCUTANEOUS WEEKLY
Qty: 3 ML | Refills: 2 | Status: SHIPPED | OUTPATIENT
Start: 2024-11-20

## 2024-11-20 RX ORDER — SEMAGLUTIDE 0.68 MG/ML
0.5 INJECTION, SOLUTION SUBCUTANEOUS
Refills: 1 | OUTPATIENT
Start: 2024-11-20

## 2024-11-20 NOTE — PROGRESS NOTES
Chief Complaint  Diabetes follow up    SUBJECTIVE    History of Present Illness    Ray Pratt is a 79 y.o. male who presents to the office today as an established patient that last saw me on 8/19/2024.     Atrial fib/ HTN/CHF/NICM: follows with [facility]. Takes atenolol 25 mg once daily, dofetilide 125 mcg twice daily and anticoagulated with eliquis 5 mg twice daily. Also takes lasix 40 mg daily and takes potassium supplementation 20 meq daily. Doesn't check BP at home.      Type 2 diabetes: takes metformin ER 1000 mg daily, empagliflozin 25 mg daily and at last visit switched sitagliptin 100 mg daily to ozempic and he is taking 0.5 mg weekly.  When he first started Ozempic he felt full more often and he lost 10 lbs initially and doesn't feel the same full feeling any more but feels he doesn't eat as much. Checks his sugar occasionally and remembers a 110 recently fasting.    Lab Results   Component Value Date    HGBA1C 7.2 (H) 08/19/2024         No Known Allergies     Outpatient Medications Marked as Taking for the 11/20/24 encounter (Office Visit) with Marcial Jackson DO   Medication Sig Dispense Refill    atenolol (TENORMIN) 25 MG tablet TAKE 1 TABLET BY MOUTH EVERY DAY 90 tablet 7    dofetilide (TIKOSYN) 125 MCG capsule TAKE 1 CAPSULE BY MOUTH EVERY 12 HOURS. 180 capsule 1    Eliquis 5 MG tablet tablet TAKE 1 TABLET BY MOUTH EVERY 12 HOURS 60 tablet 10    furosemide (LASIX) 40 MG tablet Take 1 tablet by mouth Daily. 90 tablet 3    Jardiance 25 MG tablet tablet TAKE 1 TABLET BY MOUTH EVERY DAY WITH BREAKFAST 90 tablet 1    loratadine (CLARITIN) 10 MG tablet TAKE 1 TABLET BY MOUTH EVERY DAY 90 tablet 1    metFORMIN ER (GLUCOPHAGE-XR) 500 MG 24 hr tablet TAKE 2 TABLETS BY MOUTH DAILY WITH BREAKFAST. 180 tablet 1    potassium chloride (MICRO-K) 10 MEQ CR capsule TAKE 2 CAPSULES BY MOUTH EVERY  capsule 2    rosuvastatin (CRESTOR) 20 MG tablet Take 1 tablet by mouth Daily. 90 tablet 1     "[DISCONTINUED] Semaglutide,0.25 or 0.5MG/DOS, (OZEMPIC) 2 MG/3ML solution pen-injector Inject 0.5 mg under the skin into the appropriate area as directed 1 (One) Time Per Week. 3 mL 1        Past Medical History:   Diagnosis Date    Acute combined systolic and diastolic congestive heart failure     Atrial fibrillation     Atrial flutter with rapid ventricular response     CHF (congestive heart failure)     Dilated cardiomyopathy     Hyperlipidemia     Hypertension     Low-tension glaucoma of right eye     Nonischemic cardiomyopathy     Obesity     Ocular hypertension of left eye     On dofetilide therapy     MARCOS (obstructive sleep apnea)     compliant with BiPAP    Persistent atrial fibrillation     Pneumonia of left lung due to Haemophilus influenzae 01/18/2017    Ptosis of right eyelid     Sepsis 01/18/2017    Type 2 diabetes mellitus, without long-term current use of insulin        OBJECTIVE    Vital Signs:   /70   Pulse 99   Temp 97.5 °F (36.4 °C) (Infrared)   Ht 162.6 cm (64\")   Wt 102 kg (224 lb)   SpO2 96%   BMI 38.45 kg/m²        Physical Exam  Vitals reviewed.   Constitutional:       General: He is not in acute distress.     Appearance: He is obese. He is not ill-appearing.   Eyes:      General: No scleral icterus.  Cardiovascular:      Rate and Rhythm: Normal rate and regular rhythm.      Heart sounds: Normal heart sounds. No murmur heard.  Pulmonary:      Effort: Pulmonary effort is normal. No respiratory distress.      Breath sounds: Normal breath sounds. No wheezing.   Musculoskeletal:      Right lower leg: No edema.      Left lower leg: No edema.   Skin:     Coloration: Skin is not jaundiced.   Neurological:      Mental Status: He is alert.   Psychiatric:         Mood and Affect: Mood normal.         Behavior: Behavior normal.         Thought Content: Thought content normal.                             ASSESSMENT & PLAN     Diagnoses and all orders for this visit:    1. Type 2 diabetes " mellitus with hyperglycemia, without long-term current use of insulin (Primary)  2. Morbid (severe) obesity due to excess calories  -A1c trends on file for this patient:   A1C Last 3 Results          8/19/2024    08:37   HGBA1C Last 3 Results   Hemoglobin A1C 7.2      -Goal A1c for this patient is less than 7.0%  -Current diabetes regimen:takes metformin ER 1000 mg daily, empagliflozin 25 mg daily and at last visit switched sitagliptin 100 mg daily to ozempic and he is taking 0.5 mg weekly.  When he first started Ozempic he felt full more often and he lost 10 lbs initially and doesn't feel the same full feeling any more but feels he doesn't eat as much. Checks his sugar occasionally and remembers a 110 recently fasting.    -Changes made to diabetes regimen today: recheck A1c today. Has lost from 237 lbs 6/2024 to 224 lbs today. Great news. He is interested in additional weight loss so we will trial a higher dose of ozempic at 1 mg weekly.   -Diabetic kidney disease screening: up to date and negative, will repeat in 1 year  -     Semaglutide, 1 MG/DOSE, (Ozempic, 1 MG/DOSE,) 4 MG/3ML solution pen-injector; Inject 1 mg under the skin into the appropriate area as directed 1 (One) Time Per Week.  Dispense: 3 mL; Refill: 2    3. Benign essential HTN  4. Atrial fibrillation, persistent  5. Chronic combined systolic and diastolic congestive heart failure  -follows with [facility]. Takes atenolol 25 mg once daily, dofetilide 125 mcg twice daily and anticoagulated with eliquis 5 mg twice daily. Also takes lasix 40 mg daily and takes potassium supplementation 20 meq daily. Doesn't check BP at home.   -reviewed most recent cardiology progress note  -/70 in office today, great control. Monitor for need to decrease antihypertensives as he looses weight.  -continue current regimen for now          Follow Up  Return for Next scheduled follow up.    Patient/family had no further questions at this time and verbalized  understanding of the plan discussed today.

## 2024-12-17 DIAGNOSIS — E11.65 TYPE 2 DIABETES MELLITUS WITH HYPERGLYCEMIA, WITHOUT LONG-TERM CURRENT USE OF INSULIN: ICD-10-CM

## 2024-12-17 RX ORDER — METFORMIN HYDROCHLORIDE 500 MG/1
1000 TABLET, EXTENDED RELEASE ORAL
Qty: 180 TABLET | Refills: 1 | Status: SHIPPED | OUTPATIENT
Start: 2024-12-17

## 2025-01-08 ENCOUNTER — HOSPITAL ENCOUNTER (OUTPATIENT)
Dept: CT IMAGING | Facility: HOSPITAL | Age: 80
Discharge: HOME | End: 2025-01-08
Admitting: NURSE PRACTITIONER
Payer: MEDICARE

## 2025-01-08 DIAGNOSIS — J43.9 BULLOUS EMPHYSEMA: ICD-10-CM

## 2025-01-08 DIAGNOSIS — J43.9 BULLA OF LUNG: ICD-10-CM

## 2025-01-08 PROCEDURE — 71250 CT THORAX DX C-: CPT

## 2025-01-15 NOTE — PROGRESS NOTES
"Chief Complaint  Follow up, right pneumothorax s/p chest tube March 2023    Subjective        Ray Pratt presents to [facility] THORACIC SURGERY  History of Present Illness    Mr. Pratt is a very pleasant 79-year-old gentleman who presents today for continued surveillance of bullous emphysema.  He has a history of right sided pneumothorax that resolved with chest tube placement while hospitalized March 2023.  Etiology of pneumothorax thought to be secondary to bullous disease. He is a never smoker.  He is sees Dr. Meléndez for management of his sleep apnea.  His BiPAP settings were previously decreased and he has been tolerating this well.     He has some persistent shortness of breath with exertion but is not dyspneic at rest. He does not require supplemental oxygen during the day.   He presents today feeling well with CT chest.  He has no significant changes to his health history since he was last seen in our office in June 2023.      Objective   Vital Signs:  /68 (BP Location: Left arm, Patient Position: Sitting, Cuff Size: Adult)   Pulse 82   Resp 16   Wt 99.9 kg (220 lb 3.2 oz)   SpO2 96%   BMI 37.80 kg/m²   Estimated body mass index is 37.8 kg/m² as calculated from the following:    Height as of 11/20/24: 162.6 cm (64\").    Weight as of this encounter: 99.9 kg (220 lb 3.2 oz).             Physical Exam  Constitutional:       Appearance: Normal appearance. He is well-groomed. He is obese. He is not ill-appearing.   HENT:      Head: Normocephalic and atraumatic.   Cardiovascular:      Rate and Rhythm: Normal rate.   Pulmonary:      Effort: Pulmonary effort is normal. No respiratory distress.   Musculoskeletal:         General: Normal range of motion.      Cervical back: Normal range of motion and neck supple.   Skin:     General: Skin is warm and dry.   Neurological:      General: No focal deficit present.      Mental Status: He is alert and oriented to person, place, " and time. Mental status is at baseline.   Psychiatric:         Mood and Affect: Mood normal.        Result Review :    CT chest 1/11/2024: There is a stable 8 mm right paratracheal lymph node.  No significant mediastinal or hilar lymphadenopathy.  Bulla to right middle lobe as before, slightly smaller.  Right lung atelectasis.  No suspicious pulmonary nodules or pleural effusion.  Imaging reviewed independently.      CT chest 6/20/2023: resolution of previously seen right pneumothorax with associated subcutaneous air to the right chest wall.  Subpleural bulla in the right middle and lower lobe without change.  Basilar scarring/atelectasis is stable.  No pleural or pericardial effusion.  There are calcified mediastinal and left hilar lymph nodes.  No new or enlarging pulmonary nodules.  No pleural or pericardial effusion.  There is a small hiatal hernia.           Assessment and Plan   Diagnoses and all orders for this visit:    1. Bullous emphysema (Primary)  -     CT Chest Without Contrast; Future    2. Bulla of lung  -     CT Chest Without Contrast; Future      Mr. Pratt's most recent CT chest demonstrates persistent bulla to the right midlung has slightly increased in size.  No  new or enlarging pulmonary nodules. We will continue surveillance of bullous emphysema with CT chest in 1 year.  He will follow-up with pulmonary medicine as directed.  He is in agreement with this plan.  We can consider checking in alpha 1 antitrypsin in the future if not done by his pulmonologist previously.         I spent 31 minutes caring for Ray on this date of service. This time includes time spent by me in the following activities:preparing for the visit, reviewing tests, obtaining and/or reviewing a separately obtained history, ordering medications, tests, or procedures, referring and communicating with other health care professionals , documenting information in the medical record, and independently interpreting results  and communicating that information with the patient/family/caregiver    Follow Up   Return in about 1 year (around 1/16/2026) for Next scheduled follow up.  Patient was given instructions and counseling regarding his condition or for health maintenance advice. Please see specific information pulled into the AVS if appropriate.

## 2025-01-16 ENCOUNTER — OFFICE VISIT (OUTPATIENT)
Dept: SURGERY | Facility: CLINIC | Age: 80
End: 2025-01-16
Payer: MEDICARE

## 2025-01-16 VITALS
SYSTOLIC BLOOD PRESSURE: 146 MMHG | OXYGEN SATURATION: 96 % | HEART RATE: 82 BPM | DIASTOLIC BLOOD PRESSURE: 68 MMHG | RESPIRATION RATE: 16 BRPM | WEIGHT: 220.2 LBS | BODY MASS INDEX: 37.8 KG/M2

## 2025-01-16 DIAGNOSIS — J43.9 BULLOUS EMPHYSEMA: Primary | ICD-10-CM

## 2025-01-16 DIAGNOSIS — J43.9 BULLA OF LUNG: ICD-10-CM

## 2025-01-16 PROCEDURE — 1159F MED LIST DOCD IN RCRD: CPT | Performed by: NURSE PRACTITIONER

## 2025-01-16 PROCEDURE — 99214 OFFICE O/P EST MOD 30 MIN: CPT | Performed by: NURSE PRACTITIONER

## 2025-01-16 PROCEDURE — 3078F DIAST BP <80 MM HG: CPT | Performed by: NURSE PRACTITIONER

## 2025-01-16 PROCEDURE — 3077F SYST BP >= 140 MM HG: CPT | Performed by: NURSE PRACTITIONER

## 2025-01-16 PROCEDURE — 1160F RVW MEDS BY RX/DR IN RCRD: CPT | Performed by: NURSE PRACTITIONER

## 2025-01-17 ENCOUNTER — PATIENT ROUNDING (BHMG ONLY) (OUTPATIENT)
Dept: SURGERY | Facility: CLINIC | Age: 80
End: 2025-01-17
Payer: MEDICARE

## 2025-01-17 NOTE — PROGRESS NOTES
My name is Macarena,     I am with [facility]     I want to officially welcome you back to the practice and ask about your recent visit.         If you could tell me about your visit with us. What things went well?          We're always looking for ways to make our patients' experiences even better. Do you have recommendations on ways we may improve?       Overall were you satisfied with your first visit to our practice?         I appreciate you taking the time to answer these few questions today. If there is anything else our office can do for you, please let us know.        Thank you and have a great day.    Sent [employer] message

## 2025-02-02 DIAGNOSIS — E11.65 TYPE 2 DIABETES MELLITUS WITH HYPERGLYCEMIA, WITHOUT LONG-TERM CURRENT USE OF INSULIN: ICD-10-CM

## 2025-02-05 RX ORDER — SEMAGLUTIDE 2.68 MG/ML
2 INJECTION, SOLUTION SUBCUTANEOUS WEEKLY
Qty: 3 ML | Refills: 2 | Status: SHIPPED | OUTPATIENT
Start: 2025-02-05

## 2025-02-12 ENCOUNTER — OFFICE VISIT (OUTPATIENT)
Dept: CARDIOLOGY | Facility: CLINIC | Age: 80
End: 2025-02-12
Payer: MEDICARE

## 2025-02-12 VITALS
HEART RATE: 87 BPM | DIASTOLIC BLOOD PRESSURE: 76 MMHG | WEIGHT: 217.8 LBS | BODY MASS INDEX: 37.18 KG/M2 | SYSTOLIC BLOOD PRESSURE: 110 MMHG | OXYGEN SATURATION: 95 % | HEIGHT: 64 IN

## 2025-02-12 DIAGNOSIS — E11.65 TYPE 2 DIABETES MELLITUS WITH HYPERGLYCEMIA, WITHOUT LONG-TERM CURRENT USE OF INSULIN: ICD-10-CM

## 2025-02-12 DIAGNOSIS — I10 BENIGN ESSENTIAL HTN: ICD-10-CM

## 2025-02-12 DIAGNOSIS — I48.92 ATRIAL FLUTTER WITH RAPID VENTRICULAR RESPONSE: Primary | ICD-10-CM

## 2025-02-12 DIAGNOSIS — Z79.899 ON DOFETILIDE THERAPY: ICD-10-CM

## 2025-02-12 DIAGNOSIS — G47.33 OBSTRUCTIVE SLEEP APNEA: ICD-10-CM

## 2025-02-12 DIAGNOSIS — I42.0 CARDIOMYOPATHY, DILATED: ICD-10-CM

## 2025-02-12 PROBLEM — I48.19 ATRIAL FIBRILLATION, PERSISTENT: Status: RESOLVED | Noted: 2019-11-04 | Resolved: 2025-02-12

## 2025-02-12 PROCEDURE — 1159F MED LIST DOCD IN RCRD: CPT | Performed by: INTERNAL MEDICINE

## 2025-02-12 PROCEDURE — 3078F DIAST BP <80 MM HG: CPT | Performed by: INTERNAL MEDICINE

## 2025-02-12 PROCEDURE — 1160F RVW MEDS BY RX/DR IN RCRD: CPT | Performed by: INTERNAL MEDICINE

## 2025-02-12 PROCEDURE — 99214 OFFICE O/P EST MOD 30 MIN: CPT | Performed by: INTERNAL MEDICINE

## 2025-02-12 PROCEDURE — 3074F SYST BP LT 130 MM HG: CPT | Performed by: INTERNAL MEDICINE

## 2025-02-12 PROCEDURE — 93000 ELECTROCARDIOGRAM COMPLETE: CPT | Performed by: INTERNAL MEDICINE

## 2025-02-12 NOTE — PROGRESS NOTES
"      CARDIOLOGY    Chelita Escalante MD    ENCOUNTER DATE:  02/12/2025    Ray Pratt / 79 y.o. / male        CHIEF COMPLAINT / REASON FOR OFFICE VISIT     Follow-up      HISTORY OF PRESENT ILLNESS       HPI    Ray Pratt is a 79 y.o. male     This is a gentleman who was hospitalized in November 2019.  He has diabetes and hypertension.  He presented to the emergency room with syncope and lightheadedness.  He was found to be in atrial flutter he was rate controlled with digoxin and atenolol and anticoagulated with Eliquis.  With rapid ventricular response.  TSH was normal.  Echocardiogram showed severe LV dysfunction with ejection fraction 22%.  Heart catheterization showed no evidence of coronary disease with significantly elevated left ventricular end-diastolic pressure.  He was diagnosed with obstructive sleep apnea and started on BiPAP.  He was admitted to the hospital and January 13, 2020 for a cardioversion.  He had missed abciximab.  He was discharged home with the plan of bringing him back in 3 weeks once he had not missed any blood thinner.  He saw electrophysiology and was started on dofetilide.  He converted back to sinus rhythm. He had a repeat echocardiogram in June 2020 which showed normal LV function with an ejection fraction of about 65% with mild left atrial enlargement no significant valve disease.  He had another echo in February 2022 which showed normal LV function ejection fraction of 65%, borderline left ventricular hypertrophy and no valve disease.  Echocardiogram February 2023 put the ejection fraction 65% with borderline left ventricular hypertrophy, normal diastolic function and no significant valve disease.         VITAL SIGNS     Visit Vitals  /76   Pulse 87   Ht 162.6 cm (64\")   Wt 98.8 kg (217 lb 12.8 oz)   SpO2 95%   BMI 37.39 kg/m²         Wt Readings from Last 3 Encounters:   02/12/25 98.8 kg (217 lb 12.8 oz)   01/16/25 99.9 kg (220 lb 3.2 oz)   11/20/24 102 kg " (224 lb)     Body mass index is 37.39 kg/m².      PHYSICAL EXAMINATION     Constitutional:       General: Not in acute distress.  Neck:      Vascular: No carotid bruit or JVD.   Pulmonary:      Effort: Pulmonary effort is normal.      Breath sounds: Normal breath sounds.   Cardiovascular:      Normal rate. Regular rhythm.      Murmurs: There is no murmur.   Psychiatric:         Mood and Affect: Mood and affect normal.           REVIEWED DATA       ECG 12 Lead    Date/Time: 2/12/2025 11:25 AM  Performed by: Chelita Escalante MD    Authorized by: Chelita Escalante MD  Comparison: compared with previous ECG from 11/5/2024  Rhythm: sinus rhythm  BPM: 87  Conduction: right bundle branch block    Clinical impression: abnormal EKG            Lipid Panel          8/19/2024    08:37   Lipid Panel   Total Cholesterol 154    Triglycerides 85    HDL Cholesterol 50    VLDL Cholesterol 16    LDL Cholesterol  88        Lab Results   Component Value Date    GLUCOSE 109 (H) 08/19/2024    BUN 19 08/19/2024    CREATININE 1.16 08/19/2024     08/19/2024    K 4.3 08/19/2024     08/19/2024    CALCIUM 9.6 08/19/2024    PROTEINTOT 7.1 08/19/2024    ALBUMIN 4.2 08/19/2024    ALT 13 08/19/2024    AST 16 08/19/2024    ALKPHOS 66 08/19/2024    BILITOT 0.6 08/19/2024    GLOB 2.9 08/19/2024    AGRATIO 1.1 03/10/2023    BCR 16 08/19/2024    ANIONGAP 9.4 03/11/2023    EGFR 81.4 03/11/2023       ASSESSMENT & PLAN      Diagnosis Plan   1. Atrial flutter with rapid ventricular response        2. Cardiomyopathy, dilated        3. Benign essential HTN        4. Obstructive sleep apnea        5. On dofetilide therapy        6. Type 2 diabetes mellitus with hyperglycemia, without long-term current use of insulin            1.  Persistent atrial fibrillation.  He follows with Dr. Vazquez.   -Continue anticoagulation with Eliquis  -Continue antiarrhythmic therapy with dofetilide.  Corrected QT interval 479 ms by today's EKG.  -Continue atenolol  25 mg a day.    CHADS-VASc Risk Assessment               5 Total Score    1 CHF    1 Hypertension    2 Age >/= 75    1 DM    Criteria that do not apply:    PRIOR STROKE/TIA/THROMBO    Vascular Disease    Age 65-74    Sex: Female            2.  Nonischemic cardiomyopathy.  Likely tachycardia mediated.  Ejection fraction has since normalized.  Last echo in February 2023 but his ejection fraction at 65%.    3.  Diabetes.  Metformin, Ozempic and Jardiance.    4.  Obstructive sleep apnea.  He wears CPAP 4 to 6 hours a night and nasal oxygen the rest of the time.    5.  Hypertension.  Blood pressure is controlled.  Continue     6.  Right bundle branch block.    7.  Hyperlipidemia.  On rosuvastatin 20 mg a day.  Lipid panel reviewed as above.    No changes today.  Follow-up with Ritu in 1 year.      Orders Placed This Encounter   Procedures    ECG 12 Lead     This order was created via procedure documentation     Order Specific Question:   Release to patient     Answer:   Routine Release [[id number]]           MEDICATIONS         Discharge Medications            Accurate as of February 12, 2025 11:25 AM. If you have any questions, ask your nurse or doctor.                Continue These Medications        Instructions Start Date   atenolol 25 MG tablet  Commonly known as: TENORMIN   25 mg, Oral, Daily      dofetilide 125 MCG capsule  Commonly known as: TIKOSYN   125 mcg, Oral, Every 12 Hours      Eliquis 5 MG tablet tablet  Generic drug: apixaban   TAKE 1 TABLET BY MOUTH EVERY 12 HOURS      furosemide 40 MG tablet  Commonly known as: LASIX   40 mg, Oral, Daily      Jardiance 25 MG tablet tablet  Generic drug: empagliflozin   25 mg, Oral, Daily With Breakfast      loratadine 10 MG tablet  Commonly known as: CLARITIN   TAKE 1 TABLET BY MOUTH EVERY DAY      metFORMIN  MG 24 hr tablet  Commonly known as: GLUCOPHAGE-XR   1,000 mg, Oral, Daily With Breakfast      Ozempic (2 MG/DOSE) 8 MG/3ML solution pen-injector  Generic  drug: Semaglutide (2 MG/DOSE)   2 mg, Subcutaneous, Weekly      potassium chloride 10 MEQ CR capsule  Commonly known as: MICRO-K   20 mEq, Oral, Daily      rosuvastatin 20 MG tablet  Commonly known as: CRESTOR   20 mg, Oral, Daily                 Chelita Escalante MD  02/12/25  11:25 EST    Part of this note may be an electronic transcription/translation of spoken language to printed text using the Dragon dictation system.

## 2025-02-13 RX ORDER — ROSUVASTATIN CALCIUM 20 MG/1
20 TABLET, COATED ORAL DAILY
Qty: 90 TABLET | Refills: 1 | Status: SHIPPED | OUTPATIENT
Start: 2025-02-13

## 2025-02-14 RX ORDER — POTASSIUM CHLORIDE 750 MG/1
20 CAPSULE, EXTENDED RELEASE ORAL DAILY
Qty: 180 CAPSULE | Refills: 2 | Status: SHIPPED | OUTPATIENT
Start: 2025-02-14

## 2025-02-21 ENCOUNTER — OFFICE VISIT (OUTPATIENT)
Dept: INTERNAL MEDICINE | Facility: CLINIC | Age: 80
End: 2025-02-21
Payer: MEDICARE

## 2025-02-21 VITALS
WEIGHT: 215 LBS | OXYGEN SATURATION: 97 % | DIASTOLIC BLOOD PRESSURE: 70 MMHG | SYSTOLIC BLOOD PRESSURE: 110 MMHG | HEIGHT: 64 IN | TEMPERATURE: 97.6 F | BODY MASS INDEX: 36.7 KG/M2 | HEART RATE: 102 BPM

## 2025-02-21 DIAGNOSIS — E66.01 MORBID (SEVERE) OBESITY DUE TO EXCESS CALORIES: ICD-10-CM

## 2025-02-21 DIAGNOSIS — E11.9 TYPE 2 DIABETES MELLITUS WITHOUT COMPLICATION, WITHOUT LONG-TERM CURRENT USE OF INSULIN: Primary | ICD-10-CM

## 2025-02-21 LAB
BUN SERPL-MCNC: 12 MG/DL (ref 8–23)
BUN/CREAT SERPL: 11.5 (ref 7–25)
CALCIUM SERPL-MCNC: 9.4 MG/DL (ref 8.6–10.5)
CHLORIDE SERPL-SCNC: 101 MMOL/L (ref 98–107)
CO2 SERPL-SCNC: 28.2 MMOL/L (ref 22–29)
CREAT SERPL-MCNC: 1.04 MG/DL (ref 0.76–1.27)
EGFRCR SERPLBLD CKD-EPI 2021: 73 ML/MIN/1.73
GLUCOSE SERPL-MCNC: 115 MG/DL (ref 65–99)
HBA1C MFR BLD: 6.4 % (ref 4.8–5.6)
POTASSIUM SERPL-SCNC: 4.1 MMOL/L (ref 3.5–5.2)
SODIUM SERPL-SCNC: 140 MMOL/L (ref 136–145)

## 2025-02-21 NOTE — PROGRESS NOTES
Marcial Jackson DO, [facility]  Internal Medicine  [facility]  [address], [address]  [place] [address]  [phone]    Chief Complaint  Diabetes follow up    SUBJECTIVE    History of Present Illness    Ray Pratt is a 79 y.o. male who presents to the office today as an established patient that last saw me on 11/20/2024.     Type 2 diabetes/obesity: takes metformin ER 1000 mg daily, empagliflozin 25 mg daily and ozempic 2 mg (one total administration at this strength). States he doesn't eat as much overall since being on ozempic.  He is better about stopping eating when he is feeling full and not cleaning the plate. He has lost around 20 lbs he believes. He doesn't checks often at home.   Lab Results   Component Value Date    HGBA1C 6.80 (H) 11/20/2024       No Known Allergies     Outpatient Medications Marked as Taking for the 2/21/25 encounter (Office Visit) with Marcial Jackson DO   Medication Sig Dispense Refill    atenolol (TENORMIN) 25 MG tablet TAKE 1 TABLET BY MOUTH EVERY DAY 90 tablet 7    dofetilide (TIKOSYN) 125 MCG capsule TAKE 1 CAPSULE BY MOUTH EVERY 12 HOURS. 180 capsule 1    Eliquis 5 MG tablet tablet TAKE 1 TABLET BY MOUTH EVERY 12 HOURS 60 tablet 10    furosemide (LASIX) 40 MG tablet Take 1 tablet by mouth Daily. 90 tablet 3    Jardiance 25 MG tablet tablet TAKE 1 TABLET BY MOUTH EVERY DAY WITH BREAKFAST 90 tablet 1    loratadine (CLARITIN) 10 MG tablet TAKE 1 TABLET BY MOUTH EVERY DAY 90 tablet 1    metFORMIN ER (GLUCOPHAGE-XR) 500 MG 24 hr tablet Take 2 tablets by mouth Daily With Breakfast. 180 tablet 1    potassium chloride (MICRO-K) 10 MEQ CR capsule TAKE 2 CAPSULES BY MOUTH EVERY  capsule 2    rosuvastatin (CRESTOR) 20 MG tablet TAKE 1 TABLET BY MOUTH EVERY DAY 90 tablet 1    Semaglutide, 2 MG/DOSE, (Ozempic, 2 MG/DOSE,) 8 MG/3ML solution pen-injector Inject 2 mg under the skin into the appropriate area as directed 1 (One) Time Per Week. 3 mL 2        Past  "Medical History:   Diagnosis Date    Acute combined systolic and diastolic congestive heart failure     Atrial fibrillation     Atrial flutter with rapid ventricular response     CHF (congestive heart failure)     Dilated cardiomyopathy     Hyperlipidemia     Hypertension     Low-tension glaucoma of right eye     Nonischemic cardiomyopathy     Obesity     Ocular hypertension of left eye     On dofetilide therapy     MARCOS (obstructive sleep apnea)     compliant with BiPAP    Persistent atrial fibrillation     Pneumonia of left lung due to Haemophilus influenzae 01/18/2017    Ptosis of right eyelid     Sepsis 01/18/2017    Type 2 diabetes mellitus, without long-term current use of insulin        OBJECTIVE    Vital Signs:   /70   Pulse 102   Temp 97.6 °F (36.4 °C) (Infrared)   Ht 162.6 cm (64\")   Wt 97.5 kg (215 lb)   SpO2 97%   BMI 36.90 kg/m²        Physical Exam  Vitals reviewed.   Constitutional:       General: He is not in acute distress.     Appearance: He is obese. He is not ill-appearing.   Eyes:      General: No scleral icterus.  Pulmonary:      Effort: Pulmonary effort is normal. No respiratory distress.   Skin:     Coloration: Skin is not jaundiced.   Neurological:      Mental Status: He is alert.   Psychiatric:         Mood and Affect: Mood normal.         Behavior: Behavior normal.         Thought Content: Thought content normal.                             ASSESSMENT & PLAN     Diagnoses and all orders for this visit:    1. Type 2 diabetes mellitus without complication, without long-term current use of insulin (Primary)  2. Morbid (severe) obesity due to excess calories  -A1c trends on file for this patient:   A1C Last 3 Results          8/19/2024    08:37 11/20/2024    10:21   HGBA1C Last 3 Results   Hemoglobin A1C 7.2  6.80      -Goal A1c for this patient is less than 7.0%  -Current diabetes regimen: takes metformin ER 1000 mg daily, empagliflozin 25 mg daily and ozempic 2 mg (one total " administration at this strength). States he doesn't eat as much overall since being on ozempic.  He is better about stopping eating when he is feeling full and not cleaning the plate. He has lost around 20 lbs he believes. He doesn't checks often at home.   -Changes made to diabetes regimen today: recheck A1c today, previously improving control with start of ozempic and weight loss. Weight down from 224 lbs 11/2024 to 215 lbs on our scale. Great news. Continue current regimen.   -Diabetic kidney disease screening: up to date and negative, will repeat in 1 year  -check BMP  -diabetic eye exam scanned to chart and reviewed-negative            Follow Up  Return in about 3 months (around 5/21/2025) for Recheck.    Patient/family had no further questions at this time and verbalized understanding of the plan discussed today.

## 2025-03-03 DIAGNOSIS — G47.31 PRIMARY CENTRAL SLEEP APNEA: Primary | ICD-10-CM

## 2025-03-03 DIAGNOSIS — E66.01 OBESITY, MORBID, BMI 40.0-49.9: ICD-10-CM

## 2025-03-03 DIAGNOSIS — J93.9 PNEUMOTHORAX ON RIGHT: ICD-10-CM

## 2025-03-03 DIAGNOSIS — G47.33 OBSTRUCTIVE SLEEP APNEA: ICD-10-CM

## 2025-04-11 RX ORDER — EMPAGLIFLOZIN 25 MG/1
25 TABLET, FILM COATED ORAL
Qty: 90 TABLET | Refills: 1 | Status: SHIPPED | OUTPATIENT
Start: 2025-04-11

## 2025-05-03 DIAGNOSIS — E11.65 TYPE 2 DIABETES MELLITUS WITH HYPERGLYCEMIA, WITHOUT LONG-TERM CURRENT USE OF INSULIN: ICD-10-CM

## 2025-05-05 RX ORDER — SEMAGLUTIDE 2.68 MG/ML
2 INJECTION, SOLUTION SUBCUTANEOUS WEEKLY
Qty: 3 ML | Refills: 5 | Status: SHIPPED | OUTPATIENT
Start: 2025-05-05

## 2025-05-07 ENCOUNTER — OFFICE VISIT (OUTPATIENT)
Age: 80
End: 2025-05-07
Payer: MEDICARE

## 2025-05-07 VITALS
HEART RATE: 66 BPM | HEIGHT: 64 IN | BODY MASS INDEX: 36.88 KG/M2 | SYSTOLIC BLOOD PRESSURE: 98 MMHG | DIASTOLIC BLOOD PRESSURE: 62 MMHG | WEIGHT: 216 LBS

## 2025-05-07 DIAGNOSIS — I50.42 CHRONIC COMBINED SYSTOLIC AND DIASTOLIC CONGESTIVE HEART FAILURE: ICD-10-CM

## 2025-05-07 DIAGNOSIS — Z79.899 ON DOFETILIDE THERAPY: Primary | ICD-10-CM

## 2025-05-07 DIAGNOSIS — R06.09 DOE (DYSPNEA ON EXERTION): ICD-10-CM

## 2025-05-07 NOTE — PROGRESS NOTES
Date of Office Visit: 2025  Encounter Provider: Reginald Vazquez MD  Place of Service: [facility] CARDIOLOGY  Patient Name: Ray Pratt  : 1945    Subjective:     Encounter Date:2025      Patient ID: Ray Pratt is a 79 y.o. male who has a cc of  PT OF DR DOAN who is on dofeltide for pers AF x 5 years.     He is doing pretty well w some martin and low endurance.     The patient had a good year.   No anginal chest pain,      No soa,   No fainting,  No orthostasis.   No edema.   Exercise tolerance: decent     There have been no hospital admission since the last visit.     There have been no bleeding events.       Past Medical History:   Diagnosis Date    Acute combined systolic and diastolic congestive heart failure     Atrial fibrillation     Atrial flutter with rapid ventricular response     CHF (congestive heart failure)     Dilated cardiomyopathy     Hyperlipidemia     Hypertension     Low-tension glaucoma of right eye     Nonischemic cardiomyopathy     Obesity     Ocular hypertension of left eye     On dofetilide therapy     MARCOS (obstructive sleep apnea)     compliant with BiPAP    Persistent atrial fibrillation     Pneumonia of left lung due to Haemophilus influenzae 2017    Ptosis of right eyelid     Sepsis 2017    Type 2 diabetes mellitus, without long-term current use of insulin        Social History     Socioeconomic History    Marital status:    Tobacco Use    Smoking status: Never     Passive exposure: Never    Smokeless tobacco: Never   Vaping Use    Vaping status: Never Used   Substance and Sexual Activity    Alcohol use: Never    Drug use: Never    Sexual activity: Yes     Partners: Female     Birth control/protection: Other       Family History   Problem Relation Age of Onset    Stroke Father     Epilepsy Sister     Fainting Sister     Arthritis Sister         back problems       Review of Systems   Constitutional: Negative for fever and  "night sweats.   HENT:  Negative for ear pain and stridor.    Eyes:  Negative for discharge and visual halos.   Cardiovascular:  Negative for cyanosis.   Respiratory:  Negative for hemoptysis and sputum production.    Hematologic/Lymphatic: Negative for adenopathy.   Skin:  Negative for nail changes and unusual hair distribution.   Musculoskeletal:  Negative for gout and joint swelling.   Gastrointestinal:  Negative for bowel incontinence and flatus.   Genitourinary:  Negative for dysuria and flank pain.   Neurological:  Negative for seizures and tremors.   Psychiatric/Behavioral:  Negative for altered mental status. The patient is not nervous/anxious.             Objective:     Vitals:    05/07/25 0856   BP: 98/62   BP Location: Left arm   Patient Position: Sitting   Pulse: 66   Weight: 98 kg (216 lb)   Height: 162.6 cm (64\")         Eyes:      General:         Right eye: No discharge.         Left eye: No discharge.   HENT:      Head: Normocephalic and atraumatic.   Neck:      Thyroid: No thyromegaly.      Vascular: No JVD.   Pulmonary:      Effort: Pulmonary effort is normal.      Breath sounds: Normal breath sounds. No rales.   Cardiovascular:      Normal rate. Occasional ectopic beats. Regular rhythm.      No gallop.    Edema:     Peripheral edema absent.   Abdominal:      General: Bowel sounds are normal.      Palpations: Abdomen is soft.      Tenderness: There is no abdominal tenderness.   Musculoskeletal: Normal range of motion.         General: No deformity. Skin:     General: Skin is warm and dry.      Findings: No erythema.   Neurological:      Mental Status: Alert and oriented to person, place, and time.      Motor: Normal muscle tone.   Psychiatric:         Behavior: Behavior normal.         Thought Content: Thought content normal.           ECG 12 Lead    Date/Time: 5/7/2025 9:50 AM  Performed by: Reginald Vazquez MD    Authorized by: Reginald Vazquez MD  Comparison: compared with previous ECG   Similar " to previous ECG  Rhythm: sinus rhythm  Ectopy: atrial premature contractions  Conduction: right bundle branch block          Lab Review:       Assessment:          Diagnosis Plan   1. On dofetilide therapy        2. VILLA (dyspnea on exertion)        3. Chronic combined systolic and diastolic congestive heart failure               Plan:     No AF so that is GREAT. QT is ok. On a-ban.     Soft BP today and bradycardia. Let's stop the atenolol.

## 2025-06-02 ENCOUNTER — OFFICE VISIT (OUTPATIENT)
Dept: INTERNAL MEDICINE | Facility: CLINIC | Age: 80
End: 2025-06-02
Payer: MEDICARE

## 2025-06-02 VITALS
BODY MASS INDEX: 36.54 KG/M2 | TEMPERATURE: 98.2 F | HEIGHT: 64 IN | OXYGEN SATURATION: 95 % | DIASTOLIC BLOOD PRESSURE: 70 MMHG | HEART RATE: 93 BPM | SYSTOLIC BLOOD PRESSURE: 102 MMHG | WEIGHT: 214 LBS

## 2025-06-02 DIAGNOSIS — E11.9 TYPE 2 DIABETES MELLITUS WITHOUT COMPLICATION, WITHOUT LONG-TERM CURRENT USE OF INSULIN: Primary | ICD-10-CM

## 2025-06-02 DIAGNOSIS — E66.01 MORBID (SEVERE) OBESITY DUE TO EXCESS CALORIES: ICD-10-CM

## 2025-06-02 DIAGNOSIS — I48.19 ATRIAL FIBRILLATION, PERSISTENT: ICD-10-CM

## 2025-06-02 DIAGNOSIS — I10 BENIGN ESSENTIAL HTN: ICD-10-CM

## 2025-06-02 DIAGNOSIS — I50.42 CHRONIC COMBINED SYSTOLIC AND DIASTOLIC CONGESTIVE HEART FAILURE: ICD-10-CM

## 2025-06-02 PROCEDURE — 1126F AMNT PAIN NOTED NONE PRSNT: CPT | Performed by: STUDENT IN AN ORGANIZED HEALTH CARE EDUCATION/TRAINING PROGRAM

## 2025-06-02 PROCEDURE — 3078F DIAST BP <80 MM HG: CPT | Performed by: STUDENT IN AN ORGANIZED HEALTH CARE EDUCATION/TRAINING PROGRAM

## 2025-06-02 PROCEDURE — 3074F SYST BP LT 130 MM HG: CPT | Performed by: STUDENT IN AN ORGANIZED HEALTH CARE EDUCATION/TRAINING PROGRAM

## 2025-06-02 PROCEDURE — G2211 COMPLEX E/M VISIT ADD ON: HCPCS | Performed by: STUDENT IN AN ORGANIZED HEALTH CARE EDUCATION/TRAINING PROGRAM

## 2025-06-02 PROCEDURE — 99214 OFFICE O/P EST MOD 30 MIN: CPT | Performed by: STUDENT IN AN ORGANIZED HEALTH CARE EDUCATION/TRAINING PROGRAM

## 2025-06-02 RX ORDER — SEMAGLUTIDE 2.68 MG/ML
2 INJECTION, SOLUTION SUBCUTANEOUS WEEKLY
Qty: 9 ML | Refills: 2 | Status: SHIPPED | OUTPATIENT
Start: 2025-06-02

## 2025-06-02 NOTE — PROGRESS NOTES
Marcial Jackson DO, FACP  Internal Medicine  [facility]  [address], [address]  [place] [address]  [phone]    Chief Complaint  Diabetes follow up    SUBJECTIVE    History of Present Illness    Ray Pratt is a 79 y.o. male who presents to the office today as an established patient that last saw me on 2/21/2025.      Atrial fib/ HTN/CHF/NICM: follows with [facility]. Takes dofetilide 125 mcg twice daily and anticoagulated with eliquis 5 mg twice daily. Also takes lasix 40 mg daily and takes potassium supplementation 20 meq daily. Doesn't check BP at home. Atenolol 25 mg once daily was stopped by cardiology due to low HR and low BP. Doesn't believe he has had any lightheaded recently. States he can get tired after exercising but no chest pains and no trouble breathing.    Type 2 diabetes/obesity: takes metformin ER 1000 mg daily, empagliflozin 25 mg daily and ozempic 2 mg. Feels that he has lost some weight but now it has gotten slower to a pound per week perhaps. Starting weight 231 lbs, today at 214 lbs. Doesn't check blood sugar regularly at home. States he eats smaller portions than he used to and he feels full quicker. For exercise he does some yard work only.   Lab Results   Component Value Date    HGBA1C 6.40 (H) 02/21/2025       No Known Allergies     Outpatient Medications Marked as Taking for the 6/2/25 encounter (Office Visit) with Marcial Jackson DO   Medication Sig Dispense Refill    dofetilide (TIKOSYN) 125 MCG capsule TAKE 1 CAPSULE BY MOUTH EVERY 12 HOURS. 180 capsule 1    Eliquis 5 MG tablet tablet TAKE 1 TABLET BY MOUTH EVERY 12 HOURS 60 tablet 10    furosemide (LASIX) 40 MG tablet Take 1 tablet by mouth Daily. 90 tablet 3    Jardiance 25 MG tablet tablet TAKE 1 TABLET BY MOUTH EVERY DAY WITH BREAKFAST 90 tablet 1    loratadine (CLARITIN) 10 MG tablet TAKE 1 TABLET BY MOUTH EVERY DAY 90 tablet 1    metFORMIN ER (GLUCOPHAGE-XR) 500 MG 24 hr tablet Take 2  "tablets by mouth Daily With Breakfast. 180 tablet 1    potassium chloride (MICRO-K) 10 MEQ CR capsule TAKE 2 CAPSULES BY MOUTH EVERY  capsule 2    rosuvastatin (CRESTOR) 20 MG tablet TAKE 1 TABLET BY MOUTH EVERY DAY 90 tablet 1    Semaglutide, 2 MG/DOSE, (Ozempic, 2 MG/DOSE,) 8 MG/3ML solution pen-injector Inject 2 mg under the skin into the appropriate area as directed 1 (One) Time Per Week. 9 mL 2    [DISCONTINUED] Semaglutide, 2 MG/DOSE, (Ozempic, 2 MG/DOSE,) 8 MG/3ML solution pen-injector INJECT 2 MG UNDER THE SKIN INTO THE APPROPRIATE AREA AS DIRECTED 1 (ONE) TIME PER WEEK. 3 mL 5        Past Medical History:   Diagnosis Date    Acute combined systolic and diastolic congestive heart failure     Atrial fibrillation     Atrial flutter with rapid ventricular response     CHF (congestive heart failure)     Dilated cardiomyopathy     Hyperlipidemia     Hypertension     Low-tension glaucoma of right eye     Nonischemic cardiomyopathy     Obesity     Ocular hypertension of left eye     On dofetilide therapy     MARCOS (obstructive sleep apnea)     compliant with BiPAP    Persistent atrial fibrillation     Pneumonia of left lung due to Haemophilus influenzae 01/18/2017    Ptosis of right eyelid     Sepsis 01/18/2017    Type 2 diabetes mellitus, without long-term current use of insulin        OBJECTIVE    Vital Signs:   /70   Pulse 93   Temp 98.2 °F (36.8 °C) (Infrared)   Ht 162.6 cm (64\")   Wt 97.1 kg (214 lb)   SpO2 95%   BMI 36.73 kg/m²        Physical Exam  Vitals reviewed.   Constitutional:       General: He is not in acute distress.     Appearance: He is obese. He is not ill-appearing.   Eyes:      General: No scleral icterus.  Cardiovascular:      Rate and Rhythm: Normal rate and regular rhythm.      Heart sounds: Normal heart sounds. No murmur heard.  Pulmonary:      Effort: Pulmonary effort is normal. No respiratory distress.      Breath sounds: Normal breath sounds. No wheezing. "   Musculoskeletal:      Right lower leg: Edema (trace pretibial and ankle) present.      Left lower leg: Edema (trace pretibial and ankle) present.   Skin:     Coloration: Skin is not jaundiced.   Neurological:      Mental Status: He is alert.   Psychiatric:         Mood and Affect: Mood normal.         Behavior: Behavior normal.         Thought Content: Thought content normal.                             ASSESSMENT & PLAN     Diagnoses and all orders for this visit:    1. Type 2 diabetes mellitus without complication, without long-term current use of insulin (Primary)  2. Morbid (severe) obesity due to excess calories  -A1c trends on file for this patient:   A1C Last 3 Results          8/19/2024    08:37 11/20/2024    10:21 2/21/2025    10:50   HGBA1C Last 3 Results   Hemoglobin A1C 7.2  6.80  6.40      -Goal A1c for this patient is less than 7.0%  -Current diabetes regimen:takes metformin ER 1000 mg daily, empagliflozin 25 mg daily and ozempic 2 mg. Feels that he has lost some weight but now it has gotten slower to a pound per week perhaps. Starting weight 231 lbs, today at 214 lbs. Doesn't check blood sugar regularly at home. States he eats smaller portions than he used to and he feels full quicker. For exercise he does some yard work only.   -Changes made to diabetes regimen today: recheck A1c today. Has had improvement with treatment with Ozempic. Weight down as above, great news. Stressed importance of regular exercise. Plan to continue current regimen if A1c still at goal.   -Diabetic kidney disease screening: history of positive screening with improvement to normal on most recent annual monitoring   -check CMP and CBC     3. Benign essential HTN  4. Atrial fibrillation, persistent  5. Chronic combined systolic and diastolic congestive heart failure  -follows with [facility]. Takes dofetilide 125 mcg twice daily and anticoagulated with eliquis 5 mg twice daily. Also takes lasix 40 mg daily and takes  potassium supplementation 20 meq daily. Doesn't check BP at home. Atenolol 25 mg once daily was stopped by cardiology due to low HR and low BP. Doesn't believe he has had any lightheaded recently. States he can get tired after exercising but no chest pains and no trouble breathing.  -I reviewed most recent cardiology progress note. BP on the lower end of acceptable today at 102 /70 but he is already off antihypertensives.will need to monitor signs and symptoms of hypotension  -volume states is pretty close to euvoemic today, lung sounds clear. Continue current medications. Check renal function and electrolytes.             Follow Up  Return in about 3 months (around 9/2/2025).    Patient/family had no further questions at this time and verbalized understanding of the plan discussed today.

## 2025-06-03 LAB
ALBUMIN SERPL-MCNC: 4.1 G/DL (ref 3.5–5.2)
ALBUMIN/GLOB SERPL: 1.5 G/DL
ALP SERPL-CCNC: 69 U/L (ref 39–117)
ALT SERPL-CCNC: 14 U/L (ref 1–41)
AST SERPL-CCNC: 21 U/L (ref 1–40)
BASOPHILS # BLD AUTO: 0.06 10*3/MM3 (ref 0–0.2)
BASOPHILS NFR BLD AUTO: 0.8 % (ref 0–1.5)
BILIRUB SERPL-MCNC: 0.4 MG/DL (ref 0–1.2)
BUN SERPL-MCNC: 15 MG/DL (ref 8–23)
BUN/CREAT SERPL: 16.9 (ref 7–25)
CALCIUM SERPL-MCNC: 9.2 MG/DL (ref 8.6–10.5)
CHLORIDE SERPL-SCNC: 102 MMOL/L (ref 98–107)
CO2 SERPL-SCNC: 28.5 MMOL/L (ref 22–29)
CREAT SERPL-MCNC: 0.89 MG/DL (ref 0.76–1.27)
EGFRCR SERPLBLD CKD-EPI 2021: 87.2 ML/MIN/1.73
EOSINOPHIL # BLD AUTO: 0.1 10*3/MM3 (ref 0–0.4)
EOSINOPHIL NFR BLD AUTO: 1.4 % (ref 0.3–6.2)
ERYTHROCYTE [DISTWIDTH] IN BLOOD BY AUTOMATED COUNT: 14.3 % (ref 12.3–15.4)
GLOBULIN SER CALC-MCNC: 2.7 GM/DL
GLUCOSE SERPL-MCNC: 89 MG/DL (ref 65–99)
HBA1C MFR BLD: 5.9 % (ref 4.8–5.6)
HCT VFR BLD AUTO: 44 % (ref 37.5–51)
HGB BLD-MCNC: 14.2 G/DL (ref 13–17.7)
IMM GRANULOCYTES # BLD AUTO: 0.03 10*3/MM3 (ref 0–0.05)
IMM GRANULOCYTES NFR BLD AUTO: 0.4 % (ref 0–0.5)
LYMPHOCYTES # BLD AUTO: 1.93 10*3/MM3 (ref 0.7–3.1)
LYMPHOCYTES NFR BLD AUTO: 26.1 % (ref 19.6–45.3)
MCH RBC QN AUTO: 29.5 PG (ref 26.6–33)
MCHC RBC AUTO-ENTMCNC: 32.3 G/DL (ref 31.5–35.7)
MCV RBC AUTO: 91.5 FL (ref 79–97)
MONOCYTES # BLD AUTO: 0.8 10*3/MM3 (ref 0.1–0.9)
MONOCYTES NFR BLD AUTO: 10.8 % (ref 5–12)
NEUTROPHILS # BLD AUTO: 4.47 10*3/MM3 (ref 1.7–7)
NEUTROPHILS NFR BLD AUTO: 60.5 % (ref 42.7–76)
NRBC BLD AUTO-RTO: 0 /100 WBC (ref 0–0.2)
PLATELET # BLD AUTO: 234 10*3/MM3 (ref 140–450)
POTASSIUM SERPL-SCNC: 4.2 MMOL/L (ref 3.5–5.2)
PROT SERPL-MCNC: 6.8 G/DL (ref 6–8.5)
RBC # BLD AUTO: 4.81 10*6/MM3 (ref 4.14–5.8)
SODIUM SERPL-SCNC: 140 MMOL/L (ref 136–145)
WBC # BLD AUTO: 7.39 10*3/MM3 (ref 3.4–10.8)

## 2025-06-19 ENCOUNTER — OFFICE VISIT (OUTPATIENT)
Dept: SLEEP MEDICINE | Facility: HOSPITAL | Age: 80
End: 2025-06-19
Payer: MEDICARE

## 2025-06-19 VITALS
SYSTOLIC BLOOD PRESSURE: 114 MMHG | BODY MASS INDEX: 36.54 KG/M2 | WEIGHT: 214 LBS | OXYGEN SATURATION: 92 % | HEART RATE: 80 BPM | HEIGHT: 64 IN | DIASTOLIC BLOOD PRESSURE: 76 MMHG

## 2025-06-19 DIAGNOSIS — E11.65 TYPE 2 DIABETES MELLITUS WITH HYPERGLYCEMIA, WITHOUT LONG-TERM CURRENT USE OF INSULIN: ICD-10-CM

## 2025-06-19 DIAGNOSIS — G47.31 PRIMARY CENTRAL SLEEP APNEA: Primary | ICD-10-CM

## 2025-06-19 DIAGNOSIS — G47.33 OBSTRUCTIVE SLEEP APNEA: ICD-10-CM

## 2025-06-19 DIAGNOSIS — E66.01 OBESITY, MORBID, BMI 40.0-49.9: ICD-10-CM

## 2025-06-19 PROCEDURE — G0463 HOSPITAL OUTPT CLINIC VISIT: HCPCS

## 2025-06-19 NOTE — PROGRESS NOTES
"  [facility]  [address]  [address]  [place] [address]  Phone   Fax       SLEEP CLINIC FOLLOW UP PROGRESS NOTE.    Ray Pratt  [id number]   1945  79 y.o.  male      PCP: Marcial Jackson DO      Date of visit: 6/19/2025    Chief Complaint   Patient presents with    Sleep Apnea       HPI:  This is a 79 y.o. years old patient is here for the management of complex sleep apnea..  She is a patient of Dr. Meng but I have reviewed the sleep study.  Patient has a complex sleep apnea with a total AHI of 72/h out of it central sleep index is 54/h.  He was on BiPAP ST with a BiPAP pressure of 1511 with a backup rate of 10.  Unfortunately he had a right-sided pneumothorax which was treated with a chest tube.  He is being followed by thoracic surgery and he has blebs  Normal bedtime 11 PM  Wake time 8:30 AM    Medications and allergies are reviewed by me and documented in the encounter.     SOCIAL (habits pertaining to sleep medicine)  History tobacco use:No   History of alcohol use: 0 per week  Caffeine use: 2     REVIEW OF SYSTEMS:   Pertaining positive symptoms are:  [place] Sleepiness Scale :Total score: 10         PHYSICAL EXAMINATION:  CONSTITUTIONAL:  Vitals:    06/19/25 0944   BP: 114/76   Pulse: 80   SpO2: 92%   Weight: 97.1 kg (214 lb)   Height: 162.6 cm (64\")    Body mass index is 36.73 kg/m².   NOSE: nasal passages are clear, No deformities noted   RESP SYSTEM: Not in any respiratory distress, no chest deformities noted,   CARDIOVASULAR: No edema noted  NEURO: Oriented x 3, gait normal,  Mood and affect appeared appropriate      Data reviewed:  The Smart card downloaded on 6/19/2025 has been reviewed independently by me for compliance and discussed the data with the patient.  Date of the download is February 8, 2023 to March 9, 2023 and he has not used after the pneumothorax was discovered  Compliance; 93%  More than 4 hr use, 93%  Average use of the " device 6 hours and 25 minutes night  Residual AHI: 5.4 /hr   Device: [employer] testing  DME: [facility]      ASSESSMENT AND PLAN:  Complex sleep apnea with a predominantly central sleep apnea.  He is a very complicated situation.  Patient had pneumothorax which was treated.  He is tolerating the reduced BiPAP ST pressure. The AHI is acceptable and he is using an average of 6  hours and 25 minutes and is benefiting from the device.  The device is medically necessary.  I will see him in about 1 year for follow-up with the he will call the sleep center if he has any problems  History of right-sided pneumothorax  Obesity  2 with BMI is Body mass index is 36.73 kg/m².. I have discuss the relationship between the weight and sleep apnea. The benefit of weight loss in reducing severity of sleep apnea was discussed. Discussed diet and exercise with the patient to achieve ideal BMI.  He has lost weight  Return in about 1 year (around 6/19/2026) for with smart card down load. . Patient's questions were answered.    6/19/2025  Lulu Meléndez MD  Sleep Medicine.  Medical Director,   [facility], [place] and [place] sleep centers.

## 2025-08-06 RX ORDER — DOFETILIDE 0.12 MG/1
125 CAPSULE ORAL EVERY 12 HOURS
Qty: 180 CAPSULE | Refills: 1 | Status: SHIPPED | OUTPATIENT
Start: 2025-08-06

## 2025-08-13 RX ORDER — ROSUVASTATIN CALCIUM 20 MG/1
20 TABLET, COATED ORAL DAILY
Qty: 90 TABLET | Refills: 1 | Status: SHIPPED | OUTPATIENT
Start: 2025-08-13

## (undated) DEVICE — GLIDESHEATH SLENDER STAINLESS STEEL KIT: Brand: GLIDESHEATH SLENDER

## (undated) DEVICE — CATH VENT MIV RADL PIG ST TIP 5F 110CM

## (undated) DEVICE — TR BAND RADIAL ARTERY COMPRESSION DEVICE: Brand: TR BAND

## (undated) DEVICE — KT MANIFLD CARDIAC

## (undated) DEVICE — PK CATH CARD 40

## (undated) DEVICE — GW EMR FIX EXCHG J STD .035 3MM 260CM

## (undated) DEVICE — CATH DIAG IMPULSE FR4 5F 100CM

## (undated) DEVICE — CATH DIAG IMPULSE FL3.5 5F 100CM